# Patient Record
Sex: MALE | Race: OTHER | HISPANIC OR LATINO | Employment: OTHER | URBAN - METROPOLITAN AREA
[De-identification: names, ages, dates, MRNs, and addresses within clinical notes are randomized per-mention and may not be internally consistent; named-entity substitution may affect disease eponyms.]

---

## 2021-05-17 ENCOUNTER — VBI (OUTPATIENT)
Dept: ADMINISTRATIVE | Facility: OTHER | Age: 56
End: 2021-05-17

## 2021-06-23 RX ORDER — HYDROCHLOROTHIAZIDE 25 MG/1
TABLET ORAL EVERY 24 HOURS
COMMUNITY
Start: 2014-12-08 | End: 2021-07-19 | Stop reason: SDUPTHER

## 2021-06-23 RX ORDER — ATORVASTATIN CALCIUM 20 MG/1
TABLET, FILM COATED ORAL EVERY 24 HOURS
COMMUNITY
End: 2021-09-28 | Stop reason: HOSPADM

## 2021-06-24 ENCOUNTER — HOSPITAL ENCOUNTER (EMERGENCY)
Facility: HOSPITAL | Age: 56
Discharge: HOME | End: 2021-06-24
Attending: EMERGENCY MEDICINE | Admitting: EMERGENCY MEDICINE
Payer: COMMERCIAL

## 2021-06-24 ENCOUNTER — OFFICE VISIT (OUTPATIENT)
Dept: FAMILY MEDICINE CLINIC | Facility: CLINIC | Age: 56
End: 2021-06-24

## 2021-06-24 ENCOUNTER — APPOINTMENT (EMERGENCY)
Dept: CT IMAGING | Facility: HOSPITAL | Age: 56
End: 2021-06-24
Payer: COMMERCIAL

## 2021-06-24 VITALS
HEART RATE: 94 BPM | HEIGHT: 62 IN | BODY MASS INDEX: 30.11 KG/M2 | SYSTOLIC BLOOD PRESSURE: 232 MMHG | DIASTOLIC BLOOD PRESSURE: 88 MMHG | WEIGHT: 163.6 LBS | RESPIRATION RATE: 20 BRPM | OXYGEN SATURATION: 98 % | TEMPERATURE: 98.4 F

## 2021-06-24 VITALS
OXYGEN SATURATION: 98 % | SYSTOLIC BLOOD PRESSURE: 179 MMHG | RESPIRATION RATE: 18 BRPM | DIASTOLIC BLOOD PRESSURE: 75 MMHG | TEMPERATURE: 98.6 F | HEART RATE: 82 BPM

## 2021-06-24 DIAGNOSIS — I51.89 GRADE I DIASTOLIC DYSFUNCTION: ICD-10-CM

## 2021-06-24 DIAGNOSIS — R51.9 HEADACHE: ICD-10-CM

## 2021-06-24 DIAGNOSIS — Z12.11 SCREENING FOR COLON CANCER: ICD-10-CM

## 2021-06-24 DIAGNOSIS — I16.1 HYPERTENSIVE EMERGENCY: Primary | ICD-10-CM

## 2021-06-24 DIAGNOSIS — I10 HYPERTENSION, UNSPECIFIED TYPE: ICD-10-CM

## 2021-06-24 DIAGNOSIS — I10 HYPERTENSION, UNSPECIFIED TYPE: Primary | ICD-10-CM

## 2021-06-24 LAB
ALBUMIN SERPL BCP-MCNC: 3.3 G/DL (ref 3.5–5)
ALP SERPL-CCNC: 123 U/L (ref 46–116)
ALT SERPL W P-5'-P-CCNC: 30 U/L (ref 12–78)
ANION GAP SERPL CALCULATED.3IONS-SCNC: 9 MMOL/L (ref 4–13)
AST SERPL W P-5'-P-CCNC: 32 U/L (ref 5–45)
ATRIAL RATE: 86 BPM
BASOPHILS # BLD AUTO: 0.03 THOUSANDS/ΜL (ref 0–0.1)
BASOPHILS NFR BLD AUTO: 1 % (ref 0–1)
BILIRUB SERPL-MCNC: 0.75 MG/DL (ref 0.2–1)
BUN SERPL-MCNC: 6 MG/DL (ref 5–25)
CALCIUM ALBUM COR SERPL-MCNC: 9.4 MG/DL (ref 8.3–10.1)
CALCIUM SERPL-MCNC: 8.8 MG/DL (ref 8.3–10.1)
CHLORIDE SERPL-SCNC: 96 MMOL/L (ref 100–108)
CO2 SERPL-SCNC: 29 MMOL/L (ref 21–32)
CREAT SERPL-MCNC: 0.73 MG/DL (ref 0.6–1.3)
EOSINOPHIL # BLD AUTO: 0.01 THOUSAND/ΜL (ref 0–0.61)
EOSINOPHIL NFR BLD AUTO: 0 % (ref 0–6)
ERYTHROCYTE [DISTWIDTH] IN BLOOD BY AUTOMATED COUNT: 12 % (ref 11.6–15.1)
GFR SERPL CREATININE-BSD FRML MDRD: 104 ML/MIN/1.73SQ M
GLUCOSE SERPL-MCNC: 140 MG/DL (ref 65–140)
HCT VFR BLD AUTO: 46.1 % (ref 36.5–49.3)
HGB BLD-MCNC: 16.1 G/DL (ref 12–17)
IMM GRANULOCYTES # BLD AUTO: 0.02 THOUSAND/UL (ref 0–0.2)
IMM GRANULOCYTES NFR BLD AUTO: 0 % (ref 0–2)
LYMPHOCYTES # BLD AUTO: 0.93 THOUSANDS/ΜL (ref 0.6–4.47)
LYMPHOCYTES NFR BLD AUTO: 14 % (ref 14–44)
MCH RBC QN AUTO: 33.8 PG (ref 26.8–34.3)
MCHC RBC AUTO-ENTMCNC: 34.9 G/DL (ref 31.4–37.4)
MCV RBC AUTO: 97 FL (ref 82–98)
MONOCYTES # BLD AUTO: 0.56 THOUSAND/ΜL (ref 0.17–1.22)
MONOCYTES NFR BLD AUTO: 9 % (ref 4–12)
NEUTROPHILS # BLD AUTO: 4.93 THOUSANDS/ΜL (ref 1.85–7.62)
NEUTS SEG NFR BLD AUTO: 76 % (ref 43–75)
NRBC BLD AUTO-RTO: 0 /100 WBCS
P AXIS: 9 DEGREES
PLATELET # BLD AUTO: 142 THOUSANDS/UL (ref 149–390)
PMV BLD AUTO: 9.7 FL (ref 8.9–12.7)
POTASSIUM SERPL-SCNC: 4.1 MMOL/L (ref 3.5–5.3)
PR INTERVAL: 178 MS
PROT SERPL-MCNC: 7.8 G/DL (ref 6.4–8.2)
QRS AXIS: 14 DEGREES
QRSD INTERVAL: 86 MS
QT INTERVAL: 336 MS
QTC INTERVAL: 402 MS
RBC # BLD AUTO: 4.77 MILLION/UL (ref 3.88–5.62)
SODIUM SERPL-SCNC: 134 MMOL/L (ref 136–145)
T WAVE AXIS: 213 DEGREES
TROPONIN I SERPL-MCNC: 0.04 NG/ML
VENTRICULAR RATE: 86 BPM
WBC # BLD AUTO: 6.48 THOUSAND/UL (ref 4.31–10.16)

## 2021-06-24 PROCEDURE — 99284 EMERGENCY DEPT VISIT MOD MDM: CPT

## 2021-06-24 PROCEDURE — 93010 ELECTROCARDIOGRAM REPORT: CPT | Performed by: INTERNAL MEDICINE

## 2021-06-24 PROCEDURE — 99213 OFFICE O/P EST LOW 20 MIN: CPT | Performed by: FAMILY MEDICINE

## 2021-06-24 PROCEDURE — 84484 ASSAY OF TROPONIN QUANT: CPT | Performed by: EMERGENCY MEDICINE

## 2021-06-24 PROCEDURE — 70450 CT HEAD/BRAIN W/O DYE: CPT

## 2021-06-24 PROCEDURE — 99285 EMERGENCY DEPT VISIT HI MDM: CPT | Performed by: EMERGENCY MEDICINE

## 2021-06-24 PROCEDURE — 3078F DIAST BP <80 MM HG: CPT | Performed by: FAMILY MEDICINE

## 2021-06-24 PROCEDURE — 96375 TX/PRO/DX INJ NEW DRUG ADDON: CPT

## 2021-06-24 PROCEDURE — 36415 COLL VENOUS BLD VENIPUNCTURE: CPT | Performed by: EMERGENCY MEDICINE

## 2021-06-24 PROCEDURE — 96374 THER/PROPH/DIAG INJ IV PUSH: CPT

## 2021-06-24 PROCEDURE — G1004 CDSM NDSC: HCPCS

## 2021-06-24 PROCEDURE — 93005 ELECTROCARDIOGRAM TRACING: CPT

## 2021-06-24 PROCEDURE — 85025 COMPLETE CBC W/AUTO DIFF WBC: CPT | Performed by: EMERGENCY MEDICINE

## 2021-06-24 PROCEDURE — 80053 COMPREHEN METABOLIC PANEL: CPT | Performed by: EMERGENCY MEDICINE

## 2021-06-24 PROCEDURE — 3077F SYST BP >= 140 MM HG: CPT | Performed by: FAMILY MEDICINE

## 2021-06-24 RX ORDER — LABETALOL 20 MG/4 ML (5 MG/ML) INTRAVENOUS SYRINGE
10 ONCE
Status: DISCONTINUED | OUTPATIENT
Start: 2021-06-24 | End: 2021-06-24

## 2021-06-24 RX ORDER — LABETALOL 20 MG/4 ML (5 MG/ML) INTRAVENOUS SYRINGE
10 ONCE
Status: COMPLETED | OUTPATIENT
Start: 2021-06-24 | End: 2021-06-24

## 2021-06-24 RX ORDER — KETOROLAC TROMETHAMINE 30 MG/ML
30 INJECTION, SOLUTION INTRAMUSCULAR; INTRAVENOUS ONCE
Status: COMPLETED | OUTPATIENT
Start: 2021-06-24 | End: 2021-06-24

## 2021-06-24 RX ADMIN — KETOROLAC TROMETHAMINE 30 MG: 30 INJECTION, SOLUTION INTRAMUSCULAR; INTRAVENOUS at 16:25

## 2021-06-24 RX ADMIN — LABETALOL 20 MG/4 ML (5 MG/ML) INTRAVENOUS SYRINGE 10 MG: at 14:53

## 2021-06-24 NOTE — ED NOTES
Patient's mother provided update via phone at this time  She reports she will come pick him up       Chuy Valdez RN  06/24/21 4877

## 2021-06-24 NOTE — PROGRESS NOTES
Assessment/Plan:    Hypertensive emergency  Assessment:   Blood Pressure 1st reading 232/88 mmHg(obtain by nurse);  225/90  MmHg (obtain by me), poorly controlled  Goal BP less than 140/90 mmHg as per JNC-8  Complains significant for end-organ damage  EKG obtained at the office: NSR, possible q-waves and  T-was inversion in V4, V5, and V6  Currently is not taking any medication to controlled his HTN  No EKG on file to compare these findings     DDx: ACS, old MI, or aortic dissection  Plant:  - After discussing with the patient our findings it was recommended transport him to the ED via EMS; patient agreed  -EMS to transfer the patient to [facility] for a Higher level of care were given with copy of EKG obtained at the clinic    -Reassess the patient out our office once he is D/C from hospital for better control of BP  Diagnoses and all orders for this visit:    Hypertensive emergency    Hypertension, unspecified type    Grade I diastolic dysfunction  -     POCT ECG    Screening for colon cancer    Other orders  -     atorvastatin (LIPITOR) 20 mg tablet; every 24 hours  -     hydrochlorothiazide (HYDRODIURIL) 25 mg tablet; every 24 hours  -     Cancel: Ambulatory referral to Colorectal Surgery; Future          Subjective:      Patient ID: Prerna Peterson is a 64 y o  male  Encounter vas performed with the help of the mother  This is a pleasant 64 y o  Male who is deaf 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, with a PMH pertinent for HTN and grade I diastolic dysfunction, presents to our clinic with his mother, who is helping to translate during the encounter  The mother states that the patient has complained of headaches, blurry vision, and chest tightness for a couple of days   A series of questions were written for better understanding, including location, intensity, severity, quality, radiation, and associators: the location frontotemporal headache, 7/10 intensity, throbbing in quality, that started "a couple of days ago", associated with blurry vision and chest tightness  Denies palpitations, LE edema, Cough, SOB  He has not been evaluated by his PCP "in a long time"  His mother states she thought the patient was getting regular checkups with PCP  The mother is also concern regarding the patient's increased in alcohol intake  The following portions of the patient's history were reviewed and updated as appropriate: allergies, current medications, past family history, past medical history, past social history, past surgical history and problem list     Review of Systems   Constitutional: Negative for chills and fever  Eyes: Positive for visual disturbance  Blurry vision   Respiratory: Negative for cough, shortness of breath and wheezing  Cardiovascular: Negative for palpitations and leg swelling  Chest tightness    Gastrointestinal: Negative for nausea and vomiting  Neurological: Positive for headaches  Objective:      BP (!) 232/88 (BP Location: Left arm, Patient Position: Sitting, Cuff Size: Standard)   Pulse 94   Temp 98 4 °F (36 9 °C) (Temporal)   Resp 20   Ht 5' 1 75" (1 568 m)   Wt 74 2 kg (163 lb 9 6 oz)   SpO2 98%   BMI 30 17 kg/m²          Physical Exam  Vitals (Nurse BP: 232/88 mmHg; Second /90 mmHg 10 min after firt reading, last bp was obtained by me  ) and nursing note reviewed  Constitutional:       General: He is awake  He is not in acute distress  Appearance: He is well-developed  He is obese  He is not ill-appearing, toxic-appearing or diaphoretic  HENT:      Head: Normocephalic and atraumatic  Eyes:      Extraocular Movements: Extraocular movements intact  Conjunctiva/sclera: Conjunctivae normal    Cardiovascular:      Rate and Rhythm: Normal rate and regular rhythm  Occasional extrasystoles are present  Pulses:           Radial pulses are 2+ on the right side and 2+ on the left side  Heart sounds: Normal heart sounds  No murmur heard  No friction rub  No gallop  Pulmonary:      Effort: Pulmonary effort is normal  No respiratory distress  Breath sounds: Normal breath sounds and air entry  Chest:      Chest wall: No lacerations or tenderness  There is no dullness to percussion  Breasts: Breasts are symmetrical      Abdominal:      Palpations: Abdomen is soft  Musculoskeletal:      Cervical back: Normal range of motion and neck supple  Feet:      Right foot:      Skin integrity: No ulcer, skin breakdown, erythema, warmth, callus or dry skin  Left foot:      Skin integrity: No ulcer, skin breakdown, erythema, warmth, callus or dry skin  Skin:     General: Skin is warm  Neurological:      Mental Status: He is alert  Cranial Nerves: Cranial nerve deficit (deafness, chronic, since birth) and dysarthria (chornic, since birth) present  Motor: No weakness, tremor or abnormal muscle tone  Psychiatric:         Behavior: Behavior is cooperative

## 2021-06-24 NOTE — ED PROVIDER NOTES
History  Chief Complaint   Patient presents with    Hypertension     Pt presents from [facility] after staff noted BP of 200/100's and EKG showed changes  Pt reports chronic vision changes and new onset dizziness  PT requires ASL interpretation  15-year-old male was sent over from the clinic for his blood pressure being 200's/100's   + headache, no nausea/vomiting, no chest pain, no shortness of breath  Patient feels dizzy at times  He states that he is not on medication for hypertension  He is deaf and mute and his family is translating for him through lip reading and sign language  Prior to Admission Medications   Prescriptions Last Dose Informant Patient Reported? Taking?   atorvastatin (LIPITOR) 20 mg tablet   Yes No   Sig: every 24 hours   hydrochlorothiazide (HYDRODIURIL) 25 mg tablet   Yes No   Sig: every 24 hours      Facility-Administered Medications: None       Past Medical History:   Diagnosis Date    Deaf     Heart murmur        No past surgical history on file  Family History   Problem Relation Age of Onset    Hypertension Mother     Diabetes Mother     Heart disease Mother         pacemaker    Arthritis Mother         chronic     I have reviewed and agree with the history as documented  E-Cigarette/Vaping    E-Cigarette Use Never User      E-Cigarette/Vaping Substances    Nicotine No     THC No     CBD No     Flavoring No     Other No     Unknown No      Social History     Tobacco Use    Smoking status: Never Smoker    Smokeless tobacco: Never Used   Vaping Use    Vaping Use: Never used   Substance Use Topics    Alcohol use: Not Currently     Comment: 'as per mother pt drinks everyday"     Drug use: Never       Review of Systems   Constitutional: Negative for appetite change, fatigue and fever  HENT: Negative for rhinorrhea and sore throat  Respiratory: Negative for cough, shortness of breath and wheezing      Cardiovascular: Negative for chest pain and leg swelling  Gastrointestinal: Negative for abdominal pain, diarrhea, nausea and vomiting  Genitourinary: Negative for dysuria and flank pain  Musculoskeletal: Negative for back pain and neck pain  Skin: Negative for rash  Neurological: Positive for dizziness and headaches  Negative for syncope  Psychiatric/Behavioral:        Mood normal       Physical Exam  Physical Exam  Vitals and nursing note reviewed  Constitutional:       Appearance: He is well-developed  HENT:      Head: Normocephalic and atraumatic  Eyes:      Pupils: Pupils are equal, round, and reactive to light  Cardiovascular:      Rate and Rhythm: Normal rate and regular rhythm  Pulmonary:      Effort: Pulmonary effort is normal  No respiratory distress  Breath sounds: Normal breath sounds  Abdominal:      Palpations: Abdomen is soft  Tenderness: There is no abdominal tenderness  Musculoskeletal:         General: Normal range of motion  Cervical back: Normal range of motion and neck supple  Skin:     General: Skin is warm and dry  Neurological:      General: No focal deficit present  Mental Status: He is alert and oriented to person, place, and time  Cranial Nerves: No cranial nerve deficit           Vital Signs  ED Triage Vitals [06/24/21 1412]   Temperature Pulse Respirations Blood Pressure SpO2   98 6 °F (37 °C) 90 19 (!) 225/118 96 %      Temp Source Heart Rate Source Patient Position - Orthostatic VS BP Location FiO2 (%)   Oral Monitor Lying Left arm --      Pain Score       --           Vitals:    06/24/21 1412 06/24/21 1625 06/24/21 1645   BP: (!) 225/118 (!) 179/86 (!) 179/75   Pulse: 90 84 82   Patient Position - Orthostatic VS: Lying Lying Lying         Visual Acuity      ED Medications  Medications   Labetalol HCl (NORMODYNE) injection 10 mg (10 mg Intravenous Given 6/24/21 1453)   ketorolac (TORADOL) injection 30 mg (30 mg Intravenous Given 6/24/21 1625)       Diagnostic Studies  Results Reviewed     Procedure Component Value Units Date/Time    Troponin I [[id number]]  (Normal) Collected: 06/24/21 1453    Lab Status: Final result Specimen: Blood from Arm, Left Updated: 06/24/21 1533     Troponin I 0 04 ng/mL     Comprehensive metabolic panel [[id number]]  (Abnormal) Collected: 06/24/21 1453    Lab Status: Final result Specimen: Blood from Arm, Left Updated: 06/24/21 1531     Sodium 134 mmol/L      Potassium 4 1 mmol/L      Chloride 96 mmol/L      CO2 29 mmol/L      ANION GAP 9 mmol/L      BUN 6 mg/dL      Creatinine 0 73 mg/dL      Glucose 140 mg/dL      Calcium 8 8 mg/dL      Corrected Calcium 9 4 mg/dL      AST 32 U/L      ALT 30 U/L      Alkaline Phosphatase 123 U/L      Total Protein 7 8 g/dL      Albumin 3 3 g/dL      Total Bilirubin 0 75 mg/dL      eGFR 104 ml/min/1 73sq m     Narrative:      National Kidney Disease Foundation guidelines for Chronic Kidney Disease (CKD):     Stage 1 with normal or high GFR (GFR > 90 mL/min/1 73 square meters)    Stage 2 Mild CKD (GFR = 60-89 mL/min/1 73 square meters)    Stage 3A Moderate CKD (GFR = 45-59 mL/min/1 73 square meters)    Stage 3B Moderate CKD (GFR = 30-44 mL/min/1 73 square meters)    Stage 4 Severe CKD (GFR = 15-29 mL/min/1 73 square meters)    Stage 5 End Stage CKD (GFR <15 mL/min/1 73 square meters)  Note: GFR calculation is accurate only with a steady state creatinine    CBC and differential [[id number]]  (Abnormal) Collected: 06/24/21 1453    Lab Status: Final result Specimen: Blood from Arm, Left Updated: 06/24/21 1510     WBC 6 48 Thousand/uL      RBC 4 77 Million/uL      Hemoglobin 16 1 g/dL      Hematocrit 46 1 %      MCV 97 fL      MCH 33 8 pg      MCHC 34 9 g/dL      RDW 12 0 %      MPV 9 7 fL      Platelets 324 Thousands/uL      nRBC 0 /100 WBCs      Neutrophils Relative 76 %      Immat GRANS % 0 %      Lymphocytes Relative 14 %      Monocytes Relative 9 %      Eosinophils Relative 0 %      Basophils Relative 1 %      Neutrophils Absolute 4 93 Thousands/µL      Immature Grans Absolute 0 02 Thousand/uL      Lymphocytes Absolute 0 93 Thousands/µL      Monocytes Absolute 0 56 Thousand/µL      Eosinophils Absolute 0 01 Thousand/µL      Basophils Absolute 0 03 Thousands/µL                  CT head without contrast   Final Result by Kathleen Houston MD (06/24 0143)      No acute intracranial abnormality  Workstation performed: [id number]                    Procedures  Procedures         ED Course                                           MDM  Number of Diagnoses or Management Options     Amount and/or Complexity of Data Reviewed  Clinical lab tests: ordered and reviewed  Tests in the radiology section of CPT®: ordered and reviewed    Risk of Complications, Morbidity, and/or Mortality  Presenting problems: moderate  General comments: Patient felt better in the ER  His blood pressure lower to 179/75  On his EKG it shows some T-wave inversion in V4 to V6  He has no old EKG to compare  He has no chest pain and his troponin is negative  He is stable to follow up as an outpatient with a cardiologist -phone number was given        Disposition  Final diagnoses:   Hypertension, unspecified type   Headache     Time reflects when diagnosis was documented in both MDM as applicable and the Disposition within this note     Time User Action Codes Description Comment    6/24/2021  4:14 PM Jae Serra Hypertension, unspecified type     6/24/2021  4:14 PM Lopez-Rodriguez, Ted Lesch Add [R51 9] Headache       ED Disposition     ED Disposition Condition Date/Time Comment    Discharge Stable Thu Jun 24, 2021  4:14 PM Ashley Palmira discharge to home/self care              Follow-up Information     Follow up With Specialties Details Why Contact Info Additional [address]   [address], [address] [address]  [address] [facility], [address], [address], [place], [address] [place] [address]          Discharge Medication List as of 6/24/2021  4:14 PM      CONTINUE these medications which have NOT CHANGED    Details   atorvastatin (LIPITOR) 20 mg tablet every 24 hours, Historical Med      hydrochlorothiazide (HYDRODIURIL) 25 mg tablet every 24 hours, Starting Mon 12/8/2014, Historical Med           No discharge procedures on file      PDMP Review     None          ED Provider  Electronically Signed by           Jenny Murrell MD  06/24/21 9289

## 2021-06-25 NOTE — ASSESSMENT & PLAN NOTE
Assessment:   Blood Pressure 1st reading 232/88 mmHg(obtain by nurse);  225/90  MmHg (obtain by me), poorly controlled  Goal BP less than 140/90 mmHg as per JNC-8  Complains significant for end-organ damage  EKG obtained at the office: NSR, possible q-waves and  T-was inversion in V4, V5, and V6  Currently is not taking any medication to controlled his HTN  No EKG on file to compare these findings     DDx: ACS, old MI, or aortic dissection  Plant:  - After discussing with the patient our findings it was recommended transport him to the ED via EMS; patient agreed  -EMS to transfer the patient to [facility] for a Higher level of care were given with copy of EKG obtained at the clinic    -Reassess the patient out our office once he is D/C from hospital for better control of BP

## 2021-07-19 ENCOUNTER — OFFICE VISIT (OUTPATIENT)
Dept: FAMILY MEDICINE CLINIC | Facility: CLINIC | Age: 56
End: 2021-07-19

## 2021-07-19 ENCOUNTER — VBI (OUTPATIENT)
Dept: ADMINISTRATIVE | Facility: OTHER | Age: 56
End: 2021-07-19

## 2021-07-19 VITALS
HEIGHT: 62 IN | HEART RATE: 103 BPM | DIASTOLIC BLOOD PRESSURE: 78 MMHG | RESPIRATION RATE: 20 BRPM | TEMPERATURE: 96.7 F | WEIGHT: 160.7 LBS | OXYGEN SATURATION: 96 % | BODY MASS INDEX: 29.57 KG/M2 | SYSTOLIC BLOOD PRESSURE: 166 MMHG

## 2021-07-19 DIAGNOSIS — I10 HYPERTENSION, UNSPECIFIED TYPE: Primary | ICD-10-CM

## 2021-07-19 DIAGNOSIS — D69.6 THROMBOCYTOPENIA (HCC): ICD-10-CM

## 2021-07-19 PROCEDURE — 99213 OFFICE O/P EST LOW 20 MIN: CPT | Performed by: FAMILY MEDICINE

## 2021-07-19 PROCEDURE — 3078F DIAST BP <80 MM HG: CPT | Performed by: FAMILY MEDICINE

## 2021-07-19 PROCEDURE — 3077F SYST BP >= 140 MM HG: CPT | Performed by: FAMILY MEDICINE

## 2021-07-19 RX ORDER — BLOOD PRESSURE TEST KIT
KIT MISCELLANEOUS 2 TIMES DAILY
Qty: 1 KIT | Refills: 0 | Status: SHIPPED | OUTPATIENT
Start: 2021-07-19 | End: 2021-11-03 | Stop reason: SDUPTHER

## 2021-07-19 RX ORDER — HYDROCHLOROTHIAZIDE 25 MG/1
25 TABLET ORAL DAILY
Qty: 30 TABLET | Refills: 2 | Status: SHIPPED | OUTPATIENT
Start: 2021-07-19 | End: 2021-08-20

## 2021-07-19 RX ORDER — LISINOPRIL 10 MG/1
10 TABLET ORAL DAILY
Qty: 30 TABLET | Refills: 0 | Status: SHIPPED | OUTPATIENT
Start: 2021-07-19 | End: 2021-09-20 | Stop reason: ALTCHOICE

## 2021-07-19 NOTE — ASSESSMENT & PLAN NOTE
Blood Pressure 166/78 mmHg,   suboptimal control  Goal BP less than 140/90 mmHg as per JNC-8   As per chart the patient on hydrochlorothiazide 25 mg daily, patient not compliant  Reviewed BP target goal with patient  EKG  Obtain at the office showed  NSR,  With possible enlargement of the  left atrial, ST and T-wave abnormalities I was recommended to consider inferolateral ischemia  -Continue to maintain healthy balanced diet with focus on low salt intake     -Continue  Will continue hydrochlorothiazide 25 mg daily   -Start the patient on lisinopril 10 mg daily  -Monitor BP at home daily and log the results for next office visit     - Will repeat EKG at next visit

## 2021-07-19 NOTE — ASSESSMENT & PLAN NOTE
Incidental as per CBC performed  On 6/2021   no active bleeding reported         Repeat CBC and discussed results and next encounter

## 2021-07-20 ENCOUNTER — PATIENT OUTREACH (OUTPATIENT)
Dept: FAMILY MEDICINE CLINIC | Facility: CLINIC | Age: 56
End: 2021-07-20

## 2021-07-20 PROBLEM — I16.1 HYPERTENSIVE EMERGENCY: Status: RESOLVED | Noted: 2021-06-24 | Resolved: 2021-07-20

## 2021-07-20 NOTE — PROGRESS NOTES
MIAH HAWKINS received a referral from patient's PCP regarding possible involvement in complex care management program  MIAH HAWKINS did discuss case with RN SHRUTHI, López Eng at this time  Per Zeyad Peters, pt does not meet criteria for complex care management  Referral will be closed  MIAH CM available if any psychosocial concerns present

## 2021-07-21 NOTE — ASSESSMENT & PLAN NOTE
No murmur was appreciated of this visit, previous murmur most likely related to the hypertensive emergency  Will reassess for murmurs her next visit     Follow-up in 4 weeks

## 2021-07-21 NOTE — PROGRESS NOTES
Assessment/Plan:    Hypertension  Blood Pressure 166/78 mmHg,   suboptimal control  Goal BP less than 140/90 mmHg as per JNC-8   As per chart the patient on hydrochlorothiazide 25 mg daily, patient not compliant  Reviewed BP target goal with patient  EKG  Obtain at the office showed  NSR,  With possible enlargement of the  left atrial, ST and T-wave abnormalities I was recommended to consider inferolateral ischemia  -Continue to maintain healthy balanced diet with focus on low salt intake     -Continue  Will continue hydrochlorothiazide 25 mg daily   -Start the patient on lisinopril 10 mg daily  -Monitor BP at home daily and log the results for next office visit  - Will repeat EKG at next visit      Thrombocytopenia ([facility] [address] )   Incidental as per CBC performed  On 6/2021   no active bleeding reported         Repeat CBC and discussed results and next encounter    Grade I diastolic dysfunction   No murmur was appreciated of this visit, previous murmur most likely related to the hypertensive emergency  Will reassess for murmurs her next visit  Follow-up in 4 weeks       Diagnoses and all orders for this visit:    Hypertension, unspecified type  -     hydrochlorothiazide (HYDRODIURIL) 25 mg tablet; Take 1 tablet (25 mg total) by mouth daily  -     lisinopril (ZESTRIL) 10 mg tablet; Take 1 tablet (10 mg total) by mouth daily  -     Blood Pressure KIT; Use 2 (two) times a day  -     Ambulatory referral to social work care management program; Future    Thrombocytopenia ([address] )  -     CBC and differential; Future           DEAFNESS   Patient poor compliant medication, possible due to his communication barrier  Will referred the patient to social work for available resources  Subjective:      Patient ID: Rhina Arellano is a 64 y o  male  Encounter vas performed with the help of the mother  This is a pleasant 64 y o   Male with a PMH pertinent for Deafness,  HTN and grade I diastolic dysfunction, who presents to our clinic with his mother follow-up on his chronic conditions,  At the last encounter the patient was sent to the emergency department  On 06/24/2021 after he was found to be in hypertensive emergency, the patient was complaining of chest pain headaches and abnormal EKG (T-wave inversion in V4 and V6),  troponins were negative  And the patient was discharged home with close follow-up with PCP  At that time his blood pressure was 230/100s mmHg  At today's visit the patient feels much better and denies chest pain, palpitation,  Shortness of breath, cough, lower extremity edema,dizziness, headaches, nausea,  Or vomits  The patient is not compliant with these medications, mother states that she was unaware that the patient was on a prescribed blood pressure medication  Although she was in the when the patient was transfer at his last visit to the emergency department to rule out ACS  The following portions of the patient's history were reviewed and updated as appropriate: allergies, current medications, past family history, past medical history, past social history, past surgical history and problem list     Review of Systems   Constitutional: Negative for chills, diaphoresis and fever  Encounter was performed with the help of his mother   HENT: Negative for trouble swallowing  Bilateral deafness, chronic since the patient was born  Eyes: Negative for visual disturbance  Respiratory: Negative for cough, chest tightness and shortness of breath  Cardiovascular: Negative for chest pain, palpitations and leg swelling  Gastrointestinal: Negative for abdominal pain, nausea and vomiting  Genitourinary: Negative for difficulty urinating and dysuria  Musculoskeletal: Negative for back pain and myalgias  Skin: Negative for pallor  Neurological: Negative for syncope           Objective:      /78 (BP Location: Left arm, Patient Position: Sitting, Cuff Size: Standard)   Pulse 103 Temp (!) 96 7 °F (35 9 °C) (Temporal)   Resp 20   Ht 5' 1 75" (1 568 m)   Wt 72 9 kg (160 lb 11 2 oz)   SpO2 96%   BMI 29 63 kg/m²          Physical Exam  Vitals (Nurse BP: 232/88 mmHg; Second /90 mmHg 10 min after firt reading, last bp was obtained by me  ) and nursing note reviewed  Constitutional:       General: He is awake  He is not in acute distress  Appearance: He is well-developed  He is obese  He is not ill-appearing, toxic-appearing or diaphoretic  HENT:      Head: Normocephalic and atraumatic  Eyes:      General: No scleral icterus  Extraocular Movements: Extraocular movements intact  Conjunctiva/sclera: Conjunctivae normal    Neck:      Vascular: No carotid bruit  Cardiovascular:      Rate and Rhythm: Normal rate and regular rhythm  No extrasystoles are present  Pulses:           Radial pulses are 2+ on the right side and 2+ on the left side  Heart sounds: Normal heart sounds  No murmur heard  No friction rub  No gallop  Pulmonary:      Effort: Pulmonary effort is normal  No respiratory distress  Breath sounds: Normal breath sounds and air entry  Chest:      Chest wall: No lacerations or tenderness  There is no dullness to percussion  Breasts: Breasts are symmetrical      Abdominal:      Palpations: Abdomen is soft  Tenderness: There is no abdominal tenderness  Musculoskeletal:         General: Normal range of motion  Right lower leg: No edema  Left lower leg: No edema  Feet:      Right foot:      Skin integrity: No ulcer, skin breakdown, erythema, warmth, callus or dry skin  Left foot:      Skin integrity: No ulcer, skin breakdown, erythema, warmth, callus or dry skin  Skin:     General: Skin is warm  Neurological:      Mental Status: He is alert  Cranial Nerves: Cranial nerve deficit (deafness, chronic, since birth) and dysarthria (chornic, since birth) present        Motor: No weakness, tremor or abnormal muscle tone    Psychiatric:         Behavior: Behavior is cooperative

## 2021-08-20 ENCOUNTER — OFFICE VISIT (OUTPATIENT)
Dept: FAMILY MEDICINE CLINIC | Facility: CLINIC | Age: 56
End: 2021-08-20

## 2021-08-20 VITALS
WEIGHT: 161 LBS | OXYGEN SATURATION: 95 % | BODY MASS INDEX: 29.69 KG/M2 | SYSTOLIC BLOOD PRESSURE: 176 MMHG | HEART RATE: 106 BPM | DIASTOLIC BLOOD PRESSURE: 84 MMHG | TEMPERATURE: 97.6 F | RESPIRATION RATE: 18 BRPM

## 2021-08-20 DIAGNOSIS — I10 HYPERTENSION, UNSPECIFIED TYPE: Primary | ICD-10-CM

## 2021-08-20 DIAGNOSIS — I51.89 GRADE I DIASTOLIC DYSFUNCTION: ICD-10-CM

## 2021-08-20 PROBLEM — Z12.11 SCREENING FOR COLON CANCER: Status: ACTIVE | Noted: 2021-08-20

## 2021-08-20 PROCEDURE — 99213 OFFICE O/P EST LOW 20 MIN: CPT | Performed by: FAMILY MEDICINE

## 2021-08-20 PROCEDURE — 3077F SYST BP >= 140 MM HG: CPT | Performed by: FAMILY MEDICINE

## 2021-08-20 PROCEDURE — 3079F DIAST BP 80-89 MM HG: CPT | Performed by: FAMILY MEDICINE

## 2021-08-20 RX ORDER — AMLODIPINE BESYLATE 5 MG/1
5 TABLET ORAL DAILY
Qty: 30 TABLET | Refills: 0 | Status: SHIPPED | OUTPATIENT
Start: 2021-08-20 | End: 2021-09-20 | Stop reason: SDUPTHER

## 2021-08-20 NOTE — PROGRESS NOTES
Assessment/Plan:    Hypertension  Blood Pressure 176/84 mmHg,   suboptimal control  Goal BP less than 140/90 mmHg as per JNC-8   On hydrochlorothiazide 25 mg and lisinopril 10 mg daily, patient not compliant  Reviewed BP target goal with patient  EKG  Obtain at the office showed  NSR,  With possible enlargement of the  left atrial, ST and T-wave abnormalities I was recommended to consider inferolateral ischemia  -Continue to maintain healthy balanced diet with focus on low salt intake  - will discontinue the  Hydrochlorothiazide, will continue lisinopril 10 mg daily and will start the patient on amlodipine 5 mg daily  - Ambulatory referral to Cardiology  - Encourage patient for daily blood pressure check up some home, if not possible because of the lack of obtaining the device then was recommended to find a pharmacy where he can not check his blood pressure  Grade I diastolic dysfunction   No murmur was appreciated of this visit  -Will reassess for murmurs her next visit   -Amb  Echocardiogram ordered      Screening for colon cancer  · Pt is 64years old, no history of colonoscopy perform   · USPSTF recommends colonoscopy screening starting at age 48   · Denies rectal bleeding, melena, or changes in bw habits/   · Will  Refer him to ambulatory gastroenterology for colonoscopy  Diagnoses and all orders for this visit:    Hypertension, unspecified type  -     Ambulatory referral to Cardiology; Future  -     amLODIPine (NORVASC) 5 mg tablet; Take 1 tablet (5 mg total) by mouth daily  -     Ambulatory referral to Gastroenterology; Future    Grade I diastolic dysfunction  -     Echo complete with contrast if indicated; Future          Subjective:      Patient ID: Ligia Valdez is a 64 y o  male  Encounter performed with the help of his mother for translation since the patient has a hypoacusia  Silvia Armijo  Encarnacion is a 64 y o  M with a PMHx that includes HTN, grade I diastolic dysfunction, Hyperlipidemia, cardiomegaly  and thrombocytopenia, who presents for a f/u of his HTN     He is currently on Lisinopril 10 mg and hydrochlorothiazide 25 mg, not compliant with his medication his hydrochlorothiazide, patient reports that makes him feel dizzy  Patient does not monitor his blood pressure home, tried to get a blood pressure kit at the pharmacy but was  told that he had to pay for the device  At his last visit his blood pressure was 166/78 mmHg  Overall  Patient reports is feeling well  Denies fatigue, headaches, dizziness, blurred vision, nausea, palpitation, chest pain, SOB, urinary changes, weakness, bowel changes, sleep problems  Patient's medical conditions are stable unless noted otherwise above   Patient has not had any recent hospitalizations, or medical emergencies since last visit  Patient has no further complaints other than what is mentioned in the ROS  The following portions of the patient's history were reviewed and updated as appropriate: allergies, current medications, past family history, past medical history, past social history, past surgical history and problem list     Review of Systems   Constitutional: Negative for activity change, chills, diaphoresis, fatigue, fever and unexpected weight change  Eyes: Negative for visual disturbance  Respiratory: Negative for cough, chest tightness, shortness of breath and wheezing  Cardiovascular: Negative for chest pain, palpitations and leg swelling  Gastrointestinal: Negative for abdominal distention, abdominal pain, blood in stool, constipation, diarrhea, nausea, rectal pain and vomiting  Genitourinary: Negative for difficulty urinating, dysuria and flank pain  Skin: Negative for color change and pallor  Neurological: Negative for dizziness, speech difficulty and weakness  Psychiatric/Behavioral: Negative for sleep disturbance and suicidal ideas           Objective:      BP (!) 176/84 (BP Location: Left arm, Patient Position: Sitting, Cuff Size: Adult)   Pulse (!) 106   Temp 97 6 °F (36 4 °C) (Temporal)   Resp 18   Wt 73 kg (161 lb)   SpO2 95%   BMI 29 69 kg/m²          Physical Exam  Vitals and nursing note reviewed  Constitutional:       General: He is not in acute distress  Appearance: He is well-developed  He is not ill-appearing, toxic-appearing or diaphoretic  HENT:      Head: Normocephalic and atraumatic  Right Ear: Decreased hearing noted  Left Ear: Decreased hearing noted  Ears:      Comments: Chronic hypoacusia since birth  Eyes:      General: No scleral icterus  Extraocular Movements: Extraocular movements intact  Cardiovascular:      Rate and Rhythm: Normal rate and regular rhythm  No extrasystoles are present  Pulses:           Radial pulses are 2+ on the right side and 2+ on the left side  Heart sounds: Normal heart sounds, S1 normal and S2 normal  No murmur heard  No friction rub  No gallop  Pulmonary:      Effort: Pulmonary effort is normal  No respiratory distress  Breath sounds: Normal breath sounds and air entry  Abdominal:      General: Bowel sounds are normal       Palpations: Abdomen is soft  There is no mass  Tenderness: There is no abdominal tenderness  There is no right CVA tenderness, left CVA tenderness, guarding or rebound  Musculoskeletal:         General: No swelling, tenderness, deformity or signs of injury  Normal range of motion  Cervical back: Normal range of motion  Right lower leg: No edema  Left lower leg: No edema  Feet:      Right foot:      Skin integrity: No ulcer, skin breakdown, erythema, warmth, callus or dry skin  Left foot:      Skin integrity: No ulcer, skin breakdown, erythema, warmth, callus or dry skin  Skin:     General: Skin is warm  Findings: No rash  Neurological:      General: No focal deficit present  Mental Status: He is alert

## 2021-08-20 NOTE — ASSESSMENT & PLAN NOTE
Blood Pressure 176/84 mmHg,   suboptimal control  Goal BP less than 140/90 mmHg as per JNC-8   On hydrochlorothiazide 25 mg and lisinopril 10 mg daily, patient not compliant  Reviewed BP target goal with patient  EKG  Obtain at the office showed  NSR,  With possible enlargement of the  left atrial, ST and T-wave abnormalities I was recommended to consider inferolateral ischemia  -Continue to maintain healthy balanced diet with focus on low salt intake  - will discontinue the  Hydrochlorothiazide, will continue lisinopril 10 mg daily and will start the patient on amlodipine 5 mg daily  - Ambulatory referral to Cardiology  - Encourage patient for daily blood pressure check up some home, if not possible because of the lack of obtaining the device then was recommended to find a pharmacy where he can not check his blood pressure

## 2021-08-20 NOTE — PATIENT INSTRUCTIONS
Heart Healthy Diet   WHAT YOU NEED TO KNOW:   A heart healthy diet is an eating plan low in unhealthy fats and sodium (salt)  The plan is high in healthy fats and fiber  A heart healthy diet helps improve your cholesterol levels and lowers your risk for heart disease and stroke  A dietitian will teach you how to read and understand food labels  DISCHARGE INSTRUCTIONS:   Heart healthy diet guidelines to follow:   · Choose foods that contain healthy fats  ? Unsaturated fats  include monounsaturated and polyunsaturated fats  Unsaturated fat is found in foods such as soybean, canola, olive, corn, and safflower oils  It is also found in soft tub margarine that is made with liquid vegetable oil  ? Omega-3 fat  is found in certain fish, such as salmon, tuna, and trout, and in walnuts and flaxseed  Eat fish high in omega-3 fats at least 2 times a week  · Get 20 to 30 grams of fiber each day  Fruits, vegetables, whole-grain foods, and legumes (cooked beans) are good sources of fiber  · Limit or do not have unhealthy fats  ? Cholesterol  is found in animal foods, such as eggs and lobster, and in dairy products made from whole milk  Limit cholesterol to less than 200 mg each day  ? Saturated fat  is found in meats, such as pete and hamburger  It is also found in chicken or turkey skin, whole milk, and butter  Limit saturated fat to less than 7% of your total daily calories  ? Trans fat  is found in packaged foods, such as potato chips and cookies  It is also in hard margarine, some fried foods, and shortening  Do not eat foods that contain trans fats  · Limit sodium as directed  You may be told to limit sodium to 2,000 to 2,300 mg each day  Choose low-sodium or no-salt-added foods  Add little or no salt to food you prepare  Use herbs and spices in place of salt         Include the following in your heart healthy plan:  Ask your dietitian or healthcare provider how many servings to have from each of the following food groups:  · Grains:      ? Whole-wheat breads, cereals, and pastas, and brown rice    ? Low-fat, low-sodium crackers and chips    · Vegetables:      ? Broccoli, green beans, green peas, and spinach    ? Collards, kale, and lima beans    ? Carrots, sweet potatoes, tomatoes, and peppers    ? Canned vegetables with no salt added    · Fruits:      ? Bananas, peaches, pears, and pineapple    ? Grapes, raisins, and dates    ? Oranges, tangerines, grapefruit, orange juice, and grapefruit juice    ? Apricots, mangoes, melons, and papaya    ? Raspberries and strawberries    ? Canned fruit with no added sugar    · Low-fat dairy:      ? Nonfat (skim) milk, 1% milk, and low-fat almond, cashew, or soy milks fortified with calcium    ? Low-fat cheese, regular or frozen yogurt, and cottage cheese    · Meats and proteins:      ? Lean cuts of beef and pork (loin, leg, round), skinless chicken and turkey    ? Legumes, soy products, egg whites, or nuts    Limit or do not include the following in your heart healthy plan:   · Unhealthy fats and oils:      ? Whole or 2% milk, cream cheese, sour cream, or cheese    ? High-fat cuts of beef (T-bone steaks, ribs), chicken or turkey with skin, and organ meats such as liver    ? Butter, stick margarine, shortening, and cooking oils such as coconut or palm oil    · Foods and liquids high in sodium:      ? Packaged foods, such as frozen dinners, cookies, macaroni and cheese, and cereals with more than 300 mg of sodium per serving    ? Vegetables with added sodium, such as instant potatoes, vegetables with added sauces, or regular canned vegetables    ? Cured or smoked meats, such as hot dogs, pete, and sausage    ? High-sodium ketchup, barbecue sauce, salad dressing, pickles, olives, soy sauce, or miso    · Foods and liquids high in sugar:      ? Candy, cake, cookies, pies, or doughnuts    ? Soft drinks (soda), sports drinks, or sweetened tea    ?  Canned or dry mixes for cakes, soups, sauces, or gravies    Other healthy heart guidelines:   · Do not smoke  Nicotine and other chemicals in cigarettes and cigars can cause lung and heart damage  Ask your healthcare provider for information if you currently smoke and need help to quit  E-cigarettes or smokeless tobacco still contain nicotine  Talk to your healthcare provider before you use these products  · Limit or do not drink alcohol as directed  Alcohol can damage your heart and raise your blood pressure  Your healthcare provider may give you specific daily and weekly limits  The general recommended limit is 1 drink a day for women 21 or older and for men 72 or older  Do not have more than 3 drinks in a day or 7 in a week  The recommended limit is 2 drinks a day for men 24to 59years of age  Do not have more than 4 drinks in a day or 14 in a week  A drink of alcohol is 12 ounces of beer, 5 ounces of wine, or 1½ ounces of liquor  · Exercise regularly  Exercise can help you maintain a healthy weight and improve your blood pressure and cholesterol levels  Regular exercise can also decrease your risk for heart problems  Ask your healthcare provider about the best exercise plan for you  Do not start an exercise program without asking your healthcare provider  Follow up with your doctor or cardiologist as directed:  Write down your questions so you remember to ask them during your visits  © Copyright [employer] 2021 Information is for End User's use only and may not be sold, redistributed or otherwise used for commercial purposes  All illustrations and images included in CareNotes® are the copyrighted property of A D A M , Inc  or [facility] Rebekah Barton   The above information is an  only  It is not intended as medical advice for individual conditions or treatments  Talk to your doctor, nurse or pharmacist before following any medical regimen to see if it is safe and effective for you

## 2021-08-22 NOTE — ASSESSMENT & PLAN NOTE
No murmur was appreciated of this visit  -Will reassess for murmurs her next visit   -Amb   Echocardiogram ordered

## 2021-08-22 NOTE — ASSESSMENT & PLAN NOTE
· Pt is 64years old, no history of colonoscopy perform   · USPSTF recommends colonoscopy screening starting at age 48   · Denies rectal bleeding, melena, or changes in bw habits/   · Will  Refer him to ambulatory gastroenterology for colonoscopy

## 2021-09-20 ENCOUNTER — OFFICE VISIT (OUTPATIENT)
Dept: FAMILY MEDICINE CLINIC | Facility: CLINIC | Age: 56
End: 2021-09-20

## 2021-09-20 VITALS
TEMPERATURE: 98 F | HEIGHT: 68 IN | SYSTOLIC BLOOD PRESSURE: 190 MMHG | BODY MASS INDEX: 24.55 KG/M2 | OXYGEN SATURATION: 96 % | DIASTOLIC BLOOD PRESSURE: 93 MMHG | WEIGHT: 162 LBS | HEART RATE: 102 BPM | RESPIRATION RATE: 16 BRPM

## 2021-09-20 DIAGNOSIS — I10 HYPERTENSION, UNSPECIFIED TYPE: Primary | ICD-10-CM

## 2021-09-20 PROCEDURE — 3080F DIAST BP >= 90 MM HG: CPT | Performed by: INTERNAL MEDICINE

## 2021-09-20 PROCEDURE — 3077F SYST BP >= 140 MM HG: CPT | Performed by: INTERNAL MEDICINE

## 2021-09-20 PROCEDURE — 99213 OFFICE O/P EST LOW 20 MIN: CPT | Performed by: INTERNAL MEDICINE

## 2021-09-20 RX ORDER — LOSARTAN POTASSIUM AND HYDROCHLOROTHIAZIDE 12.5; 5 MG/1; MG/1
1 TABLET ORAL DAILY
Qty: 30 TABLET | Refills: 5 | Status: SHIPPED | OUTPATIENT
Start: 2021-09-20 | End: 2021-09-28 | Stop reason: HOSPADM

## 2021-09-20 RX ORDER — AMLODIPINE BESYLATE 5 MG/1
5 TABLET ORAL DAILY
Qty: 30 TABLET | Refills: 5 | Status: SHIPPED | OUTPATIENT
Start: 2021-09-20 | End: 2021-09-28 | Stop reason: HOSPADM

## 2021-09-20 NOTE — PROGRESS NOTES
pressure was 176/84 mmHg  Overall  Patient reports is feeling well  Denies fatigue, headaches, dizziness, blurred vision, nausea, palpitation, chest pain, SOB, urinary changes, weakness, bowel changes, sleep problems  The patient's medical conditions are stable unless noted otherwise above  Patient has not had any recent hospitalizations, or medical emergencies since last visit  Patient has no further complaints other than what is mentioned in the ROS  The following portions of the patient's history were reviewed and updated as appropriate: allergies, current medications, past family history, past medical history, past social history, past surgical history and problem list     Review of Systems   Constitutional: Negative for activity change, chills, diaphoresis, fatigue, fever and unexpected weight change  Eyes: Negative for visual disturbance  Respiratory: Negative for cough, chest tightness, shortness of breath and wheezing  Cardiovascular: Negative for chest pain, palpitations and leg swelling  Gastrointestinal: Negative for abdominal distention, abdominal pain, anal bleeding, blood in stool, constipation, diarrhea, nausea, rectal pain and vomiting  Genitourinary: Negative for difficulty urinating, dysuria and flank pain  Skin: Negative for color change and pallor  Neurological: Negative for dizziness, speech difficulty and weakness  Psychiatric/Behavioral: Negative for sleep disturbance and suicidal ideas  Objective:      BP (!) 190/93 (BP Location: Left arm, Patient Position: Sitting, Cuff Size: Standard)   Pulse 102   Temp 98 °F (36 7 °C) (Temporal)   Resp 16   Ht 5' 7 75" (1 721 m)   Wt 73 5 kg (162 lb)   SpO2 96%   BMI 24 81 kg/m²          Physical Exam  Vitals and nursing note reviewed  Constitutional:       General: He is not in acute distress  Appearance: He is well-developed  He is not ill-appearing, toxic-appearing or diaphoretic     HENT:      Head: Normocephalic and atraumatic  Ears:      Comments:    Eyes:      General: No scleral icterus  Extraocular Movements: Extraocular movements intact  Cardiovascular:      Rate and Rhythm: Normal rate and regular rhythm  No extrasystoles are present  Pulses:           Radial pulses are 2+ on the right side and 2+ on the left side  Heart sounds: Normal heart sounds, S1 normal and S2 normal  No murmur heard  No friction rub  No gallop  Pulmonary:      Effort: Pulmonary effort is normal  No respiratory distress  Breath sounds: Normal breath sounds and air entry  Abdominal:      General: Bowel sounds are normal       Palpations: Abdomen is soft  There is no mass  Tenderness: There is no abdominal tenderness  There is no right CVA tenderness, left CVA tenderness, guarding or rebound  Musculoskeletal:         General: No swelling, tenderness, deformity or signs of injury  Normal range of motion  Cervical back: Normal range of motion  Right lower leg: No edema  Left lower leg: No edema  Feet:      Right foot:      Skin integrity: No ulcer, skin breakdown, erythema, warmth, callus or dry skin  Left foot:      Skin integrity: No ulcer, skin breakdown, erythema, warmth, callus or dry skin  Skin:     General: Skin is warm  Findings: No rash  Neurological:      General: No focal deficit present  Mental Status: He is alert

## 2021-09-21 NOTE — ASSESSMENT & PLAN NOTE
Assessment   Blood Pressure at todays office visit 190/93 mmHg,   suboptimally control  Goal BP less than 140/90 mmHg as per JNC-8   On hydrochlorothiazide 25 mg and lisinopril 10 mg daily and amlodipine 5 mg dialy, reports been compliant  Reviewed BP target goal with patient  EKG form 6/2021 Obtain at the office showed  NSR,  With possible enlargement of the  left atrial, ST and T-wave abnormalities I was recommended to consider inferolateral ischemia, troponin wnl x1     Plan   -Continue to maintain healthy balanced diet with focus on low salt intake  - will discontinue the  Hydrochlorothiazide, will continue lisinopril 10 mg daily and will start him on losartan-hydrochlorothiazide 50-12 5 mg   - Ambulatory referral to Cardiology at last visit, pending scheduling    - Encourage patient for daily blood pressure check up some home, if not possible because of the lack of obtaining the device then was recommended to find a pharmacy where he can not check his blood pressure  Reassess at next visit

## 2021-09-27 ENCOUNTER — CLINICAL SUPPORT (OUTPATIENT)
Dept: FAMILY MEDICINE CLINIC | Facility: CLINIC | Age: 56
End: 2021-09-27

## 2021-09-27 ENCOUNTER — APPOINTMENT (EMERGENCY)
Dept: RADIOLOGY | Facility: HOSPITAL | Age: 56
End: 2021-09-27
Payer: COMMERCIAL

## 2021-09-27 ENCOUNTER — HOSPITAL ENCOUNTER (OUTPATIENT)
Facility: HOSPITAL | Age: 56
Setting detail: OBSERVATION
Discharge: HOME | End: 2021-09-28
Attending: EMERGENCY MEDICINE | Admitting: FAMILY MEDICINE
Payer: COMMERCIAL

## 2021-09-27 VITALS — SYSTOLIC BLOOD PRESSURE: 226 MMHG | DIASTOLIC BLOOD PRESSURE: 100 MMHG

## 2021-09-27 DIAGNOSIS — I10 HYPERTENSION, UNSPECIFIED TYPE: Primary | ICD-10-CM

## 2021-09-27 DIAGNOSIS — E11.9 TYPE 2 DIABETES MELLITUS WITHOUT COMPLICATION, WITHOUT LONG-TERM CURRENT USE OF INSULIN (HCC): ICD-10-CM

## 2021-09-27 DIAGNOSIS — E78.49 OTHER HYPERLIPIDEMIA: ICD-10-CM

## 2021-09-27 DIAGNOSIS — I16.0 HYPERTENSIVE URGENCY: ICD-10-CM

## 2021-09-27 DIAGNOSIS — I51.89 GRADE I DIASTOLIC DYSFUNCTION: ICD-10-CM

## 2021-09-27 PROBLEM — R77.8 ELEVATED TROPONIN: Status: ACTIVE | Noted: 2021-09-27

## 2021-09-27 PROBLEM — R79.89 ELEVATED TROPONIN: Status: ACTIVE | Noted: 2021-09-27

## 2021-09-27 LAB
ALBUMIN SERPL BCP-MCNC: 4.6 G/DL (ref 3–5.2)
ALP SERPL-CCNC: 113 U/L (ref 43–122)
ALT SERPL W P-5'-P-CCNC: 46 U/L
ANION GAP SERPL CALCULATED.3IONS-SCNC: 12 MMOL/L (ref 5–14)
AST SERPL W P-5'-P-CCNC: 61 U/L (ref 17–59)
ATRIAL RATE: 94 BPM
ATRIAL RATE: 99 BPM
BACTERIA UR QL AUTO: ABNORMAL /HPF
BASOPHILS # BLD AUTO: 0 THOUSANDS/ΜL (ref 0–0.1)
BASOPHILS NFR BLD AUTO: 1 % (ref 0–1)
BILIRUB SERPL-MCNC: 0.72 MG/DL
BILIRUB UR QL STRIP: NEGATIVE
BUN SERPL-MCNC: 6 MG/DL (ref 5–25)
CALCIUM SERPL-MCNC: 9.4 MG/DL (ref 8.4–10.2)
CHLORIDE SERPL-SCNC: 97 MMOL/L (ref 97–108)
CHOLEST SERPL-MCNC: 230 MG/DL
CLARITY UR: CLEAR
CO2 SERPL-SCNC: 26 MMOL/L (ref 22–30)
COLOR UR: YELLOW
CREAT SERPL-MCNC: 0.57 MG/DL (ref 0.7–1.5)
EOSINOPHIL # BLD AUTO: 0 THOUSAND/ΜL (ref 0–0.4)
EOSINOPHIL NFR BLD AUTO: 0 % (ref 0–6)
ERYTHROCYTE [DISTWIDTH] IN BLOOD BY AUTOMATED COUNT: 13.2 %
FINE GRAN CASTS URNS QL MICRO: ABNORMAL /LPF
GFR SERPL CREATININE-BSD FRML MDRD: 115 ML/MIN/1.73SQ M
GLUCOSE SERPL-MCNC: 122 MG/DL (ref 70–99)
GLUCOSE UR STRIP-MCNC: NEGATIVE MG/DL
HCT VFR BLD AUTO: 48.1 % (ref 41–53)
HDLC SERPL-MCNC: 59 MG/DL
HGB BLD-MCNC: 16.5 G/DL (ref 13.5–17.5)
HGB UR QL STRIP.AUTO: 25
HYALINE CASTS #/AREA URNS LPF: ABNORMAL /LPF
KETONES UR STRIP-MCNC: ABNORMAL MG/DL
LDLC SERPL CALC-MCNC: 153 MG/DL
LEUKOCYTE ESTERASE UR QL STRIP: NEGATIVE
LYMPHOCYTES # BLD AUTO: 0.9 THOUSANDS/ΜL (ref 0.5–4)
LYMPHOCYTES NFR BLD AUTO: 12 % (ref 25–45)
MCH RBC QN AUTO: 34.3 PG (ref 26–34)
MCHC RBC AUTO-ENTMCNC: 34.4 G/DL (ref 31–36)
MCV RBC AUTO: 100 FL (ref 80–100)
MONOCYTES # BLD AUTO: 0.5 THOUSAND/ΜL (ref 0.2–0.9)
MONOCYTES NFR BLD AUTO: 6 % (ref 1–10)
MUCOUS THREADS UR QL AUTO: ABNORMAL
NEUTROPHILS # BLD AUTO: 5.9 THOUSANDS/ΜL (ref 1.8–7.8)
NEUTS SEG NFR BLD AUTO: 81 % (ref 45–65)
NITRITE UR QL STRIP: NEGATIVE
NON-SQ EPI CELLS URNS QL MICRO: ABNORMAL /HPF
P AXIS: 58 DEGREES
P AXIS: 62 DEGREES
PH UR STRIP.AUTO: 6 [PH]
PLATELET # BLD AUTO: 151 THOUSANDS/UL (ref 150–450)
PMV BLD AUTO: 8.5 FL (ref 8.9–12.7)
POTASSIUM SERPL-SCNC: 4.2 MMOL/L (ref 3.6–5)
PR INTERVAL: 200 MS
PR INTERVAL: 202 MS
PROT SERPL-MCNC: 8.6 G/DL (ref 5.9–8.4)
PROT UR STRIP-MCNC: ABNORMAL MG/DL
QRS AXIS: 51 DEGREES
QRS AXIS: 52 DEGREES
QRSD INTERVAL: 78 MS
QRSD INTERVAL: 82 MS
QT INTERVAL: 346 MS
QT INTERVAL: 350 MS
QTC INTERVAL: 437 MS
QTC INTERVAL: 444 MS
RBC # BLD AUTO: 4.82 MILLION/UL (ref 4.5–5.9)
RBC #/AREA URNS AUTO: ABNORMAL /HPF
SODIUM SERPL-SCNC: 135 MMOL/L (ref 137–147)
SP GR UR STRIP.AUTO: 1.01 (ref 1–1.04)
T WAVE AXIS: 194 DEGREES
T WAVE AXIS: 197 DEGREES
TRIGL SERPL-MCNC: 90 MG/DL
TROPONIN I SERPL-MCNC: 0.04 NG/ML (ref 0–0.03)
TROPONIN I SERPL-MCNC: 0.05 NG/ML (ref 0–0.03)
TROPONIN I SERPL-MCNC: 0.05 NG/ML (ref 0–0.03)
TSH SERPL DL<=0.05 MIU/L-ACNC: 1.22 UIU/ML (ref 0.47–4.68)
UROBILINOGEN UA: 1 MG/DL
VENTRICULAR RATE: 94 BPM
VENTRICULAR RATE: 99 BPM
WBC # BLD AUTO: 7.3 THOUSAND/UL (ref 4.5–11)
WBC #/AREA URNS AUTO: ABNORMAL /HPF

## 2021-09-27 PROCEDURE — 93005 ELECTROCARDIOGRAM TRACING: CPT

## 2021-09-27 PROCEDURE — 96361 HYDRATE IV INFUSION ADD-ON: CPT

## 2021-09-27 PROCEDURE — 99284 EMERGENCY DEPT VISIT MOD MDM: CPT | Performed by: EMERGENCY MEDICINE

## 2021-09-27 PROCEDURE — 93010 ELECTROCARDIOGRAM REPORT: CPT | Performed by: INTERNAL MEDICINE

## 2021-09-27 PROCEDURE — 81001 URINALYSIS AUTO W/SCOPE: CPT | Performed by: EMERGENCY MEDICINE

## 2021-09-27 PROCEDURE — 80061 LIPID PANEL: CPT | Performed by: STUDENT IN AN ORGANIZED HEALTH CARE EDUCATION/TRAINING PROGRAM

## 2021-09-27 PROCEDURE — 80053 COMPREHEN METABOLIC PANEL: CPT | Performed by: EMERGENCY MEDICINE

## 2021-09-27 PROCEDURE — 96374 THER/PROPH/DIAG INJ IV PUSH: CPT

## 2021-09-27 PROCEDURE — 71045 X-RAY EXAM CHEST 1 VIEW: CPT

## 2021-09-27 PROCEDURE — 83036 HEMOGLOBIN GLYCOSYLATED A1C: CPT | Performed by: STUDENT IN AN ORGANIZED HEALTH CARE EDUCATION/TRAINING PROGRAM

## 2021-09-27 PROCEDURE — 84484 ASSAY OF TROPONIN QUANT: CPT | Performed by: FAMILY MEDICINE

## 2021-09-27 PROCEDURE — NC001 PR NO CHARGE: Performed by: FAMILY MEDICINE

## 2021-09-27 PROCEDURE — 84443 ASSAY THYROID STIM HORMONE: CPT | Performed by: STUDENT IN AN ORGANIZED HEALTH CARE EDUCATION/TRAINING PROGRAM

## 2021-09-27 PROCEDURE — 36415 COLL VENOUS BLD VENIPUNCTURE: CPT | Performed by: EMERGENCY MEDICINE

## 2021-09-27 PROCEDURE — 84484 ASSAY OF TROPONIN QUANT: CPT | Performed by: EMERGENCY MEDICINE

## 2021-09-27 PROCEDURE — 99285 EMERGENCY DEPT VISIT HI MDM: CPT

## 2021-09-27 PROCEDURE — 85025 COMPLETE CBC W/AUTO DIFF WBC: CPT | Performed by: EMERGENCY MEDICINE

## 2021-09-27 RX ORDER — AMLODIPINE BESYLATE 10 MG/1
10 TABLET ORAL DAILY
Status: DISCONTINUED | OUTPATIENT
Start: 2021-09-28 | End: 2021-09-28 | Stop reason: HOSPADM

## 2021-09-27 RX ORDER — HYDRALAZINE HYDROCHLORIDE 20 MG/ML
10 INJECTION INTRAMUSCULAR; INTRAVENOUS ONCE
Status: COMPLETED | OUTPATIENT
Start: 2021-09-27 | End: 2021-09-27

## 2021-09-27 RX ORDER — CHLORTHALIDONE 25 MG/1
25 TABLET ORAL DAILY
Status: DISCONTINUED | OUTPATIENT
Start: 2021-09-28 | End: 2021-09-28

## 2021-09-27 RX ORDER — LOSARTAN POTASSIUM 50 MG/1
50 TABLET ORAL EVERY EVENING
Status: DISCONTINUED | OUTPATIENT
Start: 2021-09-27 | End: 2021-09-28

## 2021-09-27 RX ORDER — LOSARTAN POTASSIUM 50 MG/1
50 TABLET ORAL EVERY EVENING
Status: DISCONTINUED | OUTPATIENT
Start: 2021-09-28 | End: 2021-09-27

## 2021-09-27 RX ORDER — HYDRALAZINE HYDROCHLORIDE 20 MG/ML
10 INJECTION INTRAMUSCULAR; INTRAVENOUS EVERY 6 HOURS PRN
Status: DISCONTINUED | OUTPATIENT
Start: 2021-09-27 | End: 2021-09-28 | Stop reason: HOSPADM

## 2021-09-27 RX ORDER — AMLODIPINE BESYLATE 5 MG/1
5 TABLET ORAL DAILY
Status: DISCONTINUED | OUTPATIENT
Start: 2021-09-28 | End: 2021-09-27

## 2021-09-27 RX ADMIN — HYDRALAZINE HYDROCHLORIDE 10 MG: 20 INJECTION INTRAMUSCULAR; INTRAVENOUS at 19:11

## 2021-09-27 RX ADMIN — HYDRALAZINE HYDROCHLORIDE 10 MG: 20 INJECTION INTRAMUSCULAR; INTRAVENOUS at 21:23

## 2021-09-27 RX ADMIN — LOSARTAN POTASSIUM 50 MG: 50 TABLET, FILM COATED ORAL at 21:23

## 2021-09-27 RX ADMIN — HYDRALAZINE HYDROCHLORIDE 10 MG: 20 INJECTION INTRAMUSCULAR; INTRAVENOUS at 13:25

## 2021-09-27 RX ADMIN — SODIUM CHLORIDE 1000 ML: 0.9 INJECTION, SOLUTION INTRAVENOUS at 13:06

## 2021-09-28 ENCOUNTER — APPOINTMENT (OUTPATIENT)
Dept: NON INVASIVE DIAGNOSTICS | Facility: HOSPITAL | Age: 56
End: 2021-09-28
Payer: COMMERCIAL

## 2021-09-28 VITALS
DIASTOLIC BLOOD PRESSURE: 86 MMHG | BODY MASS INDEX: 29.89 KG/M2 | HEART RATE: 89 BPM | SYSTOLIC BLOOD PRESSURE: 162 MMHG | RESPIRATION RATE: 18 BRPM | HEIGHT: 61 IN | OXYGEN SATURATION: 95 % | TEMPERATURE: 97.4 F | WEIGHT: 158.29 LBS

## 2021-09-28 PROBLEM — R79.89 ELEVATED TROPONIN: Status: RESOLVED | Noted: 2021-09-27 | Resolved: 2021-09-28

## 2021-09-28 PROBLEM — R77.8 ELEVATED TROPONIN: Status: RESOLVED | Noted: 2021-09-27 | Resolved: 2021-09-28

## 2021-09-28 PROBLEM — E11.9 TYPE 2 DIABETES MELLITUS WITHOUT COMPLICATION, WITHOUT LONG-TERM CURRENT USE OF INSULIN (HCC): Status: ACTIVE | Noted: 2021-09-28

## 2021-09-28 PROBLEM — E78.49 OTHER HYPERLIPIDEMIA: Status: ACTIVE | Noted: 2021-09-28

## 2021-09-28 PROBLEM — I16.0 HYPERTENSIVE URGENCY: Status: RESOLVED | Noted: 2021-09-27 | Resolved: 2021-09-28

## 2021-09-28 LAB
ALBUMIN SERPL BCP-MCNC: 4 G/DL (ref 3–5.2)
ALBUMIN SERPL BCP-MCNC: 4.3 G/DL (ref 3–5.2)
ALP SERPL-CCNC: 95 U/L (ref 43–122)
ALP SERPL-CCNC: 96 U/L (ref 43–122)
ALT SERPL W P-5'-P-CCNC: 34 U/L
ALT SERPL W P-5'-P-CCNC: 35 U/L
ANION GAP SERPL CALCULATED.3IONS-SCNC: 10 MMOL/L (ref 5–14)
ANION GAP SERPL CALCULATED.3IONS-SCNC: 13 MMOL/L (ref 5–14)
AST SERPL W P-5'-P-CCNC: 37 U/L (ref 17–59)
AST SERPL W P-5'-P-CCNC: 40 U/L (ref 17–59)
ATRIAL RATE: 78 BPM
ATRIAL RATE: 95 BPM
ATRIAL RATE: 96 BPM
BILIRUB SERPL-MCNC: 0.98 MG/DL
BILIRUB SERPL-MCNC: 1.02 MG/DL
BUN SERPL-MCNC: 8 MG/DL (ref 5–25)
BUN SERPL-MCNC: 8 MG/DL (ref 5–25)
CALCIUM SERPL-MCNC: 8.9 MG/DL (ref 8.4–10.2)
CALCIUM SERPL-MCNC: 9.3 MG/DL (ref 8.4–10.2)
CHLORIDE SERPL-SCNC: 96 MMOL/L (ref 97–108)
CHLORIDE SERPL-SCNC: 97 MMOL/L (ref 97–108)
CO2 SERPL-SCNC: 24 MMOL/L (ref 22–30)
CO2 SERPL-SCNC: 26 MMOL/L (ref 22–30)
CREAT SERPL-MCNC: 0.61 MG/DL (ref 0.7–1.5)
CREAT SERPL-MCNC: 0.63 MG/DL (ref 0.7–1.5)
CREAT UR-MCNC: 260 MG/DL
EST. AVERAGE GLUCOSE BLD GHB EST-MCNC: 143 MG/DL
GFR SERPL CREATININE-BSD FRML MDRD: 110 ML/MIN/1.73SQ M
GFR SERPL CREATININE-BSD FRML MDRD: 112 ML/MIN/1.73SQ M
GLUCOSE SERPL-MCNC: 115 MG/DL (ref 70–99)
GLUCOSE SERPL-MCNC: 123 MG/DL (ref 70–99)
HBA1C MFR BLD: 6.6 %
MICROALBUMIN UR-MCNC: 929 MG/L (ref 0–20)
MICROALBUMIN/CREAT 24H UR: 357 MG/G CREATININE (ref 0–30)
P AXIS: 42 DEGREES
P AXIS: 53 DEGREES
P AXIS: 58 DEGREES
POTASSIUM SERPL-SCNC: 4 MMOL/L (ref 3.6–5)
POTASSIUM SERPL-SCNC: 4.2 MMOL/L (ref 3.6–5)
PR INTERVAL: 170 MS
PR INTERVAL: 182 MS
PR INTERVAL: 184 MS
PROT SERPL-MCNC: 7.7 G/DL (ref 5.9–8.4)
PROT SERPL-MCNC: 8 G/DL (ref 5.9–8.4)
QRS AXIS: 39 DEGREES
QRS AXIS: 43 DEGREES
QRS AXIS: 50 DEGREES
QRSD INTERVAL: 88 MS
QRSD INTERVAL: 92 MS
QRSD INTERVAL: 96 MS
QT INTERVAL: 346 MS
QT INTERVAL: 346 MS
QT INTERVAL: 390 MS
QTC INTERVAL: 434 MS
QTC INTERVAL: 437 MS
QTC INTERVAL: 444 MS
SODIUM SERPL-SCNC: 133 MMOL/L (ref 137–147)
SODIUM SERPL-SCNC: 133 MMOL/L (ref 137–147)
T WAVE AXIS: 139 DEGREES
T WAVE AXIS: 147 DEGREES
T WAVE AXIS: 193 DEGREES
VENTRICULAR RATE: 78 BPM
VENTRICULAR RATE: 95 BPM
VENTRICULAR RATE: 96 BPM

## 2021-09-28 PROCEDURE — NC001 PR NO CHARGE: Performed by: FAMILY MEDICINE

## 2021-09-28 PROCEDURE — 93306 TTE W/DOPPLER COMPLETE: CPT

## 2021-09-28 PROCEDURE — 93005 ELECTROCARDIOGRAM TRACING: CPT

## 2021-09-28 PROCEDURE — 93306 TTE W/DOPPLER COMPLETE: CPT | Performed by: INTERNAL MEDICINE

## 2021-09-28 PROCEDURE — 93010 ELECTROCARDIOGRAM REPORT: CPT | Performed by: INTERNAL MEDICINE

## 2021-09-28 PROCEDURE — 82570 ASSAY OF URINE CREATININE: CPT | Performed by: STUDENT IN AN ORGANIZED HEALTH CARE EDUCATION/TRAINING PROGRAM

## 2021-09-28 PROCEDURE — 99205 OFFICE O/P NEW HI 60 MIN: CPT | Performed by: INTERNAL MEDICINE

## 2021-09-28 PROCEDURE — 80053 COMPREHEN METABOLIC PANEL: CPT | Performed by: STUDENT IN AN ORGANIZED HEALTH CARE EDUCATION/TRAINING PROGRAM

## 2021-09-28 PROCEDURE — 82043 UR ALBUMIN QUANTITATIVE: CPT | Performed by: STUDENT IN AN ORGANIZED HEALTH CARE EDUCATION/TRAINING PROGRAM

## 2021-09-28 RX ORDER — CARVEDILOL 3.12 MG/1
3.12 TABLET ORAL 2 TIMES DAILY WITH MEALS
Qty: 60 TABLET | Refills: 0 | Status: SHIPPED | OUTPATIENT
Start: 2021-09-28 | End: 2021-11-03 | Stop reason: SDUPTHER

## 2021-09-28 RX ORDER — CHLORTHALIDONE 25 MG/1
12.5 TABLET ORAL DAILY
Status: DISCONTINUED | OUTPATIENT
Start: 2021-09-28 | End: 2021-09-28 | Stop reason: HOSPADM

## 2021-09-28 RX ORDER — ATORVASTATIN CALCIUM 40 MG/1
40 TABLET, FILM COATED ORAL
Qty: 30 TABLET | Refills: 0 | Status: SHIPPED | OUTPATIENT
Start: 2021-09-28 | End: 2021-11-03 | Stop reason: SDUPTHER

## 2021-09-28 RX ORDER — CARVEDILOL 3.12 MG/1
3.12 TABLET ORAL 2 TIMES DAILY WITH MEALS
Status: DISCONTINUED | OUTPATIENT
Start: 2021-09-28 | End: 2021-09-28 | Stop reason: HOSPADM

## 2021-09-28 RX ORDER — LOSARTAN POTASSIUM 50 MG/1
50 TABLET ORAL
Status: DISCONTINUED | OUTPATIENT
Start: 2021-09-28 | End: 2021-09-28 | Stop reason: HOSPADM

## 2021-09-28 RX ORDER — CHLORTHALIDONE 25 MG/1
12.5 TABLET ORAL DAILY
Qty: 30 TABLET | Refills: 0 | Status: SHIPPED | OUTPATIENT
Start: 2021-09-29 | End: 2021-11-03 | Stop reason: SDUPTHER

## 2021-09-28 RX ORDER — AMLODIPINE BESYLATE 10 MG/1
10 TABLET ORAL DAILY
Qty: 30 TABLET | Refills: 0 | Status: SHIPPED | OUTPATIENT
Start: 2021-09-29 | End: 2021-11-03 | Stop reason: SDUPTHER

## 2021-09-28 RX ORDER — ATORVASTATIN CALCIUM 40 MG/1
40 TABLET, FILM COATED ORAL
Status: DISCONTINUED | OUTPATIENT
Start: 2021-09-28 | End: 2021-09-28 | Stop reason: HOSPADM

## 2021-09-28 RX ORDER — LOSARTAN POTASSIUM 50 MG/1
50 TABLET ORAL
Qty: 30 TABLET | Refills: 0 | Status: SHIPPED | OUTPATIENT
Start: 2021-09-29 | End: 2021-11-03 | Stop reason: SDUPTHER

## 2021-09-28 RX ADMIN — CARVEDILOL 3.12 MG: 3.12 TABLET, FILM COATED ORAL at 10:04

## 2021-09-28 RX ADMIN — CHLORTHALIDONE 12.5 MG: 25 TABLET ORAL at 10:15

## 2021-09-28 RX ADMIN — AMLODIPINE BESYLATE 10 MG: 10 TABLET ORAL at 10:16

## 2021-09-28 RX ADMIN — LOSARTAN POTASSIUM 50 MG: 50 TABLET, FILM COATED ORAL at 13:19

## 2021-10-20 ENCOUNTER — TELEPHONE (OUTPATIENT)
Dept: GASTROENTEROLOGY | Facility: MEDICAL CENTER | Age: 56
End: 2021-10-20

## 2021-11-03 ENCOUNTER — OFFICE VISIT (OUTPATIENT)
Dept: FAMILY MEDICINE CLINIC | Facility: CLINIC | Age: 56
End: 2021-11-03

## 2021-11-03 VITALS
RESPIRATION RATE: 18 BRPM | WEIGHT: 152 LBS | BODY MASS INDEX: 28.72 KG/M2 | HEART RATE: 95 BPM | OXYGEN SATURATION: 96 % | TEMPERATURE: 97.6 F | DIASTOLIC BLOOD PRESSURE: 80 MMHG | SYSTOLIC BLOOD PRESSURE: 160 MMHG

## 2021-11-03 DIAGNOSIS — E78.49 OTHER HYPERLIPIDEMIA: ICD-10-CM

## 2021-11-03 DIAGNOSIS — E11.9 TYPE 2 DIABETES MELLITUS WITHOUT COMPLICATION, WITHOUT LONG-TERM CURRENT USE OF INSULIN (HCC): ICD-10-CM

## 2021-11-03 DIAGNOSIS — I10 HYPERTENSION, UNSPECIFIED TYPE: Primary | ICD-10-CM

## 2021-11-03 DIAGNOSIS — Z23 NEED FOR INFLUENZA VACCINATION: ICD-10-CM

## 2021-11-03 PROCEDURE — 99213 OFFICE O/P EST LOW 20 MIN: CPT | Performed by: FAMILY MEDICINE

## 2021-11-03 PROCEDURE — 3077F SYST BP >= 140 MM HG: CPT | Performed by: FAMILY MEDICINE

## 2021-11-03 PROCEDURE — 3079F DIAST BP 80-89 MM HG: CPT | Performed by: FAMILY MEDICINE

## 2021-11-03 PROCEDURE — 4010F ACE/ARB THERAPY RXD/TAKEN: CPT | Performed by: FAMILY MEDICINE

## 2021-11-03 PROCEDURE — G0008 ADMIN INFLUENZA VIRUS VAC: HCPCS | Performed by: FAMILY MEDICINE

## 2021-11-03 PROCEDURE — 90682 RIV4 VACC RECOMBINANT DNA IM: CPT | Performed by: FAMILY MEDICINE

## 2021-11-03 RX ORDER — CARVEDILOL 3.12 MG/1
3.12 TABLET ORAL 2 TIMES DAILY WITH MEALS
Qty: 60 TABLET | Refills: 1 | Status: SHIPPED | OUTPATIENT
Start: 2021-11-03 | End: 2021-11-30

## 2021-11-03 RX ORDER — LOSARTAN POTASSIUM 50 MG/1
50 TABLET ORAL
Qty: 30 TABLET | Refills: 1 | Status: SHIPPED | OUTPATIENT
Start: 2021-11-03 | End: 2021-11-30

## 2021-11-03 RX ORDER — BLOOD PRESSURE TEST KIT
KIT MISCELLANEOUS 2 TIMES DAILY
Qty: 1 KIT | Refills: 0 | Status: SHIPPED | OUTPATIENT
Start: 2021-11-03

## 2021-11-03 RX ORDER — CHLORTHALIDONE 25 MG/1
12.5 TABLET ORAL DAILY
Qty: 30 TABLET | Refills: 0 | Status: SHIPPED | OUTPATIENT
Start: 2021-11-03 | End: 2021-11-30

## 2021-11-03 RX ORDER — AMLODIPINE BESYLATE 10 MG/1
10 TABLET ORAL DAILY
Qty: 30 TABLET | Refills: 1 | Status: SHIPPED | OUTPATIENT
Start: 2021-11-03

## 2021-11-03 RX ORDER — ATORVASTATIN CALCIUM 40 MG/1
40 TABLET, FILM COATED ORAL
Qty: 30 TABLET | Refills: 1 | Status: SHIPPED | OUTPATIENT
Start: 2021-11-03

## 2021-11-30 ENCOUNTER — CONSULT (OUTPATIENT)
Dept: CARDIOLOGY CLINIC | Facility: CLINIC | Age: 56
End: 2021-11-30
Payer: COMMERCIAL

## 2021-11-30 VITALS
HEART RATE: 75 BPM | DIASTOLIC BLOOD PRESSURE: 80 MMHG | BODY MASS INDEX: 27.9 KG/M2 | WEIGHT: 147.8 LBS | HEIGHT: 61 IN | SYSTOLIC BLOOD PRESSURE: 150 MMHG

## 2021-11-30 DIAGNOSIS — I10 UNCONTROLLED HYPERTENSION: Primary | ICD-10-CM

## 2021-11-30 DIAGNOSIS — I51.89 DIASTOLIC DYSFUNCTION WITHOUT HEART FAILURE: ICD-10-CM

## 2021-11-30 DIAGNOSIS — E78.49 OTHER HYPERLIPIDEMIA: ICD-10-CM

## 2021-11-30 DIAGNOSIS — I10 HYPERTENSION, UNSPECIFIED TYPE: ICD-10-CM

## 2021-11-30 DIAGNOSIS — E11.9 TYPE 2 DIABETES MELLITUS WITHOUT COMPLICATION, WITHOUT LONG-TERM CURRENT USE OF INSULIN (HCC): ICD-10-CM

## 2021-11-30 PROCEDURE — 3008F BODY MASS INDEX DOCD: CPT | Performed by: FAMILY MEDICINE

## 2021-11-30 PROCEDURE — 99215 OFFICE O/P EST HI 40 MIN: CPT | Performed by: INTERNAL MEDICINE

## 2021-11-30 PROCEDURE — 93000 ELECTROCARDIOGRAM COMPLETE: CPT | Performed by: INTERNAL MEDICINE

## 2021-11-30 RX ORDER — LOSARTAN POTASSIUM AND HYDROCHLOROTHIAZIDE 12.5; 1 MG/1; MG/1
1 TABLET ORAL DAILY
Qty: 90 TABLET | Refills: 3 | Status: SHIPPED | OUTPATIENT
Start: 2021-11-30

## 2021-11-30 RX ORDER — METOPROLOL SUCCINATE 25 MG/1
25 TABLET, EXTENDED RELEASE ORAL EVERY EVENING
Qty: 90 TABLET | Refills: 3 | Status: SHIPPED | OUTPATIENT
Start: 2021-11-30

## 2022-04-14 ENCOUNTER — VBI (OUTPATIENT)
Dept: ADMINISTRATIVE | Facility: OTHER | Age: 57
End: 2022-04-14

## 2022-10-12 PROBLEM — Z12.11 SCREENING FOR COLON CANCER: Status: RESOLVED | Noted: 2021-08-20 | Resolved: 2022-10-12

## 2023-03-01 ENCOUNTER — APPOINTMENT (EMERGENCY)
Dept: RADIOLOGY | Facility: HOSPITAL | Age: 58
End: 2023-03-01

## 2023-03-01 ENCOUNTER — HOSPITAL ENCOUNTER (OUTPATIENT)
Facility: HOSPITAL | Age: 58
Setting detail: OBSERVATION
Discharge: HOME | End: 2023-03-02
Attending: EMERGENCY MEDICINE | Admitting: FAMILY MEDICINE

## 2023-03-01 ENCOUNTER — APPOINTMENT (EMERGENCY)
Dept: CT IMAGING | Facility: HOSPITAL | Age: 58
End: 2023-03-01

## 2023-03-01 DIAGNOSIS — R77.8 ELEVATED TROPONIN: Primary | ICD-10-CM

## 2023-03-01 DIAGNOSIS — I16.1 HYPERTENSIVE EMERGENCY WITHOUT CONGESTIVE HEART FAILURE: ICD-10-CM

## 2023-03-01 DIAGNOSIS — I51.89 DIASTOLIC DYSFUNCTION WITHOUT HEART FAILURE: ICD-10-CM

## 2023-03-01 DIAGNOSIS — I10 HYPERTENSION: ICD-10-CM

## 2023-03-01 DIAGNOSIS — E78.49 OTHER HYPERLIPIDEMIA: ICD-10-CM

## 2023-03-01 DIAGNOSIS — E11.9 TYPE 2 DIABETES MELLITUS WITHOUT COMPLICATION, WITHOUT LONG-TERM CURRENT USE OF INSULIN (HCC): ICD-10-CM

## 2023-03-01 LAB
2HR DELTA HS TROPONIN: 8 NG/L
4HR DELTA HS TROPONIN: 13 NG/L
ALBUMIN SERPL BCP-MCNC: 4.7 G/DL (ref 3.5–5)
ALP SERPL-CCNC: 112 U/L (ref 43–122)
ALT SERPL W P-5'-P-CCNC: 43 U/L
ANION GAP SERPL CALCULATED.3IONS-SCNC: 11 MMOL/L (ref 5–14)
AST SERPL W P-5'-P-CCNC: 49 U/L (ref 17–59)
ATRIAL RATE: 75 BPM
ATRIAL RATE: 93 BPM
BACTERIA UR QL AUTO: NORMAL /HPF
BASOPHILS # BLD MANUAL: 0 THOUSAND/UL (ref 0–0.1)
BASOPHILS NFR MAR MANUAL: 0 % (ref 0–1)
BILIRUB SERPL-MCNC: 0.89 MG/DL (ref 0.2–1)
BILIRUB UR QL STRIP: NEGATIVE
BUN SERPL-MCNC: 6 MG/DL (ref 5–25)
CALCIUM SERPL-MCNC: 9.2 MG/DL (ref 8.4–10.2)
CARDIAC TROPONIN I PNL SERPL HS: 27 NG/L
CARDIAC TROPONIN I PNL SERPL HS: 35 NG/L
CARDIAC TROPONIN I PNL SERPL HS: 40 NG/L
CARDIAC TROPONIN I PNL SERPL HS: 42 NG/L (ref 8–18)
CHLORIDE SERPL-SCNC: 97 MMOL/L (ref 96–108)
CLARITY UR: CLEAR
CO2 SERPL-SCNC: 25 MMOL/L (ref 21–32)
COLOR UR: YELLOW
CREAT SERPL-MCNC: 0.59 MG/DL (ref 0.7–1.5)
EOSINOPHIL # BLD MANUAL: 0 THOUSAND/UL (ref 0–0.4)
EOSINOPHIL NFR BLD MANUAL: 0 % (ref 0–6)
ERYTHROCYTE [DISTWIDTH] IN BLOOD BY AUTOMATED COUNT: 12.6 % (ref 11.6–15.1)
FLUAV RNA RESP QL NAA+PROBE: NEGATIVE
FLUBV RNA RESP QL NAA+PROBE: NEGATIVE
GFR SERPL CREATININE-BSD FRML MDRD: 111 ML/MIN/1.73SQ M
GLUCOSE SERPL-MCNC: 126 MG/DL (ref 70–99)
GLUCOSE SERPL-MCNC: 83 MG/DL (ref 65–140)
GLUCOSE SERPL-MCNC: 91 MG/DL (ref 65–140)
GLUCOSE UR STRIP-MCNC: NEGATIVE MG/DL
HCT VFR BLD AUTO: 49.3 % (ref 36.5–49.3)
HGB BLD-MCNC: 16.8 G/DL (ref 12–17)
HGB UR QL STRIP.AUTO: 25
KETONES UR STRIP-MCNC: NEGATIVE MG/DL
LEUKOCYTE ESTERASE UR QL STRIP: NEGATIVE
LIPASE SERPL-CCNC: 77 U/L (ref 23–300)
LYMPHOCYTES # BLD AUTO: 0.51 THOUSAND/UL (ref 0.6–4.47)
LYMPHOCYTES # BLD AUTO: 8 % (ref 14–44)
MCH RBC QN AUTO: 32.6 PG (ref 26.8–34.3)
MCHC RBC AUTO-ENTMCNC: 34.1 G/DL (ref 31.4–37.4)
MCV RBC AUTO: 96 FL (ref 82–98)
MONOCYTES # BLD AUTO: 0.26 THOUSAND/UL (ref 0–1.22)
MONOCYTES NFR BLD: 4 % (ref 4–12)
NEUTROPHILS # BLD MANUAL: 5.58 THOUSAND/UL (ref 1.85–7.62)
NEUTS BAND NFR BLD MANUAL: 2 % (ref 0–8)
NEUTS SEG NFR BLD AUTO: 85 % (ref 43–75)
NITRITE UR QL STRIP: NEGATIVE
NON-SQ EPI CELLS URNS QL MICRO: NORMAL /HPF
NT-PROBNP SERPL-MCNC: 275 PG/ML (ref 0–299)
P AXIS: 33 DEGREES
P AXIS: 52 DEGREES
PH UR STRIP.AUTO: 6.5 [PH]
PLATELET # BLD AUTO: 123 THOUSANDS/UL (ref 149–390)
PLATELET BLD QL SMEAR: ABNORMAL
PMV BLD AUTO: 8.9 FL (ref 8.9–12.7)
POTASSIUM SERPL-SCNC: 3.8 MMOL/L (ref 3.5–5.3)
PR INTERVAL: 176 MS
PR INTERVAL: 182 MS
PROT SERPL-MCNC: 8.7 G/DL (ref 6.4–8.4)
PROT UR STRIP-MCNC: ABNORMAL MG/DL
QRS AXIS: 23 DEGREES
QRS AXIS: 37 DEGREES
QRSD INTERVAL: 98 MS
QRSD INTERVAL: 98 MS
QT INTERVAL: 356 MS
QT INTERVAL: 386 MS
QTC INTERVAL: 431 MS
QTC INTERVAL: 442 MS
RBC # BLD AUTO: 5.16 MILLION/UL (ref 3.88–5.62)
RBC #/AREA URNS AUTO: NORMAL /HPF
RBC MORPH BLD: NORMAL
RSV RNA RESP QL NAA+PROBE: NEGATIVE
S PYO DNA THROAT QL NAA+PROBE: NOT DETECTED
SARS-COV-2 RNA RESP QL NAA+PROBE: NEGATIVE
SODIUM SERPL-SCNC: 133 MMOL/L (ref 135–147)
SP GR UR STRIP.AUTO: 1.01 (ref 1–1.04)
T WAVE AXIS: 153 DEGREES
T WAVE AXIS: 80 DEGREES
UROBILINOGEN UA: NEGATIVE MG/DL
VARIANT LYMPHS # BLD AUTO: 1 %
VENTRICULAR RATE: 75 BPM
VENTRICULAR RATE: 93 BPM
WBC # BLD AUTO: 6.41 THOUSAND/UL (ref 4.31–10.16)
WBC #/AREA URNS AUTO: NORMAL /HPF

## 2023-03-01 RX ORDER — HYDROCHLOROTHIAZIDE 25 MG/1
12.5 TABLET ORAL DAILY
Status: CANCELLED | OUTPATIENT
Start: 2023-03-01

## 2023-03-01 RX ORDER — AMLODIPINE BESYLATE 5 MG/1
10 TABLET ORAL DAILY
Status: CANCELLED | OUTPATIENT
Start: 2023-03-01

## 2023-03-01 RX ORDER — AMLODIPINE BESYLATE 10 MG/1
10 TABLET ORAL DAILY
Status: DISCONTINUED | OUTPATIENT
Start: 2023-03-01 | End: 2023-03-02 | Stop reason: HOSPADM

## 2023-03-01 RX ORDER — INSULIN LISPRO 100 [IU]/ML
1-5 INJECTION, SOLUTION INTRAVENOUS; SUBCUTANEOUS
Status: DISCONTINUED | OUTPATIENT
Start: 2023-03-01 | End: 2023-03-02 | Stop reason: HOSPADM

## 2023-03-01 RX ORDER — HYDROCHLOROTHIAZIDE 12.5 MG/1
12.5 TABLET ORAL DAILY
Status: DISCONTINUED | OUTPATIENT
Start: 2023-03-01 | End: 2023-03-02 | Stop reason: HOSPADM

## 2023-03-01 RX ORDER — METOPROLOL SUCCINATE 25 MG/1
25 TABLET, EXTENDED RELEASE ORAL EVERY EVENING
Status: DISCONTINUED | OUTPATIENT
Start: 2023-03-01 | End: 2023-03-02 | Stop reason: HOSPADM

## 2023-03-01 RX ORDER — ONDANSETRON 2 MG/ML
4 INJECTION INTRAMUSCULAR; INTRAVENOUS ONCE
Status: COMPLETED | OUTPATIENT
Start: 2023-03-01 | End: 2023-03-01

## 2023-03-01 RX ORDER — LOSARTAN POTASSIUM 50 MG/1
100 TABLET ORAL DAILY
Status: DISCONTINUED | OUTPATIENT
Start: 2023-03-01 | End: 2023-03-02 | Stop reason: HOSPADM

## 2023-03-01 RX ORDER — KETOROLAC TROMETHAMINE 30 MG/ML
15 INJECTION, SOLUTION INTRAMUSCULAR; INTRAVENOUS ONCE
Status: COMPLETED | OUTPATIENT
Start: 2023-03-01 | End: 2023-03-01

## 2023-03-01 RX ORDER — AMLODIPINE BESYLATE 10 MG/1
10 TABLET ORAL DAILY
Status: DISCONTINUED | OUTPATIENT
Start: 2023-03-02 | End: 2023-03-01

## 2023-03-01 RX ORDER — METOPROLOL SUCCINATE 25 MG/1
25 TABLET, EXTENDED RELEASE ORAL EVERY EVENING
Status: CANCELLED | OUTPATIENT
Start: 2023-03-01

## 2023-03-01 RX ORDER — ATORVASTATIN CALCIUM 20 MG/1
40 TABLET, FILM COATED ORAL
Status: CANCELLED | OUTPATIENT
Start: 2023-03-01

## 2023-03-01 RX ORDER — ATORVASTATIN CALCIUM 40 MG/1
40 TABLET, FILM COATED ORAL
Status: DISCONTINUED | OUTPATIENT
Start: 2023-03-01 | End: 2023-03-02 | Stop reason: HOSPADM

## 2023-03-01 RX ORDER — LOSARTAN POTASSIUM 50 MG/1
100 TABLET ORAL DAILY
Status: CANCELLED | OUTPATIENT
Start: 2023-03-01

## 2023-03-01 RX ADMIN — AMLODIPINE BESYLATE 10 MG: 10 TABLET ORAL at 20:53

## 2023-03-01 RX ADMIN — KETOROLAC TROMETHAMINE 15 MG: 30 INJECTION, SOLUTION INTRAMUSCULAR; INTRAVENOUS at 11:57

## 2023-03-01 RX ADMIN — IOHEXOL 85 ML: 350 INJECTION, SOLUTION INTRAVENOUS at 11:18

## 2023-03-01 RX ADMIN — SODIUM CHLORIDE 500 ML: 0.9 INJECTION, SOLUTION INTRAVENOUS at 11:58

## 2023-03-01 RX ADMIN — METOPROLOL SUCCINATE 25 MG: 25 TABLET, FILM COATED, EXTENDED RELEASE ORAL at 22:01

## 2023-03-01 RX ADMIN — LOSARTAN POTASSIUM 100 MG: 50 TABLET, FILM COATED ORAL at 15:51

## 2023-03-01 RX ADMIN — ONDANSETRON 4 MG: 2 INJECTION INTRAMUSCULAR; INTRAVENOUS at 10:37

## 2023-03-01 RX ADMIN — HYDROCHLOROTHIAZIDE 12.5 MG: 12.5 TABLET ORAL at 15:51

## 2023-03-01 RX ADMIN — ATORVASTATIN CALCIUM 40 MG: 40 TABLET, FILM COATED ORAL at 15:51

## 2023-03-01 NOTE — PLAN OF CARE
Problem: Potential for Falls  Goal: Patient will remain free of falls  Description: INTERVENTIONS:  - Educate patient/family on patient safety including physical limitations  - Instruct patient to call for assistance with activity   - Consult OT/PT to assist with strengthening/mobility   - Keep Call bell within reach  - Keep bed low and locked with side rails adjusted as appropriate  - Keep care items and personal belongings within reach  - Initiate and maintain comfort rounds  - Make Fall Risk Sign visible to staff  Outcome: Progressing     Problem: PAIN - ADULT  Goal: Verbalizes/displays adequate comfort level or baseline comfort level  Description: Interventions:  - Encourage patient to monitor pain and request assistance  - Assess pain using appropriate pain scale  - Administer analgesics based on type and severity of pain and evaluate response  - Implement non-pharmacological measures as appropriate and evaluate response  - Consider cultural and social influences on pain and pain management  - Notify physician/advanced practitioner if interventions unsuccessful or patient reports new pain  Outcome: Progressing     Problem: Nutrition/Hydration-ADULT  Goal: Nutrient/Hydration intake appropriate for improving, restoring or maintaining nutritional needs  Description: Monitor and assess patient's nutrition/hydration status for malnutrition  Collaborate with interdisciplinary team and initiate plan and interventions as ordered  Monitor patient's weight and dietary intake as ordered or per policy  Utilize nutrition screening tool and intervene as necessary  Determine patient's food preferences and provide high-protein, high-caloric foods as appropriate       INTERVENTIONS:  - Monitor oral intake, urinary output, labs, and treatment plans  - Assess nutrition and hydration status and recommend course of action  - Evaluate amount of meals eaten  - Assist patient with eating if necessary   - Allow adequate time for meals  - Recommend/ encourage appropriate diets, oral nutritional supplements, and vitamin/mineral supplements  - Order, calculate, and assess calorie counts as needed  - Recommend, monitor, and adjust tube feedings and TPN/PPN based on assessed needs  - Assess need for intravenous fluids  - Provide specific nutrition/hydration education as appropriate  - Include patient/family/caregiver in decisions related to nutrition  Outcome: Progressing

## 2023-03-01 NOTE — ED PROVIDER NOTES
History  Chief Complaint   Patient presents with   • Cold Like Symptoms     Sore throat and vomiting and dizzy feeling, sweaty, body aches since today  54-year-old male past medical history significant for prior heart murmur, prior hypertensive urgency history of hypertension, prior elevated troponin, upper lipidemia and left ventricular hypertrophy presents today for evaluation of dizziness, fatigue, nausea, vomiting, sore throat, body aches and chills ongoing since yesterday  Patient reports his dizziness is more of a fatigue feeling, patient's mother who is interpreting for the patient as he is deaf reports he has not lost consciousness  Patient specifically denies any difficulty breathing or chest pain reports no palpitations  Patient denies abdominal pain however reports he has had nausea and vomiting  Patient endorses sore throat  History provided by:  Patient   used: No        Prior to Admission Medications   Prescriptions Last Dose Informant Patient Reported? Taking? Blood Pressure KIT   No No   Sig: Use 2 (two) times a day   amLODIPine (NORVASC) 10 mg tablet   No No   Sig: Take 1 tablet (10 mg total) by mouth daily In the morning   atorvastatin (LIPITOR) 40 mg tablet   No No   Sig: Take 1 tablet (40 mg total) by mouth daily with dinner   losartan-hydrochlorothiazide (HYZAAR) 100-12 5 MG per tablet   No No   Sig: Take 1 tablet by mouth daily   metFORMIN (GLUCOPHAGE) 500 mg tablet   No No   Sig: take 1 tablet by mouth twice a day with meals   metoprolol succinate (TOPROL-XL) 25 mg 24 hr tablet   No No   Sig: Take 1 tablet (25 mg total) by mouth every evening      Facility-Administered Medications: None       Past Medical History:   Diagnosis Date   • Deaf    • Heart murmur        History reviewed  No pertinent surgical history      Family History   Problem Relation Age of Onset   • Hypertension Mother    • Diabetes Mother    • Heart disease Mother         pacemaker   • Arthritis Mother         chronic     I have reviewed and agree with the history as documented  E-Cigarette/Vaping   • E-Cigarette Use Never User      E-Cigarette/Vaping Substances   • Nicotine No    • THC No    • CBD No    • Flavoring No    • Other No    • Unknown No      Social History     Tobacco Use   • Smoking status: Never   • Smokeless tobacco: Never   Vaping Use   • Vaping Use: Never used   Substance Use Topics   • Alcohol use: Not Currently     Comment: 'as per mother pt drinks everyday"    • Drug use: Never       Review of Systems   Constitutional: Positive for chills and fatigue  Negative for fever  HENT: Positive for sore throat  Negative for congestion, ear pain and rhinorrhea  Eyes: Negative for redness  Respiratory: Negative for chest tightness and shortness of breath  Cardiovascular: Negative for chest pain and palpitations  Gastrointestinal: Positive for nausea and vomiting  Negative for abdominal pain  Genitourinary: Negative for dysuria and hematuria  Musculoskeletal: Positive for arthralgias and myalgias  Skin: Negative for rash  Neurological: Negative for dizziness, syncope, light-headedness and numbness  Physical Exam  Physical Exam  Vitals and nursing note reviewed  Constitutional:       Appearance: Normal appearance  He is well-developed  HENT:      Head: Normocephalic and atraumatic  Eyes:      General: No scleral icterus  Pupils: Pupils are equal, round, and reactive to light  Cardiovascular:      Rate and Rhythm: Normal rate and regular rhythm  Pulses: Normal pulses  Heart sounds: Murmur heard  Pulmonary:      Effort: Pulmonary effort is normal  No respiratory distress  Breath sounds: No stridor  Abdominal:      General: There is no distension  Palpations: There is no mass  Musculoskeletal:      Cervical back: Normal range of motion  Right lower leg: No edema  Left lower leg: No edema     Skin:     General: Skin is warm and dry  Capillary Refill: Capillary refill takes less than 2 seconds  Coloration: Skin is not jaundiced  Neurological:      Mental Status: He is alert and oriented to person, place, and time  Gait: Gait normal    Psychiatric:         Mood and Affect: Mood normal          Vital Signs  ED Triage Vitals [03/01/23 1018]   Temperature Pulse Respirations Blood Pressure SpO2   98 9 °F (37 2 °C) 95 20 (!) 218/101 95 %      Temp Source Heart Rate Source Patient Position - Orthostatic VS BP Location FiO2 (%)   Oral Monitor Sitting Left arm --      Pain Score       10 - Worst Possible Pain           Vitals:    03/01/23 1245 03/01/23 1246 03/01/23 1306 03/01/23 1400   BP: (!) 204/111 (!) 201/115 160/74 (!) 208/103   Pulse: 89 85 86 80   Patient Position - Orthostatic VS: Lying Lying Lying Lying         Visual Acuity      ED Medications  Medications   ondansetron (ZOFRAN) injection 4 mg (4 mg Intravenous Given 3/1/23 1037)   iohexol (OMNIPAQUE) 350 MG/ML injection (SINGLE-DOSE) 85 mL (85 mL Intravenous Given 3/1/23 1118)   sodium chloride 0 9 % bolus 500 mL (0 mL Intravenous Stopped 3/1/23 1300)   ketorolac (TORADOL) injection 15 mg (15 mg Intravenous Given 3/1/23 1157)       Diagnostic Studies  Results Reviewed     Procedure Component Value Units Date/Time    HS Troponin I 4hr [[id number]] Collected: 03/01/23 1407    Lab Status:  In process Specimen: Blood from Arm, Right Updated: 03/01/23 1415    HS Troponin I 2hr [[id number]]  (Normal) Collected: 03/01/23 1239    Lab Status: Final result Specimen: Blood from Arm, Right Updated: 03/01/23 1321     hs TnI 2hr 35 ng/L      Delta 2hr hsTnI 8 ng/L     Manual Differential(PHLEBS Do Not Order) [[id number]]  (Abnormal) Collected: 03/01/23 1037    Lab Status: Final result Specimen: Blood from Arm, Right Updated: 03/01/23 1124     Segmented % 85 %      Bands % 2 %      Lymphocytes % 8 %      Monocytes % 4 %      Eosinophils, % 0 %      Basophils % 0 %      Atypical Lymphocytes % 1 %      Absolute Neutrophils 5 58 Thousand/uL      Lymphocytes Absolute 0 51 Thousand/uL      Monocytes Absolute 0 26 Thousand/uL      Eosinophils Absolute 0 00 Thousand/uL      Basophils Absolute 0 00 Thousand/uL      Total Counted --     RBC Morphology Normal     Platelet Estimate Borderline    FLU/RSV/COVID - if FLU/RSV clinically relevant [[id number]]  (Normal) Collected: 03/01/23 1037    Lab Status: Final result Specimen: Nares from Nose Updated: 03/01/23 1121     SARS-CoV-2 Negative     INFLUENZA A PCR Negative     INFLUENZA B PCR Negative     RSV PCR Negative    Narrative:      FOR PEDIATRIC PATIENTS - copy/paste COVID Guidelines URL to browser: [email or web address]    SARS-CoV-2 assay is a Nucleic Acid Amplification assay intended for the  qualitative detection of nucleic acid from SARS-CoV-2 in nasopharyngeal  swabs  Results are for the presumptive identification of SARS-CoV-2 RNA  Positive results are indicative of infection with SARS-CoV-2, the virus  causing COVID-19, but do not rule out bacterial infection or co-infection  with other viruses  Laboratories within the [place] and its  territories are required to report all positive results to the appropriate  public health authorities  Negative results do not preclude SARS-CoV-2  infection and should not be used as the sole basis for treatment or other  patient management decisions  Negative results must be combined with  clinical observations, patient history, and epidemiological information  This test has not been FDA cleared or approved  This test has been authorized by FDA under an Emergency Use Authorization  (EUA)   This test is only authorized for the duration of time the  declaration that circumstances exist justifying the authorization of the  emergency use of an in vitro diagnostic tests for detection of SARS-CoV-2  virus and/or diagnosis of COVID-19 infection under section 564(b)(1) of  the Act, 21 U  S C  449LJG-5(D)(4), unless the authorization is terminated  or revoked sooner  The test has been validated but independent review by FDA  and CLIA is pending  Test performed using [employer] GeneXpert: This RT-PCR assay targets N2,  a region unique to SARS-CoV-2  A conserved region in the E-gene was chosen  for pan-Sarbecovirus detection which includes SARS-CoV-2  According to CMS-2020-01-R, this platform meets the definition of high-throughput technology      Urine Microscopic [[id number]]  (Normal) Collected: 03/01/23 1047    Lab Status: Final result Specimen: Urine, Other Updated: 03/01/23 1113     RBC, UA 1-2 /hpf      WBC, UA None Seen /hpf      Epithelial Cells Occasional /hpf      Bacteria, UA None Seen /hpf     UA (URINE) with reflex to Scope [[id number]]  (Abnormal) Collected: 03/01/23 1047    Lab Status: Final result Specimen: Urine, Other Updated: 03/01/23 1112     Color, UA Yellow     Clarity, UA Clear     Specific Gravity, UA 1 010     pH, UA 6 5     Leukocytes, UA Negative     Nitrite, UA Negative     Protein, UA 30 (1+) mg/dl      Glucose, UA Negative mg/dl      Ketones, UA Negative mg/dl      Bilirubin, UA Negative     Occult Blood, UA 25 0     UROBILINOGEN UA Negative mg/dL     Strep A PCR [[id number]]  (Normal) Collected: 03/01/23 1037    Lab Status: Final result Specimen: Throat Updated: 03/01/23 1108     STREP A PCR Not Detected    HS Troponin 0hr (reflex protocol) [[id number]]  (Normal) Collected: 03/01/23 1037    Lab Status: Final result Specimen: Blood from Arm, Right Updated: 03/01/23 1107     hs TnI 0hr 27 ng/L     NT-BNP PRO-BE,[facility] only           [[id number]]  (Normal) Collected: 03/01/23 1037    Lab Status: Final result Specimen: Blood from Arm, Right Updated: 03/01/23 1105     NT-proBNP 275 pg/mL     Lipase [[id number]]  (Normal) Collected: 03/01/23 1037    Lab Status: Final result Specimen: Blood from Arm, Right Updated: 03/01/23 1058     Lipase 77 u/L     Comprehensive metabolic panel [[id number]]  (Abnormal) Collected: 03/01/23 1037    Lab Status: Final result Specimen: Blood from Arm, Right Updated: 03/01/23 1058     Sodium 133 mmol/L      Potassium 3 8 mmol/L      Chloride 97 mmol/L      CO2 25 mmol/L      ANION GAP 11 mmol/L      BUN 6 mg/dL      Creatinine 0 59 mg/dL      Glucose 126 mg/dL      Calcium 9 2 mg/dL      AST 49 U/L      ALT 43 U/L      Alkaline Phosphatase 112 U/L      Total Protein 8 7 g/dL      Albumin 4 7 g/dL      Total Bilirubin 0 89 mg/dL      eGFR 111 ml/min/1 73sq m     Narrative:      [place] guidelines for Chronic Kidney Disease (CKD):   •  Stage 1 with normal or high GFR (GFR > 90 mL/min/1 73 square meters)  •  Stage 2 Mild CKD (GFR = 60-89 mL/min/1 73 square meters)  •  Stage 3A Moderate CKD (GFR = 45-59 mL/min/1 73 square meters)  •  Stage 3B Moderate CKD (GFR = 30-44 mL/min/1 73 square meters)  •  Stage 4 Severe CKD (GFR = 15-29 mL/min/1 73 square meters)  •  Stage 5 End Stage CKD (GFR <15 mL/min/1 73 square meters)  Note: GFR calculation is accurate only with a steady state creatinine    CBC and differential [[id number]]  (Abnormal) Collected: 03/01/23 1037    Lab Status: Final result Specimen: Blood from Arm, Right Updated: 03/01/23 1053     WBC 6 41 Thousand/uL      RBC 5 16 Million/uL      Hemoglobin 16 8 g/dL      Hematocrit 49 3 %      MCV 96 fL      MCH 32 6 pg      MCHC 34 1 g/dL      RDW 12 6 %      MPV 8 9 fL      Platelets 277 Thousands/uL     Narrative: This is an appended report  These results have been appended to a previously verified report  CT chest with contrast   Final Result by Kelsey Kathleen MD (03/01 1142)      No abnormalities on chest radiograph  The opacity in question is a benign pericardial fat pad  Diffuse hepatic steatosis            Workstation performed: [id number]         XR chest pa & lateral   ED Interpretation by Sydnee Saucedo, AARON (03/01 4496)   Abnormality noted in the left lower lobe concerning for potential pleural effusion versus other, CT scan ordered for further evaluation  Final Result by Mandi Valiente MD (03/01 4668)      No acute cardiopulmonary disease        Findings are stable            Workstation performed: [id number]                    Procedures  ECG 12 Lead Documentation Only    Date/Time: 3/1/2023 10:34 AM  Performed by: Jen Molina PA-C  Authorized by: Jen Molina PA-C     Indications / Diagnosis:  Fatigue / n/v  ECG reviewed by me, the ED Provider: yes    Patient location:  ED  Previous ECG:     Comparison to cardiac monitor: Yes    Interpretation:     Interpretation: normal    Rate:     ECG rate:  93    ECG rate assessment: normal    Rhythm:     Rhythm: sinus rhythm    Ectopy:     Ectopy: none    QRS:     QRS axis:  Normal    QRS intervals:  Normal  T waves:     T waves: peaked and inverted      Peaked:  V2 and V3    Inverted:  V5 and V6  Q waves:     Q waves:  V1, V2 and V3  Other findings:     Other findings: LVH    Comments:        QRS 98        ECG 12 Lead Documentation Only    Date/Time: 3/1/2023 2:22 PM  Performed by: Jen Molina PA-C  Authorized by: Jen Molina PA-C     Indications / Diagnosis:  Repeat EKG with elevated troponin  ECG reviewed by me, the ED Provider: yes    Patient location:  ED  Previous ECG:     Previous ECG:  Compared to current    Similarity:  No change    Comparison to cardiac monitor: Yes    Interpretation:     Interpretation: normal    Rate:     ECG rate:  75    ECG rate assessment: normal    Rhythm:     Rhythm: sinus rhythm    Ectopy:     Ectopy: none    QRS:     QRS axis:  Normal    QRS intervals:  Normal  Conduction:     Conduction: normal    T waves:     T waves: non-specific and inverted      Inverted:  V5 and V6  Comments:        QRS 98                 ED Course  ED Course as of 03/01/23 1426 Wed Mar 01, 2023   1124 As per radiology the abnormality on CXR is noted to be pericardial fat pad  No other abnls noted on CT    1248    No abnormalities on chest radiograph  The opacity in question is a benign pericardial fat pad      Diffuse hepatic steatosis  [address] staff noticed an abnormal 4-lead EKG, ordered repeat twelve-lead EKG at 1415 hrs  noted to be without significant change  0 Session was had with family medicine residents who accepted the patient for admission on the basis of elevated blood pressure and troponin                               SBIRT 20yo+    Flowsheet Row Most Recent Value   SBIRT (25 yo +)    In order to provide better care to our patients, we are screening all of our patients for alcohol and drug use  Would it be okay to ask you these screening questions? No Filed at: 03/01/2023 1411                    Medical Decision Making  51-year-old male multiple cardiac conditions noted on past medical history including hypertensive urgency/emergency, patient is noncompliant with his antihypertensives as per his mother presents for multiple complaints including a sore throat, dizziness which is reported to be a fatigue sensation not associate with loss of consciousness or rotational dizziness or focal neurodeficits, fatigue, body aches, chills, sweats, nausea and vomiting  Due to patient's significant comorbid cardiac conditions will obtain EKG, troponin, BNP, chest x-ray to evaluate for potential cardiac abnormalities or dysrhythmias, sequela of hypertensive state  Physical exam is reassuring with a known heart murmur auscultated again and rhonchi in bilateral lung fields for which a chest x-ray will be obtained to evaluate for pneumonia  In the setting of URI symptoms, flu COVID and RSV as well as strep throat testing obtained  EKG on my interpretation shows no significant deviations from prior EKG noted in September 2021      Work-up reveals chest x-ray abnormality which prompted a CT scan however a benign pericardial fat pad was noted, additionally blood work is without significant abnormalities with the exception of troponin  Patient noted to have initial troponin over 20 and repeat over 30, a delta troponin of 8 in the setting of hypertension and untreated hypertension recommend observation admission with family medicine who can manage his outpatient regimen  All imaging and/or lab testing discussed with patient  Patient and/or family members verbalizes understanding and agrees with plan for admission  Patient is stable for admission  Portions of the record may have been created with voice recognition software  Occasional wrong word or "sound a like" substitutions may have occurred due to the inherent limitations of voice recognition software  Read the chart carefully and recognize, using context, where substitutions have occurred  Elevated troponin: acute illness or injury  Hypertensive emergency without congestive heart failure: chronic illness or injury with exacerbation, progression, or side effects of treatment that poses a threat to life or bodily functions  Amount and/or Complexity of Data Reviewed  Independent Historian: caregiver  Labs: ordered  Radiology: ordered and independent interpretation performed  Risk  Prescription drug management            Disposition  Final diagnoses:   Elevated troponin   Hypertensive emergency without congestive heart failure     Time reflects when diagnosis was documented in both MDM as applicable and the Disposition within this note     Time User Action Codes Description Comment    3/1/2023  2:12 PM Haven Fraga Add [R77 8] Elevated troponin     3/1/2023  2:12 PM Haven Fraga Add [I16 1] Hypertensive emergency without congestive heart failure       ED Disposition     ED Disposition   Admit    Condition   Stable    Date/Time   Wed Mar 1, 2023  2:12 PM    Comment   Case was discussed with family medicine residents and the patient's admission status was agreed to be Admission Status: observation status to the service of Dr Carter Flores   Follow-up Information    None         Patient's Medications   Discharge Prescriptions    No medications on file       No discharge procedures on file      PDMP Review     None          ED Provider  Electronically Signed by           Dallas Saavedra PA-C  03/01/23 1429

## 2023-03-01 NOTE — ASSESSMENT & PLAN NOTE
ASCVD 32 1%  Home medication Lipitor 40 mg daily  Noncompliant medication for the past 4 months    - Continue home medication atorvastatin 40 mg daily

## 2023-03-01 NOTE — ASSESSMENT & PLAN NOTE
Lab Results   Component Value Date    HGBA1C 6 6 (H) 09/27/2021   Medication noncompliance for the past 4 months    Recent Labs     03/01/23  1621 03/01/23  2149   POCGLU 83 91       Blood Sugar Average: Last 72 hrs:    - Continue home medication Metformin

## 2023-03-01 NOTE — ED NOTES
Pt attached to continuous cardiac and pulse oximetry monitoring, intermittent NiBP monitoring q 30 min     Jaxon Person, [facility]  03/01/23 5284

## 2023-03-01 NOTE — ASSESSMENT & PLAN NOTE
Presents with one day of bilateral headache, generalized body aches, dizziness    /101mmHg in the ED  Troponins 27>35>40  NTproBNP 275  Last saw Cardio on 11/30/21; EF - 56%  Home medication: Amlodipine 10 mg daily, Hyzaar 100-12 0 5 mg daily and Toprol XL 25 mg daily  Noncompliance to home medication for the past 4 months and multiple no-shows to PCP visits and cardiology visits outpatient  Currently asymptomatic on examination     -Amlodipine 10 mg daily, hydrochlorothiazide 12 5 mg daily, losartan 100 mg daily and metoprolol succinate 25 mg daily starting now  -Monitor blood pressure closely  - AM CBC, CMP and EKG

## 2023-03-01 NOTE — H&P
History and Physical - Nestor 6    Patient Information: Jessenia Dial 62 y o  male MRN: [id number]  Unit/Bed#: 7T [facility] 708-01 Encounter: [id number]  Admitting Physician: Cliff Duncan MD  PCP: Mario Blake MD  Date of Admission:  03/01/23    Assessment and Plan    * Uncontrolled hypertension  Assessment & Plan  Presents with one day of bilateral headache, generalized body aches, dizziness  /101mmHg in the ED  Troponins 27>35>40  NTproBNP 275  Last saw Cardio on 11/30/21; EF - 56%  Home medication: Amlodipine 10 mg daily, Hyzaar 100-12 0 5 mg daily and Toprol XL 25 mg daily  Noncompliance to home medication for the past 4 months and multiple no-shows to PCP visits and cardiology visits outpatient  Currently asymptomatic on examination     -Amlodipine 10 mg daily, hydrochlorothiazide 12 5 mg daily, losartan 100 mg daily and metoprolol succinate 25 mg daily starting now  -Monitor blood pressure closely  - AM CBC, CMP and EKG     Deaf  Assessment & Plan  Congenital deafness & mute  Uses amended sign language     - Use Ipad with video for conversing   - Family able to translate (Mother, Hemanth Su)    Type 2 diabetes mellitus without complication, without long-term current use of insulin ([facility] [address] )  Assessment & Plan  Lab Results   Component Value Date    HGBA1C 6 6 (H) 09/27/2021   Medication noncompliance for the past 4 months    No results for input(s): POCGLU in the last 72 hours  Blood Sugar Average: Last 72 hrs:  - Hold home metformin and restart prior to discharge  -SSI and fingerstick glucose checks ACHS    Other hyperlipidemia  Assessment & Plan  ASCVD 32 1%  Home medication Lipitor 40 mg daily  Noncompliant medication for the past 4 months    - Continue home medication of atorvastatin 40 mg daily      VTE Prophylaxis: VTE Score: 3 Moderate Risk (Score 3-4) -Sequential Compression Devices Ordered    Code Status: Level 1 - Full Code  Anticipated Length of Stay:  Patient will be admitted on an Observation basis with an anticipated length of stay of  less than 2 midnights  Justification for Hospital Stay: Hypertensive urgency   Total Time for Visit, including Counseling / Coordination of Care: 45 mins  Greater than 50% of this total time spent on direct patient counseling and coordination of care  Patient Information Sharing: With the consent of Alice Larios , their loved ones Adela Franco, mother ) were notified today by inpatient team of the patient’s condition and current plan  All questions answered  Chief Complaint:     Chief Complaint   Patient presents with   • Cold Like Symptoms     Sore throat and vomiting and dizzy feeling, sweaty, body aches since today  History of Present Illness:    Alice Larios is a 62 y o  male with a PMH of DM type II, HLD and HTN presented to the ED escorted by his mother (as the patient speaks sign language) due to bilateral headache, dizziness, upper and lower extremity pain that had started today  The patient had not eaten anything as well, however had one episode of nbnb emesis as well  Patient currently denies headache, blurry vision, chest pain, SOB, cough, abdominal pain,n/v/c/d or urinary symptoms  Of note, patient has multiple no-shows with Cardiology and PCP at the Clinic since patient does not like going to the doctor  Patient reports he had not taken any of his medication for the past 4 months  ED course:   -VS on arrival: Temp 98 9 F, pulse 95, SPO2 95% on RA, rest 20, /101mmHg  -Labs: Flu/RSV/COVID negative, strep a PCR not detected, UA unremarkable  NT , troponins 27> 35> 40  -CT chest: No abnormalities on chest radiograph   The opacity in question is a benign pericardial fat pad  Diffuse hepatic steatosis  Chest X ray: No acute cardiopulmonary disease   -Meds: Ondansetron 4 mg IV, Ketorolac 15 mg IV, NS bolus 500 ml     Patient will be admitted to [facility] for hypertensive urgency           Review of Systems:  Review of Systems   Constitutional: Negative for fatigue and fever  HENT: Negative for congestion, rhinorrhea, sinus pressure, sinus pain, sore throat and tinnitus  Eyes: Negative for visual disturbance  Respiratory: Negative for apnea, cough, choking, chest tightness, shortness of breath and stridor  Cardiovascular: Negative for chest pain, palpitations and leg swelling  Gastrointestinal: Negative for abdominal pain, constipation, diarrhea, nausea and vomiting  Genitourinary: Negative for difficulty urinating, frequency and urgency  Musculoskeletal: Negative for arthralgias  Skin: Negative for pallor and rash  Neurological: Negative for dizziness, seizures, syncope, facial asymmetry, light-headedness, numbness and headaches  Psychiatric/Behavioral: Negative for confusion  All other systems reviewed and are negative  Past Medical and Surgical History:   Past Medical History:   Diagnosis Date   • Deaf    • Heart murmur      History reviewed  No pertinent surgical history  Meds/Allergies: Allergies: No Known Allergies  Prior to Admission Medications   Prescriptions Last Dose Informant Patient Reported? Taking?    Blood Pressure KIT Unknown  No No   Sig: Use 2 (two) times a day   Patient not taking: Reported on 3/1/2023   amLODIPine (NORVASC) 10 mg tablet Unknown  No No   Sig: Take 1 tablet (10 mg total) by mouth daily In the morning   Patient not taking: Reported on 3/1/2023   atorvastatin (LIPITOR) 40 mg tablet Unknown  No No   Sig: Take 1 tablet (40 mg total) by mouth daily with dinner   Patient not taking: Reported on 3/1/2023   losartan-hydrochlorothiazide (HYZAAR) 100-12 5 MG per tablet Unknown  No No   Sig: Take 1 tablet by mouth daily   Patient not taking: Reported on 3/1/2023   metFORMIN (GLUCOPHAGE) 500 mg tablet Unknown  No No   Sig: take 1 tablet by mouth twice a day with meals   Patient not taking: Reported on 3/1/2023   metoprolol succinate (TOPROL-XL) 25 mg 24 hr tablet Unknown  No No   Sig: Take 1 tablet (25 mg total) by mouth every evening   Patient not taking: Reported on 3/1/2023      Facility-Administered Medications: None     Social History:     Social History     Socioeconomic History   • Marital status: Single     Spouse name: Not on file   • Number of children: Not on file   • Years of education: Not on file   • Highest education level: Not on file   Occupational History   • Not on file   Tobacco Use   • Smoking status: Never   • Smokeless tobacco: Never   Vaping Use   • Vaping Use: Never used   Substance and Sexual Activity   • Alcohol use: Not Currently     Comment: 'as per mother pt drinks everyday"    • Drug use: Never   • Sexual activity: Not on file   Other Topics Concern   • Not on file   Social History Narrative   • Not on file     Social Determinants of Health     Financial Resource Strain: Not on file   Food Insecurity: Not on file   Transportation Needs: Not on file   Physical Activity: Not on file   Stress: Not on file   Social Connections: Not on file   Intimate Partner Violence: Not on file   Housing Stability: Not on file     Patient Pre-hospital Living Situation: Home   Patient Pre-hospital Level of Mobility: Self ambulating   Patient Pre-hospital Diet Restrictions: None     Family History:  Family History   Problem Relation Age of Onset   • Hypertension Mother    • Diabetes Mother    • Heart disease Mother         pacemaker   • Arthritis Mother         chronic       Physical Exam:   Vitals:   Blood Pressure: (!) 188/86 (03/01/23 1706)  Pulse: 80 (03/01/23 1541)  Temperature: 98 9 °F (37 2 °C) (03/01/23 1018)  Temp Source: Temporal (03/01/23 1541)  Respirations: 16 (03/01/23 1541)  Height: 5' 2" (157 5 cm) (03/01/23 1645)  Weight - Scale: 64 kg (141 lb 1 5 oz) (03/01/23 1645)  SpO2: 96 % (03/01/23 1541)    Physical Exam  Constitutional:       General: He is not in acute distress  Appearance: He is normal weight   He is not ill-appearing or toxic-appearing  HENT:      Head: Normocephalic and atraumatic  Right Ear: External ear normal  There is no impacted cerumen  Left Ear: External ear normal  There is no impacted cerumen  Nose: Nose normal  No congestion or rhinorrhea  Mouth/Throat:      Mouth: Mucous membranes are moist       Pharynx: Oropharynx is clear  No posterior oropharyngeal erythema  Eyes:      General: No scleral icterus  Extraocular Movements: Extraocular movements intact  Neck:      Vascular: No carotid bruit  Cardiovascular:      Rate and Rhythm: Normal rate and regular rhythm  Pulses: Normal pulses  Heart sounds: Normal heart sounds  No murmur heard  No friction rub  Pulmonary:      Effort: Pulmonary effort is normal  No respiratory distress  Breath sounds: Normal breath sounds  No wheezing  Abdominal:      General: Abdomen is flat  Bowel sounds are normal  There is no distension  Palpations: Abdomen is soft  There is no mass  Tenderness: There is no abdominal tenderness  Hernia: No hernia is present  Musculoskeletal:         General: No swelling  Normal range of motion  Cervical back: No rigidity  Right lower leg: No edema  Left lower leg: No edema  Lymphadenopathy:      Cervical: No cervical adenopathy  Skin:     General: Skin is warm  Coloration: Skin is not jaundiced  Findings: No erythema or rash  Neurological:      General: No focal deficit present  Mental Status: He is alert  Psychiatric:         Mood and Affect: Mood normal          Lab Results: I have personally reviewed pertinent reports      Results from last 7 days   Lab Units 03/01/23  1037   WBC Thousand/uL 6 41   HEMOGLOBIN g/dL 16 8   HEMATOCRIT % 49 3   PLATELETS Thousands/uL 123*   LYMPHO PCT % 8*   MONO PCT % 4   EOS PCT % 0   BANDS PCT % 2     Results from last 7 days   Lab Units 03/01/23  1037   POTASSIUM mmol/L 3 8   CHLORIDE mmol/L 97   CO2 mmol/L 25   BUN mg/dL 6   CREATININE mg/dL 0 59*   CALCIUM mg/dL 9 2   ALK PHOS U/L 112   ALT U/L 43   AST U/L 49   EGFR ml/min/1 73sq m 111                     Results from last 7 days   Lab Units 03/01/23  1037   NT-PRO BNP pg/mL 275      Results from last 7 days   Lab Units 03/01/23  1047   COLOR UA  Yellow   CLARITY UA  Clear   SPEC GRAV UA  1 010   PH UA  6 5   LEUKOCYTES UA  Negative   NITRITE UA  Negative   GLUCOSE UA mg/dl Negative   KETONES UA mg/dl Negative   BILIRUBIN UA  Negative   BLOOD UA  25 0*      Results from last 7 days   Lab Units 03/01/23  1047   RBC UA /hpf 1-2   WBC UA /hpf None Seen   EPITHELIAL CELLS WET PREP /hpf Occasional   BACTERIA UA /hpf None Seen        Imaging: I have personally reviewed pertinent reports  XR chest pa & lateral  Result Date: 3/1/2023  Narrative: CHEST INDICATION:   dizziness  COMPARISON:  9/27/2021 EXAM PERFORMED/VIEWS:  XR CHEST PA & LATERAL Images: 2 FINDINGS: Cardiomediastinal silhouette appears unremarkable  The lungs are clear  No pneumothorax or pleural effusion  Osseous structures appear within normal limits for patient age  Impression: No acute cardiopulmonary disease  CT chest with contrast  Result Date: 3/1/2023  Impression: No abnormalities on chest radiograph  The opacity in question is a benign pericardial fat pad  Diffuse hepatic steatosis         EKG, Pathology, and Other Studies Reviewed on Admission:   EKG  Result Date: 03/01/23  Impression:  NSR, no acute ST/T wave abnormalities, QTc interval 431    Entire H&P was discussed with Dr Alice Mancilla who agreed to what is noted above    Ariel Larose MD  03/01/23  5:28 PM

## 2023-03-02 VITALS
OXYGEN SATURATION: 94 % | WEIGHT: 141.09 LBS | HEIGHT: 62 IN | HEART RATE: 65 BPM | TEMPERATURE: 97.2 F | SYSTOLIC BLOOD PRESSURE: 138 MMHG | DIASTOLIC BLOOD PRESSURE: 86 MMHG | BODY MASS INDEX: 25.96 KG/M2 | RESPIRATION RATE: 17 BRPM

## 2023-03-02 PROBLEM — E87.1 HYPONATREMIA: Status: ACTIVE | Noted: 2023-03-02

## 2023-03-02 PROBLEM — I10 UNCONTROLLED HYPERTENSION: Status: RESOLVED | Noted: 2021-06-24 | Resolved: 2023-03-02

## 2023-03-02 LAB
ALBUMIN SERPL BCP-MCNC: 4.1 G/DL (ref 3.5–5)
ALP SERPL-CCNC: 91 U/L (ref 43–122)
ALT SERPL W P-5'-P-CCNC: 33 U/L
ANION GAP SERPL CALCULATED.3IONS-SCNC: 7 MMOL/L (ref 5–14)
AST SERPL W P-5'-P-CCNC: 39 U/L (ref 17–59)
ATRIAL RATE: 68 BPM
ATRIAL RATE: 72 BPM
BASOPHILS # BLD AUTO: 0.04 THOUSANDS/ÂΜL (ref 0–0.1)
BASOPHILS NFR BLD AUTO: 1 % (ref 0–1)
BILIRUB SERPL-MCNC: 1.66 MG/DL (ref 0.2–1)
BUN SERPL-MCNC: 8 MG/DL (ref 5–25)
CALCIUM SERPL-MCNC: 9 MG/DL (ref 8.4–10.2)
CHLORIDE SERPL-SCNC: 99 MMOL/L (ref 96–108)
CO2 SERPL-SCNC: 26 MMOL/L (ref 21–32)
CREAT SERPL-MCNC: 0.6 MG/DL (ref 0.7–1.5)
EOSINOPHIL # BLD AUTO: 0.02 THOUSAND/ÂΜL (ref 0–0.61)
EOSINOPHIL NFR BLD AUTO: 0 % (ref 0–6)
ERYTHROCYTE [DISTWIDTH] IN BLOOD BY AUTOMATED COUNT: 13 % (ref 11.6–15.1)
GFR SERPL CREATININE-BSD FRML MDRD: 110 ML/MIN/1.73SQ M
GLUCOSE P FAST SERPL-MCNC: 92 MG/DL (ref 70–99)
GLUCOSE SERPL-MCNC: 118 MG/DL (ref 65–140)
GLUCOSE SERPL-MCNC: 92 MG/DL (ref 70–99)
HCT VFR BLD AUTO: 49.8 % (ref 36.5–49.3)
HGB BLD-MCNC: 16.4 G/DL (ref 12–17)
IMM GRANULOCYTES # BLD AUTO: 0.01 THOUSAND/UL (ref 0–0.2)
IMM GRANULOCYTES NFR BLD AUTO: 0 % (ref 0–2)
LYMPHOCYTES # BLD AUTO: 0.98 THOUSANDS/ÂΜL (ref 0.6–4.47)
LYMPHOCYTES NFR BLD AUTO: 16 % (ref 14–44)
MCH RBC QN AUTO: 32.2 PG (ref 26.8–34.3)
MCHC RBC AUTO-ENTMCNC: 32.9 G/DL (ref 31.4–37.4)
MCV RBC AUTO: 98 FL (ref 82–98)
MONOCYTES # BLD AUTO: 0.7 THOUSAND/ÂΜL (ref 0.17–1.22)
MONOCYTES NFR BLD AUTO: 12 % (ref 4–12)
NEUTROPHILS # BLD AUTO: 4.26 THOUSANDS/ÂΜL (ref 1.85–7.62)
NEUTS SEG NFR BLD AUTO: 71 % (ref 43–75)
NRBC BLD AUTO-RTO: 0 /100 WBCS
P AXIS: 25 DEGREES
P AXIS: 33 DEGREES
PLATELET # BLD AUTO: 124 THOUSANDS/UL (ref 149–390)
PMV BLD AUTO: 10.1 FL (ref 8.9–12.7)
POTASSIUM SERPL-SCNC: 3.8 MMOL/L (ref 3.5–5.3)
PR INTERVAL: 170 MS
PR INTERVAL: 174 MS
PROT SERPL-MCNC: 7.8 G/DL (ref 6.4–8.4)
QRS AXIS: 33 DEGREES
QRS AXIS: 35 DEGREES
QRSD INTERVAL: 96 MS
QRSD INTERVAL: 98 MS
QT INTERVAL: 394 MS
QT INTERVAL: 394 MS
QTC INTERVAL: 418 MS
QTC INTERVAL: 431 MS
RBC # BLD AUTO: 5.1 MILLION/UL (ref 3.88–5.62)
SODIUM SERPL-SCNC: 132 MMOL/L (ref 135–147)
T WAVE AXIS: 180 DEGREES
T WAVE AXIS: 184 DEGREES
VENTRICULAR RATE: 68 BPM
VENTRICULAR RATE: 72 BPM
WBC # BLD AUTO: 6.01 THOUSAND/UL (ref 4.31–10.16)

## 2023-03-02 RX ORDER — AMLODIPINE BESYLATE 10 MG/1
10 TABLET ORAL DAILY
Qty: 30 TABLET | Refills: 0 | Status: SHIPPED | OUTPATIENT
Start: 2023-03-02 | End: 2023-03-16

## 2023-03-02 RX ORDER — LOSARTAN POTASSIUM AND HYDROCHLOROTHIAZIDE 12.5; 1 MG/1; MG/1
1 TABLET ORAL DAILY
Qty: 30 TABLET | Refills: 0 | Status: SHIPPED | OUTPATIENT
Start: 2023-03-02 | End: 2023-03-16

## 2023-03-02 RX ORDER — METOPROLOL SUCCINATE 25 MG/1
25 TABLET, EXTENDED RELEASE ORAL DAILY
Qty: 30 TABLET | Refills: 0 | Status: SHIPPED | OUTPATIENT
Start: 2023-03-02 | End: 2023-03-16 | Stop reason: SDUPTHER

## 2023-03-02 RX ORDER — ATORVASTATIN CALCIUM 40 MG/1
40 TABLET, FILM COATED ORAL DAILY
Qty: 30 TABLET | Refills: 0 | Status: SHIPPED | OUTPATIENT
Start: 2023-03-02 | End: 2023-03-16 | Stop reason: SDUPTHER

## 2023-03-02 RX ADMIN — LOSARTAN POTASSIUM 100 MG: 50 TABLET, FILM COATED ORAL at 08:37

## 2023-03-02 RX ADMIN — AMLODIPINE BESYLATE 10 MG: 10 TABLET ORAL at 08:37

## 2023-03-02 RX ADMIN — HYDROCHLOROTHIAZIDE 12.5 MG: 12.5 TABLET ORAL at 08:38

## 2023-03-02 NOTE — PLAN OF CARE
Problem: PAIN - ADULT  Goal: Verbalizes/displays adequate comfort level or baseline comfort level  Description: Interventions:  - Encourage patient to monitor pain and request assistance  - Assess pain using appropriate pain scale  - Administer analgesics based on type and severity of pain and evaluate response  - Implement non-pharmacological measures as appropriate and evaluate response  - Consider cultural and social influences on pain and pain management  - Notify physician/advanced practitioner if interventions unsuccessful or patient reports new pain  Outcome: Progressing     Problem: Nutrition/Hydration-ADULT  Goal: Nutrient/Hydration intake appropriate for improving, restoring or maintaining nutritional needs  Description: Monitor and assess patient's nutrition/hydration status for malnutrition  Collaborate with interdisciplinary team and initiate plan and interventions as ordered  Monitor patient's weight and dietary intake as ordered or per policy  Utilize nutrition screening tool and intervene as necessary  Determine patient's food preferences and provide high-protein, high-caloric foods as appropriate       INTERVENTIONS:  - Monitor oral intake, urinary output, labs, and treatment plans  - Assess nutrition and hydration status and recommend course of action  - Evaluate amount of meals eaten  - Assist patient with eating if necessary   - Allow adequate time for meals  - Recommend/ encourage appropriate diets, oral nutritional supplements, and vitamin/mineral supplements  - Order, calculate, and assess calorie counts as needed  - Recommend, monitor, and adjust tube feedings and TPN/PPN based on assessed needs  - Assess need for intravenous fluids  - Provide specific nutrition/hydration education as appropriate  - Include patient/family/caregiver in decisions related to nutrition  Outcome: Progressing     Problem: Knowledge Deficit  Goal: Patient/family/caregiver demonstrates understanding of disease process, treatment plan, medications, and discharge instructions  Description: Complete learning assessment and assess knowledge base    Interventions:  - Provide teaching at level of understanding  - Provide teaching via preferred learning methods  Outcome: Progressing

## 2023-03-02 NOTE — DISCHARGE SUMMARY
Discharge Summary - Nestor Malcolm    Patient Information: Nubia Valencia 62 y o  male MRN: [id number]  Unit/Bed#: 7T [facility] 708-01 Encounter: [id number]    Discharging Physician / Practitioner: Peewee Gallego   PCP: Ish Reyna MD  Admission Date:   Admission Orders (From admission, onward)     Ordered        03/01/23 1412  Place in Observation  Once                      Discharge Date: 3/2/2023    Reason for Admission: Hypertensive Urgency     Discharge Diagnoses:     Principal Problem:    Uncontrolled hypertension  Active Problems:    Other hyperlipidemia    Type 2 diabetes mellitus without complication, without long-term current use of insulin ([facility] [address] )    Deaf    Hyponatremia  Resolved Problems:    * No resolved hospital problems  *        * Uncontrolled hypertension  Assessment & Plan  Presents with one day of bilateral headache, generalized body aches, dizziness  /101mmHg in the ED  Troponins 27>35>40  NTproBNP 275  Last saw Cardio on 11/30/21; EF - 56%  Home medication: Amlodipine 10 mg daily, Hyzaar 100-12 0 5 mg daily and Toprol XL 25 mg daily  Noncompliance to home medication for the past 4 months and multiple no-shows to PCP visits and cardiology visits outpatient  Currently asymptomatic on examination     -Amlodipine 10 mg daily, hydrochlorothiazide 12 5 mg daily, losartan 100 mg daily and metoprolol succinate 25 mg daily starting now  -Monitor blood pressure closely  - AM CBC, CMP and EKG     Hyponatremia  Assessment & Plan  Chronic   Levels have not gone above 135 in the past    Serum osmolality 272  Possible due to diuretic therapy, however patient noncompliant to medication for the past 4 months     - Urine Sodium random   - Outpatient follow up     Deaf  Assessment & Plan  Congenital deafness & mute  Uses amended sign language       Type 2 diabetes mellitus without complication, without long-term current use of insulin [facility])  Assessment & Plan  Lab Results   Component Value Date    HGBA1C 6 6 (H) 09/27/2021   Medication noncompliance for the past 4 months    Recent Labs     03/01/23  1621 03/01/23  2149   POCGLU 83 91       Blood Sugar Average: Last 72 hrs:    - Continue home medication Metformin     Other hyperlipidemia  Assessment & Plan  ASCVD 32 1%  Home medication Lipitor 40 mg daily  Noncompliant medication for the past 4 months    - Continue home medication atorvastatin 40 mg daily       Consultations During Hospital Stay:  · None     Procedures Performed:   · None     Significant Findings / Test Results:   · CBC unremarkable  · Sodium 133 (chronic)     Incidental Findings:   · None      Test Results Pending at Discharge (will require follow up): · None      Outpatient Tests Requested:  ·     Outpatient follow-up Requested:  • PCP and Cardiology     Complications:  None     Hospital Course:     Madan Sandoval is a 62 y o  male with a PMH of DM type II, HLD and HTN presented on 3/1/23 to the ED escorted by his mother (as the patient speaks sign language) due to bilateral headache, dizziness, upper and lower extremity pain that had started today  The patient had not eaten anything as well, however had one episode of nbnb emesis as well  Patient currently denies headache, blurry vision, chest pain, SOB, cough, abdominal pain,n/v/c/d or urinary symptoms  Of note, patient has multiple no-shows with Cardiology and PCP at the Clinic since patient does not like going to the doctor  Patient reports he had not taken any of his medication for the past 4 months       ED course:   -VS on arrival: Temp 98 9 F, pulse 95, SPO2 95% on RA, rest 20, /101mmHg  -Labs: Flu/RSV/COVID negative, strep a PCR not detected, UA unremarkable  NT , troponins 27> 35> 40  -CT chest: No abnormalities on chest radiograph   The opacity in question is a benign pericardial fat pad  Diffuse hepatic steatosis     Chest X ray: No acute cardiopulmonary disease   -Meds: Ondansetron 4 mg IV, Ketorolac 15 mg IV, NS bolus 500 ml      Patient was admitted to [facility] for hypertensive urgency  During the hospital stay, the patients BP was managed with continuing his home medication: Amlodipine, Losartan and Hydrochlorothiazide and Metorpolol  The following day, the patient patient's blood pressure was 138/86 and patient denied chest pain, shortness of breath, dizziness  Other vital signs stable  Patient was instructed to follow up outpatient with PCP and Cardiology  Condition at Discharge: stable     Discharge Day Visit / Exam:     Vitals: Blood Pressure: 138/86 (03/02/23 0724)  Pulse: 65 (03/02/23 0724)  Temperature: (!) 97 2 °F (36 2 °C) (03/02/23 0724)  Temp Source: Temporal (03/02/23 0724)  Respirations: 17 (03/02/23 0724)  Height: 5' 2" (157 5 cm) (03/01/23 1645)  Weight - Scale: 64 kg (141 lb 1 5 oz) (03/01/23 1645)  SpO2: 94 % (03/02/23 0724)  Exam:   Physical Exam  Constitutional:       General: He is not in acute distress  Appearance: He is normal weight  He is not ill-appearing or toxic-appearing  HENT:      Head: Normocephalic and atraumatic  Right Ear: External ear normal  There is no impacted cerumen  Left Ear: External ear normal  There is no impacted cerumen  Nose: Nose normal  No congestion or rhinorrhea  Mouth/Throat:      Mouth: Mucous membranes are moist       Pharynx: Oropharynx is clear  No posterior oropharyngeal erythema  Eyes:      General: No scleral icterus  Extraocular Movements: Extraocular movements intact  Neck:      Vascular: No carotid bruit  Cardiovascular:      Rate and Rhythm: Normal rate and regular rhythm  Pulses: Normal pulses  Heart sounds: Normal heart sounds  No murmur heard  No friction rub  Pulmonary:      Effort: Pulmonary effort is normal  No respiratory distress  Breath sounds: Normal breath sounds  No wheezing  Abdominal:      General: Abdomen is flat  Bowel sounds are normal  There is no distension  Palpations: Abdomen is soft  Tenderness: There is no abdominal tenderness  Musculoskeletal:         General: Normal range of motion  Cervical back: No rigidity  Right lower leg: No edema  Left lower leg: No edema  Lymphadenopathy:      Cervical: No cervical adenopathy  Skin:     General: Skin is warm  Coloration: Skin is not jaundiced  Findings: No erythema or rash  Neurological:      General: No focal deficit present  Mental Status: He is alert  Sensory: No sensory deficit  Motor: No weakness  Psychiatric:         Mood and Affect: Mood normal          Discussion with Family: Mother, Chancey Landau    Discharge instructions/Information to patient and family:   See after visit summary for information provided to patient and family  Discharge Medications:   amLODIPine Besylate  10 mg Oral Daily, In the morning  Atorvastatin Calcium  40 mg Oral Daily with dinner  Losartan Potassium-HCTZ  100-12 5 MG Tabs, 1 tablet Oral Daily  metFORMIN HCl  500 mg Tabs, take 1 tablet by mouth twice a day with meals  Metoprolol Succinate  25 mg Oral Every evening      Provisions for Follow-Up Care:  See after visit summary for information related to follow-up care and any pertinent home health orders  Disposition:     Home    For Discharges to [facility]:   · Not Applicable to this Patient - Not Applicable to this Patient    Planned Readmission: no     Discharge Statement:  I spent 60 minutes discharging the patient  This time was spent on the day of discharge  I had direct contact with the patient on the day of discharge  Greater than 50% of the total time was spent examining patient, answering all patient questions, arranging and discussing plan of care with patient as well as directly providing post-discharge instructions  Additional time then spent on discharge activities      ** Please Note: This note has been constructed using a voice recognition system **    Peter Jacinto MD  03/02/23  2:56 PM

## 2023-03-02 NOTE — PLAN OF CARE
Problem: Potential for Falls  Goal: Patient will remain free of falls  Description: INTERVENTIONS:  - Educate patient/family on patient safety including physical limitations  - Instruct patient to call for assistance with activity   - Consult OT/PT to assist with strengthening/mobility   - Keep Call bell within reach  - Keep bed low and locked with side rails adjusted as appropriate  - Keep care items and personal belongings within reach  - Initiate and maintain comfort rounds  - Make Fall Risk Sign visible to staff  Outcome: Progressing     Problem: PAIN - ADULT  Goal: Verbalizes/displays adequate comfort level or baseline comfort level  Description: Interventions:  - Encourage patient to monitor pain and request assistance  - Assess pain using appropriate pain scale  - Administer analgesics based on type and severity of pain and evaluate response  - Implement non-pharmacological measures as appropriate and evaluate response  - Consider cultural and social influences on pain and pain management  - Notify physician/advanced practitioner if interventions unsuccessful or patient reports new pain  Outcome: Progressing     Problem: INFECTION - ADULT  Goal: Absence or prevention of progression during hospitalization  Description: INTERVENTIONS:  - Assess and monitor for signs and symptoms of infection  - Monitor lab/diagnostic results  - Monitor all insertion sites, i e  indwelling lines, tubes, and drains  - Monitor endotracheal if appropriate and nasal secretions for changes in amount and color  - [place] appropriate cooling/warming therapies per order  - Administer medications as ordered  - Instruct and encourage patient and family to use good hand hygiene technique  - Identify and instruct in appropriate isolation precautions for identified infection/condition  Outcome: Progressing     Problem: SAFETY ADULT  Goal: Patient will remain free of falls  Description: INTERVENTIONS:  - Educate patient/family on patient safety including physical limitations  - Instruct patient to call for assistance with activity   - Consult OT/PT to assist with strengthening/mobility   - Keep Call bell within reach  - Keep bed low and locked with side rails adjusted as appropriate  - Keep care items and personal belongings within reach  - Initiate and maintain comfort rounds  - Make Fall Risk Sign visible to staff  Outcome: Progressing     Problem: DISCHARGE PLANNING  Goal: Discharge to home or other facility with appropriate resources  Description: INTERVENTIONS:  - Identify barriers to discharge w/patient and caregiver  - Arrange for needed discharge resources and transportation as appropriate  - Identify discharge learning needs (meds, wound care, etc )  - Arrange for interpretive services to assist at discharge as needed  - Refer to Case Management Department for coordinating discharge planning if the patient needs post-hospital services based on physician/advanced practitioner order or complex needs related to functional status, cognitive ability, or social support system  Outcome: Progressing     Problem: Knowledge Deficit  Goal: Patient/family/caregiver demonstrates understanding of disease process, treatment plan, medications, and discharge instructions  Description: Complete learning assessment and assess knowledge base    Interventions:  - Provide teaching at level of understanding  - Provide teaching via preferred learning methods  Outcome: Progressing      Problem: CARDIOVASCULAR - ADULT  Goal: Maintains optimal cardiac output and hemodynamic stability  Description: INTERVENTIONS:  - Monitor I/O, vital signs and rhythm  - Monitor for S/S and trends of decreased cardiac output  - Administer and titrate ordered vasoactive medications to optimize hemodynamic stability  - Assess quality of pulses, skin color and temperature  - Assess for signs of decreased coronary artery perfusion  - Instruct patient to report change in severity of symptoms  Outcome: Progressing

## 2023-03-02 NOTE — ASSESSMENT & PLAN NOTE
Chronic   Levels have not gone above 135 in the past    Serum osmolality 272  Possible due to diuretic therapy, however patient noncompliant to medication for the past 4 months     - Urine Sodium random   - Outpatient follow up

## 2023-03-08 ENCOUNTER — TELEPHONE (OUTPATIENT)
Dept: FAMILY MEDICINE CLINIC | Facility: CLINIC | Age: 58
End: 2023-03-08

## 2023-03-08 NOTE — TELEPHONE ENCOUNTER
03/08/23    first & FINAL attempt to contact patient  Spoke to Pt mom  Pt mom wanted to schedule an ED / Adm f/u appt  Pt mom requested appt to be schedule with Pt PCP  Appt scheduled 03/16/23

## 2023-03-16 ENCOUNTER — OFFICE VISIT (OUTPATIENT)
Dept: FAMILY MEDICINE CLINIC | Facility: CLINIC | Age: 58
End: 2023-03-16

## 2023-03-16 VITALS
TEMPERATURE: 98.7 F | SYSTOLIC BLOOD PRESSURE: 142 MMHG | HEART RATE: 85 BPM | BODY MASS INDEX: 25.61 KG/M2 | OXYGEN SATURATION: 95 % | RESPIRATION RATE: 18 BRPM | WEIGHT: 140 LBS | DIASTOLIC BLOOD PRESSURE: 74 MMHG

## 2023-03-16 DIAGNOSIS — I10 HYPERTENSION, UNSPECIFIED TYPE: ICD-10-CM

## 2023-03-16 DIAGNOSIS — E78.49 OTHER HYPERLIPIDEMIA: ICD-10-CM

## 2023-03-16 DIAGNOSIS — Z23 NEED FOR VACCINATION FOR PNEUMOCOCCUS: ICD-10-CM

## 2023-03-16 DIAGNOSIS — Z11.4 SCREENING FOR HIV (HUMAN IMMUNODEFICIENCY VIRUS): ICD-10-CM

## 2023-03-16 DIAGNOSIS — I51.89 DIASTOLIC DYSFUNCTION WITHOUT HEART FAILURE: ICD-10-CM

## 2023-03-16 DIAGNOSIS — E11.9 TYPE 2 DIABETES MELLITUS WITHOUT COMPLICATION, WITHOUT LONG-TERM CURRENT USE OF INSULIN (HCC): ICD-10-CM

## 2023-03-16 DIAGNOSIS — I10 PRIMARY HYPERTENSION: Primary | ICD-10-CM

## 2023-03-16 DIAGNOSIS — Z11.59 NEED FOR HEPATITIS C SCREENING TEST: ICD-10-CM

## 2023-03-16 DIAGNOSIS — Z12.11 SCREEN FOR COLON CANCER: ICD-10-CM

## 2023-03-16 LAB — SL AMB POCT HEMOGLOBIN AIC: 5.6 (ref ?–6.5)

## 2023-03-16 RX ORDER — METOPROLOL SUCCINATE 25 MG/1
25 TABLET, EXTENDED RELEASE ORAL DAILY
Qty: 90 TABLET | Refills: 0 | Status: SHIPPED | OUTPATIENT
Start: 2023-03-16 | End: 2023-04-15

## 2023-03-16 RX ORDER — AMLODIPINE BESYLATE 10 MG/1
10 TABLET ORAL DAILY
Qty: 90 TABLET | Refills: 0 | Status: SHIPPED | OUTPATIENT
Start: 2023-03-16

## 2023-03-16 RX ORDER — ATORVASTATIN CALCIUM 40 MG/1
40 TABLET, FILM COATED ORAL DAILY
Qty: 90 TABLET | Refills: 0 | Status: SHIPPED | OUTPATIENT
Start: 2023-03-16 | End: 2023-04-15

## 2023-03-16 RX ORDER — LOSARTAN POTASSIUM AND HYDROCHLOROTHIAZIDE 25; 100 MG/1; MG/1
1 TABLET ORAL DAILY
Qty: 90 TABLET | Refills: 0 | Status: SHIPPED | OUTPATIENT
Start: 2023-03-16

## 2023-03-16 RX ORDER — LOSARTAN POTASSIUM AND HYDROCHLOROTHIAZIDE 25; 100 MG/1; MG/1
1 TABLET ORAL DAILY
Qty: 90 TABLET | Refills: 3 | Status: SHIPPED | OUTPATIENT
Start: 2023-03-16 | End: 2023-03-16

## 2023-03-16 NOTE — PROGRESS NOTES
Name: Sincere Alvarez      : 1965      MRN: [id number]  Encounter Provider: Angel Garcia MD  Encounter Date: 3/16/2023   Encounter department: [address] [place] [address]  Primary hypertension  Assessment & Plan:  Assessment:   · Blood pressure at today’s office visit 142/74 mmHg, suboptimally controlled   · Blood Pressure goal as per JNC-8 less than 140/90 mmHg,   Losartan-HCTZ 100-12 5 mg qd  Metroprolon 25 mg ER qd, amlodipine 10 mg qd, reports compliance with medication    Plan:   · Will increase HCTZ from 12 5 to 25 mg QDPOSC  · BP goals and possible side effects reviewed with patient, recommended to call the office/schedule appointment  if need prior to his next visit if needed   · The patient was educated on the importance of medication compliance and to monitor her blood pressure at home on a  daily basis  · Ideally in quiet room; after 5 minutes of rest, sited, legs uncrossed and in a relaxed environment  · Recommended to  bring BP diary in visit  · Will recommend performing aerobic exercise for at least more than 3 times per week or more that 150 min x week of moderate physical activity  · Will recommend sodium and fat reduction and to increase fruit consumption  · Reassess BP in 2 weeks  Orders:  -     losartan-hydrochlorothiazide (HYZAAR) 100-25 MG per tablet; Take 1 tablet by mouth daily  -     Microalbumin / creatinine urine ratio    2  Hypertension, unspecified type  -     amLODIPine (NORVASC) 10 mg tablet; Take 1 tablet (10 mg total) by mouth daily In the morning    3  Diastolic dysfunction without heart failure  -     metoprolol succinate (TOPROL-XL) 25 mg 24 hr tablet; Take 1 tablet (25 mg total) by mouth daily    4  Other hyperlipidemia  -     atorvastatin (LIPITOR) 40 mg tablet; Take 1 tablet (40 mg total) by mouth daily    5   Type 2 diabetes mellitus without complication, without long-term current use of insulin (HCC)  - metFORMIN (GLUCOPHAGE) 500 mg tablet; Take 1 tablet (500 mg total) by mouth 2 (two) times a day with meals  -     Microalbumin / creatinine urine ratio  -     POCT hemoglobin A1c    6  Screen for colon cancer  -     Ambulatory Referral to General Surgery; Future    7  Need for vaccination for pneumococcus  -     Pneumococcal Conjugate Vaccine 20-valent (Pcv20)    8  Need for hepatitis C screening test  -     Hepatitis C antibody; Future    9  Screening for HIV (human immunodeficiency virus)  -     HIV 1/2 AG/AB w Reflex [facility] for 2 yr old and above; Future         Subjective      Encounter performed with the help of his mother for translation since the patient has hypoacusia  Jamal Schwartz is a 64 y o  M with a PMHx that includes HTN, grade I diastolic dysfunction, Hyperlipidemia, cardiomegaly  and thrombocytopenia, who presents for a f/u of his HTN  He is currently on Losartan-HCTZ 100-12 5 mg qd  Metroprolon 25 mg ER qd, amlodipine 10 mg qd, reports compliance with medication  Patient does not monitor his blood pressure home, tried to get a blood pressure kit at the pharmacy but was  told that he had to pay for the device  Patient was recently hospitalized [facility] to [facility] emergency  Overall  Patient reports is feeling well  Denies fatigue, headaches, dizziness, blurred vision, nausea, palpitation, chest pain, SOB, urinary changes, weakness, bowel changes, sleep problems  The patient's medical conditions are stable unless noted otherwise above  Review of Systems   Constitutional: Negative for activity change, appetite change, chills, fatigue, fever and unexpected weight change  HENT: Negative for ear pain and sore throat  Eyes: Negative for pain and visual disturbance  Respiratory: Negative for apnea, cough, choking, chest tightness, shortness of breath, wheezing and stridor  Cardiovascular: Negative for chest pain, palpitations and leg swelling     Gastrointestinal: Negative for abdominal distention, abdominal pain, anal bleeding, blood in stool, constipation, diarrhea, nausea and vomiting  Endocrine: Negative for cold intolerance, heat intolerance, polydipsia and polyphagia  Genitourinary: Negative for decreased urine volume, difficulty urinating, dysuria, enuresis, flank pain, frequency, hematuria and urgency  Musculoskeletal: Negative for arthralgias and back pain  Skin: Negative for color change and rash  Neurological: Negative for seizures, syncope and light-headedness  All other systems reviewed and are negative  Current Outpatient Medications on File Prior to Visit   Medication Sig   • Blood Pressure KIT Use 2 (two) times a day (Patient not taking: Reported on 3/1/2023)   • [DISCONTINUED] carvedilol (COREG) 3 125 mg tablet Take 1 tablet (3 125 mg total) by mouth 2 (two) times a day with meals   • [DISCONTINUED] chlorthalidone 25 mg tablet Take 0 5 tablets (12 5 mg total) by mouth daily In the morning   • [DISCONTINUED] losartan (COZAAR) 50 mg tablet Take 1 tablet (50 mg total) by mouth daily with lunch       Objective     /74 (BP Location: Left arm, Patient Position: Sitting, Cuff Size: Standard)   Pulse 85   Temp 98 7 °F (37 1 °C) (Temporal)   Resp 18   Wt 63 5 kg (140 lb)   SpO2 95%   BMI 25 61 kg/m²     Physical Exam  Vitals and nursing note reviewed  Constitutional:       General: He is not in acute distress  Appearance: He is well-developed and overweight  He is not ill-appearing, toxic-appearing or diaphoretic  HENT:      Head: Normocephalic and atraumatic  Eyes:      General: No scleral icterus  Extraocular Movements: Extraocular movements intact  Cardiovascular:      Rate and Rhythm: Normal rate and regular rhythm  No extrasystoles are present  Pulses:           Radial pulses are 2+ on the right side and 2+ on the left side  Heart sounds: Normal heart sounds, S1 normal and S2 normal  No murmur heard  No friction rub  No gallop  Pulmonary:      Effort: Pulmonary effort is normal  No respiratory distress  Breath sounds: Normal breath sounds and air entry  Abdominal:      General: Bowel sounds are normal       Palpations: Abdomen is soft  There is no mass  Tenderness: There is no abdominal tenderness  There is no right CVA tenderness, left CVA tenderness, guarding or rebound  Musculoskeletal:         General: No swelling, tenderness, deformity or signs of injury  Normal range of motion  Cervical back: Normal range of motion  Right lower leg: No edema  Left lower leg: No edema  Feet:      Right foot:      Skin integrity: No ulcer, skin breakdown, erythema, warmth, callus or dry skin  Left foot:      Skin integrity: No ulcer, skin breakdown, erythema, warmth, callus or dry skin  Skin:     General: Skin is warm  Findings: No rash  Neurological:      General: No focal deficit present  Mental Status: He is alert         Classie Carrel, MD

## 2023-03-20 NOTE — ASSESSMENT & PLAN NOTE
Chronic, has reminded stable      no active bleeding reported            Repeat CBC and discussed results and next encounter

## 2023-03-20 NOTE — ASSESSMENT & PLAN NOTE
Assessment:   · Blood pressure at today’s office visit 142/74 mmHg, suboptimally controlled   · Blood Pressure goal as per JNC-8 less than 140/90 mmHg,   Losartan-HCTZ 100-12 5 mg qd  Metroprolon 25 mg ER qd, amlodipine 10 mg qd, reports compliance with medication    Plan:   · Will increase HCTZ from 12 5 to 25 mg QDPOSC  · BP goals and possible side effects reviewed with patient, recommended to call the office/schedule appointment  if need prior to his next visit if needed   · The patient was educated on the importance of medication compliance and to monitor her blood pressure at home on a  daily basis  · Ideally in quiet room; after 5 minutes of rest, sited, legs uncrossed and in a relaxed environment  · Recommended to  bring BP diary in visit  · Will recommend performing aerobic exercise for at least more than 3 times per week or more that 150 min x week of moderate physical activity  · Will recommend sodium and fat reduction and to increase fruit consumption  · Reassess BP in 2 weeks

## 2023-04-05 LAB
CREAT UR-MCNC: 29.8 MG/DL
MICROALBUMIN UR-MCNC: 25.6 MG/L (ref 0–20)
MICROALBUMIN/CREAT 24H UR: 86 MG/G CREATININE (ref 0–30)

## 2023-04-24 ENCOUNTER — TELEPHONE (OUTPATIENT)
Dept: FAMILY MEDICINE CLINIC | Facility: CLINIC | Age: 58
End: 2023-04-24

## 2023-04-24 NOTE — TELEPHONE ENCOUNTER
----- Message from Junie Ochoa MD sent at 4/18/2023 10:41 AM EDT -----  Sudhir Harris, results showed improvement  Please inform patient that results will be further discussed at next office visit  If there is no visit schedule, please schedule appointment  Thank you, nice day

## 2023-05-04 ENCOUNTER — OFFICE VISIT (OUTPATIENT)
Dept: FAMILY MEDICINE CLINIC | Facility: CLINIC | Age: 58
End: 2023-05-04

## 2023-05-04 VITALS
SYSTOLIC BLOOD PRESSURE: 166 MMHG | HEART RATE: 86 BPM | TEMPERATURE: 97.8 F | BODY MASS INDEX: 25.24 KG/M2 | RESPIRATION RATE: 18 BRPM | DIASTOLIC BLOOD PRESSURE: 66 MMHG | WEIGHT: 138 LBS | OXYGEN SATURATION: 95 %

## 2023-05-04 DIAGNOSIS — I10 HYPERTENSION, UNSPECIFIED TYPE: ICD-10-CM

## 2023-05-04 DIAGNOSIS — I51.89 DIASTOLIC DYSFUNCTION WITHOUT HEART FAILURE: ICD-10-CM

## 2023-05-04 DIAGNOSIS — I10 PRIMARY HYPERTENSION: ICD-10-CM

## 2023-05-04 DIAGNOSIS — E78.49 OTHER HYPERLIPIDEMIA: ICD-10-CM

## 2023-05-04 DIAGNOSIS — E87.1 HYPONATREMIA: Primary | ICD-10-CM

## 2023-05-04 NOTE — PROGRESS NOTES
Name: Rama Tolliver      : 1965      MRN: [id number]  Encounter Provider: Jayda Upton MD  Encounter Date: 2023   Encounter department: [place] [address]    Assessment & Plan     1  Hyponatremia  Assessment & Plan:  Assessment   Has remained stable around low 130s, last 132  Patient asymptomatic  Plan   Fluid restriction   Follow up with future BMP       2  Other hyperlipidemia    3  Diastolic dysfunction without heart failure  Assessment & Plan:  Assessment   Patient was last seen on 2021  Echo showed EF 56%, stable from cardiovascular standpoint, no sign of retention  Currently on amlodipine 10 mg daily, Hyzaar 100-25 mg qd and metoprolol 25 mg XR  Previously on Coreg, was D/C and switch to metoprolol due to side effects(dizziness) per EMR  Challenges to his treatment due to poor compliance with treatment and multiple no-shows to PCP visits and cardiology visits outpatient    Plan   -Amlodipine 10 mg daily, Hyzaar 100-25 mg qd, and metoprolol succinate 25 mg qd          4  Hypertension, unspecified type  -     amLODIPine (NORVASC) 10 mg tablet; Take 1 tablet (10 mg total) by mouth daily In the morning    5  Primary hypertension  Assessment & Plan:  Assessment:   • Blood pressure at today’s office visit 166/66 mmHg, suboptimally controlled  • Despite his HCTZ was increased to 25 mg at his last encounter, his BP has been worsening    • Blood Pressure goal as per JNC-8 less than 140/90 mmHg,   • Losartan-HCTZ 100-25 mg qd, Metoprolol 25 mg ER qd, amlodipine 10 mg qd,  • Questionable compliance, worsening BP readings despite increasing his medications and medications bottles are practically unused       Plan:   • Continue current treatment  • BP goals reviewed with patient  • The patient was educated on the importance of medication compliance and to monitor her blood pressure at home on a  daily basis    • This was also discussed with mother with patient's permission in order to obtain family support with the end goal of better results  • Recommended to  bring BP diary in visit  • Will recommend performing aerobic exercise for at least more than 3 times per week or more that 150 min x week of moderate physical activity  • Will recommend sodium and fat reduction and to increase fruit consumption  • Reassess BP in 1 month  Orders:  -     losartan-hydrochlorothiazide (HYZAAR) 100-25 MG per tablet; Take 1 tablet by mouth daily         Subjective      Mother is helping with the interpretation during our communication  It was a pleasure to [id number] [address], a 62 y o  Male with PMH that includes HTN, grade I diastolic dysfunction, Hyperlipidemia, cardiomegaly who presents for Hypertension follow up and management of his chronic medical condition(s)       -Admitted on 3/2023 for Hypertensive emergency with readings of 218/101mmHg at presentation    -Patient has been difficult with compliance, brought his medications for review and bottles are filled dating back as far as 3/2023   -He is currently on amlodipine 10 mg and Hyzaar 100-25 mg and Toprol XL 25 mg     -Blood pressure at his last visit @ 142/74 mmHg  Overall  Patient reports is feeling well  Denies fatigue, headaches, dizziness, blurred vision, nausea, palpitation, chest pain, SOB, urinary changes, weakness, bowel changes, sleep problems  The patient's medical conditions are stable unless noted otherwise above  Patient has no further complaints other than what is mentioned in the ROS  Review of Systems   Constitutional: Negative for activity change, appetite change, chills, fatigue and fever  HENT: Negative for ear pain, facial swelling, mouth sores, postnasal drip, rhinorrhea, sinus pressure, sneezing, sore throat, trouble swallowing and voice change  Eyes: Negative for pain, itching and visual disturbance     Respiratory: Negative for apnea, cough, choking, chest tightness and shortness of breath  Cardiovascular: Negative for chest pain, palpitations and leg swelling  Gastrointestinal: Negative for abdominal pain, constipation, diarrhea, nausea and vomiting  Genitourinary: Negative for difficulty urinating, dysuria and hematuria  Musculoskeletal: Negative for arthralgias and back pain  Skin: Negative for color change, pallor and rash  Allergic/Immunologic: Negative for environmental allergies and food allergies  Neurological: Negative for dizziness, seizures, syncope, weakness, light-headedness and headaches  Psychiatric/Behavioral: Negative for confusion  All other systems reviewed and are negative  Current Outpatient Medications on File Prior to Visit   Medication Sig   • atorvastatin (LIPITOR) 40 mg tablet Take 1 tablet (40 mg total) by mouth daily   • Blood Pressure KIT Use 2 (two) times a day (Patient not taking: Reported on 3/1/2023)   • metoprolol succinate (TOPROL-XL) 25 mg 24 hr tablet Take 1 tablet (25 mg total) by mouth daily   • [DISCONTINUED] carvedilol (COREG) 3 125 mg tablet Take 1 tablet (3 125 mg total) by mouth 2 (two) times a day with meals   • [DISCONTINUED] chlorthalidone 25 mg tablet Take 0 5 tablets (12 5 mg total) by mouth daily In the morning   • [DISCONTINUED] losartan (COZAAR) 50 mg tablet Take 1 tablet (50 mg total) by mouth daily with lunch       Objective     /66 (BP Location: Left arm, Patient Position: Sitting, Cuff Size: Standard)   Pulse 86   Temp 97 8 °F (36 6 °C) (Temporal)   Resp 18   Wt 62 6 kg (138 lb)   SpO2 95%   BMI 25 24 kg/m²     Physical Exam  Vitals and nursing note reviewed  Constitutional:       General: He is not in acute distress  Appearance: Normal appearance  He is well-developed and overweight  He is not ill-appearing, toxic-appearing or diaphoretic  HENT:      Head: Normocephalic and atraumatic  Right Ear: Decreased hearing noted  Left Ear: Decreased hearing noted        Ears: Comments: Chronic hypoacusia since birth  Eyes:      General: No scleral icterus  Extraocular Movements: Extraocular movements intact  Cardiovascular:      Rate and Rhythm: Normal rate and regular rhythm  No extrasystoles are present  Pulses:           Radial pulses are 2+ on the right side and 2+ on the left side  Heart sounds: Normal heart sounds, S1 normal and S2 normal  No murmur heard  No friction rub  No gallop  Pulmonary:      Effort: Pulmonary effort is normal  No respiratory distress  Breath sounds: Normal breath sounds and air entry  Abdominal:      General: Bowel sounds are normal       Palpations: Abdomen is soft  There is no mass  Tenderness: There is no abdominal tenderness  There is no right CVA tenderness, left CVA tenderness, guarding or rebound  Musculoskeletal:         General: No swelling, tenderness, deformity or signs of injury  Normal range of motion  Cervical back: Normal range of motion  Right lower leg: No edema  Left lower leg: No edema  Feet:      Right foot:      Skin integrity: No ulcer, skin breakdown, erythema, warmth, callus or dry skin  Left foot:      Skin integrity: No ulcer, skin breakdown, erythema, warmth, callus or dry skin  Skin:     General: Skin is warm  Findings: No rash  Neurological:      General: No focal deficit present  Mental Status: He is alert         Kyree Martinez MD

## 2023-05-08 RX ORDER — AMLODIPINE BESYLATE 10 MG/1
10 TABLET ORAL DAILY
Qty: 90 TABLET | Refills: 0 | Status: SHIPPED | OUTPATIENT
Start: 2023-05-08

## 2023-05-08 RX ORDER — LOSARTAN POTASSIUM AND HYDROCHLOROTHIAZIDE 25; 100 MG/1; MG/1
1 TABLET ORAL DAILY
Qty: 90 TABLET | Refills: 0 | Status: SHIPPED | OUTPATIENT
Start: 2023-05-08

## 2023-05-11 NOTE — ASSESSMENT & PLAN NOTE
Assessment:   • Blood pressure at today’s office visit 166/66 mmHg, suboptimally controlled  • Despite his HCTZ was increased to 25 mg at his last encounter, his BP has been worsening    • Blood Pressure goal as per JNC-8 less than 140/90 mmHg,   • Losartan-HCTZ 100-25 mg qd, Metoprolol 25 mg ER qd, amlodipine 10 mg qd,  • Questionable compliance, worsening BP readings despite increasing his medications and medications bottles are practically unused       Plan:   • Continue current treatment  • BP goals reviewed with patient  • The patient was educated on the importance of medication compliance and to monitor her blood pressure at home on a  daily basis  • This was also discussed with mother with patient's permission in order to obtain family support with the end goal of better results  • Recommended to  bring BP diary in visit  • Will recommend performing aerobic exercise for at least more than 3 times per week or more that 150 min x week of moderate physical activity  • Will recommend sodium and fat reduction and to increase fruit consumption  • Reassess BP in 1 month

## 2023-05-11 NOTE — ASSESSMENT & PLAN NOTE
Assessment   Patient was last seen on 11/2021  Echo showed EF 56%, stable from cardiovascular standpoint, no sign of retention  Currently on amlodipine 10 mg daily, Hyzaar 100-25 mg qd and metoprolol 25 mg XR  Previously on Coreg, was D/C and switch to metoprolol due to side effects(dizziness) per EMR     Challenges to his treatment due to poor compliance with treatment and multiple no-shows to PCP visits and cardiology visits outpatient    Plan   -Amlodipine 10 mg daily, Hyzaar 100-25 mg qd, and metoprolol succinate 25 mg qd

## 2023-05-11 NOTE — ASSESSMENT & PLAN NOTE
Assessment   Has remained stable around low 130s, last 132  Patient asymptomatic       Plan   Fluid restriction   Follow up with future BMP

## 2023-05-11 NOTE — ASSESSMENT & PLAN NOTE
Congenital deafness & mute  Uses amended sign language     - Family able to translate (Mother, Surendra Abdi)

## 2023-07-20 ENCOUNTER — OFFICE VISIT (OUTPATIENT)
Dept: FAMILY MEDICINE CLINIC | Facility: CLINIC | Age: 58
End: 2023-07-20

## 2023-07-20 VITALS
HEART RATE: 98 BPM | TEMPERATURE: 98.9 F | OXYGEN SATURATION: 98 % | HEIGHT: 62 IN | WEIGHT: 146 LBS | BODY MASS INDEX: 26.87 KG/M2 | RESPIRATION RATE: 16 BRPM | DIASTOLIC BLOOD PRESSURE: 90 MMHG | SYSTOLIC BLOOD PRESSURE: 162 MMHG

## 2023-07-20 DIAGNOSIS — I10 PRIMARY HYPERTENSION: ICD-10-CM

## 2023-07-20 DIAGNOSIS — H57.89 REDNESS OF EYE, LEFT: Primary | ICD-10-CM

## 2023-07-20 RX ORDER — POLYMYXIN B SULFATE AND TRIMETHOPRIM 1; 10000 MG/ML; [USP'U]/ML
1 SOLUTION OPHTHALMIC EVERY 6 HOURS
Qty: 10 ML | Refills: 0 | Status: SHIPPED | OUTPATIENT
Start: 2023-07-20 | End: 2023-07-27

## 2023-07-20 NOTE — PROGRESS NOTES
Name: Kaiden Gudino      : 1965      MRN: [id number]  Encounter Provider: Heide Rice MD  Encounter Date: 2023   Encounter department: [facility] [facility]     1. Redness of eye, left  Assessment & Plan:  Patient is complaining of left eye pain and redness that began 1 week ago. He also has a small stye on his eyelid. He denies any sick contacts. Patient described sticky eye discharge. Plan:  Prescribe POLYTRIM opthalmic drops 1 drop every 6 hours for 1 week. Orders:  -     polymyxin b-trimethoprim (POLYTRIM) ophthalmic solution; Administer 1 drop into the left eye every 6 (six) hours for 7 days    2. Primary hypertension  Assessment & Plan:  Patient's blood is not well controlled. He stopped his amlodipine that was prescribed in 2023 due to dizziness. Plan:   Discontinue amlodipine   Change TOPROL 25 QD  to LOPRESSOR  25 BID  Follow up in 2 weeks for nurse visit   Follow up with me in 2 months     Orders:  -     metoprolol tartrate (LOPRESSOR) 25 mg tablet; Take 1 tablet (25 mg total) by mouth every 12 (twelve) hours         Subjective      Kaiden Gudino is a 62year old male with PMHX of Diabetes and Hypertension presents to the clinic for painful red Right eye. Needed video interpretor for sign language. [address] [place] [address]     Patient reports that both eyes sensitive to sunlight. Very poor vision in left eye, but right has normal vision. Issues from the sun a few weeks ago. Sticky discharge coming from both eyes. It's his first time having this problem. No other people around, no sick contacts. Only other deaf people. Does not see an eye doctor but says that he needs glasses. HPI  Review of Systems   Constitutional: Negative for fever. HENT: Negative for hearing loss, rhinorrhea and sneezing. Eyes: Positive for photophobia, discharge, itching and visual disturbance. Negative for pain.    Respiratory: Negative for cough and shortness of breath. Cardiovascular: Negative for chest pain and palpitations. Gastrointestinal: Negative for abdominal pain, constipation and diarrhea. Genitourinary: Negative for dysuria and hematuria. Skin: Negative for rash. Neurological: Positive for dizziness. Hematological: Does not bruise/bleed easily. Current Outpatient Medications on File Prior to Visit   Medication Sig   • atorvastatin (LIPITOR) 40 mg tablet Take 1 tablet (40 mg total) by mouth daily   • Blood Pressure KIT Use 2 (two) times a day (Patient not taking: Reported on 3/1/2023)   • losartan-hydrochlorothiazide (HYZAAR) 100-25 MG per tablet Take 1 tablet by mouth daily   • [DISCONTINUED] amLODIPine (NORVASC) 10 mg tablet Take 1 tablet (10 mg total) by mouth daily In the morning   • [DISCONTINUED] carvedilol (COREG) 3.125 mg tablet Take 1 tablet (3.125 mg total) by mouth 2 (two) times a day with meals   • [DISCONTINUED] chlorthalidone 25 mg tablet Take 0.5 tablets (12.5 mg total) by mouth daily In the morning   • [DISCONTINUED] losartan (COZAAR) 50 mg tablet Take 1 tablet (50 mg total) by mouth daily with lunch   • [DISCONTINUED] metoprolol succinate (TOPROL-XL) 25 mg 24 hr tablet Take 1 tablet (25 mg total) by mouth daily       Objective     /90 (BP Location: Right arm, Patient Position: Sitting, Cuff Size: Standard)   Pulse 98   Temp 98.9 °F (37.2 °C) (Temporal)   Resp 16   Ht 5' 2" (1.575 m)   Wt 66.2 kg (146 lb)   SpO2 98%   BMI 26.70 kg/m²     Physical Exam  HENT:      Head: Normocephalic. Eyes:      General: No scleral icterus. Left eye: No hordeolum. Extraocular Movements:      Right eye: Normal extraocular motion. Conjunctiva/sclera:      Left eye: Left conjunctiva is injected. Cardiovascular:      Pulses: Normal pulses. Heart sounds: Normal heart sounds.    Pulmonary:      Effort: Pulmonary effort is normal.       Micheal Padilla MD

## 2023-07-20 NOTE — ASSESSMENT & PLAN NOTE
Patient's blood is not well controlled. He stopped his amlodipine that was prescribed in march 2023 due to dizziness.      Plan:   Discontinue amlodipine   Change TOPROL 25 QD  to LOPRESSOR  25 BID  Follow up in 2 weeks for nurse visit   Follow up with me in 2 months

## 2023-07-20 NOTE — ASSESSMENT & PLAN NOTE
Patient is complaining of left eye pain and redness that began 1 week ago. He also has a small stye on his eyelid. He denies any sick contacts. Patient described sticky eye discharge. Plan:  Prescribe POLYTRIM opthalmic drops 1 drop every 6 hours for 1 week.

## 2023-08-03 ENCOUNTER — CLINICAL SUPPORT (OUTPATIENT)
Dept: FAMILY MEDICINE CLINIC | Facility: CLINIC | Age: 58
End: 2023-08-03

## 2023-08-03 ENCOUNTER — APPOINTMENT (EMERGENCY)
Dept: RADIOLOGY | Facility: HOSPITAL | Age: 58
DRG: 305 | End: 2023-08-03
Payer: MEDICARE

## 2023-08-03 ENCOUNTER — APPOINTMENT (EMERGENCY)
Dept: CT IMAGING | Facility: HOSPITAL | Age: 58
DRG: 305 | End: 2023-08-03
Payer: MEDICARE

## 2023-08-03 ENCOUNTER — HOSPITAL ENCOUNTER (EMERGENCY)
Facility: HOSPITAL | Age: 58
Discharge: HOME | DRG: 305 | End: 2023-08-03
Attending: EMERGENCY MEDICINE
Payer: MEDICARE

## 2023-08-03 ENCOUNTER — OFFICE VISIT (OUTPATIENT)
Dept: FAMILY MEDICINE CLINIC | Facility: CLINIC | Age: 58
End: 2023-08-03

## 2023-08-03 VITALS
HEART RATE: 82 BPM | TEMPERATURE: 98.7 F | SYSTOLIC BLOOD PRESSURE: 180 MMHG | BODY MASS INDEX: 26.68 KG/M2 | RESPIRATION RATE: 16 BRPM | HEIGHT: 62 IN | WEIGHT: 145 LBS | OXYGEN SATURATION: 95 % | DIASTOLIC BLOOD PRESSURE: 100 MMHG

## 2023-08-03 VITALS
DIASTOLIC BLOOD PRESSURE: 85 MMHG | RESPIRATION RATE: 15 BRPM | TEMPERATURE: 98 F | BODY MASS INDEX: 27.55 KG/M2 | OXYGEN SATURATION: 96 % | SYSTOLIC BLOOD PRESSURE: 160 MMHG | WEIGHT: 150.6 LBS | HEART RATE: 65 BPM

## 2023-08-03 VITALS — DIASTOLIC BLOOD PRESSURE: 100 MMHG | SYSTOLIC BLOOD PRESSURE: 180 MMHG

## 2023-08-03 DIAGNOSIS — I10 PRIMARY HYPERTENSION: Primary | ICD-10-CM

## 2023-08-03 DIAGNOSIS — R51.9 HEADACHE: ICD-10-CM

## 2023-08-03 DIAGNOSIS — I10 HYPERTENSION: Primary | ICD-10-CM

## 2023-08-03 DIAGNOSIS — I16.1 HYPERTENSIVE EMERGENCY: Primary | ICD-10-CM

## 2023-08-03 LAB
2HR DELTA HS TROPONIN: 0 NG/L
ANION GAP SERPL CALCULATED.3IONS-SCNC: 7 MMOL/L
ATRIAL RATE: 68 BPM
BASOPHILS # BLD AUTO: 0.04 THOUSANDS/ÂΜL (ref 0–0.1)
BASOPHILS NFR BLD AUTO: 1 % (ref 0–1)
BILIRUB UR QL STRIP: NEGATIVE
BUN SERPL-MCNC: 7 MG/DL (ref 5–25)
CALCIUM SERPL-MCNC: 9.3 MG/DL (ref 8.4–10.2)
CARDIAC TROPONIN I PNL SERPL HS: 18 NG/L
CARDIAC TROPONIN I PNL SERPL HS: 18 NG/L
CHLORIDE SERPL-SCNC: 98 MMOL/L (ref 96–108)
CLARITY UR: CLEAR
CO2 SERPL-SCNC: 28 MMOL/L (ref 21–32)
COLOR UR: NORMAL
CREAT SERPL-MCNC: 0.71 MG/DL (ref 0.6–1.3)
EOSINOPHIL # BLD AUTO: 0.07 THOUSAND/ÂΜL (ref 0–0.61)
EOSINOPHIL NFR BLD AUTO: 1 % (ref 0–6)
ERYTHROCYTE [DISTWIDTH] IN BLOOD BY AUTOMATED COUNT: 12.5 % (ref 11.6–15.1)
GFR SERPL CREATININE-BSD FRML MDRD: 103 ML/MIN/1.73SQ M
GLUCOSE SERPL-MCNC: 94 MG/DL (ref 65–140)
GLUCOSE UR STRIP-MCNC: NEGATIVE MG/DL
HCT VFR BLD AUTO: 53.5 % (ref 36.5–49.3)
HGB BLD-MCNC: 18.4 G/DL (ref 12–17)
HGB UR QL STRIP.AUTO: NEGATIVE
IMM GRANULOCYTES # BLD AUTO: 0.03 THOUSAND/UL (ref 0–0.2)
IMM GRANULOCYTES NFR BLD AUTO: 0 % (ref 0–2)
KETONES UR STRIP-MCNC: NEGATIVE MG/DL
LEUKOCYTE ESTERASE UR QL STRIP: NEGATIVE
LYMPHOCYTES # BLD AUTO: 1.11 THOUSANDS/ÂΜL (ref 0.6–4.47)
LYMPHOCYTES NFR BLD AUTO: 15 % (ref 14–44)
MCH RBC QN AUTO: 32.4 PG (ref 26.8–34.3)
MCHC RBC AUTO-ENTMCNC: 34.4 G/DL (ref 31.4–37.4)
MCV RBC AUTO: 94 FL (ref 82–98)
MONOCYTES # BLD AUTO: 0.49 THOUSAND/ÂΜL (ref 0.17–1.22)
MONOCYTES NFR BLD AUTO: 7 % (ref 4–12)
NEUTROPHILS # BLD AUTO: 5.76 THOUSANDS/ÂΜL (ref 1.85–7.62)
NEUTS SEG NFR BLD AUTO: 76 % (ref 43–75)
NITRITE UR QL STRIP: NEGATIVE
NRBC BLD AUTO-RTO: 0 /100 WBCS
P AXIS: 27 DEGREES
PH UR STRIP.AUTO: 6 [PH]
PLATELET # BLD AUTO: 144 THOUSANDS/UL (ref 149–390)
PMV BLD AUTO: 10 FL (ref 8.9–12.7)
POTASSIUM SERPL-SCNC: 3.9 MMOL/L (ref 3.5–5.3)
PR INTERVAL: 176 MS
PROT UR STRIP-MCNC: NEGATIVE MG/DL
QRS AXIS: 23 DEGREES
QRSD INTERVAL: 92 MS
QT INTERVAL: 402 MS
QTC INTERVAL: 427 MS
RBC # BLD AUTO: 5.68 MILLION/UL (ref 3.88–5.62)
SODIUM SERPL-SCNC: 133 MMOL/L (ref 135–147)
SP GR UR STRIP.AUTO: 1.01 (ref 1–1.04)
T WAVE AXIS: 172 DEGREES
UROBILINOGEN UA: NEGATIVE MG/DL
VENTRICULAR RATE: 68 BPM
WBC # BLD AUTO: 7.5 THOUSAND/UL (ref 4.31–10.16)

## 2023-08-03 PROCEDURE — 84484 ASSAY OF TROPONIN QUANT: CPT | Performed by: EMERGENCY MEDICINE

## 2023-08-03 PROCEDURE — 99285 EMERGENCY DEPT VISIT HI MDM: CPT | Performed by: EMERGENCY MEDICINE

## 2023-08-03 PROCEDURE — 99285 EMERGENCY DEPT VISIT HI MDM: CPT

## 2023-08-03 PROCEDURE — 93005 ELECTROCARDIOGRAM TRACING: CPT

## 2023-08-03 PROCEDURE — G1004 CDSM NDSC: HCPCS

## 2023-08-03 PROCEDURE — 36415 COLL VENOUS BLD VENIPUNCTURE: CPT | Performed by: EMERGENCY MEDICINE

## 2023-08-03 PROCEDURE — 85025 COMPLETE CBC W/AUTO DIFF WBC: CPT | Performed by: EMERGENCY MEDICINE

## 2023-08-03 PROCEDURE — 93010 ELECTROCARDIOGRAM REPORT: CPT | Performed by: INTERNAL MEDICINE

## 2023-08-03 PROCEDURE — 3080F DIAST BP >= 90 MM HG: CPT | Performed by: FAMILY MEDICINE

## 2023-08-03 PROCEDURE — 80048 BASIC METABOLIC PNL TOTAL CA: CPT | Performed by: EMERGENCY MEDICINE

## 2023-08-03 PROCEDURE — 71045 X-RAY EXAM CHEST 1 VIEW: CPT

## 2023-08-03 PROCEDURE — 70450 CT HEAD/BRAIN W/O DYE: CPT

## 2023-08-03 PROCEDURE — 96374 THER/PROPH/DIAG INJ IV PUSH: CPT

## 2023-08-03 PROCEDURE — 3077F SYST BP >= 140 MM HG: CPT | Performed by: FAMILY MEDICINE

## 2023-08-03 PROCEDURE — 96376 TX/PRO/DX INJ SAME DRUG ADON: CPT

## 2023-08-03 PROCEDURE — 99215 OFFICE O/P EST HI 40 MIN: CPT | Performed by: FAMILY MEDICINE

## 2023-08-03 RX ORDER — LABETALOL HYDROCHLORIDE 5 MG/ML
5 INJECTION, SOLUTION INTRAVENOUS ONCE
Status: COMPLETED | OUTPATIENT
Start: 2023-08-03 | End: 2023-08-03

## 2023-08-03 RX ADMIN — LABETALOL HYDROCHLORIDE 5 MG: 5 INJECTION, SOLUTION INTRAVENOUS at 16:15

## 2023-08-03 RX ADMIN — LABETALOL HYDROCHLORIDE 5 MG: 5 INJECTION, SOLUTION INTRAVENOUS at 15:30

## 2023-08-03 NOTE — ASSESSMENT & PLAN NOTE
-patient self discontinued amlodipine due to dizziness.  Per office visit note from 7/20 was started on lopressor 25mg BID and per patient has been using consistently  -today bp 180/100, manual 185/95    PLAN  -send to ER

## 2023-08-03 NOTE — ASSESSMENT & PLAN NOTE
-initial bp of 180/100; repeat manual 185/95  -new onset symptoms of blurry vision and throbbing headache for past two days reports not able to see out of left eye    PLAN  -send to ED Stella Suarez here at bedside.  STAT CXR and ABG to be done.  She is on BIPAP mask 50% FIO2.

## 2023-08-03 NOTE — DISCHARGE INSTRUCTIONS
Please follow up with your appointment tomorrow at [facility]   If symptom worsens please return to the ED

## 2023-08-03 NOTE — ED PROVIDER NOTES
History  Chief Complaint   Patient presents with   • Hypertension     Sent from PCP for HTN and sx of blurred vision for 1 month. And headache that started a few days ago. HPI  Patient is a 61-year-old male who is deaf and has a history of hypertension and hyperlipidemia presenting with concerns of hypertensive emergency. Patient was seen at his primary care provider's office where he complained of 2 days of headaches as well as blurry vision. States that the blurry vision is only present in the left eye. Denies any diplopia. Never had headaches similar to this in the past.  Headache is diffuse. Denies any extremity weakness or numbness. Recently had a change in blood pressure medication due to complaints of lightheadedness. Although initially reported dizziness but denies any vertiginous symptoms. Patient on exam has no focal neurologic deficits. Denies taking any analgesics. Also reports no chest pain, shortness of breath, changes in urination, swelling of extremities or in the abdomen. Prior to Admission Medications   Prescriptions Last Dose Informant Patient Reported? Taking? Blood Pressure KIT Not Taking Self No No   Sig: Use 2 (two) times a day   atorvastatin (LIPITOR) 40 mg tablet   No No   Sig: Take 1 tablet (40 mg total) by mouth daily   losartan-hydrochlorothiazide (HYZAAR) 100-25 MG per tablet Not Taking  No No   Sig: Take 1 tablet by mouth daily   metoprolol tartrate (LOPRESSOR) 25 mg tablet 8/3/2023  No Yes   Sig: Take 1 tablet (25 mg total) by mouth every 12 (twelve) hours      Facility-Administered Medications: None       Past Medical History:   Diagnosis Date   • Deaf    • Heart murmur        History reviewed. No pertinent surgical history.     Family History   Problem Relation Age of Onset   • Hypertension Mother    • Diabetes Mother    • Heart disease Mother         pacemaker   • Arthritis Mother         chronic     I have reviewed and agree with the history as documented. E-Cigarette/Vaping   • E-Cigarette Use Never User      E-Cigarette/Vaping Substances   • Nicotine No    • THC No    • CBD No    • Flavoring No    • Other No    • Unknown No      Social History     Tobacco Use   • Smoking status: Never   • Smokeless tobacco: Never   Vaping Use   • Vaping Use: Never used   Substance Use Topics   • Alcohol use: Not Currently     Comment: 'as per mother pt drinks everyday"    • Drug use: Never       Review of Systems   Constitutional: Negative for chills, diaphoresis, fever and unexpected weight change. HENT: Negative for ear pain and sore throat. Eyes: Positive for visual disturbance. Respiratory: Negative for cough, chest tightness and shortness of breath. Cardiovascular: Negative for chest pain and leg swelling. Gastrointestinal: Negative for abdominal distention, abdominal pain, constipation, diarrhea, nausea and vomiting. Endocrine: Negative. Genitourinary: Negative for difficulty urinating and dysuria. Musculoskeletal: Negative. Skin: Negative. Allergic/Immunologic: Negative. Neurological: Positive for light-headedness and headaches. Negative for weakness. Hematological: Negative. Psychiatric/Behavioral: Negative. All other systems reviewed and are negative. Physical Exam  Physical Exam  Vitals and nursing note reviewed. Constitutional:       General: He is not in acute distress. Appearance: Normal appearance. He is not ill-appearing. HENT:      Head: Normocephalic and atraumatic. Right Ear: External ear normal.      Left Ear: External ear normal.      Nose: Nose normal.      Mouth/Throat:      Mouth: Mucous membranes are moist.      Pharynx: Oropharynx is clear. Eyes:      General: No scleral icterus. Right eye: No discharge. Left eye: No discharge. Extraocular Movements: Extraocular movements intact.       Conjunctiva/sclera: Conjunctivae normal.      Pupils: Pupils are equal, round, and reactive to light. Cardiovascular:      Rate and Rhythm: Normal rate and regular rhythm. Pulses: Normal pulses. Heart sounds: Normal heart sounds. Pulmonary:      Effort: Pulmonary effort is normal.      Breath sounds: Normal breath sounds. Abdominal:      General: Abdomen is flat. Bowel sounds are normal. There is no distension. Palpations: Abdomen is soft. Tenderness: There is no abdominal tenderness. There is no guarding or rebound. Musculoskeletal:         General: Normal range of motion. Cervical back: Normal range of motion and neck supple. Skin:     General: Skin is warm and dry. Capillary Refill: Capillary refill takes less than 2 seconds. Neurological:      General: No focal deficit present. Mental Status: He is alert and oriented to person, place, and time. Mental status is at baseline. Sensory: No sensory deficit. Motor: No weakness. Coordination: Coordination normal.      Gait: Gait normal.   Psychiatric:         Mood and Affect: Mood normal.         Behavior: Behavior normal.         Thought Content:  Thought content normal.         Judgment: Judgment normal.         Vital Signs  ED Triage Vitals   Temperature Pulse Respirations Blood Pressure SpO2   08/03/23 1451 08/03/23 1451 08/03/23 1451 08/03/23 1451 08/03/23 1451   98 °F (36.7 °C) 70 20 (!) 217/101 98 %      Temp Source Heart Rate Source Patient Position - Orthostatic VS BP Location FiO2 (%)   08/03/23 1451 08/03/23 1451 08/03/23 1451 08/03/23 1451 --   Oral Monitor Lying Left arm       Pain Score       08/03/23 1520       10 - Worst Possible Pain           Vitals:    08/03/23 1557 08/03/23 1627 08/03/23 1656 08/03/23 1752   BP: (!) 186/106 (!) 175/90 (!) 182/98 160/85   Pulse: 68 71 68 65   Patient Position - Orthostatic VS: Sitting Sitting Lying Lying         Visual Acuity  Visual Acuity    Flowsheet Row Most Recent Value   L Pupil Size (mm) 3   R Pupil Size (mm) 3          ED Medications  Medications   labetalol (NORMODYNE) injection 5 mg (5 mg Intravenous Given 8/3/23 1530)   labetalol (NORMODYNE) injection 5 mg (5 mg Intravenous Given 8/3/23 1615)       Diagnostic Studies  Results Reviewed     Procedure Component Value Units Date/Time    HS Troponin I 2hr [[id number]]  (Normal) Collected: 08/03/23 1759    Lab Status: Final result Specimen: Blood from Arm, Left Updated: 08/03/23 1830     hs TnI 2hr 18 ng/L      Delta 2hr hsTnI 0 ng/L     Basic metabolic panel [[id number]]  (Abnormal) Collected: 08/03/23 1603    Lab Status: Final result Specimen: Blood from Arm, Left Updated: 08/03/23 1638     Sodium 133 mmol/L      Potassium 3.9 mmol/L      Chloride 98 mmol/L      CO2 28 mmol/L      ANION GAP 7 mmol/L      BUN 7 mg/dL      Creatinine 0.71 mg/dL      Glucose 94 mg/dL      Calcium 9.3 mg/dL      eGFR 103 ml/min/1.73sq m     Narrative:      [place] guidelines for Chronic Kidney Disease (CKD):   •  Stage 1 with normal or high GFR (GFR > 90 mL/min/1.73 square meters)  •  Stage 2 Mild CKD (GFR = 60-89 mL/min/1.73 square meters)  •  Stage 3A Moderate CKD (GFR = 45-59 mL/min/1.73 square meters)  •  Stage 3B Moderate CKD (GFR = 30-44 mL/min/1.73 square meters)  •  Stage 4 Severe CKD (GFR = 15-29 mL/min/1.73 square meters)  •  Stage 5 End Stage CKD (GFR <15 mL/min/1.73 square meters)  Note: GFR calculation is accurate only with a steady state creatinine    HS Troponin 0hr (reflex protocol) [[id number]]  (Normal) Collected: 08/03/23 1603    Lab Status: Final result Specimen: Blood from Arm, Left Updated: 08/03/23 1637     hs TnI 0hr 18 ng/L     UA (URINE) with reflex to Scope [[id number]]  (Normal) Collected: 08/03/23 1529    Lab Status: Final result Specimen: Urine, Clean Catch Updated: 08/03/23 1558     Color, UA Straw     Clarity, UA Clear     Specific Gravity, UA 1.010     pH, UA 6.0     Leukocytes, UA Negative     Nitrite, UA Negative     Protein, UA Negative mg/dl Glucose, UA Negative mg/dl      Ketones, UA Negative mg/dl      Bilirubin, UA Negative     Occult Blood, UA Negative     UROBILINOGEN UA Negative mg/dL     CBC and differential [[id number]]  (Abnormal) Collected: 08/03/23 1529    Lab Status: Final result Specimen: Blood from Arm, Left Updated: 08/03/23 1545     WBC 7.50 Thousand/uL      RBC 5.68 Million/uL      Hemoglobin 18.4 g/dL      Hematocrit 53.5 %      MCV 94 fL      MCH 32.4 pg      MCHC 34.4 g/dL      RDW 12.5 %      MPV 10.0 fL      Platelets 990 Thousands/uL      nRBC 0 /100 WBCs      Neutrophils Relative 76 %      Immat GRANS % 0 %      Lymphocytes Relative 15 %      Monocytes Relative 7 %      Eosinophils Relative 1 %      Basophils Relative 1 %      Neutrophils Absolute 5.76 Thousands/µL      Immature Grans Absolute 0.03 Thousand/uL      Lymphocytes Absolute 1.11 Thousands/µL      Monocytes Absolute 0.49 Thousand/µL      Eosinophils Absolute 0.07 Thousand/µL      Basophils Absolute 0.04 Thousands/µL                  CT head without contrast   Final Result by Yessenia Escobedo MD (08/03 1613)      No acute intracranial abnormality. Workstation performed: [id number]         XR chest 1 view portable   Final Result by Abdirahman Baldwin MD (08/03 1644)      No acute cardiopulmonary disease. Workstation performed: [id number]                    Procedures  Procedures         ED Course                               SBIRT 20yo+    Flowsheet Row Most Recent Value   Initial Alcohol Screen: US AUDIT-C     1. How often do you have a drink containing alcohol? 0 Filed at: 08/03/2023 1520   2. How many drinks containing alcohol do you have on a typical day you are drinking? 0 Filed at: 08/03/2023 1520   3a. Male UNDER 65: How often do you have five or more drinks on one occasion? 0 Filed at: 08/03/2023 1520   Audit-C Score 0 Filed at: 08/03/2023 1520   TJ: How many times in the past year have you. ..     Used an illegal drug or used a prescription medication for non-medical reasons? Never Filed at: 08/03/2023 1520                    Medical Decision Making  70-year-old male presenting with headache, blurry vision of the left eye, lightheadedness in the setting of hypertension  Patient was initially seen in the clinic and was referred to the ED due to concerns of hypertensive emergency  Labs were obtained to rule out any endorgan damage  Labs are unremarkable  Patient however persistently hypertensive and was given 2 doses of labetalol with reduction of blood pressure  Aim to not reduce it more than 25%  Family medicine was consulted and evaluated the patient. Due to resolution of symptoms as well as reduction in the blood pressure they plan for close follow-up tomorrow morning for possible blood pressure medication adjustments  Patient agreed with plan and was discharged with strict return precautions. Headache: acute illness or injury  Hypertension: acute illness or injury  Amount and/or Complexity of Data Reviewed  Labs: ordered. Radiology: ordered. Risk  Prescription drug management. Disposition  Final diagnoses:   Hypertension   Headache     Time reflects when diagnosis was documented in both MDM as applicable and the Disposition within this note     Time User Action Codes Description Comment    8/3/2023  6:46 PM Kevin Wong  Hypertension     8/3/2023  6:46 PM Asad Cunha Add [R51.9] Headache       ED Disposition     ED Disposition   Discharge    Condition   Stable    Date/Time   Thu Aug 3, 2023  6:46 PM    [address] discharge to home/self care.                Follow-up Information     Follow up With Specialties Details Why [address] Family Medicine Schedule an appointment as soon as possible for a visit   [address] [address]  [phone]            Discharge Medication List as of 8/3/2023  6:46 PM      CONTINUE these medications which have NOT CHANGED    Details   atorvastatin (LIPITOR) 40 mg tablet Take 1 tablet (40 mg total) by mouth daily, Starting Thu 3/16/2023, Until Sat 4/15/2023, Normal      Blood Pressure KIT Use 2 (two) times a day, Starting Wed 11/3/2021, Normal      losartan-hydrochlorothiazide (HYZAAR) 100-25 MG per tablet Take 1 tablet by mouth daily, Starting Mon 5/8/2023, Normal      metoprolol tartrate (LOPRESSOR) 25 mg tablet Take 1 tablet (25 mg total) by mouth every 12 (twelve) hours, Starting Thu 7/20/2023, Normal             No discharge procedures on file.     PDMP Review     None          ED Provider  Electronically Signed by           Elijah Her MD  08/05/23 6133

## 2023-08-03 NOTE — QUICK NOTE
Ghassan Yeung is a very pleasant patient who is deaf and has a PMH of HTN and hyperlipemia presented to the ED from the [facility] due to concern of HTN emergency. VS in ED on presentation significant for /101 mmg. Trops at 0 and 4 hours negative, UA wnl, CMP wnl. CT head with no acute intracranial abnormality. Patient responded to 2 rounds of labetolol 5 mg IV given 1 hour apart with decrease in BP to 160/85 mmhg. We used  via Ipad to communicate with patient. He reported feeling better. [facility] and possible changes in his BP medication regimen. Patient acknowledged and agreed. I personally sent clinical and clerical team message to schedule patient for appointment tomorrow morning.     Alexandra Juarez MD

## 2023-08-03 NOTE — PROGRESS NOTES
Name: Vicente Bui      : 1965      MRN: [id number]  Encounter Provider: Stacy Marie MD  Encounter Date: 8/3/2023   Encounter department: [address] [place] [address]    Assessment & Plan     1. Hypertensive emergency  Assessment & Plan:  -initial bp of 180/100; repeat manual 185/95  -new onset symptoms of blurry vision and throbbing headache for past two days reports not able to see out of left eye    PLAN  -send to ED    Orders:  -     Transfer to other facility         Subjective      Miguel Marrero ID# [id number]   63 yo male patient presents today for an elevated blood pressure with concomitant blurry vision and throbbing headaches for the past two days that is new. Patient self discontinued his amlodipine due to dizziness and on  was started on 25mg lopressor BID to which patient reports he uses consistently. Review of Systems   Constitutional: Negative for chills and fever. HENT: Negative for ear pain and sore throat. Eyes: Positive for visual disturbance. Negative for pain. Respiratory: Negative for cough and shortness of breath. Cardiovascular: Negative for chest pain and palpitations. Gastrointestinal: Negative for abdominal pain and vomiting. Genitourinary: Negative for dysuria and hematuria. Musculoskeletal: Negative for arthralgias and back pain. Skin: Negative for color change and rash. Neurological: Negative for seizures, syncope and headaches. All other systems reviewed and are negative.       Current Outpatient Medications on File Prior to Visit   Medication Sig   • atorvastatin (LIPITOR) 40 mg tablet Take 1 tablet (40 mg total) by mouth daily   • Blood Pressure KIT Use 2 (two) times a day (Patient not taking: Reported on 3/1/2023)   • losartan-hydrochlorothiazide (HYZAAR) 100-25 MG per tablet Take 1 tablet by mouth daily   • metoprolol tartrate (LOPRESSOR) 25 mg tablet Take 1 tablet (25 mg total) by mouth every 12 (twelve) hours   • [DISCONTINUED] carvedilol (COREG) 3.125 mg tablet Take 1 tablet (3.125 mg total) by mouth 2 (two) times a day with meals   • [DISCONTINUED] chlorthalidone 25 mg tablet Take 0.5 tablets (12.5 mg total) by mouth daily In the morning   • [DISCONTINUED] losartan (COZAAR) 50 mg tablet Take 1 tablet (50 mg total) by mouth daily with lunch       Objective     BP (!) 180/100 (BP Location: Right arm, Patient Position: Sitting, Cuff Size: Standard)   Pulse 82   Temp 98.7 °F (37.1 °C) (Temporal)   Resp 16   Ht 5' 2" (1.575 m)   Wt 65.8 kg (145 lb)   SpO2 95%   BMI 26.52 kg/m²     Physical Exam  Constitutional:       Appearance: Normal appearance. He is normal weight. HENT:      Head: Normocephalic and atraumatic. Right Ear: External ear normal.      Left Ear: External ear normal.      Nose: Nose normal.      Mouth/Throat:      Mouth: Mucous membranes are moist.      Pharynx: Oropharynx is clear. Eyes:      General: No scleral icterus. Right eye: No discharge. Left eye: No discharge. Extraocular Movements: Extraocular movements intact. Conjunctiva/sclera: Conjunctivae normal.      Pupils: Pupils are equal, round, and reactive to light. Cardiovascular:      Rate and Rhythm: Normal rate and regular rhythm. Pulses: Normal pulses. Heart sounds: Normal heart sounds. No murmur heard. No friction rub. No gallop. Pulmonary:      Effort: Pulmonary effort is normal.      Breath sounds: Normal breath sounds. No wheezing, rhonchi or rales. Abdominal:      General: Abdomen is flat. Bowel sounds are normal. There is distension. Palpations: Abdomen is soft. Tenderness: There is no abdominal tenderness. Comments: No abdominal bruit appreciated   Musculoskeletal:         General: Normal range of motion. Cervical back: Normal range of motion. Skin:     General: Skin is warm. Capillary Refill: Capillary refill takes less than 2 seconds. Findings: No rash. Neurological:      Mental Status: He is alert and oriented to person, place, and time.    Psychiatric:         Mood and Affect: Mood normal.         Judgment: Judgment normal.       Derrick Felipe MD

## 2023-08-04 ENCOUNTER — OFFICE VISIT (OUTPATIENT)
Dept: FAMILY MEDICINE CLINIC | Facility: CLINIC | Age: 58
End: 2023-08-04

## 2023-08-04 ENCOUNTER — HOSPITAL ENCOUNTER (INPATIENT)
Facility: HOSPITAL | Age: 58
LOS: 1 days | Discharge: HOME | DRG: 305 | End: 2023-08-05
Attending: EMERGENCY MEDICINE | Admitting: FAMILY MEDICINE
Payer: MEDICARE

## 2023-08-04 VITALS
RESPIRATION RATE: 18 BRPM | DIASTOLIC BLOOD PRESSURE: 80 MMHG | HEIGHT: 62 IN | BODY MASS INDEX: 26.61 KG/M2 | OXYGEN SATURATION: 96 % | WEIGHT: 144.6 LBS | SYSTOLIC BLOOD PRESSURE: 170 MMHG | HEART RATE: 80 BPM | TEMPERATURE: 97.6 F

## 2023-08-04 DIAGNOSIS — I16.1 HYPERTENSIVE EMERGENCY: ICD-10-CM

## 2023-08-04 DIAGNOSIS — Z60.2 LIVES ALONE WITHOUT HELP AVAILABLE: ICD-10-CM

## 2023-08-04 DIAGNOSIS — Z75.1 NEED FOR TRANSFER TO ANOTHER FACILITY: Primary | ICD-10-CM

## 2023-08-04 DIAGNOSIS — I10 PRIMARY HYPERTENSION: ICD-10-CM

## 2023-08-04 DIAGNOSIS — I16.0 HYPERTENSIVE URGENCY: Primary | ICD-10-CM

## 2023-08-04 DIAGNOSIS — H91.90 DEAFNESS, UNSPECIFIED LATERALITY: ICD-10-CM

## 2023-08-04 LAB
2HR DELTA HS TROPONIN: 0 NG/L
4HR DELTA HS TROPONIN: 1 NG/L
ANION GAP SERPL CALCULATED.3IONS-SCNC: 8 MMOL/L
ATRIAL RATE: 67 BPM
ATRIAL RATE: 72 BPM
ATRIAL RATE: 77 BPM
BASOPHILS # BLD AUTO: 0.02 THOUSANDS/ÂΜL (ref 0–0.1)
BASOPHILS NFR BLD AUTO: 0 % (ref 0–1)
BILIRUB UR QL STRIP: NEGATIVE
BUN SERPL-MCNC: 6 MG/DL (ref 5–25)
CALCIUM SERPL-MCNC: 9.7 MG/DL (ref 8.4–10.2)
CARDIAC TROPONIN I PNL SERPL HS: 16 NG/L
CARDIAC TROPONIN I PNL SERPL HS: 16 NG/L
CARDIAC TROPONIN I PNL SERPL HS: 17 NG/L
CHLORIDE SERPL-SCNC: 96 MMOL/L (ref 96–108)
CLARITY UR: CLEAR
CO2 SERPL-SCNC: 30 MMOL/L (ref 21–32)
COLOR UR: NORMAL
CREAT SERPL-MCNC: 0.73 MG/DL (ref 0.6–1.3)
EOSINOPHIL # BLD AUTO: 0.04 THOUSAND/ÂΜL (ref 0–0.61)
EOSINOPHIL NFR BLD AUTO: 1 % (ref 0–6)
ERYTHROCYTE [DISTWIDTH] IN BLOOD BY AUTOMATED COUNT: 12.4 % (ref 11.6–15.1)
GFR SERPL CREATININE-BSD FRML MDRD: 102 ML/MIN/1.73SQ M
GLUCOSE SERPL-MCNC: 97 MG/DL (ref 65–140)
GLUCOSE UR STRIP-MCNC: NEGATIVE MG/DL
HCT VFR BLD AUTO: 51.1 % (ref 36.5–49.3)
HGB BLD-MCNC: 17.6 G/DL (ref 12–17)
HGB UR QL STRIP.AUTO: NEGATIVE
IMM GRANULOCYTES # BLD AUTO: 0.02 THOUSAND/UL (ref 0–0.2)
IMM GRANULOCYTES NFR BLD AUTO: 0 % (ref 0–2)
KETONES UR STRIP-MCNC: NEGATIVE MG/DL
LEUKOCYTE ESTERASE UR QL STRIP: NEGATIVE
LYMPHOCYTES # BLD AUTO: 0.88 THOUSANDS/ÂΜL (ref 0.6–4.47)
LYMPHOCYTES NFR BLD AUTO: 15 % (ref 14–44)
MAGNESIUM SERPL-MCNC: 2.1 MG/DL (ref 1.9–2.7)
MCH RBC QN AUTO: 32.7 PG (ref 26.8–34.3)
MCHC RBC AUTO-ENTMCNC: 34.4 G/DL (ref 31.4–37.4)
MCV RBC AUTO: 95 FL (ref 82–98)
MONOCYTES # BLD AUTO: 0.46 THOUSAND/ÂΜL (ref 0.17–1.22)
MONOCYTES NFR BLD AUTO: 8 % (ref 4–12)
NEUTROPHILS # BLD AUTO: 4.56 THOUSANDS/ÂΜL (ref 1.85–7.62)
NEUTS SEG NFR BLD AUTO: 76 % (ref 43–75)
NITRITE UR QL STRIP: NEGATIVE
NRBC BLD AUTO-RTO: 0 /100 WBCS
P AXIS: 32 DEGREES
P AXIS: 40 DEGREES
P AXIS: 47 DEGREES
PH UR STRIP.AUTO: 8 [PH]
PLATELET # BLD AUTO: 138 THOUSANDS/UL (ref 149–390)
PMV BLD AUTO: 9.8 FL (ref 8.9–12.7)
POTASSIUM SERPL-SCNC: 4.4 MMOL/L (ref 3.5–5.3)
PR INTERVAL: 184 MS
PR INTERVAL: 188 MS
PR INTERVAL: 188 MS
PROT UR STRIP-MCNC: NEGATIVE MG/DL
QRS AXIS: 25 DEGREES
QRS AXIS: 28 DEGREES
QRS AXIS: 39 DEGREES
QRSD INTERVAL: 100 MS
QRSD INTERVAL: 94 MS
QRSD INTERVAL: 94 MS
QT INTERVAL: 390 MS
QT INTERVAL: 396 MS
QT INTERVAL: 402 MS
QTC INTERVAL: 424 MS
QTC INTERVAL: 433 MS
QTC INTERVAL: 441 MS
RBC # BLD AUTO: 5.38 MILLION/UL (ref 3.88–5.62)
SODIUM SERPL-SCNC: 134 MMOL/L (ref 135–147)
SP GR UR STRIP.AUTO: 1.02 (ref 1–1.04)
T WAVE AXIS: 141 DEGREES
T WAVE AXIS: 142 DEGREES
T WAVE AXIS: 153 DEGREES
TSH SERPL DL<=0.05 MIU/L-ACNC: 0.83 UIU/ML (ref 0.45–4.5)
UROBILINOGEN UA: NEGATIVE MG/DL
VENTRICULAR RATE: 67 BPM
VENTRICULAR RATE: 72 BPM
VENTRICULAR RATE: 77 BPM
WBC # BLD AUTO: 5.98 THOUSAND/UL (ref 4.31–10.16)

## 2023-08-04 PROCEDURE — 99291 CRITICAL CARE FIRST HOUR: CPT | Performed by: EMERGENCY MEDICINE

## 2023-08-04 PROCEDURE — 3077F SYST BP >= 140 MM HG: CPT

## 2023-08-04 PROCEDURE — 36415 COLL VENOUS BLD VENIPUNCTURE: CPT | Performed by: EMERGENCY MEDICINE

## 2023-08-04 PROCEDURE — 84443 ASSAY THYROID STIM HORMONE: CPT | Performed by: EMERGENCY MEDICINE

## 2023-08-04 PROCEDURE — 3079F DIAST BP 80-89 MM HG: CPT

## 2023-08-04 PROCEDURE — 84484 ASSAY OF TROPONIN QUANT: CPT

## 2023-08-04 PROCEDURE — 93005 ELECTROCARDIOGRAM TRACING: CPT

## 2023-08-04 PROCEDURE — 99215 OFFICE O/P EST HI 40 MIN: CPT

## 2023-08-04 PROCEDURE — 93010 ELECTROCARDIOGRAM REPORT: CPT | Performed by: INTERNAL MEDICINE

## 2023-08-04 PROCEDURE — 83735 ASSAY OF MAGNESIUM: CPT | Performed by: EMERGENCY MEDICINE

## 2023-08-04 PROCEDURE — 85025 COMPLETE CBC W/AUTO DIFF WBC: CPT | Performed by: EMERGENCY MEDICINE

## 2023-08-04 PROCEDURE — 80048 BASIC METABOLIC PNL TOTAL CA: CPT | Performed by: EMERGENCY MEDICINE

## 2023-08-04 PROCEDURE — 96374 THER/PROPH/DIAG INJ IV PUSH: CPT

## 2023-08-04 PROCEDURE — 99285 EMERGENCY DEPT VISIT HI MDM: CPT

## 2023-08-04 PROCEDURE — 84484 ASSAY OF TROPONIN QUANT: CPT | Performed by: EMERGENCY MEDICINE

## 2023-08-04 RX ORDER — LOSARTAN POTASSIUM 50 MG/1
100 TABLET ORAL DAILY
Status: DISCONTINUED | OUTPATIENT
Start: 2023-08-05 | End: 2023-08-05 | Stop reason: HOSPADM

## 2023-08-04 RX ORDER — ATORVASTATIN CALCIUM 40 MG/1
40 TABLET, FILM COATED ORAL DAILY
Status: DISCONTINUED | OUTPATIENT
Start: 2023-08-04 | End: 2023-08-05 | Stop reason: HOSPADM

## 2023-08-04 RX ORDER — HYDROCHLOROTHIAZIDE 25 MG/1
25 TABLET ORAL DAILY
Status: DISCONTINUED | OUTPATIENT
Start: 2023-08-05 | End: 2023-08-05 | Stop reason: HOSPADM

## 2023-08-04 RX ORDER — HYDRALAZINE HYDROCHLORIDE 20 MG/ML
5 INJECTION INTRAMUSCULAR; INTRAVENOUS ONCE
Status: COMPLETED | OUTPATIENT
Start: 2023-08-04 | End: 2023-08-04

## 2023-08-04 RX ORDER — AMLODIPINE BESYLATE 10 MG/1
10 TABLET ORAL DAILY
Status: DISCONTINUED | OUTPATIENT
Start: 2023-08-04 | End: 2023-08-05 | Stop reason: HOSPADM

## 2023-08-04 RX ORDER — LOSARTAN POTASSIUM AND HYDROCHLOROTHIAZIDE 25; 100 MG/1; MG/1
1 TABLET ORAL DAILY
Status: DISCONTINUED | OUTPATIENT
Start: 2023-08-05 | End: 2023-08-04 | Stop reason: SDUPTHER

## 2023-08-04 RX ORDER — ENOXAPARIN SODIUM 100 MG/ML
40 INJECTION SUBCUTANEOUS DAILY
Status: DISCONTINUED | OUTPATIENT
Start: 2023-08-05 | End: 2023-08-05 | Stop reason: HOSPADM

## 2023-08-04 RX ADMIN — HYDRALAZINE HYDROCHLORIDE 5 MG: 20 INJECTION INTRAMUSCULAR; INTRAVENOUS at 16:06

## 2023-08-04 RX ADMIN — ATORVASTATIN CALCIUM 40 MG: 40 TABLET, FILM COATED ORAL at 16:06

## 2023-08-04 RX ADMIN — HYDRALAZINE HYDROCHLORIDE 5 MG: 20 INJECTION, SOLUTION INTRAMUSCULAR; INTRAVENOUS at 12:16

## 2023-08-04 RX ADMIN — METOPROLOL TARTRATE 25 MG: 25 TABLET, FILM COATED ORAL at 20:34

## 2023-08-04 RX ADMIN — AMLODIPINE BESYLATE 10 MG: 10 TABLET ORAL at 20:04

## 2023-08-04 SDOH — SOCIAL STABILITY - SOCIAL INSECURITY: PROBLEMS RELATED TO LIVING ALONE: Z60.2

## 2023-08-04 NOTE — ASSESSMENT & PLAN NOTE
bp 170/80 with associated blurry vision and headache, neurological assessment unremarkable; however due to setting should go to ED for further eval

## 2023-08-04 NOTE — PROGRESS NOTES
Name: Berenice Howe      : 1965      MRN: [id number]  Encounter Provider: PRISCILLA Matias  Encounter Date: 2023   Encounter department: [address]     1. Need for transfer to another facility  Assessment & Plan:  bp 170/80 with associated blurry vision and headache, neurological assessment unremarkable; however due to setting should go to ED for further eval    Orders:  -     Transfer to other facility    2. Hypertensive emergency  Assessment & Plan:  bp improved from yesterday now 170/80; however patient experiencing frontal loab headache, with blurry vision worse on the left. Patient sent to ED for similar complaint yesterday was given labetalol x2 and discharge per pt report shortly after arriving home symptoms restarted   Pt reports compliance with medication of metoprolol 25 mg bid and Hazaar daily. Discussed with patient should go to ED for further eval/management   Pt refused ambulance will wall over to ED across the street            [address] 62 y.o. male  has a past medical history of Deaf and Heart murmur. Presenting today for follow up post ED visit for hypertension with associated blurry vision. Patient reports via  shortly after discharge started the headache and blurry vision returned with elevations in bp. Pt report taking bp medications this am. Due to limitations of setting and pt symptoms recommended he go to ED, pt agreeable with plan. Due to language barrier, a  was present via telemedicine during the history-taking and subsequent discussion (and for part of the physical exam) with this patient.  name Toniekyler Mary ID# [id number]. Hypertension  This is a chronic problem. The current episode started more than 1 year ago. The problem has been waxing and waning since onset. The problem is uncontrolled.  Associated symptoms include blurred vision, headaches and malaise/fatigue. Pertinent negatives include no chest pain, palpitations, peripheral edema, shortness of breath or sweats. Risk factors for coronary artery disease include dyslipidemia, male gender, obesity, stress and sedentary lifestyle. Past treatments include angiotensin blockers and beta blockers. The current treatment provides no improvement. There are no compliance problems. Review of Systems   Constitutional: Positive for malaise/fatigue. Negative for chills and fever. HENT: Negative for ear pain and sore throat. Eyes: Positive for blurred vision. Negative for pain and visual disturbance. Respiratory: Negative for cough and shortness of breath. Cardiovascular: Negative for chest pain and palpitations. Gastrointestinal: Negative for abdominal pain and vomiting. Genitourinary: Negative for dysuria and hematuria. Musculoskeletal: Negative for arthralgias and back pain. Skin: Negative for color change and rash. Neurological: Positive for headaches. Negative for seizures and syncope. All other systems reviewed and are negative.       Current Outpatient Medications on File Prior to Visit   Medication Sig   • atorvastatin (LIPITOR) 40 mg tablet Take 1 tablet (40 mg total) by mouth daily   • Blood Pressure KIT Use 2 (two) times a day (Patient not taking: Reported on 3/1/2023)   • losartan-hydrochlorothiazide (HYZAAR) 100-25 MG per tablet Take 1 tablet by mouth daily (Patient not taking: Reported on 8/3/2023)   • metoprolol tartrate (LOPRESSOR) 25 mg tablet Take 1 tablet (25 mg total) by mouth every 12 (twelve) hours   • [DISCONTINUED] carvedilol (COREG) 3.125 mg tablet Take 1 tablet (3.125 mg total) by mouth 2 (two) times a day with meals   • [DISCONTINUED] chlorthalidone 25 mg tablet Take 0.5 tablets (12.5 mg total) by mouth daily In the morning   • [DISCONTINUED] losartan (COZAAR) 50 mg tablet Take 1 tablet (50 mg total) by mouth daily with lunch       Objective     /80 (BP Location: Left arm, Patient Position: Sitting, Cuff Size: Adult)   Pulse 80   Temp 97.6 °F (36.4 °C) (Temporal)   Resp 18   Ht 5' 2" (1.575 m)   Wt 65.6 kg (144 lb 9.6 oz)   SpO2 96%   BMI 26.45 kg/m²     Physical Exam  Vitals and nursing note reviewed. Constitutional:       General: He is not in acute distress. Appearance: Normal appearance. He is not ill-appearing. HENT:      Head: Normocephalic and atraumatic. Right Ear: External ear normal.      Left Ear: External ear normal.      Nose: Nose normal.      Mouth/Throat:      Mouth: Mucous membranes are moist.   Eyes:      General: Visual field deficit present. Right eye: No discharge. Left eye: No discharge. Pupils: Pupils are equal, round, and reactive to light. Cardiovascular:      Rate and Rhythm: Normal rate and regular rhythm. Pulses: Normal pulses. Heart sounds: Normal heart sounds. Pulmonary:      Effort: Pulmonary effort is normal. No respiratory distress. Breath sounds: Normal breath sounds. No wheezing. Abdominal:      General: Bowel sounds are normal.      Palpations: Abdomen is soft. Tenderness: There is no abdominal tenderness. There is no right CVA tenderness or left CVA tenderness. Musculoskeletal:         General: Normal range of motion. Cervical back: Normal range of motion. Skin:     General: Skin is warm and dry. Neurological:      General: No focal deficit present. Mental Status: He is alert and oriented to person, place, and time. GCS: GCS eye subscore is 4. GCS verbal subscore is 5. GCS motor subscore is 6. Cranial Nerves: No dysarthria or facial asymmetry. Motor: No weakness, tremor or abnormal muscle tone.       Coordination: Coordination normal. Heel to Shin Test normal.      Gait: Gait normal.       Elgie Manner, CRNP

## 2023-08-04 NOTE — ASSESSMENT & PLAN NOTE
Presents w/ one day of B/L throbbing headache and eye pain and B/L blurry vision, worse on the left. Discharged from ED yesterday and sxs worsended. Can feel blood pressure rising. Denies chest pain, tightness, SOB. No alcohol, drugs, smoking. BP: 179/104 (initial), > 171/83 > 154/82  Hydralazine 5 mg given in ED.   Home medications: Losartan hydrochlorothiazide 100-25 mg daily, Metoprolol tartrate 25 mg every 12 hours; compliant    Last saw Cardio on 11/30/21; EF - 56%    Plan:  - S/p 5 mg hydralazine   - Restarted on Amlodipine 10 mg   - Continue other blood pressure lowering agents  - Troponins trended without concerns  - No end-organ damage  - DVT PPx  - Neuro checks q6  - 2g salt restricted diet  - Monitor BP

## 2023-08-04 NOTE — ASSESSMENT & PLAN NOTE
bp improved from yesterday now 170/80; however patient experiencing frontal loab headache, with blurry vision worse on the left. Patient sent to ED for similar complaint yesterday was given labetalol x2 and discharge per pt report shortly after arriving home symptoms restarted   Pt reports compliance with medication of metoprolol 25 mg bid and Hazaar daily.    Discussed with patient should go to ED for further eval/management   Pt refused ambulance will wall over to ED across the street

## 2023-08-04 NOTE — ASSESSMENT & PLAN NOTE
- No complaints of adverse effects, including visual changes, dizziness, headaches or syncope. - Will continue current metoprolol 25 mg daily, Hyzaar 100-25 mg daily. Will add Norvasc 10 mg daily   - Encouraged continued use of home blood pressure logs. - Encouraged diet and exercise regimen as tolerated.  -pt to come next week for nurse visit for bp check   Follow up in one month     Head CT on 8/3:  FINDINGS:     PARENCHYMA: Decreased attenuation is noted in periventricular and subcortical white matter demonstrating an appearance that is statistically most likely to represent mild microangiopathic change.     No CT signs of acute infarction. No intracranial mass, mass effect or midline shift.   No acute parenchymal hemorrhage.     VENTRICLES AND EXTRA-AXIAL SPACES:  Normal for the patient's age.     VISUALIZED ORBITS: Normal visualized orbits.     PARANASAL SINUSES: Normal visualized paranasal sinuses.     CALVARIUM AND EXTRACRANIAL SOFT TISSUES:  Normal.     IMPRESSION:     No acute intracranial abnormality.

## 2023-08-04 NOTE — H&P
History and Physical - [address] [place] [address]    Patient Information: Lesly Krabbe 62 y.o. male MRN: [id number]  Unit/Bed#: 7T [facility] 706-01 Encounter: [id number]  Admitting Physician: Andreia Sanders MD  PCP: Barbi Staples MD  Date of Admission:  23    Assessment and Plan    * Hypertensive emergency  Assessment & Plan  Presents w/ one day of B/L throbbing headache and eye pain and B/L blurry vision, worse on the left. Discharged from ED yesterday and sxs worsended. Can feel blood pressure rising. Denies chest pain, tightness, SOB. No alcohol, drugs, smoking. BP: 179/104 (initial), > 171/83 > 154/82  Hydralazine 5 mg given in ED. Home medications: Losartan hydrochlorothiazide 100-25 mg daily, Metoprolol tartrate 25 mg every 12 hours; compliant    Last saw Cardio on 21; EF - 56%    Plan:  - 5 mg hydralazine now  - Retart Amlodipine 10 mg tonight  - Consider increasing metoprolol to 50 mg BID from tomorrow  - Trend troponins  - AM renal function  - DVT PPx  - Neuro checks q6  - 2g salt restricted diet  - Monitor BP  - AM CBC, CMP  - Contingency plan: if BP continues to remain elevated and symptomatic, admin. IV metoprolol 5 mg    Congenital deafness  Assessment & Plan  Congenital deadness and mute. Uses Amended sign language.  was used for encounter, ID # [id number]. Type 2 diabetes mellitus without complication, without long-term current use of insulin [facility])  Assessment & Plan  Lab Results   Component Value Date    HGBA1C 5.6 2023     FS  No current medications. Plan:  - Accu checkACHS and ISS if hyperglycemic    Other hyperlipidemia  Assessment & Plan  ASCVD risk 32.1%  No current medications. Plan:  - Start atorvastatin 40 mg daily      VTE Prophylaxis: VTE Score: 3 Moderate Risk (Score 3-4) - Pharmacological DVT Prophylaxis Ordered: enoxaparin (Lovenox).   Code Status: Level 1 - Full Code  Anticipated Length of Stay:  Patient will be admitted on an Inpatient basis with an anticipated length of stay of  less than 2 midnights. Justification for Hospital Stay: hypertensive emergency  Total Time for Visit, including Counseling / Coordination of Care: 60 mins. Greater than 50% of this total time spent on direct patient counseling and coordination of care. Chief Complaint:     Chief Complaint   Patient presents with   • Follow-Up Hypertension     Reports that he is still having high blood pressure and has same symptoms as yesterday when seen here. Reports he has been taking his meds. History of Present Illness:    Miguel Zamora is a 62 y.o. male past medical history of type 2 diabetes mellitus, HTN, deafness, and thrombocytopenia who presents with worsening bilateral throbbing headaches and bilateral eye pain and blurry vision (worse on the left eye) of 1 day duration. On 8/3/23, patient was admitted to ED for HTN emergency and discharged same day. Patient was instructed to follow-up with PCP at [facility]. BP recorded in clinic: 170/80, patient advised to be evaluated in ED, patient walked to ED. In ED: /104. Hydralazine 5 mg was administered. Patient compliant on Hyzaar 100-25 mg and Lopressor 25 mg. Denies use of illicit or recreational drugs, alcohol, or history of smoking. Patient denies dizziness, n/v, chills, chest pain, SOB, dysuria, abdominal pain. Review of Systems:  Review of Systems   Constitutional: Negative for chills and fever. HENT: Negative for ear pain and sore throat. Eyes: Positive for pain and visual disturbance. Respiratory: Negative for chest tightness and shortness of breath. Cardiovascular: Negative for chest pain. Gastrointestinal: Negative for abdominal pain, nausea and vomiting. Genitourinary: Negative for dysuria and hematuria. Neurological: Positive for headaches.        Past Medical and Surgical History:   Past Medical History:   Diagnosis Date   • Deaf    • Heart murmur      History reviewed. No pertinent surgical history. Meds/Allergies: Allergies: No Known Allergies  Prior to Admission Medications   Prescriptions Last Dose Informant Patient Reported? Taking?    Blood Pressure KIT 8/4/2023 Self No Yes   Sig: Use 2 (two) times a day   atorvastatin (LIPITOR) 40 mg tablet   No No   Sig: Take 1 tablet (40 mg total) by mouth daily   losartan-hydrochlorothiazide (HYZAAR) 100-25 MG per tablet 8/4/2023  No Yes   Sig: Take 1 tablet by mouth daily   metoprolol tartrate (LOPRESSOR) 25 mg tablet 8/4/2023  No Yes   Sig: Take 1 tablet (25 mg total) by mouth every 12 (twelve) hours      Facility-Administered Medications: None     Social History:     Social History     Socioeconomic History   • Marital status: Single     Spouse name: Not on file   • Number of children: Not on file   • Years of education: Not on file   • Highest education level: Not on file   Occupational History   • Not on file   Tobacco Use   • Smoking status: Never   • Smokeless tobacco: Never   Vaping Use   • Vaping Use: Never used   Substance and Sexual Activity   • Alcohol use: Not Currently     Comment: 'as per mother pt drinks everyday"    • Drug use: Never   • Sexual activity: Not on file   Other Topics Concern   • Not on file   Social History Narrative   • Not on file     Social Determinants of Health     Financial Resource Strain: Not on file   Food Insecurity: Not on file   Transportation Needs: Not on file   Physical Activity: Not on file   Stress: Not on file   Social Connections: Not on file   Intimate Partner Violence: Not on file   Housing Stability: Not on file     Patient Pre-hospital Living Situation: Alone  Patient Pre-hospital Level of Mobility: None  Patient Pre-hospital Diet Restrictions: None    Family History:  Family History   Problem Relation Age of Onset   • Hypertension Mother    • Diabetes Mother    • Heart disease Mother         pacemaker   • Arthritis Mother         chronic       Physical Exam:   Vitals: Blood Pressure: (!) 182/86 (08/04/23 1708)  Pulse: 90 (08/04/23 1708)  Temperature: 97.8 °F (36.6 °C) (08/04/23 1500)  Temp Source: Temporal (08/04/23 1500)  Respirations: 18 (08/04/23 1630)  Height: 5' 2" (157.5 cm) (08/04/23 1500)  Weight - Scale: 65 kg (143 lb 4.8 oz) (08/04/23 1500)  SpO2: 97 % (08/04/23 1630)    Physical Exam  Vitals reviewed. HENT:      Mouth/Throat:      Mouth: Mucous membranes are moist.   Eyes:      General: No scleral icterus. Right eye: No discharge. Left eye: No discharge. Comments: Visual field deficit. Cardiovascular:      Rate and Rhythm: Normal rate and regular rhythm. Pulses: Normal pulses. Heart sounds: Normal heart sounds. Pulmonary:      Effort: Pulmonary effort is normal. No respiratory distress. Breath sounds: Normal breath sounds. Chest:      Chest wall: No tenderness. Abdominal:      Tenderness: There is no abdominal tenderness. Musculoskeletal:      Right lower leg: No edema. Left lower leg: No edema. Skin:     General: Skin is warm and dry. Neurological:      Mental Status: He is alert. Lab Results: I have personally reviewed pertinent reports.     Results from last 7 days   Lab Units 08/04/23  1143   WBC Thousand/uL 5.98   HEMOGLOBIN g/dL 17.6*   HEMATOCRIT % 51.1*   PLATELETS Thousands/uL 138*   NEUTROS PCT % 76*   LYMPHS PCT % 15   MONOS PCT % 8   EOS PCT % 1     Results from last 7 days   Lab Units 08/04/23  1143 08/03/23  1603   POTASSIUM mmol/L 4.4 3.9   CHLORIDE mmol/L 96 98   CO2 mmol/L 30 28   BUN mg/dL 6 7   CREATININE mg/dL 0.73 0.71   CALCIUM mg/dL 9.7 9.3   EGFR ml/min/1.73sq m 102 103   MAGNESIUM mg/dL 2.1  --                           Results from last 7 days   Lab Units 08/04/23  1239   COLOR UA  Straw   CLARITY UA  Clear   SPEC GRAV UA  1.020   PH UA  8.0   LEUKOCYTES UA  Negative   NITRITE UA  Negative   GLUCOSE UA mg/dl Negative   KETONES UA mg/dl Negative   BILIRUBIN UA  Negative   BLOOD UA Negative             Imaging: I have personally reviewed pertinent reports. XR chest 1 view portable  Result Date: 8/3/2023  Impression: No acute cardiopulmonary disease. CT head without contrast  Result Date: 8/3/2023  Impression: No acute intracranial abnormality. EKG, Pathology, and Other Studies Reviewed on Admission:   EKG  Result Date: 08/04/23  Impression:  Inverted T waves; no change compared to 8/3/23 EKG. Entire H&P was discussed with Dr. Derik Sykes who agreed to what is noted above.     Mariya Oliva MD  08/04/23  6:33 PM

## 2023-08-04 NOTE — ASSESSMENT & PLAN NOTE
Lab Results   Component Value Date    HGBA1C 5.6 2023     FS  No current medications.     Plan:  - Accu checkACHS and ISS if hyperglycemic

## 2023-08-04 NOTE — PLAN OF CARE
Problem: PAIN - ADULT  Goal: Verbalizes/displays adequate comfort level or baseline comfort level  Description: Interventions:  - Encourage patient to monitor pain and request assistance  - Assess pain using appropriate pain scale  - Administer analgesics based on type and severity of pain and evaluate response  - Implement non-pharmacological measures as appropriate and evaluate response  - Consider cultural and social influences on pain and pain management  - Notify physician/advanced practitioner if interventions unsuccessful or patient reports new pain  8/4/2023 1547 by Juliette Geronimo RN  Outcome: Progressing  8/4/2023 1545 by Juliette Geronimo RN  Outcome: Progressing     Problem: SAFETY ADULT  Goal: Patient will remain free of falls  Description: INTERVENTIONS:  - Educate patient/family on patient safety including physical limitations  - Instruct patient to call for assistance with activity    - Keep Call bell within reach  - Keep bed low and locked with side rails adjusted as appropriate  - Keep care items and personal belongings within reach  - Initiate and maintain comfort rounds  - Consider moving patient to room near nurses station  8/4/2023 1547 by Juliette Geronimo RN  Outcome: Progressing  8/4/2023 1545 by Juliette Geronimo RN  Outcome: Progressing     Problem: Knowledge Deficit  Goal: Patient/family/caregiver demonstrates understanding of disease process, treatment plan, medications, and discharge instructions  Description: Complete learning assessment and assess knowledge base.   Interventions:  - Provide teaching at level of understanding  - Provide teaching via preferred learning methods  8/4/2023 1547 by Juliette Geronimo RN  Outcome: Progressing  8/4/2023 1545 by Juliette Geronimo RN  Outcome: Progressing

## 2023-08-04 NOTE — ED PROVIDER NOTES
History  Chief Complaint   Patient presents with   • Follow-Up Hypertension     Reports that he is still having high blood pressure and has same symptoms as yesterday when seen here. Reports he has been taking his meds. This is a 27-year-old male who presents emergency department with complaint of headache, bilateral blurred vision and feeling lightheaded today. No room spinning sensation. Patient was seen here yesterday for elevated blood pressure. Blood pressure improved and was seen by family practice and referred to clinic today. Today he has persistent symptoms. No weakness or numbness. No chest pain. No abdominal pain. Symptoms have been persistent since yesterday during his visit to the ED. Patient was treated in ED with medications, BP improved, was seen by family practice and advise f/u today in [facility] office. Today in office was noted to again have elevated BP and persistent symptoms. Patient states he took his medications as prescribed today. DDx: hypertensive urgency, will check EKG and troponin to eval for ACS, will check labs to evaluate for renal impact of HTN. Prior to Admission Medications   Prescriptions Last Dose Informant Patient Reported? Taking? Blood Pressure KIT 8/4/2023 Self No Yes   Sig: Use 2 (two) times a day   atorvastatin (LIPITOR) 40 mg tablet   No No   Sig: Take 1 tablet (40 mg total) by mouth daily   losartan-hydrochlorothiazide (HYZAAR) 100-25 MG per tablet 8/4/2023  No Yes   Sig: Take 1 tablet by mouth daily   metoprolol tartrate (LOPRESSOR) 25 mg tablet 8/4/2023  No Yes   Sig: Take 1 tablet (25 mg total) by mouth every 12 (twelve) hours      Facility-Administered Medications: None       Past Medical History:   Diagnosis Date   • Deaf    • Heart murmur        History reviewed. No pertinent surgical history.     Family History   Problem Relation Age of Onset   • Hypertension Mother    • Diabetes Mother    • Heart disease Mother         pacemaker   • Arthritis Mother chronic     I have reviewed and agree with the history as documented. E-Cigarette/Vaping   • E-Cigarette Use Never User      E-Cigarette/Vaping Substances   • Nicotine No    • THC No    • CBD No    • Flavoring No    • Other No    • Unknown No      Social History     Tobacco Use   • Smoking status: Never   • Smokeless tobacco: Never   Vaping Use   • Vaping Use: Never used   Substance Use Topics   • Alcohol use: Not Currently     Comment: 'as per mother pt drinks everyday"    • Drug use: Never       Review of Systems   Constitutional: Negative for activity change, appetite change and fever. HENT: Negative for congestion, ear pain, rhinorrhea and sore throat. Eyes: Positive for visual disturbance (blurred). Negative for pain, discharge and redness. Respiratory: Negative for cough, shortness of breath and wheezing. Cardiovascular: Negative for chest pain and palpitations. Gastrointestinal: Negative for abdominal pain, diarrhea, nausea and vomiting. Endocrine: Negative for polyuria. Genitourinary: Negative for difficulty urinating, dysuria, frequency and urgency. Musculoskeletal: Negative for arthralgias and myalgias. Skin: Negative for color change and rash. Allergic/Immunologic: Negative for immunocompromised state. Neurological: Positive for headaches. Negative for dizziness, syncope and light-headedness. Hematological: Does not bruise/bleed easily. Psychiatric/Behavioral: Negative for confusion. All other systems reviewed and are negative. Physical Exam  Physical Exam  Vitals and nursing note reviewed. Constitutional:       General: He is not in acute distress. Appearance: He is well-developed. HENT:      Head: Normocephalic and atraumatic. Nose: Nose normal.   Eyes:      General: No scleral icterus. Conjunctiva/sclera: Conjunctivae normal.   Cardiovascular:      Rate and Rhythm: Normal rate and regular rhythm. Heart sounds: Normal heart sounds. Pulmonary:      Effort: Pulmonary effort is normal. No respiratory distress. Breath sounds: Normal breath sounds. No stridor. No wheezing. Abdominal:      General: There is no distension. Palpations: Abdomen is soft. Tenderness: There is no abdominal tenderness. There is no guarding or rebound. Musculoskeletal:         General: No deformity. Cervical back: Normal range of motion and neck supple. Skin:     General: Skin is warm and dry. Findings: No rash. Neurological:      General: No focal deficit present. Mental Status: He is alert and oriented to person, place, and time. Cranial Nerves: Cranial nerve deficit (deaf at baseline. no other CN deficit noted.) present. Sensory: No sensory deficit. Motor: No weakness (5/5 b/l upper and lower extremities. no ataxia with finger to nose or heel to shin.). Comments: Deaf at baseline   Psychiatric:         Thought Content:  Thought content normal.         Vital Signs  ED Triage Vitals [08/04/23 1053]   Temperature Pulse Respirations Blood Pressure SpO2   97.9 °F (36.6 °C) 82 18 (!) 197/104 94 %      Temp Source Heart Rate Source Patient Position - Orthostatic VS BP Location FiO2 (%)   Tympanic Monitor Sitting Left arm --      Pain Score       --           Vitals:    08/04/23 1200 08/04/23 1216 08/04/23 1230 08/04/23 1300   BP: 160/91 (!) 171/83 (!) 176/97 154/82   Pulse: 64  76 70   Patient Position - Orthostatic VS: Lying  Lying Lying         Visual Acuity      ED Medications  Medications   hydrALAZINE (APRESOLINE) injection 5 mg (5 mg Intravenous Given 8/4/23 1216)       Diagnostic Studies  Results Reviewed     Procedure Component Value Units Date/Time    HS Troponin I 2hr [[id number]]  (Normal) Collected: 08/04/23 1338    Lab Status: Final result Specimen: Blood from Arm, Right Updated: 08/04/23 1413     hs TnI 2hr 16 ng/L      Delta 2hr hsTnI 0 ng/L     UA (URINE) with reflex to Scope [[id number]]  (Normal) Collected: 08/04/23 1239    Lab Status: Final result Specimen: Urine, Clean Catch Updated: 08/04/23 1257     Color, UA Straw     Clarity, UA Clear     Specific Gravity, UA 1.020     pH, UA 8.0     Leukocytes, UA Negative     Nitrite, UA Negative     Protein, UA Negative mg/dl      Glucose, UA Negative mg/dl      Ketones, UA Negative mg/dl      Bilirubin, UA Negative     Occult Blood, UA Negative     UROBILINOGEN UA Negative mg/dL     TSH, 3rd generation with Free T4 reflex [[id number]]  (Normal) Collected: 08/04/23 1143    Lab Status: Final result Specimen: Blood from Arm, Right Updated: 08/04/23 1226     TSH 3RD GENERATON 0.831 uIU/mL     HS Troponin 0hr (reflex protocol) [[id number]]  (Normal) Collected: 08/04/23 1143    Lab Status: Final result Specimen: Blood from Arm, Right Updated: 08/04/23 1217     hs TnI 0hr 16 ng/L     HS Troponin I 4hr [[id number]]     Lab Status: No result Specimen: Blood     Basic metabolic panel [[id number]]  (Abnormal) Collected: 08/04/23 1143    Lab Status: Final result Specimen: Blood from Arm, Right Updated: 08/04/23 1213     Sodium 134 mmol/L      Potassium 4.4 mmol/L      Chloride 96 mmol/L      CO2 30 mmol/L      ANION GAP 8 mmol/L      BUN 6 mg/dL      Creatinine 0.73 mg/dL      Glucose 97 mg/dL      Calcium 9.7 mg/dL      eGFR 102 ml/min/1.73sq m     Narrative:      [facility] guidelines for Chronic Kidney Disease (CKD):   •  Stage 1 with normal or high GFR (GFR > 90 mL/min/1.73 square meters)  •  Stage 2 Mild CKD (GFR = 60-89 mL/min/1.73 square meters)  •  Stage 3A Moderate CKD (GFR = 45-59 mL/min/1.73 square meters)  •  Stage 3B Moderate CKD (GFR = 30-44 mL/min/1.73 square meters)  •  Stage 4 Severe CKD (GFR = 15-29 mL/min/1.73 square meters)  •  Stage 5 End Stage CKD (GFR <15 mL/min/1.73 square meters)  Note: GFR calculation is accurate only with a steady state creatinine    Magnesium [[id number]]  (Normal) Collected: 08/04/23 1143    Lab Status: Final result Specimen: Blood from Arm, Right Updated: 08/04/23 1213     Magnesium 2.1 mg/dL     CBC and differential [[id number]]  (Abnormal) Collected: 08/04/23 1143    Lab Status: Final result Specimen: Blood from Arm, Right Updated: 08/04/23 1159     WBC 5.98 Thousand/uL      RBC 5.38 Million/uL      Hemoglobin 17.6 g/dL      Hematocrit 51.1 %      MCV 95 fL      MCH 32.7 pg      MCHC 34.4 g/dL      RDW 12.4 %      MPV 9.8 fL      Platelets 846 Thousands/uL      nRBC 0 /100 WBCs      Neutrophils Relative 76 %      Immat GRANS % 0 %      Lymphocytes Relative 15 %      Monocytes Relative 8 %      Eosinophils Relative 1 %      Basophils Relative 0 %      Neutrophils Absolute 4.56 Thousands/µL      Immature Grans Absolute 0.02 Thousand/uL      Lymphocytes Absolute 0.88 Thousands/µL      Monocytes Absolute 0.46 Thousand/µL      Eosinophils Absolute 0.04 Thousand/µL      Basophils Absolute 0.02 Thousands/µL                  No orders to display              Procedures  ECG 12 Lead Documentation Only    Date/Time: 8/4/2023 11:40 AM    Performed by: Dior Pierre MD  Authorized by: Dior Pierre MD    Indications / Diagnosis:  Hypertension  ECG reviewed by me, the ED Provider: yes    Patient location:  ED  Previous ECG:     Previous ECG:  Compared to current    Comparison ECG info:  8/3/23    Similarity:  No change  Rate:     ECG rate assessment: normal    Rhythm:     Rhythm: sinus rhythm    Ectopy:     Ectopy: none    QRS:     QRS axis:  Normal    QRS intervals:  Normal  Conduction:     Conduction: normal    ST segments:     ST segments:  Normal  T waves:     T waves: inverted      Inverted:  V4, V5, V6, I and aVL    ECG 12 Lead Documentation Only    Date/Time: 8/4/2023 2:01 PM    Performed by: Dior Pierre MD  Authorized by: Dior Pierre MD    Indications / Diagnosis:  Hypertension  ECG reviewed by me, the ED Provider: yes    Patient location:  ED  Rate:     ECG rate assessment: normal Rhythm:     Rhythm: sinus rhythm    Ectopy:     Ectopy: none    QRS:     QRS axis:  Normal    QRS intervals:  Normal  Conduction:     Conduction: normal    ST segments:     ST segments:  Normal  T waves:     T waves: inverted      Inverted:  I, aVL, V4, V5 and V6  CriticalCare Time    Date/Time: 8/4/2023 2:35 PM    Performed by: Maurice Dietz MD  Authorized by: Maurice Dietz MD    Critical care provider statement:     Critical care time (minutes):  35    Critical care time was exclusive of:  Separately billable procedures and treating other patients and teaching time    Critical care was necessary to treat or prevent imminent or life-threatening deterioration of the following conditions:  Circulatory failure    Critical care was time spent personally by me on the following activities:  Obtaining history from patient or surrogate, development of treatment plan with patient or surrogate, discussions with consultants, examination of patient, evaluation of patient's response to treatment, interpretation of cardiac output measurements, ordering and performing treatments and interventions, ordering and review of laboratory studies, ordering and review of radiographic studies and re-evaluation of patient's condition             ED Course  ED Course as of 08/04/23 1436   Fri Aug 04, 2023   1123 Interaction was interpreted by  Codi Hansen [id number].   1310 Review of records shows patient had a CAT scan performed yesterday was within normal limits. Symptoms of the same as yesterday therefore repeat CAT scan was not ordered. He has no new symptoms since yesterday. These are persistent symptoms. 1311 I reached out to family practice admitting resident for admission evaluation. Discussed with him. He will be down to evaluate patient. SBIRT 20yo+    Flowsheet Row Most Recent Value   Initial Alcohol Screen: US AUDIT-C     1.  How often do you have a drink containing alcohol? 0 Filed at: 08/04/2023 1055   2. How many drinks containing alcohol do you have on a typical day you are drinking? 0 Filed at: 08/04/2023 1055   3a. Male UNDER 65: How often do you have five or more drinks on one occasion? 0 Filed at: 08/04/2023 1055   Audit-C Score 0 Filed at: 08/04/2023 1055   TJ: How many times in the past year have you. .. Used an illegal drug or used a prescription medication for non-medical reasons? Never Filed at: 08/04/2023 1055                    Medical Decision Making  Patient presents the emergency department with symptomatic hypertension requiring IV medications for blood pressure control. Patient does require admission. Blood pressure improved after IV hydralazine. Amount and/or Complexity of Data Reviewed  External Data Reviewed: notes. Details: Primary care office note was reviewed from today. Patient was persistently hypertensive and having similar complaints yesterday so was referred to the emergency department for further evaluation. Labs: ordered. ECG/medicine tests: ordered and independent interpretation performed. Risk  Prescription drug management. Decision regarding hospitalization. Disposition  Final diagnoses:   Hypertensive urgency     Time reflects when diagnosis was documented in both MDM as applicable and the Disposition within this note     Time User Action Codes Description Comment    8/4/2023  1:44 PM Kian Hernandez Add [I16.0] Hypertensive urgency       ED Disposition     ED Disposition   Admit    Condition   Stable    Date/Time   Fri Aug 4, 2023  1:44 PM    Comment   Case was discussed with Family Practice resident and the patient's admission status was agreed to be Admission Status: inpatient status to the service of Dr. Derik Sykes . Follow-up Information    None         Patient's Medications   Discharge Prescriptions    No medications on file       No discharge procedures on file.     PDMP Review None          ED Provider  Electronically Signed by           Nayla Monae MD  08/04/23 2331

## 2023-08-05 VITALS
TEMPERATURE: 97.5 F | BODY MASS INDEX: 26.37 KG/M2 | WEIGHT: 143.3 LBS | OXYGEN SATURATION: 95 % | HEART RATE: 64 BPM | HEIGHT: 62 IN | SYSTOLIC BLOOD PRESSURE: 123 MMHG | RESPIRATION RATE: 20 BRPM | DIASTOLIC BLOOD PRESSURE: 65 MMHG

## 2023-08-05 PROBLEM — I16.1 HYPERTENSIVE EMERGENCY: Status: RESOLVED | Noted: 2021-06-24 | Resolved: 2023-08-05

## 2023-08-05 LAB
ALBUMIN SERPL BCP-MCNC: 3.8 G/DL (ref 3.5–5)
ALP SERPL-CCNC: 57 U/L (ref 34–104)
ALT SERPL W P-5'-P-CCNC: 13 U/L (ref 7–52)
ANION GAP SERPL CALCULATED.3IONS-SCNC: 9 MMOL/L
AST SERPL W P-5'-P-CCNC: 18 U/L (ref 13–39)
BASOPHILS # BLD AUTO: 0.02 THOUSANDS/ÂΜL (ref 0–0.1)
BASOPHILS NFR BLD AUTO: 0 % (ref 0–1)
BILIRUB SERPL-MCNC: 1.22 MG/DL (ref 0.2–1)
BUN SERPL-MCNC: 7 MG/DL (ref 5–25)
CALCIUM SERPL-MCNC: 9.2 MG/DL (ref 8.4–10.2)
CHLORIDE SERPL-SCNC: 99 MMOL/L (ref 96–108)
CO2 SERPL-SCNC: 24 MMOL/L (ref 21–32)
CREAT SERPL-MCNC: 0.64 MG/DL (ref 0.6–1.3)
EOSINOPHIL # BLD AUTO: 0.01 THOUSAND/ÂΜL (ref 0–0.61)
EOSINOPHIL NFR BLD AUTO: 0 % (ref 0–6)
ERYTHROCYTE [DISTWIDTH] IN BLOOD BY AUTOMATED COUNT: 12.5 % (ref 11.6–15.1)
GFR SERPL CREATININE-BSD FRML MDRD: 107 ML/MIN/1.73SQ M
GLUCOSE SERPL-MCNC: 91 MG/DL (ref 65–140)
HCT VFR BLD AUTO: 51.7 % (ref 36.5–49.3)
HGB BLD-MCNC: 17.6 G/DL (ref 12–17)
IMM GRANULOCYTES # BLD AUTO: 0.02 THOUSAND/UL (ref 0–0.2)
IMM GRANULOCYTES NFR BLD AUTO: 0 % (ref 0–2)
LYMPHOCYTES # BLD AUTO: 0.89 THOUSANDS/ÂΜL (ref 0.6–4.47)
LYMPHOCYTES NFR BLD AUTO: 11 % (ref 14–44)
MCH RBC QN AUTO: 32.6 PG (ref 26.8–34.3)
MCHC RBC AUTO-ENTMCNC: 34 G/DL (ref 31.4–37.4)
MCV RBC AUTO: 96 FL (ref 82–98)
MONOCYTES # BLD AUTO: 0.48 THOUSAND/ÂΜL (ref 0.17–1.22)
MONOCYTES NFR BLD AUTO: 6 % (ref 4–12)
NEUTROPHILS # BLD AUTO: 6.48 THOUSANDS/ÂΜL (ref 1.85–7.62)
NEUTS SEG NFR BLD AUTO: 83 % (ref 43–75)
NRBC BLD AUTO-RTO: 0 /100 WBCS
PLATELET # BLD AUTO: 150 THOUSANDS/UL (ref 149–390)
PMV BLD AUTO: 10.7 FL (ref 8.9–12.7)
POTASSIUM SERPL-SCNC: 4.2 MMOL/L (ref 3.5–5.3)
PROT SERPL-MCNC: 6.9 G/DL (ref 6.4–8.4)
RBC # BLD AUTO: 5.4 MILLION/UL (ref 3.88–5.62)
SODIUM SERPL-SCNC: 132 MMOL/L (ref 135–147)
WBC # BLD AUTO: 7.9 THOUSAND/UL (ref 4.31–10.16)

## 2023-08-05 PROCEDURE — NC001 PR NO CHARGE: Performed by: FAMILY MEDICINE

## 2023-08-05 PROCEDURE — 85025 COMPLETE CBC W/AUTO DIFF WBC: CPT

## 2023-08-05 PROCEDURE — 80053 COMPREHEN METABOLIC PANEL: CPT

## 2023-08-05 PROCEDURE — 99235 HOSP IP/OBS SAME DATE MOD 70: CPT | Performed by: FAMILY MEDICINE

## 2023-08-05 RX ORDER — AMLODIPINE BESYLATE 10 MG/1
10 TABLET ORAL DAILY
Qty: 30 TABLET | Refills: 0 | Status: SHIPPED | OUTPATIENT
Start: 2023-08-06 | End: 2023-08-25

## 2023-08-05 RX ADMIN — METOPROLOL TARTRATE 25 MG: 25 TABLET, FILM COATED ORAL at 08:58

## 2023-08-05 RX ADMIN — ATORVASTATIN CALCIUM 40 MG: 40 TABLET, FILM COATED ORAL at 08:58

## 2023-08-05 RX ADMIN — HYDROCHLOROTHIAZIDE 25 MG: 25 TABLET ORAL at 08:58

## 2023-08-05 RX ADMIN — LOSARTAN POTASSIUM 100 MG: 50 TABLET, FILM COATED ORAL at 08:57

## 2023-08-05 RX ADMIN — AMLODIPINE BESYLATE 10 MG: 10 TABLET ORAL at 08:57

## 2023-08-05 RX ADMIN — ENOXAPARIN SODIUM 40 MG: 40 INJECTION SUBCUTANEOUS at 08:58

## 2023-08-05 NOTE — UTILIZATION REVIEW
Initial Clinical Review    Admission: Date/Time/Statement:   Admission Orders (From admission, onward)     Ordered        08/04/23 1347  INPATIENT ADMISSION  Once                      Orders Placed This Encounter   Procedures   • INPATIENT ADMISSION     Standing Status:   Standing     Number of Occurrences:   1     Order Specific Question:   Level of Care     Answer:   Med Surg [16]     Order Specific Question:   Estimated length of stay     Answer:   More than 2 Midnights     Order Specific Question:   Certification     Answer:   I certify that inpatient services are medically necessary for this patient for a duration of greater than two midnights. See H&P and MD Progress Notes for additional information about the patient's course of treatment. ED Arrival Information     Expected   -    Arrival   8/4/2023 10:41    Acuity   Urgent            Means of arrival   Walk-In    Escorted by   [facility]    Admission type   Emergency            Arrival complaint   high blood pressure/headache           Chief Complaint   Patient presents with   • Follow-Up Hypertension     Reports that he is still having high blood pressure and has same symptoms as yesterday when seen here. Reports he has been taking his meds. Initial Presentation: 62 y.o. male presents to ED from clinic  due to elevated /80 and compliants of bilat throbbing headache, eye pain, blurry vision, worse on left. Pt was seen in ED 8/3 for HTN with similar symptoms. CT head was negative. Symptoms now worsening. BP is 179/104 Given hydralazine in ED, /83, 154/82. Patient compliant with home meds Losartan HCTZ 100/25 daily and metoprolol 25 mg q12 h.  PMH: T2DM, elevated lipids, Congenital deafness and mute, ASL  used  Exam notes visual field deficit alert  EKG shows SR, occ PVC  Admitted asinpatient to med surg for hypertensive emergency Plan Hydralazine 5 mg now, restart amlodipine tonight, consider increasing metoprolo to 50 mg bid tomorrow, trend troponin, monitor renal function, DVT PPx, neuro checks q6h, 2gram restricted salt diet, monitor BP, check CBC, CMP in am. Start IV metoprolol if Bp remains elevated, monitor accu check glucose, add SSI coverage, Start atorvastatin 40mg daily      Date: 8/5    Day 2:     Exam alert, no focal neuro deficit. BP returned to normal limits.  Symptoms HA, eye pain, blurry vision resolved         ED Triage Vitals   Temperature Pulse Respirations Blood Pressure SpO2   08/04/23 1053 08/04/23 1053 08/04/23 1053 08/04/23 1053 08/04/23 1053   97.9 °F (36.6 °C) 82 18 (!) 197/104 94 %      Temp Source Heart Rate Source Patient Position - Orthostatic VS BP Location FiO2 (%)   08/04/23 1053 08/04/23 1053 08/04/23 1053 08/04/23 1053 --   Tympanic Monitor Sitting Left arm       Pain Score       08/04/23 1528       No Pain          Wt Readings from Last 1 Encounters:   08/04/23 65 kg (143 lb 4.8 oz)     Additional Vital Signs:      Date/Time Temp Pulse Resp BP MAP (mmHg) SpO2 O2 Device Patient Position - Orthostatic VS   08/05/23 1057 -- 64 -- 123/65 75 -- -- Sitting   08/05/23 0743 97.5 °F (36.4 °C) 66 20 142/75 93 95 % None (Room air) Lying   08/04/23 2219 97.6 °F (36.4 °C) 65 18 156/83 -- 95 % None (Room air) Lying   08/04/23 2004 -- 82 -- 184/94 Abnormal  136 -- -- Lying   08/04/23 1708 -- 90 -- 182/86 Abnormal  -- -- -- Sitting   08/04/23 1630 -- -- 18 183/92 Abnormal  -- 97 % None (Room air) Lying   08/04/23 1603 -- 74 -- 206/106 Abnormal  -- 97 % None (Room air) Lying   08/04/23 1500 97.8 °F (36.6 °C) 70 18 189/85 Abnormal  96 97 % None (Room air) Sitting   08/04/23 1300 -- 70 18 154/82 -- 94 % None (Room air) Lying   08/04/23 1230 -- 76 20 176/97 Abnormal  -- 96 % None (Room air) Lying   08/04/23 1216 -- -- -- 171/83 Abnormal  -- -- -- --   08/04/23 1200 -- 64 18 160/91 -- 95 % None (Room air) Lying   08/04/23 1141 -- 68 18 205/110 Abnormal  -- 98 % None (Room air) Lying       Pertinent Labs/Diagnostic Test Results:     EKG 8/4 1553    Sinus rhythm with occasional Premature ventricular complexes  Possible Left atrial enlargement  Poor R wave progression  T wave abnormality, consider lateral ischemia  Abnormal ECG  When compared with ECG of 04-AUG-2023 13:58,  Premature ventricular complexes are now Present     EKG 8/4 1358    Normal sinus rhythm  Poor R wave progression  T wave abnormality, consider lateral ischemia    EKG 8/4 1139  Normal sinus rhythm  Poor R wave progression  ST & T wave abnormality, consider lateral ischemia  Abnormal ECG        Results from last 7 days   Lab Units 08/05/23  0441 08/04/23  1143 08/03/23  1529   WBC Thousand/uL 7.90 5.98 7.50   HEMOGLOBIN g/dL 17.6* 17.6* 18.4*   HEMATOCRIT % 51.7* 51.1* 53.5*   PLATELETS Thousands/uL 150 138* 144*   NEUTROS ABS Thousands/µL 6.48 4.56 5.76         Results from last 7 days   Lab Units 08/05/23  0441 08/04/23  1143 08/03/23  1603   SODIUM mmol/L 132* 134* 133*   POTASSIUM mmol/L 4.2 4.4 3.9   CHLORIDE mmol/L 99 96 98   CO2 mmol/L 24 30 28   ANION GAP mmol/L 9 8 7   BUN mg/dL 7 6 7   CREATININE mg/dL 0.64 0.73 0.71   EGFR ml/min/1.73sq m 107 102 103   CALCIUM mg/dL 9.2 9.7 9.3   MAGNESIUM mg/dL  --  2.1  --      Results from last 7 days   Lab Units 08/05/23  0441   AST U/L 18   ALT U/L 13   ALK PHOS U/L 57   TOTAL PROTEIN g/dL 6.9   ALBUMIN g/dL 3.8   TOTAL BILIRUBIN mg/dL 1.22*         Results from last 7 days   Lab Units 08/05/23  0441 08/04/23  1143 08/03/23  1603   GLUCOSE RANDOM mg/dL 91 97 94             Results from last 7 days   Lab Units 08/04/23  1600 08/04/23  1338 08/04/23  1143 08/03/23  1759 08/03/23  1603   HS TNI 0HR ng/L  --   --  16  --  18   HS TNI 2HR ng/L  --  16  --  18  --    HSTNI D2 ng/L  --  0  --  0  --    HS TNI 4HR ng/L 17  --   --   --   --    HSTNI D4 ng/L 1  --   --   --   --              Results from last 7 days   Lab Units 08/04/23  1143   TSH 3RD GENERATON uIU/mL 0.831 Results from last 7 days   Lab Units 08/04/23  1239 08/03/23  1529   CLARITY UA  Clear Clear   COLOR UA  Straw Straw   SPEC GRAV UA  1.020 1.010   PH UA  8.0 6.0   GLUCOSE UA mg/dl Negative Negative   KETONES UA mg/dl Negative Negative   BLOOD UA  Negative Negative   PROTEIN UA mg/dl Negative Negative   NITRITE UA  Negative Negative   BILIRUBIN UA  Negative Negative   UROBILINOGEN UA mg/dL Negative Negative   LEUKOCYTES UA  Negative Negative               ED Treatment:   Medication Administration from 08/04/2023 1041 to 08/04/2023 1440       Date/Time Order Dose Route Action     08/04/2023 1216 EDT hydrALAZINE (APRESOLINE) injection 5 mg 5 mg Intravenous Given        Past Medical History:   Diagnosis Date   • Deaf    • Heart murmur      Present on Admission:  • Hypertensive emergency  • Congenital deafness  • Type 2 diabetes mellitus without complication, without long-term current use of insulin (HCC)  • Other hyperlipidemia      Admitting Diagnosis: Hypertensive urgency [I16.0]  Encounter for follow-up for hypertension [I10]  Age/Sex: 62 y.o. male  Admission Orders:  Scheduled Medications:  amLODIPine, 10 mg, Oral, Daily  atorvastatin, 40 mg, Oral, Daily  enoxaparin, 40 mg, Subcutaneous, Daily  losartan, 100 mg, Oral, Daily   And  hydrochlorothiazide, 25 mg, Oral, Daily  metoprolol tartrate, 25 mg, Oral, Q12H [facility]      Continuous IV Infusions:     PRN Meds:      Telemetry   Reg diet, 2 gram NA   Neuro  checks q 6 hrs    SCD's   VS routine     IP CONSULT TO CASE MANAGEMENT    Network Utilization Review Department  ATTENTION: Please call with any questions or concerns to [phone] and carefully listen to the prompts so that you are directed to the right person. All voicemails are confidential.  [facility] all requests for admission clinical reviews, approved or denied determinations and any other requests to dedicated fax number below belonging to the campus where the patient is receiving treatment.  List of dedicated fax numbers for the Facilities:  [place] DENIALS (Administrative/Medical Necessity) [phone] [address] (Maternity/NICU/Pediatrics) [phone]   [address] [phone]   [facility] [address] [facility] [phone] [address] [place] [address] [phone]   [address] [phone]

## 2023-08-05 NOTE — DISCHARGE INSTRUCTIONS
Hypertension   WHAT YOU NEED TO KNOW:   Hypertension is high blood pressure. Your blood pressure is the force of your blood moving against the walls of your arteries. Hypertension causes your blood pressure to get so high that your heart has to work much harder than normal. This can damage your heart. Hypertension that does not respond to medicines and lifestyle changes is called resistant hypertension. Hypertension is considered chronic when it continues for 3 months or longer. DISCHARGE INSTRUCTIONS:   Call your local emergency number (911 in the [address]) or have someone call if:   You have chest pain. You have any of the following signs of a heart attack:      Squeezing, pressure, or pain in your chest    You may  also have any of the following:     Discomfort or pain in your back, neck, jaw, stomach, or arm    Shortness of breath    Nausea or vomiting    Lightheadedness or a sudden cold sweat    You become confused or have trouble speaking. You suddenly feel lightheaded or have trouble breathing. Return to the emergency department if:   You have a severe headache or vision loss. You have weakness in an arm or leg. Call your doctor or cardiologist if:   You feel faint, dizzy, confused, or drowsy. You have been taking your blood pressure medicine but your pressure is higher than your provider says it should be. You have questions or concerns about your condition or care. Medicines: You may  need any of the following:  Antihypertensives  may be used to help lower your blood pressure. Several kinds of medicines are available. Your healthcare provider will choose medicines based on the kind of hypertension you have. You may need more than one type of medicine. Take the medicine exactly as directed. Diuretics  help decrease extra fluid that collects in your body. This will help lower your blood pressure. You may urinate more often while you take this medicine.     Cholesterol medicine  helps lower your cholesterol level. A low cholesterol level helps prevent heart disease and makes it easier to control your blood pressure. Take your medicine as directed. Contact your healthcare provider if you think your medicine is not helping or if you have side effects. Tell your provider if you are allergic to any medicine. Keep a list of the medicines, vitamins, and herbs you take. Include the amounts, and when and why you take them. Bring the list or the pill bottles to follow-up visits. Carry your medicine list with you in case of an emergency. Follow up with your doctor or cardiologist as directed: You will need to return to have your blood pressure checked and to have other lab tests done. Write down your questions so you remember to ask them during your visits. Stages of hypertension:  Your healthcare provider will give you a blood pressure goal based on your age, health, and risk for cardiovascular disease. The following are general guidelines on the stages of hypertension:  Normal blood pressure is 119/79 or lower . Your healthcare provider may only check your blood pressure each year if it stays at a normal level. Elevated blood pressure is 120/79 to 129/79 . This is sometimes called prehypertension. Your healthcare provider may suggest lifestyle changes to help lower your blood pressure to a normal level. He or she may then check it again in 3 to 6 months. Stage 1 hypertension is 130/80  to 139/89 . Your provider may recommend lifestyle changes, medication, and checks every 3 to 6 months until your blood pressure is controlled. Stage 2 hypertension is 140/90 or higher . Your provider will recommend lifestyle changes and have you take 2 kinds of hypertension medicines. You will also need to have your blood pressure checked monthly until it is controlled. Manage hypertension:   Check your blood pressure at home.   Do not smoke, have caffeine, or exercise for at least 30 minutes before you check your blood pressure. Sit and rest for 5 minutes before you check your blood pressure. Extend your arm and support it on a flat surface. Your arm should be at the same level as your heart. Follow the directions that came with your blood pressure monitor. Check your blood pressure 2 times, 1 minute apart, before you take your medicine in the morning. Also check your blood pressure before your evening meal. Keep a record of your readings and bring it to your follow-up visits. Ask your healthcare provider what your blood pressure should be. Manage any other health conditions you have. Health conditions such as diabetes can increase your risk for hypertension. Follow your healthcare provider's instructions and take all your medicines as directed. Ask about all medicines. Certain medicines can increase your blood pressure. Examples include oral birth control pills, decongestants, herbal supplements, and NSAIDs, such as ibuprofen. Your healthcare provider can tell you which medicines are safe for you to take. This includes prescription and over-the-counter medicines. Lifestyle changes you can make to manage hypertension:  Your healthcare provider may recommend you work with a team to manage hypertension. The team may include medical experts such as a dietitian, an exercise or physical therapist, and a behavior therapist. Your family members may be included in helping you create lifestyle changes. Limit sodium (salt) as directed. Too much sodium can affect your fluid balance. Check labels to find low-sodium or no-salt-added foods. Some low-sodium foods use potassium salts for flavor. Too much potassium can also cause health problems. Your healthcare provider will tell you how much sodium and potassium are safe for you to have in a day. He or she may recommend that you limit sodium to 2,300 mg a day. Follow the meal plan recommended by your healthcare provider.   A dietitian or your provider can give you more information on low-sodium plans or the DASH (Dietary Approaches to Stop Hypertension) eating plan. The DASH plan is low in sodium, processed sugar, unhealthy fats, and total fat. It is high in potassium, calcium, and fiber. These can be found in vegetables, fruit, and whole-grain foods. Be physically active throughout the day. Physical activity, such as exercise, can help control your blood pressure and your weight. Be physically active for at least 30 minutes per day, on most days of the week. Include aerobic activity, such as walking or riding a bicycle. Also include strength training at least 2 times each week. Your healthcare providers can help you create a physical activity plan. Decrease stress. This may help lower your blood pressure. Learn ways to relax, such as deep breathing or listening to music. Limit alcohol as directed. Alcohol can increase your blood pressure. A drink of alcohol is 12 ounces of beer, 5 ounces of wine, or 1½ ounces of liquor. Do not smoke. Nicotine and other chemicals in cigarettes and cigars can increase your blood pressure and also cause lung damage. Ask your healthcare provider for information if you currently smoke and need help to quit. E-cigarettes or smokeless tobacco still contain nicotine. Talk to your healthcare provider before you use these products. © Copyright [employer] 2022 Information is for End User's use only and may not be sold, redistributed or otherwise used for commercial purposes. The above information is an  only. It is not intended as medical advice for individual conditions or treatments. Talk to your doctor, nurse or pharmacist before following any medical regimen to see if it is safe and effective for you.

## 2023-08-05 NOTE — PLAN OF CARE
Problem: PAIN - ADULT  Goal: Verbalizes/displays adequate comfort level or baseline comfort level  Description: Interventions:  - Encourage patient to monitor pain and request assistance  - Assess pain using appropriate pain scale  - Administer analgesics based on type and severity of pain and evaluate response  - Implement non-pharmacological measures as appropriate and evaluate response  - Consider cultural and social influences on pain and pain management  - Notify physician/advanced practitioner if interventions unsuccessful or patient reports new pain  Outcome: Progressing     Problem: SAFETY ADULT  Goal: Patient will remain free of falls  Description: INTERVENTIONS:  - Educate patient/family on patient safety including physical limitations  - Instruct patient to call for assistance with activity    - Keep Call bell within reach  - Keep bed low and locked with side rails adjusted as appropriate  - Keep care items and personal belongings within reach  - Initiate and maintain comfort rounds  - Consider moving patient to room near nurses station  Outcome: Progressing     Problem: Knowledge Deficit  Goal: Patient/family/caregiver demonstrates understanding of disease process, treatment plan, medications, and discharge instructions  Description: Complete learning assessment and assess knowledge base.   Interventions:  - Provide teaching at level of understanding  - Provide teaching via preferred learning methods  Outcome: Progressing

## 2023-08-05 NOTE — ASSESSMENT & PLAN NOTE
BP Readings from Last 3 Encounters:   08/05/23 123/65   08/04/23 170/80   08/03/23 160/85     Patient's hypertension improved upon admission    Plan:  Started on amlodipine 10 mg daily and continue upon discharge, in addition to continuing home   Losartan-hydrochlorothiazide 100-25 mg and metoprolol-tartrate 25 mg

## 2023-08-05 NOTE — DISCHARGE SUMMARY
Discharge Summary - [address] [place] [address]    Patient Information: Manual Lobe 62 y.o. male MRN: [id number]  Unit/Bed#: 7T [facility] 706-01 Encounter: [id number]    Discharging Physician / Practitioner: Dr. Jessica Vazquez  PCP: Cesar Mehta MD  Admission Date:   Admission Orders (From admission, onward)     Ordered        23 1347  [address]  Once                      Discharge Date: 2023    Reason for Admission: Hypertensive emergency    Discharge Diagnoses:     Principal Problem (Resolved): Hypertensive emergency  Active Problems:    Primary hypertension    Other hyperlipidemia        Congenital deafness  Assessment & Plan  Congenital deadness and mute. Uses Amended sign language.  was used for encounter, ID # [id number]. Type 2 diabetes mellitus without complication, without long-term current use of insulin [facility])  Assessment & Plan  Lab Results   Component Value Date    HGBA1C 5.6 2023     FS  No current medications. Plan:  - Accu checkACHS and ISS if hyperglycemic    Other hyperlipidemia  Assessment & Plan  ASCVD risk 32.1%      Plan:  - Continue atorvastatin 40 mg daily     Primary hypertension  Assessment & Plan  BP Readings from Last 3 Encounters:   23 123/65   23 170/80   23 160/85     Patient's hypertension improved upon admission    Plan:  Started on amlodipine 10 mg daily and continue upon discharge, in addition to continuing home   Losartan-hydrochlorothiazide 100-25 mg and metoprolol-tartrate 25 mg    * Hypertensive emergency-resolved as of 2023  Assessment & Plan  Presents w/ one day of B/L throbbing headache and eye pain and B/L blurry vision, worse on the left. Discharged from ED yesterday and sxs worsended. Can feel blood pressure rising. Denies chest pain, tightness, SOB. No alcohol, drugs, smoking. BP: 179/104 (initial), > 171/83 > 154/82  Hydralazine 5 mg given in ED.   Home medications: Losartan hydrochlorothiazide 100-25 mg daily, Metoprolol tartrate 25 mg every 12 hours; compliant    Last saw Cardio on 11/30/21; EF - 56%    Plan:  - S/p 5 mg hydralazine   - Restarted on Amlodipine 10 mg   - Continue other blood pressure lowering agents  - Troponins trended without concerns  - No end-organ damage  - DVT PPx  - Neuro checks q6  - 2g salt restricted diet  - Monitor BP         Consultations During Hospital Stay:  · None    Procedures Performed:     · None    Significant Findings / Test Results:     · None    Incidental Findings:   · None    Test Results Pending at Discharge (will require follow up): · None     Outpatient Tests Requested:  · None    Outpatient follow-up Requested:  • Follow up with PCP in 1-2 weeks     Complications:  None    Hospital Course:     Lesly Krabbe is a 62 y.o. male patient who presented to the ED 8/04/23 for hypertensive emergency, transferred from her PCP's office due to bilateral throbbing headaches, bilateral eye pain and blurry vision (left>right) in the setting of elevated blood pressure (170/80). Medical history is significant for hypertension on 3 different medication classes, hyperlipidemia and type 2 diabetes. Of note, patient was seen in the ED one day prior foe elevated blood pressure without symptoms and recommended to follow up with PCP. During this ED course, patient noted to have blood pressure of 197/104 with persistence of symtoms mentioned as above. He was given one dose of IV Hydralazine 5 mg. Cardiac work up negative and EKG remarkable for sinus rhythm with occasional PVCs. Patient was admitted for observation for monitoring of hypertension and symptoms. His home medications, such as Losartan 100mg, HCTZ 25mg and Metroprolol tartrate 25mg, were continued, and Amlodipine 10 mg was added, with a good response. On day of discharge, patient's blood pressure returned to normal limits and symptoms resolved.  He was discharged home on old and new medication, and recommended to follow up with PCP. Condition at Discharge: good     Discharge Day Visit / Exam:     See daily progress note for corresponding physical exam on day of discharge     Discussion with Family: N/A    Discharge instructions/Information to patient and family:   See after visit summary for information provided to patient and family. Discharge Medications:  Amlodipine 10 mg daily  Losartan-Hydrochlorothiazide 100-25mg daily  Metoprolol-Tartrate 25mg q12h  Atorvastatin 40 mg daily    Provisions for Follow-Up Care:  See after visit summary for information related to follow-up care and any pertinent home health orders. Physical Exam  Constitutional:       Appearance: Normal appearance. HENT:      Head: Normocephalic. Eyes:      Conjunctiva/sclera: Conjunctivae normal.   Cardiovascular:      Rate and Rhythm: Normal rate and regular rhythm. Heart sounds: No murmur heard. No gallop. Pulmonary:      Effort: Pulmonary effort is normal. No respiratory distress. Breath sounds: Normal breath sounds. Abdominal:      Palpations: Abdomen is soft. Musculoskeletal:         General: No swelling. Cervical back: Normal range of motion. Skin:     General: Skin is warm. Neurological:      General: No focal deficit present. Mental Status: He is alert. Psychiatric:         Mood and Affect: Mood normal.           Disposition:     Home    For Discharges to [facility] SNF:   · Not Applicable to this Patient - Not Applicable to this Patient    Planned Readmission: No     Discharge Statement:  I spent 30 minutes discharging the patient. This time was spent on the day of discharge. I had direct contact with the patient on the day of discharge. Greater than 50% of the total time was spent examining patient, answering all patient questions, arranging and discussing plan of care with patient as well as directly providing post-discharge.     ** Please Note: This note has been constructed using a voice recognition system **    Yash Calle MD  08/05/23  2:33 PM

## 2023-08-05 NOTE — CASE MANAGEMENT
Case Management Discharge Planning Note    Patient name Filippo Bojorquez  Location 7T U 706/7T [facility] 706-01 MRN [id number]  : 1965 Date 2023       Current Admission Date: 2023  Current Admission Diagnosis:Other hyperlipidemia  Patient Active Problem List    Diagnosis Date Noted   • Need for transfer to another facility 2023   • Redness of eye, left 2023   • Hyponatremia 2023   • Congenital deafness    • Other hyperlipidemia 2021   • Type 2 diabetes mellitus without complication, without long-term current use of insulin ([address]) 2021   • Thrombocytopenia ([address]) 2021   • Primary hypertension    • Diastolic dysfunction without heart failure 2021      LOS (days): 1  Geometric Mean LOS (GMLOS) (days):   Days to GMLOS:     OBJECTIVE:  Risk of Unplanned Readmission Score: 10.28         Current admission status: Inpatient   Preferred Pharmacy:   [address] [place] [address]  Phone: [phone] Fax: [phone]    Primary Care Provider: Diamond Morrell MD    Primary Insurance: Kolby Ly MEDICARE [facility] RE[facility]  Secondary Insurance: [facility]    DISCHARGE DETAILS:      Additional Comments: Patient discharging with medications sent to [facility]. Pt unable to  Amlodipine due to an issue with his Medicare Part D. As per pharmacist, MA will not  the cost. SHRUTHI approved indigent at $8.90 billed to [facility] cost center. SHRUTHI updated RN of same. Indigent form faxed to [facility] [phone].

## 2023-08-07 DIAGNOSIS — Z71.89 COMPLEX CARE COORDINATION: Primary | ICD-10-CM

## 2023-08-07 NOTE — UTILIZATION REVIEW
NOTIFICATION OF INPATIENT ADMISSION   AUTHORIZATION REQUEST   SERVICING FACILITY:   [address] [place] [address]  Tax ID: [id number]  NPI: [id number] ATTENDING PROVIDER:  Attending Name and NPI#: Tanner Diaz Md [[id number]]  Address: [address] [place] [address]  Phone: [phone]     ADMISSION INFORMATION:  Place of Service: Inpatient [address] [place] [address] Code: 21  Inpatient Admission Date/Time: 8/4/23  1:47 PM  Discharge Date/Time: 8/5/2023 11:21 AM  Admitting Diagnosis Code/Description:  Hypertensive urgency [I16.0]  Encounter for follow-up for hypertension [I10]     UTILIZATION REVIEW CONTACT:  Rasheed Robbins Utilization   Network Utilization Review Department  Phone: [phone]  Fax [phone]  Email: [email or web address]  Contact for approvals/pending authorizations, clinical reviews, and discharge. PHYSICIAN ADVISORY SERVICES:  Medical Necessity Denial & Hwtn-ql-Cfzx Review  Phone: [phone]  Fax: [phone]  Email: [email or web address] Jean-Paul Pires RN  Registered Nurse  Specialty:  Perioperative  Utilization Review      Addendum  Date of Service:  8/5/2023  7:27 AM     Expand All Collapse All    Initial Clinical Review     Admission: Date/Time/Statement:       Admission Orders (From admission, onward)       Ordered         08/04/23 1347   INPATIENT ADMISSION  Once                               Orders Placed This Encounter   Procedures   • INPATIENT ADMISSION       Standing Status:   Standing       Number of Occurrences:   1       Order Specific Question:   Level of Care       Answer:   Med Surg [16]       Order Specific Question:   Estimated length of stay       Answer:   More than 2 Midnights       Order Specific Question:   Certification       Answer:   I certify that inpatient services are medically necessary for this patient for a duration of greater than two midnights. See H&P and MD Progress Notes for additional information about the patient's course of treatment.               ED Arrival Information               Expected   -    Arrival   8/4/2023 10:41    Acuity   Urgent              Means of arrival   Walk-In    Escorted by   [facility]    Admission type   Emergency              Arrival complaint   high blood pressure/headache                  Chief Complaint   Patient presents with   • Follow-Up Hypertension       Reports that he is still having high blood pressure and has same symptoms as yesterday when seen here. Reports he has been taking his meds.          Initial Presentation: 62 y.o. male presents to ED from clinic  due to elevated /80 and compliants of bilat throbbing headache, eye pain, blurry vision, worse on left. Pt was seen in ED 8/3 for HTN with similar symptoms. CT head was negative. Symptoms now worsening. BP is 179/104 Given hydralazine in ED, /83, 154/82. Patient compliant with home meds Losartan HCTZ 100/25 daily and metoprolol 25 mg q12 h. PMH: T2DM, elevated lipids, Congenital deafness and mute, ASL  used  Exam notes visual field deficit alert  EKG shows SR, occ PVC  Admitted asinpatient to med surg for hypertensive emergency Plan Hydralazine 5 mg now, restart amlodipine tonight, consider increasing metoprolo to 50 mg bid tomorrow, trend troponin, monitor renal function, DVT PPx, neuro checks q6h, 2gram restricted salt diet, monitor BP, check CBC, CMP in am. Start IV metoprolol if Bp remains elevated, monitor accu check glucose, add SSI coverage, Start atorvastatin 40mg daily       Date: 8/5    Day 2:     Exam alert, no focal neuro deficit. BP returned to normal limits.  Symptoms HA, eye pain, blurry vision resolved                   ED Triage Vitals   Temperature Pulse Respirations Blood Pressure SpO2   08/04/23 1053 08/04/23 1053 08/04/23 1053 08/04/23 1053 08/04/23 1053 97.9 °F (36.6 °C) 82 18 (!) 197/104 94 %       Temp Source Heart Rate Source Patient Position - Orthostatic VS BP Location FiO2 (%)   08/04/23 1053 08/04/23 1053 08/04/23 1053 08/04/23 1053 --   Tympanic Monitor Sitting Left arm         Pain Score           08/04/23 1528           No Pain                  Wt Readings from Last 1 Encounters:   08/04/23 65 kg (143 lb 4.8 oz)      Additional Vital Signs:      Date/Time Temp Pulse Resp BP MAP (mmHg) SpO2 O2 Device Patient Position - Orthostatic VS   08/05/23 1057 -- 64 -- 123/65 75 -- -- Sitting   08/05/23 0743 97.5 °F (36.4 °C) 66 20 142/75 93 95 % None (Room air) Lying   08/04/23 2219 97.6 °F (36.4 °C) 65 18 156/83 -- 95 % None (Room air) Lying   08/04/23 2004 -- 82 -- 184/94 Abnormal  136 -- -- Lying   08/04/23 1708 -- 90 -- 182/86 Abnormal  -- -- -- Sitting   08/04/23 1630 -- -- 18 183/92 Abnormal  -- 97 % None (Room air) Lying   08/04/23 1603 -- 74 -- 206/106 Abnormal  -- 97 % None (Room air) Lying   08/04/23 1500 97.8 °F (36.6 °C) 70 18 189/85 Abnormal  96 97 % None (Room air) Sitting   08/04/23 1300 -- 70 18 154/82 -- 94 % None (Room air) Lying   08/04/23 1230 -- 76 20 176/97 Abnormal  -- 96 % None (Room air) Lying   08/04/23 1216 -- -- -- 171/83 Abnormal  -- -- -- --   08/04/23 1200 -- 64 18 160/91 -- 95 % None (Room air) Lying   08/04/23 1141 -- 68 18 205/110 Abnormal  -- 98 % None (Room air) Lying         Pertinent Labs/Diagnostic Test Results:      EKG 8/4 1553    Sinus rhythm with occasional Premature ventricular complexes  Possible Left atrial enlargement  Poor R wave progression  T wave abnormality, consider lateral ischemia  Abnormal ECG  When compared with ECG of 04-AUG-2023 13:58,  Premature ventricular complexes are now Present      EKG 8/4 1358    Normal sinus rhythm  Poor R wave progression  T wave abnormality, consider lateral ischemia     EKG 8/4 1139  Normal sinus rhythm  Poor R wave progression  ST & T wave abnormality, consider lateral ischemia  Abnormal ECG                Results from last 7 days   Lab Units 08/05/23  0441 08/04/23  1143 08/03/23  1529   WBC Thousand/uL 7.90 5.98 7.50   HEMOGLOBIN g/dL 17.6* 17.6* 18.4*   HEMATOCRIT % 51.7* 51.1* 53.5*   PLATELETS Thousands/uL 150 138* 144*   NEUTROS ABS Thousands/µL 6.48 4.56 5.76                 Results from last 7 days   Lab Units 08/05/23  0441 08/04/23  1143 08/03/23  1603   SODIUM mmol/L 132* 134* 133*   POTASSIUM mmol/L 4.2 4.4 3.9   CHLORIDE mmol/L 99 96 98   CO2 mmol/L 24 30 28   ANION GAP mmol/L 9 8 7   BUN mg/dL 7 6 7   CREATININE mg/dL 0.64 0.73 0.71   EGFR ml/min/1.73sq m 107 102 103   CALCIUM mg/dL 9.2 9.7 9.3   MAGNESIUM mg/dL  --  2.1  --            Results from last 7 days   Lab Units 08/05/23  0441   AST U/L 18   ALT U/L 13   ALK PHOS U/L 57   TOTAL PROTEIN g/dL 6.9   ALBUMIN g/dL 3.8   TOTAL BILIRUBIN mg/dL 1.22*                 Results from last 7 days   Lab Units 08/05/23  0441 08/04/23  1143 08/03/23  1603   GLUCOSE RANDOM mg/dL 91 97 94              Results from last 7 days   Lab Units 08/04/23  1600 08/04/23  1338 08/04/23  1143 08/03/23  1759 08/03/23  1603   HS TNI 0HR ng/L  --   --  16  --  18   HS TNI 2HR ng/L  --  16  --  18  --    HSTNI D2 ng/L  --  0  --  0  --    HS TNI 4HR ng/L 17  --   --   --   --    HSTNI D4 ng/L 1  --   --   --   --                    Results from last 7 days   Lab Units 08/04/23  1143   TSH 3RD GENERATON uIU/mL 0.831                Results from last 7 days   Lab Units 08/04/23  1239 08/03/23  1529   CLARITY UA   Clear Clear   COLOR UA   Straw Straw   SPEC GRAV UA   1.020 1.010   PH UA   8.0 6.0   GLUCOSE UA mg/dl Negative Negative   KETONES UA mg/dl Negative Negative   BLOOD UA   Negative Negative   PROTEIN UA mg/dl Negative Negative   NITRITE UA   Negative Negative   BILIRUBIN UA   Negative Negative   UROBILINOGEN UA mg/dL Negative Negative   LEUKOCYTES UA   Negative Negative                 ED Treatment:             Medication Administration from 08/04/2023 1041 to 08/04/2023 1440        Date/Time Order Dose Route Action       08/04/2023 1216 EDT hydrALAZINE (APRESOLINE) injection 5 mg 5 mg Intravenous Given          Medical History        Past Medical History:   Diagnosis Date   • Deaf     • Heart murmur           Present on Admission:  • Hypertensive emergency  • Congenital deafness  • Type 2 diabetes mellitus without complication, without long-term current use of insulin (HCC)  • Other hyperlipidemia        Admitting Diagnosis: Hypertensive urgency [I16.0]  Encounter for follow-up for hypertension [I10]  Age/Sex: 62 y.o. male  Admission Orders:  Scheduled Medications:  amLODIPine, 10 mg, Oral, Daily  atorvastatin, 40 mg, Oral, Daily  enoxaparin, 40 mg, Subcutaneous, Daily  losartan, 100 mg, Oral, Daily   And  hydrochlorothiazide, 25 mg, Oral, Daily  metoprolol tartrate, 25 mg, Oral, Q12H [address]        Continuous IV Infusions:  PRN Meds:   Telemetry   Reg diet, 2 gram NA   Neuro  checks q 6 hrs    SCD's   VS routine      IP CONSULT TO CASE MANAGEMENT     Network Utilization Review Department  ATTENTION: Please call with any questions or concerns to [phone] and carefully listen to the prompts so that you are directed to the right person. All voicemails are confidential.  Jam Benoit all requests for admission clinical reviews, approved or denied determinations and any other requests to dedicated fax number below belonging to the campus where the patient is receiving treatment.  List of dedicated fax numbers for the Facilities:  [place] DENIALS (Administrative/Medical Necessity) [phone]   [facility] (Maternity/NICU/Pediatrics) [phone]   [address] [facility] [address] [place] [address] [phone] [address] [phone]   [address]  [facility] [phone]                    Revision History

## 2023-08-10 ENCOUNTER — PATIENT OUTREACH (OUTPATIENT)
Dept: FAMILY MEDICINE CLINIC | Facility: CLINIC | Age: 58
End: 2023-08-10

## 2023-08-10 NOTE — PROGRESS NOTES
HRR.    I called Naveed Nielson but received voicemail. Message was left asking for a return call. I will continue further outreach. Chart reviewed.

## 2023-08-15 ENCOUNTER — PATIENT OUTREACH (OUTPATIENT)
Dept: FAMILY MEDICINE CLINIC | Facility: CLINIC | Age: 58
End: 2023-08-15

## 2023-08-15 NOTE — LETTER
Date: 08/15/23    Dear Adam Richard,   My name is Homosassadmitry Fieldley; I am a registered nurse care manager working with [employer] [facility].   I have not been able to reach you and would like to set a time that I can talk with you over the phone. My work is to help patients that have complex medical conditions get the care they need. This includes patients who may have been in the hospital or emergency room. Please call me with any questions you may have.     Sincerely,  Homosassa Liam  [phone]  Outpatient Care Manager

## 2023-08-15 NOTE — PROGRESS NOTES
I called Cecily Gibson but received voicemail. Message was left asking for a return call. I am mailing the 'unable to reach' letter and await response.

## 2023-08-25 ENCOUNTER — OFFICE VISIT (OUTPATIENT)
Dept: FAMILY MEDICINE CLINIC | Facility: CLINIC | Age: 58
End: 2023-08-25

## 2023-08-25 ENCOUNTER — PATIENT OUTREACH (OUTPATIENT)
Dept: FAMILY MEDICINE CLINIC | Facility: CLINIC | Age: 58
End: 2023-08-25

## 2023-08-25 VITALS
BODY MASS INDEX: 25.58 KG/M2 | HEIGHT: 62 IN | HEART RATE: 95 BPM | WEIGHT: 139 LBS | DIASTOLIC BLOOD PRESSURE: 100 MMHG | RESPIRATION RATE: 18 BRPM | TEMPERATURE: 97.5 F | SYSTOLIC BLOOD PRESSURE: 188 MMHG

## 2023-08-25 DIAGNOSIS — R42 DIZZINESS: ICD-10-CM

## 2023-08-25 DIAGNOSIS — I10 PRIMARY HYPERTENSION: Primary | ICD-10-CM

## 2023-08-25 DIAGNOSIS — Z59.41 FOOD INSECURITY: ICD-10-CM

## 2023-08-25 DIAGNOSIS — Z78.9 NEEDS ASSISTANCE WITH COMMUNITY RESOURCES: Primary | ICD-10-CM

## 2023-08-25 PROCEDURE — 99214 OFFICE O/P EST MOD 30 MIN: CPT

## 2023-08-25 PROCEDURE — 3077F SYST BP >= 140 MM HG: CPT

## 2023-08-25 PROCEDURE — 3080F DIAST BP >= 90 MM HG: CPT

## 2023-08-25 RX ORDER — HYDROCHLOROTHIAZIDE 25 MG/1
25 TABLET ORAL DAILY
Qty: 30 TABLET | Refills: 1 | Status: SHIPPED | OUTPATIENT
Start: 2023-08-25

## 2023-08-25 RX ORDER — LOSARTAN POTASSIUM 100 MG/1
100 TABLET ORAL DAILY
Qty: 30 TABLET | Refills: 1 | Status: SHIPPED | OUTPATIENT
Start: 2023-08-25

## 2023-08-25 RX ORDER — METOPROLOL SUCCINATE 25 MG/1
25 TABLET, EXTENDED RELEASE ORAL DAILY
Qty: 30 TABLET | Refills: 1 | Status: SHIPPED | OUTPATIENT
Start: 2023-08-25

## 2023-08-25 SDOH — ECONOMIC STABILITY - FOOD INSECURITY: FOOD INSECURITY: Z59.41

## 2023-08-25 NOTE — PROGRESS NOTES
MIAH HAWKINS did receive a consult from provider Saundra in person. Provider explained that Pt came into the office same day visit, he is deaf and is dealing with some difficult situations. MIAH HAWKINS met with Pt at provider's room. Provider assisted with connecting Pt with a translate using [employer]. MIAH HAWKISN did wait about 15 minutes to receive the support since they did not have staff available at this time. MIAH HAWKINS completed the assessment using [place], (sign language). MIAH HAWKINS asked if Pt needs assistance with transportation, food, housing, also, asked if Pt needs HHA. Pt stated that he does not need transportation, he is stable with housing, he receives $ .00 monthly, Pt showed his access card he stated that something is wrong with his food stamps. Also, Pt stated that he can not see well and would like to have glasses.

## 2023-08-25 NOTE — PROGRESS NOTES
Name: Lilia Genao      : 1965      MRN: [id number]  Encounter Provider: PRISCILLA Griffin  Encounter Date: 2023   Encounter department: [facility] [facility]     1. Primary hypertension  Assessment & Plan:  Hypertensive urgency, denies any current symptoms. Pt stopped taking amlodipine due to perceived side effects and never restarted other home antihypertensives after hospital discharge, states he does not have them. As they were all filled for 90 day supply in July, I changed the orders slightly in hopes that pt's insurance will cover them, as pt has no money to buy them out of pocket. - Stop amlodipine as pt refuses to take. - Start Toprol XL 25 mg daily, losartan, 100 mg daily, and HCTZ 25 mg daily.  - Return in 1 week for BP recheck, go to ED if any symptoms. Pt indicated understanding of plan. - Social work notified of pt's inability to pay for any co-pays, if they should exist.     Orders:  -     metoprolol succinate (Toprol XL) 25 mg 24 hr tablet; Take 1 tablet (25 mg total) by mouth daily  -     losartan (COZAAR) 100 MG tablet; Take 1 tablet (100 mg total) by mouth daily  -     hydrochlorothiazide (HYDRODIURIL) 25 mg tablet; Take 1 tablet (25 mg total) by mouth daily    2. Dizziness    3. Food insecurity  Assessment & Plan:  - Referred pt to Social Work and asked that connection be made before pt leaves office today due to pt requiring sign language interpretation and pt has not had food in two days and no money for co-pays. Pt did meet with  in office. Orders:  -     Ambulatory Referral to Social Work Care Management Program; Future         Subjective     HPI     Sign language interpretation services were utilized for this visit. Jaunita Habermann presents to the office for a SAME DAY visit for c/o dizziness. Dizziness occurs intermittently, often upon getting up in the morning.  There is no chest pain, palpitations, SOB, or other symptoms. Pt believes it may be because he has no food, or due to amlodipine, and he points out several times how the medication bottle states that dizziness is a possible side effect. Pt has therefore stopped taking the amlodipine and states that is the only medication he received after discharge from the hospital. Pt was recently admitted for hypertensive emergency from 8/4 to 8/5/23 and was discharged on 3 antihypertensives but never started/restarted the metoprolol or Hyzaar. Understandably, pt remains focused on lack of food throughout the visit. Review of Systems   Constitutional: Negative for activity change, fatigue and unexpected weight change. Eyes: Negative for pain and visual disturbance. Respiratory: Negative for cough, chest tightness, shortness of breath and wheezing. Cardiovascular: Negative for chest pain, palpitations and leg swelling. Neurological: Positive for light-headedness (intermittent). Negative for dizziness, syncope, weakness, numbness and headaches. All other systems reviewed and are negative. Past Medical History:   Diagnosis Date   • Deaf    • Heart murmur      History reviewed. No pertinent surgical history.   Family History   Problem Relation Age of Onset   • Hypertension Mother    • Diabetes Mother    • Heart disease Mother         pacemaker   • Arthritis Mother         chronic     Social History     Socioeconomic History   • Marital status: Single     Spouse name: None   • Number of children: None   • Years of education: None   • Highest education level: None   Occupational History   • None   Tobacco Use   • Smoking status: Never     Passive exposure: Never   • Smokeless tobacco: Never   Vaping Use   • Vaping Use: Never used   Substance and Sexual Activity   • Alcohol use: Not Currently     Comment: 'as per mother pt drinks everyday"    • Drug use: Never   • Sexual activity: None   Other Topics Concern   • None   Social History Narrative   • None     Social Determinants of Health     Financial Resource Strain: Low Risk  (8/25/2023)    Overall Financial Resource Strain (CARDIA)    • Difficulty of Paying Living Expenses: Not very hard   Food Insecurity: No Food Insecurity (8/25/2023)    Hunger Vital Sign    • Worried About Running Out of Food in the Last Year: Never true    • Ran Out of Food in the Last Year: Never true   Transportation Needs: No Transportation Needs (8/25/2023)    PRAPARE - Transportation    • Lack of Transportation (Medical): No    • Lack of Transportation (Non-Medical): No   Physical Activity: Not on file   Stress: Not on file   Social Connections: Not on file   Intimate Partner Violence: Not on file   Housing Stability: Not on file     Current Outpatient Medications on File Prior to Visit   Medication Sig   • atorvastatin (LIPITOR) 40 mg tablet Take 1 tablet (40 mg total) by mouth daily   • Blood Pressure KIT Use 2 (two) times a day   • [DISCONTINUED] carvedilol (COREG) 3.125 mg tablet Take 1 tablet (3.125 mg total) by mouth 2 (two) times a day with meals   • [DISCONTINUED] chlorthalidone 25 mg tablet Take 0.5 tablets (12.5 mg total) by mouth daily In the morning     No Known Allergies  Immunization History   Administered Date(s) Administered   • COVID-19 PFIZER VACCINE 0.3 ML IM 07/21/2021, 08/12/2021   • Influenza, recombinant, quadrivalent,injectable, preservative free 11/03/2021   • Pneumococcal Conjugate Vaccine 20-valent (Pcv20), Polysace 03/16/2023       Objective     BP (!) 188/100 (BP Location: Left arm, Patient Position: Sitting, Cuff Size: Adult)   Pulse 95   Temp 97.5 °F (36.4 °C) (Temporal)   Resp 18   Ht 5' 2" (1.575 m)   Wt 63 kg (139 lb)   BMI 25.42 kg/m²     Physical Exam  Vitals reviewed. Constitutional:       General: He is not in acute distress. Appearance: He is overweight. He is not ill-appearing or diaphoretic. HENT:      Head: Normocephalic and atraumatic.       Mouth/Throat:      Mouth: Mucous membranes are moist.      Pharynx: Oropharynx is clear. Eyes:      General: Lids are normal.      Conjunctiva/sclera: Conjunctivae normal.      Pupils: Pupils are equal, round, and reactive to light. Cardiovascular:      Rate and Rhythm: Normal rate and regular rhythm. Pulses: Normal pulses. Pulmonary:      Effort: Pulmonary effort is normal. No tachypnea. Breath sounds: Normal breath sounds. No decreased breath sounds, wheezing or rales. Musculoskeletal:      Cervical back: Neck supple. Right lower leg: No edema. Left lower leg: No edema. Lymphadenopathy:      Cervical: No cervical adenopathy. Skin:     General: Skin is warm and dry. Neurological:      Mental Status: He is alert and oriented to person, place, and time. Cranial Nerves: No cranial nerve deficit, dysarthria or facial asymmetry. Motor: Motor function is intact. No weakness. Gait: Gait is intact.    Psychiatric:         Attention and Perception: Attention normal.         Mood and Affect: Mood and affect normal.         Behavior: Behavior normal.       PRISCILLA Blandon

## 2023-08-26 PROBLEM — Z59.41 FOOD INSECURITY: Status: ACTIVE | Noted: 2023-08-26

## 2023-08-26 NOTE — ASSESSMENT & PLAN NOTE
- Referred pt to Social Work and asked that connection be made before pt leaves office today due to pt requiring sign language interpretation and pt has not had food in two days and no money for co-pays. Pt did meet with  in office.

## 2023-08-26 NOTE — ASSESSMENT & PLAN NOTE
Hypertensive urgency, denies any current symptoms. Pt stopped taking amlodipine due to perceived side effects and never restarted other home antihypertensives after hospital discharge, states he does not have them. As they were all filled for 90 day supply in July, I changed the orders slightly in hopes that pt's insurance will cover them, as pt has no money to buy them out of pocket. - Stop amlodipine as pt refuses to take. - Start Toprol XL 25 mg daily, losartan, 100 mg daily, and HCTZ 25 mg daily.  - Return in 1 week for BP recheck, go to ED if any symptoms. Pt indicated understanding of plan.   - Social work notified of pt's inability to pay for any co-pays, if they should exist.

## 2023-08-29 ENCOUNTER — PATIENT OUTREACH (OUTPATIENT)
Dept: FAMILY MEDICINE CLINIC | Facility: CLINIC | Age: 58
End: 2023-08-29

## 2023-08-30 ENCOUNTER — PATIENT OUTREACH (OUTPATIENT)
Dept: FAMILY MEDICINE CLINIC | Facility: CLINIC | Age: 58
End: 2023-08-30

## 2023-08-31 ENCOUNTER — PATIENT OUTREACH (OUTPATIENT)
Dept: FAMILY MEDICINE CLINIC | Facility: CLINIC | Age: 58
End: 2023-08-31

## 2023-08-31 NOTE — PROGRESS NOTES
MIAH HAWKINS did call [address] at ([phone], ext [phone]) and spoke with [facility]. MIAH HAWKINS introduced herself, her role and explained her the purpose of this call. MIAH HAWKINS explained that Pt is deaf and would be beneficial for him to have a device that helps him at home. Pt stated that he does not listen when someone comes to his door, even can use phone, however, Pt can read. [facility] expressed that if she can contact Pt she could provide a demonstration how Pt can use a captioned telephone. MIAH HAWKINS informed [facility] that Pt is scheduled to see his provider at [facility] 9/26/2023. T[facility] expressed that MIAH HAWKINS can call her through video call and she can talk with Pt. MIAH HAWKINS informed [facility] that if Pt shows up for his upcoming appointment MIAH HAWKINS will contact her. Also, MIAH HAWKINS expressed thanked to [facility] for her support and time. MIAH HAWKINS is remain available for further assistance  As needed. MIAH HAWKINS will continue to follow-up.

## 2023-09-19 ENCOUNTER — HOSPITAL ENCOUNTER (EMERGENCY)
Facility: HOSPITAL | Age: 58
Discharge: HOME | End: 2023-09-19
Attending: EMERGENCY MEDICINE
Payer: MEDICARE

## 2023-09-19 ENCOUNTER — APPOINTMENT (EMERGENCY)
Dept: RADIOLOGY | Facility: HOSPITAL | Age: 58
End: 2023-09-19
Payer: MEDICARE

## 2023-09-19 VITALS
TEMPERATURE: 99.1 F | BODY MASS INDEX: 25.08 KG/M2 | OXYGEN SATURATION: 96 % | DIASTOLIC BLOOD PRESSURE: 80 MMHG | RESPIRATION RATE: 18 BRPM | SYSTOLIC BLOOD PRESSURE: 164 MMHG | HEART RATE: 77 BPM | WEIGHT: 137.1 LBS

## 2023-09-19 DIAGNOSIS — R42 VERTIGO: Primary | ICD-10-CM

## 2023-09-19 LAB
2HR DELTA HS TROPONIN: 1 NG/L
ALBUMIN SERPL BCP-MCNC: 4.4 G/DL (ref 3.5–5)
ALP SERPL-CCNC: 78 U/L (ref 34–104)
ALT SERPL W P-5'-P-CCNC: 18 U/L (ref 7–52)
ANION GAP SERPL CALCULATED.3IONS-SCNC: 8 MMOL/L
AST SERPL W P-5'-P-CCNC: 22 U/L (ref 13–39)
ATRIAL RATE: 75 BPM
BASOPHILS # BLD AUTO: 0.03 THOUSANDS/ÂΜL (ref 0–0.1)
BASOPHILS NFR BLD AUTO: 1 % (ref 0–1)
BILIRUB SERPL-MCNC: 0.69 MG/DL (ref 0.2–1)
BUN SERPL-MCNC: 5 MG/DL (ref 5–25)
CALCIUM SERPL-MCNC: 9.5 MG/DL (ref 8.4–10.2)
CARDIAC TROPONIN I PNL SERPL HS: 13 NG/L
CARDIAC TROPONIN I PNL SERPL HS: 14 NG/L
CHLORIDE SERPL-SCNC: 98 MMOL/L (ref 96–108)
CO2 SERPL-SCNC: 28 MMOL/L (ref 21–32)
CREAT SERPL-MCNC: 0.74 MG/DL (ref 0.6–1.3)
EOSINOPHIL # BLD AUTO: 0 THOUSAND/ÂΜL (ref 0–0.61)
EOSINOPHIL NFR BLD AUTO: 0 % (ref 0–6)
ERYTHROCYTE [DISTWIDTH] IN BLOOD BY AUTOMATED COUNT: 13.1 % (ref 11.6–15.1)
GFR SERPL CREATININE-BSD FRML MDRD: 101 ML/MIN/1.73SQ M
GLUCOSE SERPL-MCNC: 112 MG/DL (ref 65–140)
HCT VFR BLD AUTO: 53.4 % (ref 36.5–49.3)
HGB BLD-MCNC: 18.1 G/DL (ref 12–17)
IMM GRANULOCYTES # BLD AUTO: 0.02 THOUSAND/UL (ref 0–0.2)
IMM GRANULOCYTES NFR BLD AUTO: 0 % (ref 0–2)
LYMPHOCYTES # BLD AUTO: 0.92 THOUSANDS/ÂΜL (ref 0.6–4.47)
LYMPHOCYTES NFR BLD AUTO: 17 % (ref 14–44)
MCH RBC QN AUTO: 32 PG (ref 26.8–34.3)
MCHC RBC AUTO-ENTMCNC: 33.9 G/DL (ref 31.4–37.4)
MCV RBC AUTO: 94 FL (ref 82–98)
MONOCYTES # BLD AUTO: 0.4 THOUSAND/ÂΜL (ref 0.17–1.22)
MONOCYTES NFR BLD AUTO: 8 % (ref 4–12)
NEUTROPHILS # BLD AUTO: 3.98 THOUSANDS/ÂΜL (ref 1.85–7.62)
NEUTS SEG NFR BLD AUTO: 74 % (ref 43–75)
NRBC BLD AUTO-RTO: 0 /100 WBCS
P AXIS: 36 DEGREES
PLATELET # BLD AUTO: 157 THOUSANDS/UL (ref 149–390)
PMV BLD AUTO: 10.2 FL (ref 8.9–12.7)
POTASSIUM SERPL-SCNC: 3.7 MMOL/L (ref 3.5–5.3)
PR INTERVAL: 166 MS
PROT SERPL-MCNC: 8.1 G/DL (ref 6.4–8.4)
QRS AXIS: 51 DEGREES
QRSD INTERVAL: 86 MS
QT INTERVAL: 388 MS
QTC INTERVAL: 433 MS
RBC # BLD AUTO: 5.66 MILLION/UL (ref 3.88–5.62)
SODIUM SERPL-SCNC: 134 MMOL/L (ref 135–147)
T WAVE AXIS: 173 DEGREES
VENTRICULAR RATE: 75 BPM
WBC # BLD AUTO: 5.35 THOUSAND/UL (ref 4.31–10.16)

## 2023-09-19 PROCEDURE — 84484 ASSAY OF TROPONIN QUANT: CPT | Performed by: EMERGENCY MEDICINE

## 2023-09-19 PROCEDURE — 71045 X-RAY EXAM CHEST 1 VIEW: CPT

## 2023-09-19 PROCEDURE — 36415 COLL VENOUS BLD VENIPUNCTURE: CPT

## 2023-09-19 PROCEDURE — 85025 COMPLETE CBC W/AUTO DIFF WBC: CPT | Performed by: EMERGENCY MEDICINE

## 2023-09-19 PROCEDURE — 93010 ELECTROCARDIOGRAM REPORT: CPT | Performed by: INTERNAL MEDICINE

## 2023-09-19 PROCEDURE — 99285 EMERGENCY DEPT VISIT HI MDM: CPT | Performed by: EMERGENCY MEDICINE

## 2023-09-19 PROCEDURE — 99284 EMERGENCY DEPT VISIT MOD MDM: CPT

## 2023-09-19 PROCEDURE — 80053 COMPREHEN METABOLIC PANEL: CPT | Performed by: EMERGENCY MEDICINE

## 2023-09-19 PROCEDURE — 93005 ELECTROCARDIOGRAM TRACING: CPT

## 2023-09-19 RX ORDER — MECLIZINE HCL 12.5 MG/1
25 TABLET ORAL ONCE
Status: COMPLETED | OUTPATIENT
Start: 2023-09-19 | End: 2023-09-19

## 2023-09-19 RX ORDER — MECLIZINE HYDROCHLORIDE 25 MG/1
25 TABLET ORAL EVERY 6 HOURS PRN
Qty: 40 TABLET | Refills: 0 | Status: SHIPPED | OUTPATIENT
Start: 2023-09-19

## 2023-09-19 RX ORDER — ONDANSETRON 4 MG/1
4 TABLET, ORALLY DISINTEGRATING ORAL ONCE
Status: COMPLETED | OUTPATIENT
Start: 2023-09-19 | End: 2023-09-19

## 2023-09-19 RX ORDER — ONDANSETRON 4 MG/1
4 TABLET, ORALLY DISINTEGRATING ORAL EVERY 8 HOURS PRN
Qty: 20 TABLET | Refills: 0 | Status: SHIPPED | OUTPATIENT
Start: 2023-09-19

## 2023-09-19 RX ADMIN — ONDANSETRON 4 MG: 4 TABLET, ORALLY DISINTEGRATING ORAL at 13:48

## 2023-09-19 RX ADMIN — MECLIZINE 25 MG: 12.5 TABLET ORAL at 13:48

## 2023-09-19 NOTE — ED PROVIDER NOTES
History  Chief Complaint   Patient presents with   • Earache   • Dizziness     Pt reports, via  that he has an earache, headache, and stomach cramping that started several weeks ago, with dizziness starting today. Has been taking his hydrochlorothiazide, losartan, and metoprolol as prescribed. • Abdominal Pain     Patient is a 57-year-old male. He has a history of deafness. He has a history of hypertension. He has hyperlipidemia and diabetes. Patient presents to the emergency room with a 6-month history of ringing in his right ear with vertigo. Patient reports that it causes him to have difficulty with his balance. He denies any focal motor or sensory complaints. No fever or chills. No headache. He is nauseous and does report some epigastric pain. No chest pain or trouble breathing. Prior to Admission Medications   Prescriptions Last Dose Informant Patient Reported? Taking? Blood Pressure KIT  Self No No   Sig: Use 2 (two) times a day   atorvastatin (LIPITOR) 40 mg tablet   No No   Sig: Take 1 tablet (40 mg total) by mouth daily   hydrochlorothiazide (HYDRODIURIL) 25 mg tablet   No No   Sig: Take 1 tablet (25 mg total) by mouth daily   losartan (COZAAR) 100 MG tablet   No No   Sig: Take 1 tablet (100 mg total) by mouth daily   metoprolol succinate (Toprol XL) 25 mg 24 hr tablet   No No   Sig: Take 1 tablet (25 mg total) by mouth daily      Facility-Administered Medications: None       Past Medical History:   Diagnosis Date   • Deaf    • Heart murmur        History reviewed. No pertinent surgical history. Family History   Problem Relation Age of Onset   • Hypertension Mother    • Diabetes Mother    • Heart disease Mother         pacemaker   • Arthritis Mother         chronic     I have reviewed and agree with the history as documented.     E-Cigarette/Vaping   • E-Cigarette Use Never User      E-Cigarette/Vaping Substances   • Nicotine No    • THC No    • CBD No    • Flavoring No    • Other No    • Unknown No      Social History     Tobacco Use   • Smoking status: Never     Passive exposure: Never   • Smokeless tobacco: Never   Vaping Use   • Vaping Use: Never used   Substance Use Topics   • Alcohol use: Yes     Comment: 1 beer occasionally   • Drug use: Never       Review of Systems   Constitutional: Negative for chills and fever. HENT: Positive for hearing loss and tinnitus. Negative for rhinorrhea and sore throat. Eyes: Negative for pain, redness and visual disturbance. Respiratory: Negative for cough and shortness of breath. Cardiovascular: Negative for chest pain and leg swelling. Gastrointestinal: Positive for abdominal pain and nausea. Negative for diarrhea and vomiting. Endocrine: Negative for polydipsia and polyuria. Genitourinary: Negative for dysuria, frequency and hematuria. Musculoskeletal: Negative for back pain and neck pain. Skin: Negative for rash and wound. Allergic/Immunologic: Negative for immunocompromised state. Neurological: Positive for dizziness. Negative for weakness, numbness and headaches. Psychiatric/Behavioral: Negative for hallucinations and suicidal ideas. All other systems reviewed and are negative. Physical Exam  Physical Exam  Vitals reviewed. Constitutional:       General: He is not in acute distress. Appearance: He is not toxic-appearing. HENT:      Head: Normocephalic and atraumatic. Right Ear: Tympanic membrane normal.      Left Ear: Tympanic membrane normal.      Nose: Nose normal.      Mouth/Throat:      Mouth: Mucous membranes are moist.   Eyes:      General:         Right eye: No discharge. Left eye: No discharge. Extraocular Movements: Extraocular movements intact. Conjunctiva/sclera: Conjunctivae normal.      Pupils: Pupils are equal, round, and reactive to light. Comments: There is no nystagmus. Cardiovascular:      Rate and Rhythm: Normal rate and regular rhythm. Pulses: Normal pulses. Heart sounds: Normal heart sounds. No murmur heard. No friction rub. No gallop. Pulmonary:      Effort: Pulmonary effort is normal. No respiratory distress. Breath sounds: Normal breath sounds. No stridor. No wheezing, rhonchi or rales. Abdominal:      General: Bowel sounds are normal. There is no distension. Palpations: Abdomen is soft. Tenderness: There is no abdominal tenderness. There is no right CVA tenderness, left CVA tenderness, guarding or rebound. Musculoskeletal:         General: No swelling, tenderness, deformity or signs of injury. Normal range of motion. Cervical back: Normal range of motion and neck supple. No rigidity. Right lower leg: No edema. Left lower leg: No edema. Comments: No calf pain or unilateral leg swelling   Skin:     General: Skin is warm and dry. Coloration: Skin is not jaundiced or pale. Findings: No bruising, erythema or rash. Neurological:      General: No focal deficit present. Mental Status: He is alert and oriented to person, place, and time. GCS: GCS eye subscore is 4. GCS verbal subscore is 5. GCS motor subscore is 6. Cranial Nerves: No facial asymmetry. Sensory: No sensory deficit. Motor: Motor function is intact. Coordination: Coordination is intact. Finger-Nose-Finger Test and Heel to [place] Test normal.      Gait: Gait is intact. Comments: No ataxia.    Psychiatric:         Mood and Affect: Mood normal.         Behavior: Behavior normal.         Vital Signs  ED Triage Vitals [09/19/23 1218]   Temperature Pulse Respirations Blood Pressure SpO2   99.1 °F (37.3 °C) 89 18 (!) 215/101 93 %      Temp Source Heart Rate Source Patient Position - Orthostatic VS BP Location FiO2 (%)   Oral Monitor Sitting Left arm --      Pain Score       --           Vitals:    09/19/23 1218 09/19/23 1319   BP: (!) 215/101 164/80   Pulse: 89 77   Patient Position - Orthostatic VS: Sitting          Visual Acuity      ED Medications  Medications   meclizine (ANTIVERT) tablet 25 mg (25 mg Oral Given 9/19/23 1348)   ondansetron (ZOFRAN-ODT) dispersible tablet 4 mg (4 mg Oral Given 9/19/23 1348)       Diagnostic Studies  Results Reviewed     Procedure Component Value Units Date/Time    HS Troponin I 2hr [[id number]]  (Normal) Collected: 09/19/23 1448    Lab Status: Final result Specimen: Blood from Arm, Left Updated: 09/19/23 1543     hs TnI 2hr 14 ng/L      Delta 2hr hsTnI 1 ng/L     HS Troponin I 4hr [[id number]]     Lab Status: No result Specimen: Blood     HS Troponin 0hr (reflex protocol) [[id number]]  (Normal) Collected: 09/19/23 1244    Lab Status: Final result Specimen: Blood from Arm, Right Updated: 09/19/23 1340     hs TnI 0hr 13 ng/L     Comprehensive metabolic panel [[id number]]  (Abnormal) Collected: 09/19/23 1244    Lab Status: Final result Specimen: Blood from Arm, Right Updated: 09/19/23 1334     Sodium 134 mmol/L      Potassium 3.7 mmol/L      Chloride 98 mmol/L      CO2 28 mmol/L      ANION GAP 8 mmol/L      BUN 5 mg/dL      Creatinine 0.74 mg/dL      Glucose 112 mg/dL      Calcium 9.5 mg/dL      AST 22 U/L      ALT 18 U/L      Alkaline Phosphatase 78 U/L      Total Protein 8.1 g/dL      Albumin 4.4 g/dL      Total Bilirubin 0.69 mg/dL      eGFR 101 ml/min/1.73sq m     Narrative:      [place] guidelines for Chronic Kidney Disease (CKD):   •  Stage 1 with normal or high GFR (GFR > 90 mL/min/1.73 square meters)  •  Stage 2 Mild CKD (GFR = 60-89 mL/min/1.73 square meters)  •  Stage 3A Moderate CKD (GFR = 45-59 mL/min/1.73 square meters)  •  Stage 3B Moderate CKD (GFR = 30-44 mL/min/1.73 square meters)  •  Stage 4 Severe CKD (GFR = 15-29 mL/min/1.73 square meters)  •  Stage 5 End Stage CKD (GFR <15 mL/min/1.73 square meters)  Note: GFR calculation is accurate only with a steady state creatinine    CBC and differential [[id number]]  (Abnormal) Collected: 09/19/23 1244    Lab Status: Final result Specimen: Blood from Arm, Right Updated: 09/19/23 1318     WBC 5.35 Thousand/uL      RBC 5.66 Million/uL      Hemoglobin 18.1 g/dL      Hematocrit 53.4 %      MCV 94 fL      MCH 32.0 pg      MCHC 33.9 g/dL      RDW 13.1 %      MPV 10.2 fL      Platelets 261 Thousands/uL      nRBC 0 /100 WBCs      Neutrophils Relative 74 %      Immat GRANS % 0 %      Lymphocytes Relative 17 %      Monocytes Relative 8 %      Eosinophils Relative 0 %      Basophils Relative 1 %      Neutrophils Absolute 3.98 Thousands/µL      Immature Grans Absolute 0.02 Thousand/uL      Lymphocytes Absolute 0.92 Thousands/µL      Monocytes Absolute 0.40 Thousand/µL      Eosinophils Absolute 0.00 Thousand/µL      Basophils Absolute 0.03 Thousands/µL                  XR chest 1 view portable   ED Interpretation by Osvaldo Jeffries MD (09/19 1400)   No infiltrates. No CHF. Final Result by Cori Epperson MD (09/19 1522)      No acute cardiopulmonary disease. Resident: Stephania Sandhu, the attending radiologist, have reviewed the images and agree with the final report above. Workstation performed: [id number]                    Procedures  ECG 12 Lead Documentation Only    Date/Time: 9/19/2023 2:05 PM    Performed by: Osvaldo Jeffries MD  Authorized by: Osvaldo Jeffries MD    ECG reviewed by me, the ED Provider: yes    Patient location:  ED  Comments:      Normal sinus rhythm with frequent PVCs. Septal infarct, age undetermined. T wave abnormality laterally. Abnormal EKG. ED Course                               SBIRT 20yo+    Flowsheet Row Most Recent Value   Initial Alcohol Screen: US AUDIT-C     1. How often do you have a drink containing alcohol? 1 Filed at: 09/19/2023 1221   2. How many drinks containing alcohol do you have on a typical day you are drinking? 0 Filed at: 09/19/2023 1225   3a. Male UNDER 65: How often do you have five or more drinks on one occasion? 0 Filed at: 09/19/2023 1225   3b. FEMALE Any Age, or MALE 65+: How often do you have 4 or more drinks on one occassion? 0 Filed at: 09/19/2023 1225   Audit-C Score 1 Filed at: 09/19/2023 1225   TJ: How many times in the past year have you. .. Used an illegal drug or used a prescription medication for non-medical reasons? Never Filed at: 09/19/2023 1225                    Medical Decision Making  Patient's main complaint is tinnitus and vertigo. There is nothing on physical exam to suggest a cerebellar stroke. EKG was unchanged from prior EKG. Troponin is mildly elevated. I do not believe his symptoms are due to acute coronary syndrome. Delta troponin is pending. If delta is normal, patient can likely be discharged on Antivert with referral to ENT. This clearly seems peripheral.  Symptoms are chronic. Doubt central cause. Amount and/or Complexity of Data Reviewed  Labs: ordered. Decision-making details documented in ED Course. Radiology: ordered and independent interpretation performed. Decision-making details documented in ED Course. ECG/medicine tests: ordered and independent interpretation performed. Decision-making details documented in ED Course. Risk  Prescription drug management. Decision regarding hospitalization. Disposition  Final diagnoses:   Vertigo     Time reflects when diagnosis was documented in both MDM as applicable and the Disposition within this note     Time User Action Codes Description Comment    9/19/2023  2:47 PM Milagro Morris Add [R42] Vertigo       ED Disposition     ED Disposition   Discharge    Condition   Stable    Date/Time   Tue Sep 19, 2023  2:47 PM    [address] discharge to home/self care.                Follow-up Information     Follow up With Specialties Details [address] MD Jose R Otolaryngology In 1 week  [address]  [address]  [address] [facility]  [phone]            Patient's Medications   Discharge Prescriptions    MECLIZINE (ANTIVERT) 25 MG TABLET    Take 1 tablet (25 mg total) by mouth every 6 (six) hours as needed for dizziness       Start Date: 9/19/2023 End Date: --       Order Dose: 25 mg       Quantity: 40 tablet    Refills: 0    ONDANSETRON (ZOFRAN-ODT) 4 MG DISINTEGRATING TABLET    Take 1 tablet (4 mg total) by mouth every 8 (eight) hours as needed for nausea or vomiting       Start Date: 9/19/2023 End Date: --       Order Dose: 4 mg       Quantity: 20 tablet    Refills: 0       No discharge procedures on file.     PDMP Review     None          ED Provider  Electronically Signed by           Carlita Woodall MD  09/19/23 7789

## 2023-09-25 PROBLEM — R42 DIZZINESS: Status: ACTIVE | Noted: 2023-09-25

## 2023-09-25 NOTE — ASSESSMENT & PLAN NOTE
Lab Results   Component Value Date    HGBA1C 5.6 03/16/2023     Last fasting blood glucose:  91  No home medications   Monitor for symptoms of hyper or hypoglycemia

## 2023-09-25 NOTE — ASSESSMENT & PLAN NOTE
Patient referred to social work and spoke with them at last visit. Son is currently reunited with his father after 34 years and is helping him with his food and health situation.

## 2023-09-25 NOTE — ASSESSMENT & PLAN NOTE
Patient was last seen in the ED for dizziness. Was given meclizine and ondansetron     Patient has no changes in gait, and describes the dizziness as room spinning that lasts for a few minutes on sudden movements. He reports blood coming from his ear for the past couple of days, but none seen today. Most likely from scratching and cleaning his ears. Most likely BPPV    Patient has an appointment with private otolaryngologist for evaluation.      PT for BPPV evaluation and treatment/training

## 2023-09-25 NOTE — ASSESSMENT & PLAN NOTE
Home medications:    Losartan 100 mg QD   HCT 25 mg BID   Metoprolol 25 mg QD     Medication compliance and barriers assessed  Patient take his medications for only 5 out of the 7 days. Patient educated on taking medication everyday  His son Stuart Sheppard is now helping him with the medications and paying for them.      BP is controlled today: 148/84  Plan:   Patient encouraged to check blood pressure at least once a day and create log to discuss and future visits  Exercise and dietary counseling

## 2023-09-26 ENCOUNTER — OFFICE VISIT (OUTPATIENT)
Dept: FAMILY MEDICINE CLINIC | Facility: CLINIC | Age: 58
End: 2023-09-26

## 2023-09-26 VITALS
DIASTOLIC BLOOD PRESSURE: 84 MMHG | TEMPERATURE: 97.6 F | HEIGHT: 62 IN | HEART RATE: 86 BPM | OXYGEN SATURATION: 97 % | WEIGHT: 139 LBS | SYSTOLIC BLOOD PRESSURE: 148 MMHG | RESPIRATION RATE: 17 BRPM | BODY MASS INDEX: 25.58 KG/M2

## 2023-09-26 DIAGNOSIS — I10 PRIMARY HYPERTENSION: ICD-10-CM

## 2023-09-26 DIAGNOSIS — E11.9 TYPE 2 DIABETES MELLITUS WITHOUT COMPLICATION, WITHOUT LONG-TERM CURRENT USE OF INSULIN (HCC): ICD-10-CM

## 2023-09-26 DIAGNOSIS — R42 DIZZINESS: Primary | ICD-10-CM

## 2023-09-26 DIAGNOSIS — Z59.41 FOOD INSECURITY: ICD-10-CM

## 2023-09-26 DIAGNOSIS — R10.13 EPIGASTRIC PAIN: ICD-10-CM

## 2023-09-26 DIAGNOSIS — H60.543 DERMATITIS OF BOTH EAR CANALS: ICD-10-CM

## 2023-09-26 PROCEDURE — 3079F DIAST BP 80-89 MM HG: CPT | Performed by: INTERNAL MEDICINE

## 2023-09-26 PROCEDURE — 99214 OFFICE O/P EST MOD 30 MIN: CPT | Performed by: INTERNAL MEDICINE

## 2023-09-26 PROCEDURE — 3077F SYST BP >= 140 MM HG: CPT | Performed by: INTERNAL MEDICINE

## 2023-09-26 RX ORDER — OMEPRAZOLE 20 MG/1
20 CAPSULE, DELAYED RELEASE ORAL
Qty: 90 CAPSULE | Refills: 3 | Status: SHIPPED | OUTPATIENT
Start: 2023-09-26 | End: 2024-09-20

## 2023-09-26 RX ORDER — HYDROCORTISONE AND ACETIC ACID 20.75; 10.375 MG/ML; MG/ML
3 SOLUTION AURICULAR (OTIC) EVERY 6 HOURS SCHEDULED
Qty: 10 ML | Refills: 0 | Status: SHIPPED | OUTPATIENT
Start: 2023-09-26

## 2023-09-26 SDOH — ECONOMIC STABILITY - FOOD INSECURITY: FOOD INSECURITY: Z59.41

## 2023-09-26 NOTE — ASSESSMENT & PLAN NOTE
Epigastric pain was found on physical exam, with deep palpation.       Patient agreed to a trial of PPIs    Follow up in 6 weeks

## 2023-09-26 NOTE — PROGRESS NOTES
Name: Ian Victor      : 1965      MRN: [id number]  Encounter Provider: Mal Alvarez MD  Encounter Date: 2023   Encounter department: [address] [facility]     1. Dizziness  Assessment & Plan:  Patient was last seen in the ED for dizziness. Was given meclizine and ondansetron     Patient has no changes in gait, and describes the dizziness as room spinning that lasts for a few minutes on sudden movements. He reports blood coming from his ear for the past couple of days, but none seen today. Most likely from scratching and cleaning his ears. Most likely BPPV    Patient has an appointment with private otolaryngologist for evaluation. PT for BPPV evaluation and treatment/training     Orders:  -     Ambulatory Referral to Physical Therapy; Future    2. Primary hypertension  Assessment & Plan:  Home medications:    Losartan 100 mg QD   HCT 25 mg BID   Metoprolol 25 mg QD     Medication compliance and barriers assessed  Patient take his medications for only 5 out of the 7 days. Patient educated on taking medication everyday  His son Duane Burkitt is now helping him with the medications and paying for them. BP is controlled today: 148/84  Plan:   Patient encouraged to check blood pressure at least once a day and create log to discuss and future visits  Exercise and dietary counseling       3. Food insecurity  Assessment & Plan:  Patient referred to social work and spoke with them at last visit. Son is currently reunited with his father after 34 years and is helping him with his food and health situation. 4. Type 2 diabetes mellitus without complication, without long-term current use of insulin ([facility])  Assessment & Plan:    Lab Results   Component Value Date    HGBA1C 5.6 2023     Last fasting blood glucose:  91  No home medications   Monitor for symptoms of hyper or hypoglycemia         5.  Dermatitis of both ear canals  - hydrocortisone-acetic acid (VOSOL-HC) otic solution; Administer 3 drops into both ears every 6 (six) hours    6. Epigastric pain  Assessment & Plan:  Epigastric pain was found on physical exam, with deep palpation. Patient agreed to a trial of PPIs    Follow up in 6 weeks    Orders:  -     omeprazole (PriLOSEC) 20 mg delayed release capsule; Take 1 capsule (20 mg total) by mouth daily before breakfast         Subjective     62year old male, with a past medical history of congenital deafness, high blood pressure, diabetes came to the clinic today for a follow up on his blood pressure and his Dizziness during the last ER follow up. Patient went to the ER a few days ago for dizziness and was treated with meclizine and ondansetron. His nausea and vomiting are still persisting despite the medications from the ED. He still feels dizzy a few times a day when getting up. He describes it as a room spinning sensation that does away after a few minutes. He feels nauseaous a few time since leaving the ED, and is related to the feeling of dizziness. The patient also complains of blood coming out of his R ear, that he noticed for the past few days. He admits to cleaning his ears a few times a day. He recently reunited with his long estranged son after 29 years of not speaking to him. His son named Cal Rene will be helping his father more with his health and finances. Review of Systems   Constitutional: Negative for chills and fever. HENT: Positive for ear discharge and hearing loss. Negative for sore throat. Blood reported to be coming from the ear   Eyes: Negative for visual disturbance. Respiratory: Negative for cough and shortness of breath. Cardiovascular: Negative for chest pain and palpitations. Gastrointestinal: Positive for abdominal pain and nausea. Negative for vomiting. Genitourinary: Negative for dysuria and hematuria. Musculoskeletal: Negative for arthralgias. Skin: Negative for color change. Neurological: Negative for seizures, syncope and headaches. All other systems reviewed and are negative. Past Medical History:   Diagnosis Date   • Deaf    • Heart murmur      No past surgical history on file. Family History   Problem Relation Age of Onset   • Hypertension Mother    • Diabetes Mother    • Heart disease Mother         pacemaker   • Arthritis Mother         chronic     Social History     Socioeconomic History   • Marital status: Single     Spouse name: None   • Number of children: None   • Years of education: None   • Highest education level: None   Occupational History   • None   Tobacco Use   • Smoking status: Never     Passive exposure: Never   • Smokeless tobacco: Never   Vaping Use   • Vaping Use: Never used   Substance and Sexual Activity   • Alcohol use: Yes     Comment: 1 beer occasionally   • Drug use: Never   • Sexual activity: None   Other Topics Concern   • None   Social History Narrative   • None     Social Determinants of Health     Financial Resource Strain: Low Risk  (8/25/2023)    Overall Financial Resource Strain (CARDIA)    • Difficulty of Paying Living Expenses: Not very hard   Food Insecurity: No Food Insecurity (8/25/2023)    Hunger Vital Sign    • Worried About Running Out of Food in the Last Year: Never true    • Ran Out of Food in the Last Year: Never true   Transportation Needs: No Transportation Needs (8/25/2023)    PRAPARE - Transportation    • Lack of Transportation (Medical): No    • Lack of Transportation (Non-Medical):  No   Physical Activity: Not on file   Stress: Not on file   Social Connections: Not on file   Intimate Partner Violence: Not on file   Housing Stability: Not on file     Current Outpatient Medications on File Prior to Visit   Medication Sig   • hydrochlorothiazide (HYDRODIURIL) 25 mg tablet Take 1 tablet (25 mg total) by mouth daily   • losartan (COZAAR) 100 MG tablet Take 1 tablet (100 mg total) by mouth daily • meclizine (ANTIVERT) 25 mg tablet Take 1 tablet (25 mg total) by mouth every 6 (six) hours as needed for dizziness   • metoprolol succinate (Toprol XL) 25 mg 24 hr tablet Take 1 tablet (25 mg total) by mouth daily   • ondansetron (ZOFRAN-ODT) 4 mg disintegrating tablet Take 1 tablet (4 mg total) by mouth every 8 (eight) hours as needed for nausea or vomiting   • atorvastatin (LIPITOR) 40 mg tablet Take 1 tablet (40 mg total) by mouth daily   • Blood Pressure KIT Use 2 (two) times a day   • [DISCONTINUED] carvedilol (COREG) 3.125 mg tablet Take 1 tablet (3.125 mg total) by mouth 2 (two) times a day with meals   • [DISCONTINUED] chlorthalidone 25 mg tablet Take 0.5 tablets (12.5 mg total) by mouth daily In the morning     No Known Allergies  Immunization History   Administered Date(s) Administered   • COVID-19 PFIZER VACCINE 0.3 ML IM 07/21/2021, 08/12/2021   • Influenza, recombinant, quadrivalent,injectable, preservative free 11/03/2021   • Pneumococcal Conjugate Vaccine 20-valent (Pcv20), Polysace 03/16/2023       Objective     /84 (BP Location: Left arm, Patient Position: Sitting, Cuff Size: Standard)   Pulse 86   Temp 97.6 °F (36.4 °C) (Temporal)   Resp 17   Ht 5' 2" (1.575 m)   Wt 63 kg (139 lb)   SpO2 97%   BMI 25.42 kg/m²     Physical Exam  Constitutional:       General: He is not in acute distress. Appearance: He is not toxic-appearing. HENT:      Head: Normocephalic. Right Ear: Tympanic membrane normal. Swelling present. Left Ear: Tympanic membrane and ear canal normal. No swelling. Ears:      Comments: Mild welling in the R external canal , and erythema  Cardiovascular:      Rate and Rhythm: Normal rate and regular rhythm. Pulses: Normal pulses. Heart sounds: Normal heart sounds. Pulmonary:      Effort: Pulmonary effort is normal.      Breath sounds: Normal breath sounds. Abdominal:      General: Abdomen is flat.  Bowel sounds are normal. There is no distension. Palpations: Abdomen is soft. Tenderness: There is abdominal tenderness. Comments: Tenderness in epigastric region with deep palpation. Skin:     General: Skin is warm. Coloration: Skin is not jaundiced.        Roxana Hill MD

## 2023-09-30 ENCOUNTER — HOSPITAL ENCOUNTER (EMERGENCY)
Facility: HOSPITAL | Age: 58
Discharge: HOME | End: 2023-09-30
Attending: EMERGENCY MEDICINE
Payer: MEDICARE

## 2023-09-30 VITALS
DIASTOLIC BLOOD PRESSURE: 101 MMHG | WEIGHT: 139.4 LBS | HEART RATE: 65 BPM | BODY MASS INDEX: 25.5 KG/M2 | TEMPERATURE: 97.8 F | RESPIRATION RATE: 18 BRPM | SYSTOLIC BLOOD PRESSURE: 198 MMHG | OXYGEN SATURATION: 95 %

## 2023-09-30 DIAGNOSIS — J30.9 ALLERGIC SINUSITIS: Primary | ICD-10-CM

## 2023-09-30 LAB — SARS-COV-2 RNA RESP QL NAA+PROBE: NEGATIVE

## 2023-09-30 PROCEDURE — 99284 EMERGENCY DEPT VISIT MOD MDM: CPT | Performed by: EMERGENCY MEDICINE

## 2023-09-30 PROCEDURE — 87635 SARS-COV-2 COVID-19 AMP PRB: CPT | Performed by: EMERGENCY MEDICINE

## 2023-09-30 PROCEDURE — 99284 EMERGENCY DEPT VISIT MOD MDM: CPT

## 2023-09-30 RX ORDER — ACETAMINOPHEN 325 MG/1
650 TABLET ORAL ONCE
Status: COMPLETED | OUTPATIENT
Start: 2023-09-30 | End: 2023-09-30

## 2023-09-30 RX ORDER — IBUPROFEN 600 MG/1
600 TABLET ORAL ONCE
Status: COMPLETED | OUTPATIENT
Start: 2023-09-30 | End: 2023-09-30

## 2023-09-30 RX ORDER — IBUPROFEN 400 MG/1
400 TABLET ORAL EVERY 6 HOURS PRN
Qty: 30 TABLET | Refills: 0 | Status: SHIPPED | OUTPATIENT
Start: 2023-09-30 | End: 2023-10-07

## 2023-09-30 RX ORDER — LORATADINE 10 MG/1
10 TABLET ORAL ONCE
Status: COMPLETED | OUTPATIENT
Start: 2023-09-30 | End: 2023-09-30

## 2023-09-30 RX ORDER — LORATADINE 10 MG/1
10 TABLET ORAL DAILY
Qty: 20 TABLET | Refills: 0 | Status: SHIPPED | OUTPATIENT
Start: 2023-09-30

## 2023-09-30 RX ADMIN — IBUPROFEN 600 MG: 600 TABLET ORAL at 15:46

## 2023-09-30 RX ADMIN — ACETAMINOPHEN 650 MG: 325 TABLET ORAL at 15:46

## 2023-09-30 RX ADMIN — LORATADINE 10 MG: 10 TABLET ORAL at 15:46

## 2023-09-30 NOTE — ED PROVIDER NOTES
History  Chief Complaint   Patient presents with   • Headache     Pt to ed with bilateral earache and headache x 1 day, denies nausea, reports diarrhea x2 days and fatigued and sensitivity to lights     66-year-old male presenting the emergency department with bilateral earache, headache. No nausea. Some diarrhea. Some light sensitivity. No vision changes. Gradual onset headache over the past day. Nasal congestion. Prior to Admission Medications   Prescriptions Last Dose Informant Patient Reported? Taking? Blood Pressure KIT  Self No No   Sig: Use 2 (two) times a day   atorvastatin (LIPITOR) 40 mg tablet   No No   Sig: Take 1 tablet (40 mg total) by mouth daily   hydrochlorothiazide (HYDRODIURIL) 25 mg tablet   No No   Sig: Take 1 tablet (25 mg total) by mouth daily   hydrocortisone-acetic acid (VOSOL-HC) otic solution   No No   Sig: Administer 3 drops into both ears every 6 (six) hours   losartan (COZAAR) 100 MG tablet   No No   Sig: Take 1 tablet (100 mg total) by mouth daily   meclizine (ANTIVERT) 25 mg tablet   No No   Sig: Take 1 tablet (25 mg total) by mouth every 6 (six) hours as needed for dizziness   metoprolol succinate (Toprol XL) 25 mg 24 hr tablet   No No   Sig: Take 1 tablet (25 mg total) by mouth daily   omeprazole (PriLOSEC) 20 mg delayed release capsule   No No   Sig: Take 1 capsule (20 mg total) by mouth daily before breakfast   ondansetron (ZOFRAN-ODT) 4 mg disintegrating tablet   No No   Sig: Take 1 tablet (4 mg total) by mouth every 8 (eight) hours as needed for nausea or vomiting      Facility-Administered Medications: None       Past Medical History:   Diagnosis Date   • Deaf    • Heart murmur        History reviewed. No pertinent surgical history.     Family History   Problem Relation Age of Onset   • Hypertension Mother    • Diabetes Mother    • Heart disease Mother         pacemaker   • Arthritis Mother         chronic     I have reviewed and agree with the history as documented. E-Cigarette/Vaping   • E-Cigarette Use Never User      E-Cigarette/Vaping Substances   • Nicotine No    • THC No    • CBD No    • Flavoring No    • Other No    • Unknown No      Social History     Tobacco Use   • Smoking status: Never     Passive exposure: Never   • Smokeless tobacco: Never   Vaping Use   • Vaping Use: Never used   Substance Use Topics   • Alcohol use: Yes     Comment: 1 beer occasionally   • Drug use: Never       Review of Systems   Constitutional: Negative for chills and fever. HENT: Positive for congestion and rhinorrhea. Eyes: Negative for discharge and visual disturbance. Respiratory: Positive for cough. Negative for shortness of breath. Cardiovascular: Negative for chest pain and palpitations. Gastrointestinal: Negative for diarrhea, nausea and vomiting. Endocrine: Negative for polydipsia and polyphagia. Genitourinary: Negative for dysuria, flank pain and hematuria. Musculoskeletal: Positive for myalgias. Skin: Negative for pallor and rash. Neurological: Positive for headaches. Psychiatric/Behavioral: Negative for confusion. Physical Exam  Physical Exam  Vitals and nursing note reviewed. Constitutional:       General: He is not in acute distress. Appearance: He is well-developed. HENT:      Head: Normocephalic and atraumatic. Right Ear: Tympanic membrane and ear canal normal.      Left Ear: Tympanic membrane and ear canal normal.      Nose: Congestion present. Mouth/Throat:      Mouth: Mucous membranes are moist.   Eyes:      Conjunctiva/sclera: Conjunctivae normal.   Cardiovascular:      Rate and Rhythm: Normal rate and regular rhythm. Heart sounds: No murmur heard. Pulmonary:      Effort: Pulmonary effort is normal. No respiratory distress. Breath sounds: Normal breath sounds. Abdominal:      Palpations: Abdomen is soft. Tenderness: There is no abdominal tenderness.    Musculoskeletal:         General: No swelling. Cervical back: Neck supple. Skin:     General: Skin is warm and dry. Capillary Refill: Capillary refill takes less than 2 seconds. Neurological:      Mental Status: He is alert and oriented to person, place, and time. Cranial Nerves: No cranial nerve deficit. Sensory: No sensory deficit. Psychiatric:         Mood and Affect: Mood normal.         Vital Signs  ED Triage Vitals   Temperature Pulse Respirations Blood Pressure SpO2   09/30/23 1513 09/30/23 1513 09/30/23 1513 09/30/23 1520 09/30/23 1513   97.8 °F (36.6 °C) 65 18 (!) 198/101 95 %      Temp Source Heart Rate Source Patient Position - Orthostatic VS BP Location FiO2 (%)   09/30/23 1513 -- 09/30/23 1520 09/30/23 1520 --   Tympanic  Lying Left arm       Pain Score       --                  Vitals:    09/30/23 1513 09/30/23 1520   BP:  (!) 198/101   Pulse: 65    Patient Position - Orthostatic VS:  Lying         Visual Acuity      ED Medications  Medications   acetaminophen (TYLENOL) tablet 650 mg (650 mg Oral Given 9/30/23 1546)   ibuprofen (MOTRIN) tablet 600 mg (600 mg Oral Given 9/30/23 1546)   loratadine (CLARITIN) tablet 10 mg (10 mg Oral Given 9/30/23 1546)       Diagnostic Studies  Results Reviewed     Procedure Component Value Units Date/Time    COVID only [[id number]]  (Normal) Collected: 09/30/23 1545    Lab Status: Final result Specimen: Nares from Nose Updated: 09/30/23 1622     SARS-CoV-2 Negative    Narrative:      FOR PEDIATRIC PATIENTS - copy/paste COVID Guidelines URL to browser: [email or web address]    SARS-CoV-2 assay is a Nucleic Acid Amplification assay intended for the  qualitative detection of nucleic acid from SARS-CoV-2 in nasopharyngeal  swabs. Results are for the presumptive identification of SARS-CoV-2 RNA.     Positive results are indicative of infection with SARS-CoV-2, the virus  causing COVID-19, but do not rule out bacterial infection or co-infection  with other viruses. Laboratories within the [facility] and its  territories are required to report all positive results to the appropriate  public health authorities. Negative results do not preclude SARS-CoV-2  infection and should not be used as the sole basis for treatment or other  patient management decisions. Negative results must be combined with  clinical observations, patient history, and epidemiological information. This test has not been FDA cleared or approved. This test has been authorized by FDA under an Emergency Use Authorization  (EUA). This test is only authorized for the duration of time the  declaration that circumstances exist justifying the authorization of the  emergency use of an in vitro diagnostic tests for detection of SARS-CoV-2  virus and/or diagnosis of COVID-19 infection under section 564(b)(1) of  the Act, 21 U. S.C. 123VSD-9(H)(2), unless the authorization is terminated  or revoked sooner. The test has been validated but independent review by FDA  and CLIA is pending. Test performed using [employer] Gene[employer]: This RT-PCR assay targets N2,  a region unique to SARS-CoV-2. A conserved region in the E-gene was chosen  for pan-Sarbecovirus detection which includes SARS-CoV-2. According to CMS-2020-01-R, this platform meets the definition of high-throughput technology. No orders to display              Procedures  Procedures         ED Course                                             Medical Decision Making  59-year-old male presenting emerged department with headache, nasal congestion, bilateral ear pain. Differential diagnosis includes viral, bacterial, allergic sinusitis. COVID test negative. Likely allergic sinusitis, will treat with Claritin. Amount and/or Complexity of Data Reviewed  Labs: ordered. Risk  OTC drugs. Prescription drug management. Disposition  Final diagnoses:    Allergic sinusitis     Time reflects when diagnosis was documented in both MDM as applicable and the Disposition within this note     Time User Action Codes Description Comment    9/30/2023  4:24 PM Daryn Johnson Add [J30.9] Allergic sinusitis       ED Disposition     ED Disposition   Discharge    Condition   Stable    Date/Time   Sat Sep 30, 2023  4:24 PM    [address] discharge to home/self care. Follow-up Information     Follow up With Specialties Details Why Contact Info Additional [facility]   [address] [facility], [address] [place] [phone]  [address] [place] [address]          Patient's Medications   Discharge Prescriptions    IBUPROFEN (MOTRIN) 400 MG TABLET    Take 1 tablet (400 mg total) by mouth every 6 (six) hours as needed for mild pain (Body aches or fever) for up to 7 days       Start Date: 9/30/2023 End Date: 10/7/2023       Order Dose: 400 mg       Quantity: 30 tablet    Refills: 0    LORATADINE (CLARITIN) 10 MG TABLET    Take 1 tablet (10 mg total) by mouth daily       Start Date: 9/30/2023 End Date: --       Order Dose: 10 mg       Quantity: 20 tablet    Refills: 0       No discharge procedures on file.     PDMP Review     None          ED Provider  Electronically Signed by           Jose Schreiber MD  09/30/23 9163

## 2023-10-02 ENCOUNTER — PATIENT OUTREACH (OUTPATIENT)
Dept: FAMILY MEDICINE CLINIC | Facility: CLINIC | Age: 58
End: 2023-10-02

## 2023-10-02 NOTE — PROGRESS NOTES
Per chart review this seems Pt did show up for his doctor visit 9/26/2023. MIAH HAWKINS could not meet with Pt. Noted that Pt's son is currently reunited with his father after 34 years and is helping him with food and [place] situation. After chart review MIAH HWAKINS did call Pt's mom Denise Nick. MIAH HAWKINS introduced herself, her role, explained Walkercristobal Nick the purpose of this call and assisted in [facility] [place]. MIAH HAWKINS explained Denise Nick that MIAH HAWKINS would like to assist Pt with getting a captioned telephone, however, he should be able to talk with a staff from [address] [place] [address]. Denise Nick stated that she will talk with Pt's son and will explain Pt about it. Denise Nick expressed that she will contact the MIAH  once she contact the Pt since he lives independently. MIAH HAWKINS explained Denise Nick that per chart review this seems Pt is struggling with food insecurity. Denise Nick stated that Pt told her that he is not receiving the same amount of food stamps anymore. MIAH HAWKINS explained Denise Nick that it's happening with many people. Denise Sandoval is aware about food bank. MIAH HAWKINS provided Denise Nick with MIAH HAWKINS phone number and also, confirmed her name. Walkercristobal Nick is aware that she can contact the MIAH HAWKINS for further support. MIAH HAWKINS is available Monday through Friday [facility] office hours. Walker Sandoval seems understanding and expressed thanked to MIAH HAWKINS for her support. MIAH HAWKINS is remain available for further assistance as needed. MIAH HAWKINS will continue to follow-up.

## 2023-11-06 NOTE — ASSESSMENT & PLAN NOTE
Patient found to have epigastric pain on deep palpation at last visit. Patient has been taking omeprazole 20 mg daily each morning for 6 weeks, and he no longer has any stomach pain, or heart burn. On exam no epigastric tenderness is noted.     Plan:   Continue omeprazole

## 2023-11-06 NOTE — ASSESSMENT & PLAN NOTE
Patient has complained of dizziness at last appointment that resembled room spinning with fast movements, and treating with meclizine and zofran. Dizziness has resolved and no longer takes the anti-nausea medications.

## 2023-11-07 ENCOUNTER — OFFICE VISIT (OUTPATIENT)
Dept: FAMILY MEDICINE CLINIC | Facility: CLINIC | Age: 58
End: 2023-11-07

## 2023-11-07 VITALS
RESPIRATION RATE: 16 BRPM | OXYGEN SATURATION: 96 % | WEIGHT: 137 LBS | SYSTOLIC BLOOD PRESSURE: 170 MMHG | TEMPERATURE: 96.9 F | BODY MASS INDEX: 25.21 KG/M2 | DIASTOLIC BLOOD PRESSURE: 70 MMHG | HEIGHT: 62 IN | HEART RATE: 107 BPM

## 2023-11-07 DIAGNOSIS — R10.13 EPIGASTRIC PAIN: ICD-10-CM

## 2023-11-07 DIAGNOSIS — R42 DIZZINESS: ICD-10-CM

## 2023-11-07 DIAGNOSIS — I10 PRIMARY HYPERTENSION: Primary | ICD-10-CM

## 2023-11-07 PROCEDURE — 3077F SYST BP >= 140 MM HG: CPT | Performed by: FAMILY MEDICINE

## 2023-11-07 PROCEDURE — 99214 OFFICE O/P EST MOD 30 MIN: CPT | Performed by: FAMILY MEDICINE

## 2023-11-07 PROCEDURE — 3078F DIAST BP <80 MM HG: CPT | Performed by: FAMILY MEDICINE

## 2023-11-07 RX ORDER — LOSARTAN POTASSIUM 50 MG/1
50 TABLET ORAL DAILY
Qty: 90 TABLET | Refills: 0 | Status: SHIPPED | OUTPATIENT
Start: 2023-11-07 | End: 2024-02-05

## 2023-11-07 NOTE — PROGRESS NOTES
Assessment/Plan:    Dizziness  Patient has complained of dizziness at last appointment that resembled room spinning with fast movements, and treating with meclizine and zofran. Dizziness has resolved and no longer takes the anti-nausea medications. Primary hypertension  Assessment:   Blood pressure at today’s office visit 170/70 mmHg, uncontrolled. Recheck: 165/80  Blood Pressure is above goal as per JNC-8 less than 140/90 mmHg,   Currently on HCT 25 mg BID, Losartan 100 mg QD, metoprolol succinate 25 mg QD and non-compliant. Verbalized that he takes his medications every day, each day, but when questioned had no taken any today. Aerobic Exercise recommendations and diet, compliant walks every day. Does not complain of snoring, being exhausted, sleepy,  headache or irritability during the day. Plan:   D/C current medication regiment. Start losartan on a lower dose of 50 mg QD due to it causing him stomach problems. The patient was educated on the importance of medication compliance and to monitor her blood pressure at home daily in a quiet room; after resting for at least 5 minutes and to bring the logs at next visit. Continue walking daily  recommend sodium and fat reduction   Reassess BP in 2 weeks    Epigastric pain  Patient found to have epigastric pain on deep palpation at last visit. Patient has been taking omeprazole 20 mg daily each morning for 6 weeks, and he no longer has any stomach pain, or heart burn. On exam no epigastric tenderness is noted. Plan:   Continue omeprazole      Diagnoses and all orders for this visit:    Primary hypertension  -     losartan (COZAAR) 50 mg tablet; Take 1 tablet (50 mg total) by mouth daily    Dizziness    Epigastric pain          Subjective:      Patient ID: Adria Sweeney is a 62 y.o. male. Kaylah Xie  [facility] 62year old man with Past medical history of HLD, HTN, T2DM, and congenital deafness presents to the clinic for a follow-up on his dizziness, blood pressure and epigastric pain. Since the last visit 6 months ago, the epigastric pain has resolved since beginning omeprazole 20 mg once daily and his dizziness has also resolved. He had an appointment with his private ENT who manages his ear problem. He also did not get the physical therapy for the dizziness. He says he does not usually check his blood pressure at home but noticed on some weeks it has been high. He said that he does not check his blood pressure because he does not remember. The patient says that he takes all his blood pressure medications, however, when prompted he hasn't taken any blood pressure medications today, and stopped taking his losartan 2 days ago because it hurts his stomach because he said the dose is too high. He also stopped taking the hydrochlorothiazide medication because he said that is hurt his eyes. He said that his metoprolol was for his eyes and hasn't taken it either. He also saw the ophthalmologist and was prescribed new glasses, and can see much better and enjoys walking around much more. He does have people helping his manage his medications which are his son Panchito Kirk and his mother Sridhar Restrepo, but they are sometimes too busy to check up on him. The following portions of the patient's history were reviewed and updated as appropriate: current medications. Review of Systems   Constitutional:  Negative for fever and unexpected weight change. HENT:  Negative for congestion, rhinorrhea and sore throat. Eyes:  Negative for visual disturbance. Respiratory:  Negative for cough, shortness of breath and wheezing. Cardiovascular:  Negative for chest pain, palpitations and leg swelling. Gastrointestinal:  Negative for blood in stool, constipation, diarrhea, nausea and vomiting. Genitourinary:  Negative for dysuria and hematuria. Skin:  Negative for rash. Neurological:  Negative for dizziness, light-headedness and headaches.    Psychiatric/Behavioral: Negative for agitation. Objective:      /70 (BP Location: Left arm, Patient Position: Sitting, Cuff Size: Standard)   Pulse (!) 107   Temp (!) 96.9 °F (36.1 °C) (Temporal)   Resp 16   Ht 5' 2" (1.575 m)   Wt 62.1 kg (137 lb)   SpO2 96%   BMI 25.06 kg/m²          Physical Exam  Constitutional:       General: He is not in acute distress. Appearance: He is normal weight. He is not toxic-appearing. HENT:      Head: Normocephalic. Nose: Nose normal.      Mouth/Throat:      Mouth: Mucous membranes are moist.      Pharynx: Oropharynx is clear. Eyes:      General: No scleral icterus. Cardiovascular:      Rate and Rhythm: Normal rate and regular rhythm. Pulses: Normal pulses. Heart sounds: Normal heart sounds. No murmur heard. Pulmonary:      Effort: Pulmonary effort is normal. No respiratory distress. Breath sounds: Normal breath sounds. No wheezing or rales. Abdominal:      General: Abdomen is flat. Bowel sounds are normal.      Palpations: Abdomen is soft. Tenderness: There is no abdominal tenderness. Musculoskeletal:         General: No swelling. Skin:     Capillary Refill: Capillary refill takes less than 2 seconds. Neurological:      Mental Status: He is alert and oriented to person, place, and time.

## 2023-11-10 ENCOUNTER — PATIENT OUTREACH (OUTPATIENT)
Dept: FAMILY MEDICINE CLINIC | Facility: CLINIC | Age: 58
End: 2023-11-10

## 2023-11-10 NOTE — PROGRESS NOTES
MIAH HAWKINS did call Pt's mom Nasir Dias. MIAH HAWKINS asked Nasir Dias about Pt's current state since it's hard for MIAH HAWKINS contact him. Nasir Dias expressed that she have not see Pt for long time. MIAH HAWKINS explained Nasir Dias that per chart review this seems that Pt came to office 11/7/23 for a doctor visit and did not report any social needs at time. MIAH HAWKINS informed Nasir Dias that MIAH HAWKINS will close this referral today, however, Pt or Nasir Dias can contact MIAH  for further support. Nasir Dias stated that if Pt asked for MIAH  support will reach out to her. Nasir Dias expressed thanked for the MIAH  support. MIAH HAWKINS is closing case due to unable to contact Pt. MIAH HAWKINS is remain available for further assistance as needed.

## 2023-11-16 ENCOUNTER — APPOINTMENT (EMERGENCY)
Dept: CT IMAGING | Facility: HOSPITAL | Age: 58
End: 2023-11-16
Payer: MEDICARE

## 2023-11-16 ENCOUNTER — HOSPITAL ENCOUNTER (EMERGENCY)
Facility: HOSPITAL | Age: 58
Discharge: HOME | End: 2023-11-16
Attending: EMERGENCY MEDICINE
Payer: MEDICARE

## 2023-11-16 VITALS
RESPIRATION RATE: 20 BRPM | OXYGEN SATURATION: 96 % | DIASTOLIC BLOOD PRESSURE: 85 MMHG | BODY MASS INDEX: 24.8 KG/M2 | SYSTOLIC BLOOD PRESSURE: 169 MMHG | HEART RATE: 92 BPM | WEIGHT: 135.6 LBS | TEMPERATURE: 98.9 F

## 2023-11-16 DIAGNOSIS — R51.9 HEADACHE: Primary | ICD-10-CM

## 2023-11-16 LAB
ALBUMIN SERPL BCP-MCNC: 4.3 G/DL (ref 3.5–5)
ALP SERPL-CCNC: 75 U/L (ref 34–104)
ALT SERPL W P-5'-P-CCNC: 14 U/L (ref 7–52)
ANION GAP SERPL CALCULATED.3IONS-SCNC: 8 MMOL/L
AST SERPL W P-5'-P-CCNC: 18 U/L (ref 13–39)
BASOPHILS # BLD AUTO: 0.02 THOUSANDS/ÂΜL (ref 0–0.1)
BASOPHILS NFR BLD AUTO: 0 % (ref 0–1)
BILIRUB SERPL-MCNC: 0.72 MG/DL (ref 0.2–1)
BUN SERPL-MCNC: 9 MG/DL (ref 5–25)
CALCIUM SERPL-MCNC: 9.6 MG/DL (ref 8.4–10.2)
CHLORIDE SERPL-SCNC: 100 MMOL/L (ref 96–108)
CO2 SERPL-SCNC: 27 MMOL/L (ref 21–32)
CREAT SERPL-MCNC: 0.7 MG/DL (ref 0.6–1.3)
EOSINOPHIL # BLD AUTO: 0.05 THOUSAND/ÂΜL (ref 0–0.61)
EOSINOPHIL NFR BLD AUTO: 1 % (ref 0–6)
ERYTHROCYTE [DISTWIDTH] IN BLOOD BY AUTOMATED COUNT: 12.7 % (ref 11.6–15.1)
GFR SERPL CREATININE-BSD FRML MDRD: 104 ML/MIN/1.73SQ M
GLUCOSE SERPL-MCNC: 88 MG/DL (ref 65–140)
HCT VFR BLD AUTO: 49.7 % (ref 36.5–49.3)
HGB BLD-MCNC: 16.9 G/DL (ref 12–17)
IMM GRANULOCYTES # BLD AUTO: 0.02 THOUSAND/UL (ref 0–0.2)
IMM GRANULOCYTES NFR BLD AUTO: 0 % (ref 0–2)
LYMPHOCYTES # BLD AUTO: 1.05 THOUSANDS/ÂΜL (ref 0.6–4.47)
LYMPHOCYTES NFR BLD AUTO: 15 % (ref 14–44)
MCH RBC QN AUTO: 31.8 PG (ref 26.8–34.3)
MCHC RBC AUTO-ENTMCNC: 34 G/DL (ref 31.4–37.4)
MCV RBC AUTO: 94 FL (ref 82–98)
MONOCYTES # BLD AUTO: 0.47 THOUSAND/ÂΜL (ref 0.17–1.22)
MONOCYTES NFR BLD AUTO: 7 % (ref 4–12)
NEUTROPHILS # BLD AUTO: 5.51 THOUSANDS/ÂΜL (ref 1.85–7.62)
NEUTS SEG NFR BLD AUTO: 77 % (ref 43–75)
NRBC BLD AUTO-RTO: 0 /100 WBCS
PLATELET # BLD AUTO: 149 THOUSANDS/UL (ref 149–390)
PMV BLD AUTO: 10.4 FL (ref 8.9–12.7)
POTASSIUM SERPL-SCNC: 4 MMOL/L (ref 3.5–5.3)
PROT SERPL-MCNC: 7.7 G/DL (ref 6.4–8.4)
RBC # BLD AUTO: 5.31 MILLION/UL (ref 3.88–5.62)
SODIUM SERPL-SCNC: 135 MMOL/L (ref 135–147)
WBC # BLD AUTO: 7.12 THOUSAND/UL (ref 4.31–10.16)

## 2023-11-16 PROCEDURE — 36415 COLL VENOUS BLD VENIPUNCTURE: CPT | Performed by: EMERGENCY MEDICINE

## 2023-11-16 PROCEDURE — 96361 HYDRATE IV INFUSION ADD-ON: CPT

## 2023-11-16 PROCEDURE — 99285 EMERGENCY DEPT VISIT HI MDM: CPT

## 2023-11-16 PROCEDURE — 99284 EMERGENCY DEPT VISIT MOD MDM: CPT | Performed by: EMERGENCY MEDICINE

## 2023-11-16 PROCEDURE — 96375 TX/PRO/DX INJ NEW DRUG ADDON: CPT

## 2023-11-16 PROCEDURE — G1004 CDSM NDSC: HCPCS

## 2023-11-16 PROCEDURE — 80053 COMPREHEN METABOLIC PANEL: CPT | Performed by: EMERGENCY MEDICINE

## 2023-11-16 PROCEDURE — 70450 CT HEAD/BRAIN W/O DYE: CPT

## 2023-11-16 PROCEDURE — 85025 COMPLETE CBC W/AUTO DIFF WBC: CPT | Performed by: EMERGENCY MEDICINE

## 2023-11-16 PROCEDURE — 96374 THER/PROPH/DIAG INJ IV PUSH: CPT

## 2023-11-16 RX ORDER — KETOROLAC TROMETHAMINE 30 MG/ML
15 INJECTION, SOLUTION INTRAMUSCULAR; INTRAVENOUS ONCE
Status: COMPLETED | OUTPATIENT
Start: 2023-11-16 | End: 2023-11-16

## 2023-11-16 RX ORDER — METOCLOPRAMIDE HYDROCHLORIDE 5 MG/ML
10 INJECTION INTRAMUSCULAR; INTRAVENOUS ONCE
Status: COMPLETED | OUTPATIENT
Start: 2023-11-16 | End: 2023-11-16

## 2023-11-16 RX ORDER — METOCLOPRAMIDE 10 MG/1
10 TABLET ORAL 2 TIMES DAILY PRN
Qty: 30 TABLET | Refills: 0 | Status: SHIPPED | OUTPATIENT
Start: 2023-11-16 | End: 2023-11-21 | Stop reason: ALTCHOICE

## 2023-11-16 RX ORDER — DIPHENHYDRAMINE HYDROCHLORIDE 50 MG/ML
25 INJECTION INTRAMUSCULAR; INTRAVENOUS ONCE
Status: COMPLETED | OUTPATIENT
Start: 2023-11-16 | End: 2023-11-16

## 2023-11-16 RX ORDER — MECLIZINE HCL 12.5 MG/1
25 TABLET ORAL ONCE
Status: COMPLETED | OUTPATIENT
Start: 2023-11-16 | End: 2023-11-16

## 2023-11-16 RX ADMIN — KETOROLAC TROMETHAMINE 15 MG: 30 INJECTION, SOLUTION INTRAMUSCULAR; INTRAVENOUS at 14:22

## 2023-11-16 RX ADMIN — SODIUM CHLORIDE 1000 ML: 0.9 INJECTION, SOLUTION INTRAVENOUS at 12:17

## 2023-11-16 RX ADMIN — METOCLOPRAMIDE 10 MG: 5 INJECTION, SOLUTION INTRAMUSCULAR; INTRAVENOUS at 12:17

## 2023-11-16 RX ADMIN — MECLIZINE 25 MG: 12.5 TABLET ORAL at 12:19

## 2023-11-16 RX ADMIN — DIPHENHYDRAMINE HYDROCHLORIDE 25 MG: 50 INJECTION, SOLUTION INTRAMUSCULAR; INTRAVENOUS at 12:17

## 2023-11-20 NOTE — ASSESSMENT & PLAN NOTE
Assessment:   Blood pressure at today’s office visit  170/90 uncontrolled. BP log systolic ranging from 384U to 195C and diastolic 21W-35E  Blood Pressure is above goal as per JNC-8 less than 140/90 mmHg,   Previous prescribed losartan 50 QD  Reports he is compliant   Aerobic Exercise recommendations and diet, compliant walks every day. Does not complain of snoring, being exhausted, sleepy,  headache or irritability during the day. Plan:   D/C current medication regiment. Start combination valsartan-hydrochlorothiazide 80-12.5 QD The patient was educated on the importance of medication compliance and to monitor her blood pressure at home daily in a quiet room; after resting for at least 5 minutes and to bring the logs at next visit.    Continue walking daily  recommend sodium and fat reduction   FU in 2 weeks

## 2023-11-20 NOTE — ASSESSMENT & PLAN NOTE
Currently endorses abdominal pain. Has been taking ibuprofen daily.   On exam epigastric tenderness absent     Plan:   D/C ibuprofen

## 2023-11-21 ENCOUNTER — OFFICE VISIT (OUTPATIENT)
Dept: FAMILY MEDICINE CLINIC | Facility: CLINIC | Age: 58
End: 2023-11-21

## 2023-11-21 VITALS
HEIGHT: 62 IN | DIASTOLIC BLOOD PRESSURE: 90 MMHG | BODY MASS INDEX: 24.77 KG/M2 | OXYGEN SATURATION: 97 % | SYSTOLIC BLOOD PRESSURE: 170 MMHG | HEART RATE: 89 BPM | RESPIRATION RATE: 18 BRPM | WEIGHT: 134.6 LBS | TEMPERATURE: 98.7 F

## 2023-11-21 DIAGNOSIS — R10.13 EPIGASTRIC PAIN: ICD-10-CM

## 2023-11-21 DIAGNOSIS — Z23 ENCOUNTER FOR IMMUNIZATION: ICD-10-CM

## 2023-11-21 DIAGNOSIS — I10 PRIMARY HYPERTENSION: Primary | ICD-10-CM

## 2023-11-21 PROCEDURE — G0008 ADMIN INFLUENZA VIRUS VAC: HCPCS | Performed by: FAMILY MEDICINE

## 2023-11-21 PROCEDURE — 3080F DIAST BP >= 90 MM HG: CPT | Performed by: FAMILY MEDICINE

## 2023-11-21 PROCEDURE — 3077F SYST BP >= 140 MM HG: CPT | Performed by: FAMILY MEDICINE

## 2023-11-21 PROCEDURE — 90686 IIV4 VACC NO PRSV 0.5 ML IM: CPT | Performed by: FAMILY MEDICINE

## 2023-11-21 PROCEDURE — 99214 OFFICE O/P EST MOD 30 MIN: CPT | Performed by: FAMILY MEDICINE

## 2023-11-21 RX ORDER — VALSARTAN AND HYDROCHLOROTHIAZIDE 80; 12.5 MG/1; MG/1
1 TABLET, FILM COATED ORAL DAILY
Qty: 30 TABLET | Refills: 5 | Status: SHIPPED | OUTPATIENT
Start: 2023-11-21

## 2023-11-21 NOTE — ASSESSMENT & PLAN NOTE
Patient agreed to receive flu shot. Patient has had the flu shot before and has not had any adverse reactions to any prior immunization.

## 2023-11-21 NOTE — PROGRESS NOTES
Assessment/Plan:    Primary hypertension  Assessment:   Blood pressure at today’s office visit  170/90 uncontrolled. BP log systolic ranging from 873E to 086O and diastolic 38J-96C  Blood Pressure is above goal as per JNC-8 less than 140/90 mmHg,   Previous prescribed losartan 50 QD  Reports he is compliant   Aerobic Exercise recommendations and diet, compliant walks every day. Does not complain of snoring, being exhausted, sleepy,  headache or irritability during the day. Plan:   D/C current medication regiment. Start combination valsartan-hydrochlorothiazide 80-12.5 QD The patient was educated on the importance of medication compliance and to monitor her blood pressure at home daily in a quiet room; after resting for at least 5 minutes and to bring the logs at next visit. Continue walking daily  recommend sodium and fat reduction   FU in 2 weeks      Epigastric pain    Currently endorses abdominal pain. Has been taking ibuprofen daily. On exam epigastric tenderness absent     Plan:   D/C ibuprofen      Diagnoses and all orders for this visit:    Primary hypertension  -     valsartan-hydrochlorothiazide (DIOVAN-HCT) 80-12.5 MG per tablet; Take 1 tablet by mouth daily    Epigastric pain    Encounter for immunization  -     influenza vaccine, quadrivalent, 0.5 mL, preservative-free, for adult and pediatric patients 6 mos+ (AFLURIA, FLUARIX, FLULAVAL, FLUZONE)          Subjective:      Patient ID: Chuck Swift is a 62 y.o. male. [address] [place] [address] 62 y.o. male , with past medical history of congenital deafness, epigastric pain, primary hypertension, type 2 diabetes mellitus comes to the clinic today for a BP recheck. When patient was asked about the medications they take, he had mentioned that he was only taking ibuprofen, metoclopramide and no other medications. When asked about his blood pressure medications he gave me the metoclopramide bottle.   When asked if he takes any other blood pressure medications he said this was his blood pressure medication. When asked if he has any other medications in his bag he took out the losartan. He said that he takes this 1 every day, but it hurts his stomach sometimes. Patient reports that he has been compliant and taking the losartan every day. He also noted that he takes 400 mg of ibuprofen twice a day daily as well as metoclopramide twice a day daily. I educated the patient that if he is not having headaches or dizziness/nausea he is not supposed to be taking these medications regularly. Patient has not been feeling dizziness or nausea, and only has occasional headaches. Patient denies any chest pain, changes in vision, shortness of breath, palpitations, nausea, vomiting, diarrhea, constipation. He says that he has a very mild cough currently but it does not bother him much. It comes and goes. Interpretor [facility] Darin Drummond [phone]    The following portions of the patient's history were reviewed and updated as appropriate: current medications and problem list.    Review of Systems   Constitutional:  Negative for chills and fever. HENT:  Negative for sore throat. Respiratory:  Positive for cough. Gastrointestinal:  Negative for abdominal pain, diarrhea, nausea and vomiting. Genitourinary:  Negative for dysuria and hematuria. Skin:  Negative for color change. Neurological:  Negative for syncope and headaches. Objective:      /90 (BP Location: Right arm, Patient Position: Sitting, Cuff Size: Standard)   Pulse 89   Temp 98.7 °F (37.1 °C) (Temporal)   Resp 18   Ht 5' 2" (1.575 m)   Wt 61.1 kg (134 lb 9.6 oz)   SpO2 97%   BMI 24.62 kg/m²          Physical Exam  Constitutional:       General: He is not in acute distress. Appearance: He is normal weight. He is not ill-appearing, toxic-appearing or diaphoretic. HENT:      Head: Normocephalic.       Nose: Nose normal.      Mouth/Throat:      Mouth: Mucous membranes are moist.   Eyes:      General: No scleral icterus. Cardiovascular:      Rate and Rhythm: Normal rate and regular rhythm. Pulses: Normal pulses. Heart sounds: Normal heart sounds. No murmur heard. No gallop. Pulmonary:      Effort: Pulmonary effort is normal. No respiratory distress. Breath sounds: Normal breath sounds. No wheezing or rales. Abdominal:      General: Abdomen is flat. Bowel sounds are normal. There is no distension. Palpations: Abdomen is soft. Tenderness: There is no abdominal tenderness. There is no guarding. Musculoskeletal:         General: No swelling. Right lower leg: No edema. Left lower leg: No edema. Skin:     General: Skin is warm. Capillary Refill: Capillary refill takes less than 2 seconds. Neurological:      Mental Status: He is oriented to person, place, and time.    Psychiatric:         Mood and Affect: Mood normal.

## 2023-11-27 NOTE — ED PROVIDER NOTES
History  Chief Complaint   Patient presents with    Headache     Pt is deaf Headache, hx vertigo. "Meclizine not working ibuprofen making his belly upset" sore throat     63 yo male presenting to the emergency department with headache. Patient is deaf, history of vertigo. Notes that dizziness despite taking the meclizine. Also no sore throat. Sudden onset headache. Prior to Admission Medications   Prescriptions Last Dose Informant Patient Reported? Taking? Blood Pressure KIT  Self No No   Sig: Use 2 (two) times a day   hydrocortisone-acetic acid (VOSOL-HC) otic solution   No No   Sig: Administer 3 drops into both ears every 6 (six) hours   omeprazole (PriLOSEC) 20 mg delayed release capsule   No No   Sig: Take 1 capsule (20 mg total) by mouth daily before breakfast      Facility-Administered Medications: None       Past Medical History:   Diagnosis Date    Deaf     Heart murmur        History reviewed. No pertinent surgical history. Family History   Problem Relation Age of Onset    Hypertension Mother     Diabetes Mother     Heart disease Mother         pacemaker    Arthritis Mother         chronic     I have reviewed and agree with the history as documented. E-Cigarette/Vaping    E-Cigarette Use Never User      E-Cigarette/Vaping Substances    Nicotine No     THC No     CBD No     Flavoring No     Other No     Unknown No      Social History     Tobacco Use    Smoking status: Never     Passive exposure: Never    Smokeless tobacco: Never   Vaping Use    Vaping Use: Never used   Substance Use Topics    Alcohol use: Yes     Comment: 1 beer occasionally    Drug use: Never       Review of Systems   Constitutional:  Negative for chills and fever. HENT:  Negative for ear pain and sore throat. Eyes:  Negative for pain and visual disturbance. Respiratory:  Negative for cough and shortness of breath. Cardiovascular:  Negative for chest pain and palpitations.    Gastrointestinal:  Negative for abdominal pain and vomiting. Genitourinary:  Negative for dysuria and hematuria. Musculoskeletal:  Negative for arthralgias and back pain. Skin:  Negative for color change and rash. Neurological:  Positive for dizziness and headaches. Negative for seizures and syncope. All other systems reviewed and are negative. Physical Exam  Physical Exam  Vitals and nursing note reviewed. Constitutional:       General: He is not in acute distress. Appearance: He is well-developed. HENT:      Head: Normocephalic and atraumatic. Eyes:      Conjunctiva/sclera: Conjunctivae normal.   Cardiovascular:      Rate and Rhythm: Normal rate and regular rhythm. Heart sounds: No murmur heard. Pulmonary:      Effort: Pulmonary effort is normal. No respiratory distress. Breath sounds: Normal breath sounds. Abdominal:      Palpations: Abdomen is soft. Tenderness: There is no abdominal tenderness. Musculoskeletal:         General: No swelling. Cervical back: Neck supple. Skin:     General: Skin is warm and dry. Capillary Refill: Capillary refill takes less than 2 seconds. Neurological:      Mental Status: He is alert.    Psychiatric:         Mood and Affect: Mood normal.         Vital Signs  ED Triage Vitals [11/16/23 1119]   Temperature Pulse Respirations Blood Pressure SpO2   98.9 °F (37.2 °C) 92 20 169/85 96 %      Temp Source Heart Rate Source Patient Position - Orthostatic VS BP Location FiO2 (%)   Tympanic -- Sitting Left arm --      Pain Score       8           Vitals:    11/16/23 1119   BP: 169/85   Pulse: 92   Patient Position - Orthostatic VS: Sitting         Visual Acuity  Visual Acuity      Flowsheet Row Most Recent Value   L Pupil Size (mm) 3   R Pupil Size (mm) 3            ED Medications  Medications   sodium chloride 0.9 % bolus 1,000 mL (0 mL Intravenous Stopped 11/16/23 1423)   metoclopramide (REGLAN) injection 10 mg (10 mg Intravenous Given 11/16/23 1217) diphenhydrAMINE (BENADRYL) injection 25 mg (25 mg Intravenous Given 11/16/23 1217)   meclizine (ANTIVERT) tablet 25 mg (25 mg Oral Given 11/16/23 1219)   ketorolac (TORADOL) injection 15 mg (15 mg Intravenous Given 11/16/23 1422)       Diagnostic Studies  Results Reviewed       Procedure Component Value Units Date/Time    Comprehensive metabolic panel [[id number]] Collected: 11/16/23 1216    Lab Status: Final result Specimen: Blood from Arm, Left Updated: 11/16/23 1300     Sodium 135 mmol/L      Potassium 4.0 mmol/L      Chloride 100 mmol/L      CO2 27 mmol/L      ANION GAP 8 mmol/L      BUN 9 mg/dL      Creatinine 0.70 mg/dL      Glucose 88 mg/dL      Calcium 9.6 mg/dL      AST 18 U/L      ALT 14 U/L      Alkaline Phosphatase 75 U/L      Total Protein 7.7 g/dL      Albumin 4.3 g/dL      Total Bilirubin 0.72 mg/dL      eGFR 104 ml/min/1.73sq m     Narrative:      [place] guidelines for Chronic Kidney Disease (CKD):     Stage 1 with normal or high GFR (GFR > 90 mL/min/1.73 square meters)    Stage 2 Mild CKD (GFR = 60-89 mL/min/1.73 square meters)    Stage 3A Moderate CKD (GFR = 45-59 mL/min/1.73 square meters)    Stage 3B Moderate CKD (GFR = 30-44 mL/min/1.73 square meters)    Stage 4 Severe CKD (GFR = 15-29 mL/min/1.73 square meters)    Stage 5 End Stage CKD (GFR <15 mL/min/1.73 square meters)  Note: GFR calculation is accurate only with a steady state creatinine    CBC and differential [[id number]]  (Abnormal) Collected: 11/16/23 1216    Lab Status: Final result Specimen: Blood from Arm, Left Updated: 11/16/23 1228     WBC 7.12 Thousand/uL      RBC 5.31 Million/uL      Hemoglobin 16.9 g/dL      Hematocrit 49.7 %      MCV 94 fL      MCH 31.8 pg      MCHC 34.0 g/dL      RDW 12.7 %      MPV 10.4 fL      Platelets 872 Thousands/uL      nRBC 0 /100 WBCs      Neutrophils Relative 77 %      Immat GRANS % 0 %      Lymphocytes Relative 15 %      Monocytes Relative 7 %      Eosinophils Relative 1 % Basophils Relative 0 %      Neutrophils Absolute 5.51 Thousands/µL      Immature Grans Absolute 0.02 Thousand/uL      Lymphocytes Absolute 1.05 Thousands/µL      Monocytes Absolute 0.47 Thousand/µL      Eosinophils Absolute 0.05 Thousand/µL      Basophils Absolute 0.02 Thousands/µL                    CT head without contrast   Final Result by Sean Landon MD (11/16 1333)      No acute intracranial abnormality. Workstation performed: [id number]                    Procedures  Procedures         ED Course                                             Medical Decision Making  49-year-old male presenting to the emergency department with headache and dizziness. Differential diagnosis includes subarachnoid hemorrhage, stroke. CT head shows no intracranial hemorrhage. Neurologic exam normal in the ED except for nystagmus. Dizziness resolved after meclizine, PCP follow-up. Amount and/or Complexity of Data Reviewed  Labs: ordered. Radiology: ordered. Risk  Prescription drug management. Disposition  Final diagnoses:   Headache     Time reflects when diagnosis was documented in both MDM as applicable and the Disposition within this note       Time User Action Codes Description Comment    11/16/2023  1:56 PM Ness Corona [R51.9] Headache           ED Disposition       ED Disposition   Discharge    Condition   Stable    Date/Time   u Nov 16, 2023  1:56 PM    [address] discharge to home/self care.                    Follow-up Information       Follow up With Specialties Details Why Contact Info Additional [facility]   [address], [address] [facility] [address]  [address] [facility], [address] [facility], [address] [facility]            Discharge Medication List as of 11/16/2023  1:58 PM        START taking these medications    Details   metoclopramide (Reglan) 10 mg tablet Take 1 tablet (10 mg total) by mouth 2 (two) times a day as needed (headache), Starting Thu 11/16/2023, Normal           CONTINUE these medications which have NOT CHANGED    Details   Blood Pressure KIT Use 2 (two) times a day, Starting Wed 11/3/2021, Normal      hydrocortisone-acetic acid (VOSOL-HC) otic solution Administer 3 drops into both ears every 6 (six) hours, Starting Tue 9/26/2023, Normal      omeprazole (PriLOSEC) 20 mg delayed release capsule Take 1 capsule (20 mg total) by mouth daily before breakfast, Starting Tue 9/26/2023, Until Fri 9/20/2024, Normal      ibuprofen (MOTRIN) 400 mg tablet Take 1 tablet (400 mg total) by mouth every 6 (six) hours as needed for mild pain (Body aches or fever) for up to 7 days, Starting Sat 9/30/2023, Until Sat 10/7/2023 at 2359, Normal      losartan (COZAAR) 50 mg tablet Take 1 tablet (50 mg total) by mouth daily, Starting Tue 11/7/2023, Until Mon 2/5/2024, Normal             No discharge procedures on file.     PDMP Review       None            ED Provider  Electronically Signed by             Hardeep Byrd MD  11/27/23 6167

## 2023-12-04 ENCOUNTER — APPOINTMENT (EMERGENCY)
Dept: CT IMAGING | Facility: HOSPITAL | Age: 58
End: 2023-12-04
Payer: MEDICARE

## 2023-12-04 ENCOUNTER — HOSPITAL ENCOUNTER (EMERGENCY)
Facility: HOSPITAL | Age: 58
Discharge: HOME | End: 2023-12-04
Attending: EMERGENCY MEDICINE | Admitting: EMERGENCY MEDICINE
Payer: MEDICARE

## 2023-12-04 VITALS
HEART RATE: 86 BPM | OXYGEN SATURATION: 97 % | DIASTOLIC BLOOD PRESSURE: 93 MMHG | SYSTOLIC BLOOD PRESSURE: 165 MMHG | WEIGHT: 135.8 LBS | TEMPERATURE: 99 F | BODY MASS INDEX: 24.84 KG/M2 | RESPIRATION RATE: 18 BRPM

## 2023-12-04 DIAGNOSIS — R10.13 EPIGASTRIC PAIN: ICD-10-CM

## 2023-12-04 DIAGNOSIS — I10 PRIMARY HYPERTENSION: Primary | ICD-10-CM

## 2023-12-04 LAB
ALBUMIN SERPL BCP-MCNC: 3.9 G/DL (ref 3.5–5)
ALP SERPL-CCNC: 71 U/L (ref 34–104)
ALT SERPL W P-5'-P-CCNC: 11 U/L (ref 7–52)
ANION GAP SERPL CALCULATED.3IONS-SCNC: 8 MMOL/L
AST SERPL W P-5'-P-CCNC: 17 U/L (ref 13–39)
ATRIAL RATE: 76 BPM
BASOPHILS # BLD AUTO: 0.02 THOUSANDS/ÂΜL (ref 0–0.1)
BASOPHILS NFR BLD AUTO: 0 % (ref 0–1)
BILIRUB SERPL-MCNC: 0.59 MG/DL (ref 0.2–1)
BUN SERPL-MCNC: 8 MG/DL (ref 5–25)
CALCIUM SERPL-MCNC: 9.2 MG/DL (ref 8.4–10.2)
CHLORIDE SERPL-SCNC: 101 MMOL/L (ref 96–108)
CO2 SERPL-SCNC: 27 MMOL/L (ref 21–32)
CREAT SERPL-MCNC: 0.69 MG/DL (ref 0.6–1.3)
EOSINOPHIL # BLD AUTO: 0.01 THOUSAND/ÂΜL (ref 0–0.61)
EOSINOPHIL NFR BLD AUTO: 0 % (ref 0–6)
ERYTHROCYTE [DISTWIDTH] IN BLOOD BY AUTOMATED COUNT: 12.8 % (ref 11.6–15.1)
GFR SERPL CREATININE-BSD FRML MDRD: 104 ML/MIN/1.73SQ M
GLUCOSE SERPL-MCNC: 86 MG/DL (ref 65–140)
HCT VFR BLD AUTO: 46.2 % (ref 36.5–49.3)
HGB BLD-MCNC: 16.2 G/DL (ref 12–17)
IMM GRANULOCYTES # BLD AUTO: 0.02 THOUSAND/UL (ref 0–0.2)
IMM GRANULOCYTES NFR BLD AUTO: 0 % (ref 0–2)
LIPASE SERPL-CCNC: 20 U/L (ref 11–82)
LYMPHOCYTES # BLD AUTO: 1.12 THOUSANDS/ÂΜL (ref 0.6–4.47)
LYMPHOCYTES NFR BLD AUTO: 15 % (ref 14–44)
MCH RBC QN AUTO: 32.2 PG (ref 26.8–34.3)
MCHC RBC AUTO-ENTMCNC: 35.1 G/DL (ref 31.4–37.4)
MCV RBC AUTO: 92 FL (ref 82–98)
MONOCYTES # BLD AUTO: 0.61 THOUSAND/ÂΜL (ref 0.17–1.22)
MONOCYTES NFR BLD AUTO: 8 % (ref 4–12)
NEUTROPHILS # BLD AUTO: 5.79 THOUSANDS/ÂΜL (ref 1.85–7.62)
NEUTS SEG NFR BLD AUTO: 77 % (ref 43–75)
NRBC BLD AUTO-RTO: 0 /100 WBCS
P AXIS: 55 DEGREES
PLATELET # BLD AUTO: 143 THOUSANDS/UL (ref 149–390)
PMV BLD AUTO: 10.2 FL (ref 8.9–12.7)
POTASSIUM SERPL-SCNC: 4.2 MMOL/L (ref 3.5–5.3)
PR INTERVAL: 182 MS
PROT SERPL-MCNC: 7.2 G/DL (ref 6.4–8.4)
QRS AXIS: 41 DEGREES
QRSD INTERVAL: 98 MS
QT INTERVAL: 372 MS
QTC INTERVAL: 418 MS
RBC # BLD AUTO: 5.03 MILLION/UL (ref 3.88–5.62)
SODIUM SERPL-SCNC: 136 MMOL/L (ref 135–147)
T WAVE AXIS: 83 DEGREES
VENTRICULAR RATE: 76 BPM
WBC # BLD AUTO: 7.57 THOUSAND/UL (ref 4.31–10.16)

## 2023-12-04 PROCEDURE — 85025 COMPLETE CBC W/AUTO DIFF WBC: CPT | Performed by: EMERGENCY MEDICINE

## 2023-12-04 PROCEDURE — 96375 TX/PRO/DX INJ NEW DRUG ADDON: CPT

## 2023-12-04 PROCEDURE — 99285 EMERGENCY DEPT VISIT HI MDM: CPT | Performed by: EMERGENCY MEDICINE

## 2023-12-04 PROCEDURE — 36415 COLL VENOUS BLD VENIPUNCTURE: CPT | Performed by: EMERGENCY MEDICINE

## 2023-12-04 PROCEDURE — G1004 CDSM NDSC: HCPCS

## 2023-12-04 PROCEDURE — 70450 CT HEAD/BRAIN W/O DYE: CPT

## 2023-12-04 PROCEDURE — 83690 ASSAY OF LIPASE: CPT | Performed by: EMERGENCY MEDICINE

## 2023-12-04 PROCEDURE — 96374 THER/PROPH/DIAG INJ IV PUSH: CPT

## 2023-12-04 PROCEDURE — 99285 EMERGENCY DEPT VISIT HI MDM: CPT

## 2023-12-04 PROCEDURE — 80053 COMPREHEN METABOLIC PANEL: CPT | Performed by: EMERGENCY MEDICINE

## 2023-12-04 PROCEDURE — 96361 HYDRATE IV INFUSION ADD-ON: CPT

## 2023-12-04 PROCEDURE — 93005 ELECTROCARDIOGRAM TRACING: CPT

## 2023-12-04 RX ORDER — METOCLOPRAMIDE HYDROCHLORIDE 5 MG/ML
10 INJECTION INTRAMUSCULAR; INTRAVENOUS ONCE
Status: COMPLETED | OUTPATIENT
Start: 2023-12-04 | End: 2023-12-04

## 2023-12-04 RX ORDER — MAGNESIUM HYDROXIDE/ALUMINUM HYDROXICE/SIMETHICONE 120; 1200; 1200 MG/30ML; MG/30ML; MG/30ML
30 SUSPENSION ORAL ONCE
Status: COMPLETED | OUTPATIENT
Start: 2023-12-04 | End: 2023-12-04

## 2023-12-04 RX ORDER — DIPHENHYDRAMINE HYDROCHLORIDE 50 MG/ML
25 INJECTION INTRAMUSCULAR; INTRAVENOUS ONCE
Status: COMPLETED | OUTPATIENT
Start: 2023-12-04 | End: 2023-12-04

## 2023-12-04 RX ORDER — ONDANSETRON 4 MG/1
4 TABLET, ORALLY DISINTEGRATING ORAL EVERY 6 HOURS PRN
Qty: 20 TABLET | Refills: 0 | Status: SHIPPED | OUTPATIENT
Start: 2023-12-04

## 2023-12-04 RX ORDER — AMLODIPINE BESYLATE 5 MG/1
10 TABLET ORAL ONCE
Status: COMPLETED | OUTPATIENT
Start: 2023-12-04 | End: 2023-12-04

## 2023-12-04 RX ORDER — AMLODIPINE BESYLATE 10 MG/1
10 TABLET ORAL DAILY
Qty: 30 TABLET | Refills: 0 | Status: SHIPPED | OUTPATIENT
Start: 2023-12-04 | End: 2024-01-03

## 2023-12-04 RX ORDER — PANTOPRAZOLE SODIUM 20 MG/1
20 TABLET, DELAYED RELEASE ORAL DAILY
Qty: 20 TABLET | Refills: 0 | Status: SHIPPED | OUTPATIENT
Start: 2023-12-04

## 2023-12-04 RX ORDER — KETOROLAC TROMETHAMINE 30 MG/ML
15 INJECTION, SOLUTION INTRAMUSCULAR; INTRAVENOUS ONCE
Status: COMPLETED | OUTPATIENT
Start: 2023-12-04 | End: 2023-12-04

## 2023-12-04 RX ADMIN — DIPHENHYDRAMINE HYDROCHLORIDE 25 MG: 50 INJECTION, SOLUTION INTRAMUSCULAR; INTRAVENOUS at 12:33

## 2023-12-04 RX ADMIN — AMLODIPINE BESYLATE 10 MG: 5 TABLET ORAL at 13:53

## 2023-12-04 RX ADMIN — METOCLOPRAMIDE 10 MG: 5 INJECTION, SOLUTION INTRAMUSCULAR; INTRAVENOUS at 12:37

## 2023-12-04 RX ADMIN — KETOROLAC TROMETHAMINE 15 MG: 30 INJECTION, SOLUTION INTRAMUSCULAR; INTRAVENOUS at 12:41

## 2023-12-04 RX ADMIN — SODIUM CHLORIDE 1000 ML: 0.9 INJECTION, SOLUTION INTRAVENOUS at 12:36

## 2023-12-04 RX ADMIN — ALUMINUM HYDROXIDE, MAGNESIUM HYDROXIDE, AND SIMETHICONE 30 ML: 200; 200; 20 SUSPENSION ORAL at 12:32

## 2023-12-04 NOTE — ED PROVIDER NOTES
History  Chief Complaint   Patient presents with    High Blood Pressure     Pt complains of dizziness, headache, and stomach pain. Blood pressures have been high for several weeks per paper notes. Pt has abdominal pains for four and been struggling to eat. Abdominal Pain     60-year-old male presenting to the emergency department with dizziness headaches, pain. Patient has had throbbing headache. Ongoing for few weeks now. Notes poor appetite over the past 5 to 6 days due to headache.  used for history and physical.        Prior to Admission Medications   Prescriptions Last Dose Informant Patient Reported? Taking? Blood Pressure KIT  Self No No   Sig: Use 2 (two) times a day   hydrocortisone-acetic acid (VOSOL-HC) otic solution   No No   Sig: Administer 3 drops into both ears every 6 (six) hours   omeprazole (PriLOSEC) 20 mg delayed release capsule   No No   Sig: Take 1 capsule (20 mg total) by mouth daily before breakfast   valsartan-hydrochlorothiazide (DIOVAN-HCT) 80-12.5 MG per tablet   No No   Sig: Take 1 tablet by mouth daily      Facility-Administered Medications: None       Past Medical History:   Diagnosis Date    Deaf     Heart murmur        History reviewed. No pertinent surgical history. Family History   Problem Relation Age of Onset    Hypertension Mother     Diabetes Mother     Heart disease Mother         pacemaker    Arthritis Mother         chronic     I have reviewed and agree with the history as documented.     E-Cigarette/Vaping    E-Cigarette Use Never User      E-Cigarette/Vaping Substances    Nicotine No     THC No     CBD No     Flavoring No     Other No     Unknown No      Social History     Tobacco Use    Smoking status: Never     Passive exposure: Never    Smokeless tobacco: Never   Vaping Use    Vaping Use: Never used   Substance Use Topics    Alcohol use: Yes     Comment: 1 beer occasionally    Drug use: Never       Review of Systems   Constitutional:  Positive for appetite change. Negative for chills and fever. HENT:  Negative for ear pain and sore throat. Eyes:  Negative for pain and visual disturbance. Respiratory:  Negative for cough and shortness of breath. Cardiovascular:  Negative for chest pain and palpitations. Gastrointestinal:  Positive for abdominal pain. Negative for vomiting. Genitourinary:  Negative for dysuria and hematuria. Musculoskeletal:  Negative for arthralgias and back pain. Skin:  Negative for color change and rash. Neurological:  Positive for headaches. Negative for seizures and syncope. All other systems reviewed and are negative. Physical Exam  Physical Exam  Vitals and nursing note reviewed. Constitutional:       General: He is not in acute distress. Appearance: He is well-developed. HENT:      Head: Normocephalic and atraumatic. Eyes:      Conjunctiva/sclera: Conjunctivae normal.   Cardiovascular:      Rate and Rhythm: Normal rate and regular rhythm. Heart sounds: No murmur heard. Pulmonary:      Effort: Pulmonary effort is normal. No respiratory distress. Breath sounds: Normal breath sounds. Abdominal:      Palpations: Abdomen is soft. Tenderness: There is no abdominal tenderness. Musculoskeletal:         General: No swelling. Cervical back: Neck supple. Skin:     General: Skin is warm and dry. Capillary Refill: Capillary refill takes less than 2 seconds. Neurological:      Mental Status: He is alert.    Psychiatric:         Mood and Affect: Mood normal.         Vital Signs  ED Triage Vitals   Temperature Pulse Respirations Blood Pressure SpO2   12/04/23 1220 12/04/23 1220 12/04/23 1220 12/04/23 1220 12/04/23 1220   99 °F (37.2 °C) 86 18 (!) 197/105 97 %      Temp Source Heart Rate Source Patient Position - Orthostatic VS BP Location FiO2 (%)   12/04/23 1220 12/04/23 1220 12/04/23 1220 12/04/23 1220 --   Tympanic Monitor Lying Left arm       Pain Score       12/04/23 1241 6           Vitals:    12/04/23 1220 12/04/23 1353   BP: (!) 197/105 165/93   Pulse: 86    Patient Position - Orthostatic VS: Lying          Visual Acuity      ED Medications  Medications   sodium chloride 0.9 % bolus 1,000 mL (0 mL Intravenous Stopped 12/4/23 1355)   aluminum-magnesium hydroxide-simethicone (MAALOX) oral suspension 30 mL (30 mL Oral Given 12/4/23 1232)   ketorolac (TORADOL) injection 15 mg (15 mg Intravenous Given 12/4/23 1241)   metoclopramide (REGLAN) injection 10 mg (10 mg Intravenous Given 12/4/23 1237)   diphenhydrAMINE (BENADRYL) injection 25 mg (25 mg Intravenous Given 12/4/23 1233)   amLODIPine (NORVASC) tablet 10 mg (10 mg Oral Given 12/4/23 1353)       Diagnostic Studies  Results Reviewed       Procedure Component Value Units Date/Time    Comprehensive metabolic panel [[id number]] Collected: 12/04/23 1230    Lab Status: Final result Specimen: Blood from Arm, Right Updated: 12/04/23 1306     Sodium 136 mmol/L      Potassium 4.2 mmol/L      Chloride 101 mmol/L      CO2 27 mmol/L      ANION GAP 8 mmol/L      BUN 8 mg/dL      Creatinine 0.69 mg/dL      Glucose 86 mg/dL      Calcium 9.2 mg/dL      AST 17 U/L      ALT 11 U/L      Alkaline Phosphatase 71 U/L      Total Protein 7.2 g/dL      Albumin 3.9 g/dL      Total Bilirubin 0.59 mg/dL      eGFR 104 ml/min/1.73sq m     Narrative:      [place] guidelines for Chronic Kidney Disease (CKD):     Stage 1 with normal or high GFR (GFR > 90 mL/min/1.73 square meters)    Stage 2 Mild CKD (GFR = 60-89 mL/min/1.73 square meters)    Stage 3A Moderate CKD (GFR = 45-59 mL/min/1.73 square meters)    Stage 3B Moderate CKD (GFR = 30-44 mL/min/1.73 square meters)    Stage 4 Severe CKD (GFR = 15-29 mL/min/1.73 square meters)    Stage 5 End Stage CKD (GFR <15 mL/min/1.73 square meters)  Note: GFR calculation is accurate only with a steady state creatinine    Lipase [[id number]]  (Normal) Collected: 12/04/23 1230    Lab Status: Final result Specimen: Blood from Arm, Right Updated: 12/04/23 1306     Lipase 20 u/L     CBC and differential [[id number]]  (Abnormal) Collected: 12/04/23 1230    Lab Status: Final result Specimen: Blood from Arm, Right Updated: 12/04/23 1243     WBC 7.57 Thousand/uL      RBC 5.03 Million/uL      Hemoglobin 16.2 g/dL      Hematocrit 46.2 %      MCV 92 fL      MCH 32.2 pg      MCHC 35.1 g/dL      RDW 12.8 %      MPV 10.2 fL      Platelets 623 Thousands/uL      nRBC 0 /100 WBCs      Neutrophils Relative 77 %      Immat GRANS % 0 %      Lymphocytes Relative 15 %      Monocytes Relative 8 %      Eosinophils Relative 0 %      Basophils Relative 0 %      Neutrophils Absolute 5.79 Thousands/µL      Immature Grans Absolute 0.02 Thousand/uL      Lymphocytes Absolute 1.12 Thousands/µL      Monocytes Absolute 0.61 Thousand/µL      Eosinophils Absolute 0.01 Thousand/µL      Basophils Absolute 0.02 Thousands/µL                    CT head without contrast   Final Result by Sugar Magallon MD (12/04 1340)      No acute intracranial hemorrhage seen no mass effect or midline shift seen                  Workstation performed: [id number]                    Procedures  Procedures         ED Course                               SBIRT 22yo+      Flowsheet Row Most Recent Value   Initial Alcohol Screen: US AUDIT-C     1. How often do you have a drink containing alcohol? 0 Filed at: 12/04/2023 1222   2. How many drinks containing alcohol do you have on a typical day you are drinking? 0 Filed at: 12/04/2023 1222   3a. Male UNDER 65: How often do you have five or more drinks on one occasion? 0 Filed at: 12/04/2023 1222   Audit-C Score 0 Filed at: 12/04/2023 1222   TJ: How many times in the past year have you. .. Used an illegal drug or used a prescription medication for non-medical reasons? Never Filed at: 12/04/2023 1222                      Medical Decision Making  71-year-old male with throbbing headache, epigastric abdominal pain.   No abdominal tenderness to palpation. Likely migraine headache but CT ordered due to prolonged headache. CT shows no intracranial hemorrhage or signs of stroke. Amount and/or Complexity of Data Reviewed  Labs: ordered. Radiology: ordered. Risk  OTC drugs. Prescription drug management. Disposition  Final diagnoses:   Primary hypertension   Epigastric pain     Time reflects when diagnosis was documented in both MDM as applicable and the Disposition within this note       Time User Action Codes Description Comment    12/4/2023  2:51 PM Abad, [address] [facility] Primary hypertension     12/4/2023  2:52 PM Yessica Young Add [R10.13] Epigastric pain           ED Disposition       ED Disposition   Discharge    Condition   Stable    Date/Time   Mon Dec 4, 2023 [address] [facility] discharge to home/self care.                    Follow-up Information       Follow up With Specialties Details Why Contact Info [facility]   [address] [address]  [address] [facility], [address] [facility], [address] [facility]            Patient's Medications   Discharge Prescriptions    AMLODIPINE (NORVASC) 10 MG TABLET    Take 1 tablet (10 mg total) by mouth daily       Start Date: 12/4/2023 End Date: 1/3/2024       Order Dose: 10 mg       Quantity: 30 tablet    Refills: 0    ONDANSETRON (ZOFRAN-ODT) 4 MG DISINTEGRATING TABLET    Take 1 tablet (4 mg total) by mouth every 6 (six) hours as needed for nausea or vomiting       Start Date: 12/4/2023 End Date: --       Order Dose: 4 mg       Quantity: 20 tablet    Refills: 0    PANTOPRAZOLE (PROTONIX) 20 MG TABLET    Take 1 tablet (20 mg total) by mouth daily       Start Date: 12/4/2023 End Date: --       Order Dose: 20 mg       Quantity: 20 tablet    Refills: 0       No discharge procedures on file.     PDMP Review       None            ED Provider  Electronically Signed by             Nataliia Mckay MD  12/04/23 0145

## 2023-12-08 ENCOUNTER — TELEPHONE (OUTPATIENT)
Dept: FAMILY MEDICINE CLINIC | Facility: CLINIC | Age: 58
End: 2023-12-08

## 2023-12-08 NOTE — TELEPHONE ENCOUNTER
PCP SIGNATURE NEEDED FOR [facility] FORM RECEIVED VIA FAX AND PLACED IN PCP FOLDER TO BE DELIVERED AT ASSIGNED TIMES.       Prescriber response

## 2023-12-22 ENCOUNTER — TELEPHONE (OUTPATIENT)
Dept: FAMILY MEDICINE CLINIC | Facility: CLINIC | Age: 58
End: 2023-12-22

## 2023-12-22 NOTE — TELEPHONE ENCOUNTER
Pt came into office requesting medication refill for     pantoprazole (PROTONIX) 20 mg tablet   amLODIPine (NORVASC) 10 mg tablet     Please send to pharmacy on file

## 2024-01-02 DIAGNOSIS — R10.13 EPIGASTRIC PAIN: ICD-10-CM

## 2024-01-02 DIAGNOSIS — I10 PRIMARY HYPERTENSION: ICD-10-CM

## 2024-01-02 RX ORDER — OMEPRAZOLE 20 MG/1
20 CAPSULE, DELAYED RELEASE ORAL
Qty: 90 CAPSULE | Refills: 3 | Status: SHIPPED | OUTPATIENT
Start: 2024-01-02 | End: 2024-12-27

## 2024-01-03 NOTE — PROGRESS NOTES
Per 11/21 note patient is taking valsartan-hydrochlorothiazide 80-12.5 mg tablet for HTN and was advised to return to the office for f/u.   Patient seen in ED on 12/4 where was prescribed 30 tab of amlodipine.   I believe prior to refilling amlodipine as requested patient should have a f/u visit vit PCP.     Also per chart review patient is on omeprazole so will not be refilling pantoprazole. Will refill omeprazole instead.   
No

## 2024-01-29 NOTE — PROGRESS NOTES
Assessment/Plan:    Primary hypertension  Blood Pressure is above goal as per JNC-8 greater than 140/90 mmHg, but much better controlled than last few visits.   Compliant with 10 mg amlodipine daily.  Blood Pressure: 150/74        Plan:   Continue amlodipine 10 mg. The patient was educated on the importance of medication compliance and to monitor his blood pressure at home daily in a quiet room; after resting for at least 5 minutes and to bring the logs at next visit.   Exercise daily 30 minutes per day   recommend sodium and fat reduction   FU in 3 months       Type 2 diabetes mellitus without complication, without long-term current use of insulin ([facility])    Lab Results   Component Value Date    HGBA1C 5.6 03/16/2023     Currently diet controlled  Asymptomatic     Plan:   Recheck Hba1c   Lipid panel       Other hyperlipidemia  Last lipid panel : total - 230,      Was previously taking atorvostatin but it was discontinued per chart review due to completion of therapy.     Plan:   Hba1c   Lipid panel    Diastolic dysfunction without heart failure  Last echo: grade 1 diastolic dysfunction   Not seen by cardiology in since 2021.    Plan:   echo      Ear pain, bilateral  Was previously given otic steroid drops for ears to use every 6 hours as needed most likely due to chronic dermatitis of the ear canal.   Patient is non-compliant with its use, only using it a few days a week.     Plan:   Use otic drops every 6 hours as needed   Will re-evaluate at follow-up in 3 months.     Epigastric pain  Patient currently does not have epigastric pain, but was given a supply in the ED and has greatly improved epigastric pain     Plan:   Refill pantoprazole      Diagnoses and all orders for this visit:    Type 2 diabetes mellitus without complication, without long-term current use of insulin ([facility])  -     Hemoglobin A1C; Future  -     Albumin / creatinine urine ratio; Future  -     Lipid panel; Future    Primary hypertension  -      Echo complete w/ contrast if indicated; Future  -     amLODIPine (NORVASC) 10 mg tablet; Take 1 tablet (10 mg total) by mouth daily    Epigastric pain  -     pantoprazole (PROTONIX) 20 mg tablet; Take 1 tablet (20 mg total) by mouth daily before breakfast    Diastolic dysfunction without heart failure    Ear pain, bilateral          Subjective:      Patient ID: Mal Encarnacion is a 58 y.o. male. Use of [employer] interpretor [id number]    He is here for a follow up for his blood pressure . He has been taking amlodipine 10 mg everyday. He has not been taking the other medication prescribed to him at the last visit, which was valsartan-hydrchlorothiazide. He said he does not have any other blood pressure medications that he got other than the amlodipine. Patient is a poor historian, and requires several repetitions and even then does not answer questions.     He also complains of some ear pain both ears for many years, and rates the pain out of 2/10, and is constantly painful throughout the day. no yellow discharge or  blood seen. Previously used eardrops, not using it every day.  Has not seen a cardiologist since 2021 for blood pressure control.     HPI    The following portions of the patient's history were reviewed and updated as appropriate: current medications and problem list.    Review of Systems   Constitutional:  Negative for chills and fever.   HENT:  Negative for congestion, ear discharge, sore throat and trouble swallowing.    Respiratory:  Negative for chest tightness and shortness of breath.    Cardiovascular:  Negative for chest pain and palpitations.   Gastrointestinal:  Negative for abdominal pain, blood in stool, constipation and diarrhea.   Endocrine: Negative for polyuria.   Genitourinary:  Negative for dysuria and hematuria.   Skin:  Negative for rash.   Neurological:  Negative for dizziness and light-headedness.         Objective:      /74 (BP Location: Right arm, Patient Position: Sitting, Cuff  "Size: Standard)   Pulse 89   Temp (!) 96.8 °F (36 °C) (Temporal)   Resp 18   Ht 5' 1\" (1.549 m)   Wt 60.9 kg (134 lb 4 oz)   SpO2 96%   BMI 25.37 kg/m²          Physical Exam  Constitutional:       General: He is not in acute distress.     Appearance: Normal appearance. He is normal weight. He is not toxic-appearing.   HENT:      Head: Normocephalic and atraumatic.      Right Ear: Tympanic membrane and external ear normal.      Left Ear: Tympanic membrane and external ear normal.      Ears:      Comments: Bilateral , slight erythema on floor of ear canals     Nose: Nose normal.      Mouth/Throat:      Mouth: Mucous membranes are moist.      Pharynx: No oropharyngeal exudate or posterior oropharyngeal erythema.   Eyes:      General:         Right eye: No discharge.         Left eye: No discharge.      Pupils: Pupils are equal, round, and reactive to light.   Cardiovascular:      Rate and Rhythm: Normal rate and regular rhythm.      Pulses: Normal pulses.      Heart sounds: No murmur heard.     No friction rub. No gallop.   Pulmonary:      Effort: Pulmonary effort is normal. No respiratory distress.      Breath sounds: Normal breath sounds. No wheezing.   Abdominal:      General: Bowel sounds are normal. There is no distension.      Tenderness: There is no abdominal tenderness.   Musculoskeletal:         General: Normal range of motion.      Cervical back: Normal range of motion and neck supple.   Skin:     General: Skin is warm.      Capillary Refill: Capillary refill takes less than 2 seconds.      Findings: No rash.   Neurological:      General: No focal deficit present.      Mental Status: He is alert.   Psychiatric:         Mood and Affect: Mood normal.           "

## 2024-01-29 NOTE — ASSESSMENT & PLAN NOTE
Blood Pressure is above goal as per JNC-8 greater than 140/90 mmHg, but much better controlled than last few visits.   Compliant with 10 mg amlodipine daily.  Blood Pressure: 150/74        Plan:   Continue amlodipine 10 mg. The patient was educated on the importance of medication compliance and to monitor his blood pressure at home daily in a quiet room; after resting for at least 5 minutes and to bring the logs at next visit.   Exercise daily 30 minutes per day   recommend sodium and fat reduction   FU in 3 months

## 2024-01-30 ENCOUNTER — OFFICE VISIT (OUTPATIENT)
Dept: FAMILY MEDICINE CLINIC | Facility: CLINIC | Age: 59
End: 2024-01-30

## 2024-01-30 ENCOUNTER — APPOINTMENT (OUTPATIENT)
Dept: LAB | Facility: HOSPITAL | Age: 59
End: 2024-01-30
Payer: MEDICARE

## 2024-01-30 VITALS
HEIGHT: 61 IN | DIASTOLIC BLOOD PRESSURE: 74 MMHG | SYSTOLIC BLOOD PRESSURE: 150 MMHG | TEMPERATURE: 96.8 F | RESPIRATION RATE: 18 BRPM | HEART RATE: 89 BPM | OXYGEN SATURATION: 96 % | WEIGHT: 134.25 LBS | BODY MASS INDEX: 25.34 KG/M2

## 2024-01-30 DIAGNOSIS — R10.13 EPIGASTRIC PAIN: ICD-10-CM

## 2024-01-30 DIAGNOSIS — I10 PRIMARY HYPERTENSION: ICD-10-CM

## 2024-01-30 DIAGNOSIS — E11.9 TYPE 2 DIABETES MELLITUS WITHOUT COMPLICATION, WITHOUT LONG-TERM CURRENT USE OF INSULIN (HCC): ICD-10-CM

## 2024-01-30 DIAGNOSIS — E11.9 TYPE 2 DIABETES MELLITUS WITHOUT COMPLICATION, WITHOUT LONG-TERM CURRENT USE OF INSULIN (HCC): Primary | ICD-10-CM

## 2024-01-30 DIAGNOSIS — I51.89 DIASTOLIC DYSFUNCTION WITHOUT HEART FAILURE: ICD-10-CM

## 2024-01-30 DIAGNOSIS — H92.03 EAR PAIN, BILATERAL: ICD-10-CM

## 2024-01-30 PROBLEM — H57.89 REDNESS OF EYE, LEFT: Status: RESOLVED | Noted: 2023-07-20 | Resolved: 2024-01-30

## 2024-01-30 PROBLEM — E87.1 HYPONATREMIA: Status: RESOLVED | Noted: 2023-03-02 | Resolved: 2024-01-30

## 2024-01-30 PROBLEM — R42 DIZZINESS: Status: RESOLVED | Noted: 2023-09-25 | Resolved: 2024-01-30

## 2024-01-30 LAB
CHOLEST SERPL-MCNC: 174 MG/DL
CREAT UR-MCNC: 38.4 MG/DL
HDLC SERPL-MCNC: 58 MG/DL
LDLC SERPL CALC-MCNC: 104 MG/DL (ref 0–100)
MICROALBUMIN UR-MCNC: <7 MG/L
MICROALBUMIN/CREAT 24H UR: <18 MG/G CREATININE (ref 0–30)
NONHDLC SERPL-MCNC: 116 MG/DL
TRIGL SERPL-MCNC: 58 MG/DL

## 2024-01-30 PROCEDURE — 3077F SYST BP >= 140 MM HG: CPT | Performed by: FAMILY MEDICINE

## 2024-01-30 PROCEDURE — 99214 OFFICE O/P EST MOD 30 MIN: CPT | Performed by: FAMILY MEDICINE

## 2024-01-30 PROCEDURE — 80061 LIPID PANEL: CPT

## 2024-01-30 PROCEDURE — 36415 COLL VENOUS BLD VENIPUNCTURE: CPT

## 2024-01-30 PROCEDURE — 82570 ASSAY OF URINE CREATININE: CPT

## 2024-01-30 PROCEDURE — 3078F DIAST BP <80 MM HG: CPT | Performed by: FAMILY MEDICINE

## 2024-01-30 PROCEDURE — 83036 HEMOGLOBIN GLYCOSYLATED A1C: CPT

## 2024-01-30 PROCEDURE — 82043 UR ALBUMIN QUANTITATIVE: CPT

## 2024-01-30 RX ORDER — AMLODIPINE BESYLATE 10 MG/1
10 TABLET ORAL DAILY
Qty: 30 TABLET | Refills: 4 | Status: SHIPPED | OUTPATIENT
Start: 2024-01-30 | End: 2024-06-28

## 2024-01-30 RX ORDER — PANTOPRAZOLE SODIUM 20 MG/1
20 TABLET, DELAYED RELEASE ORAL
Qty: 30 TABLET | Refills: 5 | Status: SHIPPED | OUTPATIENT
Start: 2024-01-30 | End: 2024-07-28

## 2024-01-30 NOTE — ASSESSMENT & PLAN NOTE
Lab Results   Component Value Date    HGBA1C 5.6 03/16/2023     Currently diet controlled  Asymptomatic     Plan:   Recheck Hba1c   Lipid panel

## 2024-01-30 NOTE — ASSESSMENT & PLAN NOTE
Patient currently does not have epigastric pain, but was given a supply in the ED and has greatly improved epigastric pain     Plan:   Refill pantoprazole

## 2024-01-30 NOTE — ASSESSMENT & PLAN NOTE
Was previously given otic steroid drops for ears to use every 6 hours as needed most likely due to chronic dermatitis of the ear canal.   Patient is non-compliant with its use, only using it a few days a week.     Plan:   Use otic drops every 6 hours as needed   Will re-evaluate at follow-up in 3 months.

## 2024-01-30 NOTE — ASSESSMENT & PLAN NOTE
Last echo: grade 1 diastolic dysfunction   Not seen by cardiology in since 2021.    Plan:   echo

## 2024-01-30 NOTE — ASSESSMENT & PLAN NOTE
Last lipid panel : total - 230,      Was previously taking atorvostatin but it was discontinued per chart review due to completion of therapy.     Plan:   Hba1c   Lipid panel

## 2024-01-31 LAB
EST. AVERAGE GLUCOSE BLD GHB EST-MCNC: 103 MG/DL
HBA1C MFR BLD: 5.2 %

## 2024-02-08 ENCOUNTER — HOSPITAL ENCOUNTER (EMERGENCY)
Facility: HOSPITAL | Age: 59
Discharge: HOME | End: 2024-02-08
Attending: EMERGENCY MEDICINE
Payer: MEDICARE

## 2024-02-08 VITALS
BODY MASS INDEX: 25.02 KG/M2 | TEMPERATURE: 99.6 F | WEIGHT: 132.4 LBS | RESPIRATION RATE: 16 BRPM | OXYGEN SATURATION: 93 % | SYSTOLIC BLOOD PRESSURE: 202 MMHG | HEART RATE: 109 BPM | DIASTOLIC BLOOD PRESSURE: 106 MMHG

## 2024-02-08 DIAGNOSIS — H65.90 SEROUS OTITIS MEDIA: Primary | ICD-10-CM

## 2024-02-08 PROCEDURE — 99282 EMERGENCY DEPT VISIT SF MDM: CPT

## 2024-02-08 PROCEDURE — 99284 EMERGENCY DEPT VISIT MOD MDM: CPT | Performed by: EMERGENCY MEDICINE

## 2024-02-08 RX ORDER — LORATADINE 10 MG/1
10 TABLET ORAL 2 TIMES DAILY
Qty: 10 TABLET | Refills: 0 | Status: SHIPPED | OUTPATIENT
Start: 2024-02-08 | End: 2024-02-13

## 2024-02-08 RX ORDER — LORATADINE 10 MG/1
10 TABLET ORAL ONCE
Status: COMPLETED | OUTPATIENT
Start: 2024-02-08 | End: 2024-02-08

## 2024-02-08 RX ORDER — PREDNISONE 50 MG/1
50 TABLET ORAL DAILY
Qty: 4 TABLET | Refills: 0 | Status: SHIPPED | OUTPATIENT
Start: 2024-02-08 | End: 2024-02-12

## 2024-02-08 RX ADMIN — PREDNISONE 50 MG: 10 TABLET ORAL at 10:23

## 2024-02-08 RX ADMIN — LORATADINE 10 MG: 10 TABLET ORAL at 10:23

## 2024-02-08 NOTE — ED PROVIDER NOTES
History  Chief Complaint   Patient presents with    Cold Like Symptoms     Ear pain, sore throat, coughing      59 year-old male presenting emerged department with right ear pain sore throat coughing.  Onset over the last 2 days.  Sick contacts at home.        Prior to Admission Medications   Prescriptions Last Dose Informant Patient Reported? Taking?   Blood Pressure KIT  Self No No   Sig: Use 2 (two) times a day   amLODIPine (NORVASC) 10 mg tablet   No No   Sig: Take 1 tablet (10 mg total) by mouth daily   hydrocortisone-acetic acid (VOSOL-HC) otic solution   No No   Sig: Administer 3 drops into both ears every 6 (six) hours   pantoprazole (PROTONIX) 20 mg tablet   No No   Sig: Take 1 tablet (20 mg total) by mouth daily before breakfast      Facility-Administered Medications: None       Past Medical History:   Diagnosis Date    Deaf     Heart murmur        History reviewed. No pertinent surgical history.    Family History   Problem Relation Age of Onset    Hypertension Mother     Diabetes Mother     Heart disease Mother         pacemaker    Arthritis Mother         chronic     I have reviewed and agree with the history as documented.    E-Cigarette/Vaping    E-Cigarette Use Never User      E-Cigarette/Vaping Substances    Nicotine No     THC No     CBD No     Flavoring No     Other No     Unknown No      Social History     Tobacco Use    Smoking status: Never     Passive exposure: Never    Smokeless tobacco: Never   Vaping Use    Vaping status: Never Used   Substance Use Topics    Alcohol use: Yes     Comment: 1 beer occasionally    Drug use: Never       Review of Systems   Constitutional:  Negative for chills and fever.   HENT:  Positive for ear pain and sore throat.    Eyes:  Negative for pain and visual disturbance.   Respiratory:  Positive for cough. Negative for shortness of breath.    Cardiovascular:  Negative for chest pain and palpitations.   Gastrointestinal:  Negative for abdominal pain and vomiting.    Genitourinary:  Negative for dysuria and hematuria.   Musculoskeletal:  Negative for arthralgias and back pain.   Skin:  Negative for color change and rash.   Neurological:  Negative for seizures and syncope.   All other systems reviewed and are negative.      Physical Exam  Physical Exam  Vitals and nursing note reviewed.   Constitutional:       General: He is not in acute distress.     Appearance: He is well-developed.   HENT:      Head: Normocephalic and atraumatic.      Left Ear: Tympanic membrane normal.      Ears:      Comments: Right TM with clear effusion without erythema     Nose: Congestion present.      Mouth/Throat:      Mouth: Mucous membranes are moist.      Pharynx: Posterior oropharyngeal erythema present. No oropharyngeal exudate.   Eyes:      Conjunctiva/sclera: Conjunctivae normal.   Cardiovascular:      Rate and Rhythm: Normal rate and regular rhythm.      Heart sounds: No murmur heard.  Pulmonary:      Effort: Pulmonary effort is normal. No respiratory distress.      Breath sounds: Normal breath sounds.   Abdominal:      Palpations: Abdomen is soft.      Tenderness: There is no abdominal tenderness.   Musculoskeletal:         General: No swelling.      Cervical back: Neck supple.   Skin:     General: Skin is warm and dry.      Capillary Refill: Capillary refill takes less than 2 seconds.   Neurological:      Mental Status: He is alert.   Psychiatric:         Mood and Affect: Mood normal.         Vital Signs  ED Triage Vitals [02/08/24 1005]   Temperature Pulse Respirations Blood Pressure SpO2   99.6 °F (37.6 °C) (!) 109 16 (!) 202/106 93 %      Temp Source Heart Rate Source Patient Position - Orthostatic VS BP Location FiO2 (%)   Tympanic Monitor Sitting Left arm --      Pain Score       --           Vitals:    02/08/24 1005   BP: (!) 202/106   Pulse: (!) 109   Patient Position - Orthostatic VS: Sitting         Visual Acuity      ED Medications  Medications   loratadine (CLARITIN) tablet 10 mg  (has no administration in time range)   predniSONE tablet 50 mg (has no administration in time range)       Diagnostic Studies  Results Reviewed       None                   No orders to display              Procedures  Procedures         ED Course                                             Medical Decision Making  59-year-old male with right ear pain URI symptoms.  Likely viral infection.  Serous otitis, will treat with Claritin and prednisone.  PCP follow-up.    Risk  OTC drugs.  Prescription drug management.             Disposition  Final diagnoses:   Serous otitis media     Time reflects when diagnosis was documented in both MDM as applicable and the Disposition within this note       Time User Action Codes Description Comment    2/8/2024 10:15 AM Wendy Abad Add [H65.90] Serous otitis media           ED Disposition       ED Disposition   Discharge    Condition   Stable    Date/Time   Thu Feb 8, 2024 10:15 AM    Comment   Mal Encarnacion discharge to home/self care.                   Follow-up Information       Follow up With Specialties Details Why Contact Info Additional Information    [facility]   [address] [place] [address], [address] [address] [phone] [address], [address] [phone]            Patient's Medications   Discharge Prescriptions    LORATADINE (CLARITIN) 10 MG TABLET    Take 1 tablet (10 mg total) by mouth 2 (two) times a day for 5 days       Start Date: 2/8/2024  End Date: 2/13/2024       Order Dose: 10 mg       Quantity: 10 tablet    Refills: 0    PREDNISONE 50 MG TABLET    Take 1 tablet (50 mg total) by mouth daily for 4 days       Start Date: 2/8/2024  End Date: 2/12/2024       Order Dose: 50 mg       Quantity: 4 tablet    Refills: 0       No discharge procedures on file.    PDMP Review       None            ED Provider  Electronically  Signed by             Wendy Abad MD  02/08/24 1012

## 2024-02-10 ENCOUNTER — APPOINTMENT (EMERGENCY)
Dept: CT IMAGING | Facility: HOSPITAL | Age: 59
End: 2024-02-10
Payer: MEDICARE

## 2024-02-10 ENCOUNTER — HOSPITAL ENCOUNTER (EMERGENCY)
Facility: HOSPITAL | Age: 59
Discharge: HOME | End: 2024-02-10
Attending: EMERGENCY MEDICINE
Payer: MEDICARE

## 2024-02-10 VITALS
HEART RATE: 78 BPM | TEMPERATURE: 98.4 F | SYSTOLIC BLOOD PRESSURE: 207 MMHG | DIASTOLIC BLOOD PRESSURE: 101 MMHG | RESPIRATION RATE: 18 BRPM | WEIGHT: 130.07 LBS | BODY MASS INDEX: 24.58 KG/M2 | OXYGEN SATURATION: 99 %

## 2024-02-10 DIAGNOSIS — N30.90 CYSTITIS: ICD-10-CM

## 2024-02-10 DIAGNOSIS — K52.9 COLITIS: Primary | ICD-10-CM

## 2024-02-10 LAB
ALBUMIN SERPL BCP-MCNC: 4.1 G/DL (ref 3.5–5)
ALP SERPL-CCNC: 93 U/L (ref 34–104)
ALT SERPL W P-5'-P-CCNC: 56 U/L (ref 7–52)
ANION GAP SERPL CALCULATED.3IONS-SCNC: 13 MMOL/L
AST SERPL W P-5'-P-CCNC: 50 U/L (ref 13–39)
BACTERIA UR QL AUTO: ABNORMAL /HPF
BASOPHILS # BLD AUTO: 0.02 THOUSANDS/ÂΜL (ref 0–0.1)
BASOPHILS NFR BLD AUTO: 0 % (ref 0–1)
BILIRUB SERPL-MCNC: 1.12 MG/DL (ref 0.2–1)
BILIRUB UR QL STRIP: NEGATIVE
BUN SERPL-MCNC: 10 MG/DL (ref 5–25)
CALCIUM SERPL-MCNC: 9.2 MG/DL (ref 8.4–10.2)
CHLORIDE SERPL-SCNC: 97 MMOL/L (ref 96–108)
CLARITY UR: CLEAR
CO2 SERPL-SCNC: 25 MMOL/L (ref 21–32)
COLOR UR: YELLOW
CREAT SERPL-MCNC: 0.72 MG/DL (ref 0.6–1.3)
EOSINOPHIL # BLD AUTO: 0.01 THOUSAND/ÂΜL (ref 0–0.61)
EOSINOPHIL NFR BLD AUTO: 0 % (ref 0–6)
ERYTHROCYTE [DISTWIDTH] IN BLOOD BY AUTOMATED COUNT: 12.8 % (ref 11.6–15.1)
FLUAV RNA RESP QL NAA+PROBE: NEGATIVE
FLUBV RNA RESP QL NAA+PROBE: NEGATIVE
GFR SERPL CREATININE-BSD FRML MDRD: 102 ML/MIN/1.73SQ M
GLUCOSE SERPL-MCNC: 96 MG/DL (ref 65–140)
GLUCOSE UR STRIP-MCNC: NEGATIVE MG/DL
HCT VFR BLD AUTO: 49.2 % (ref 36.5–49.3)
HGB BLD-MCNC: 16.8 G/DL (ref 12–17)
HGB UR QL STRIP.AUTO: 25
HYALINE CASTS #/AREA URNS LPF: ABNORMAL /LPF
IMM GRANULOCYTES # BLD AUTO: 0.03 THOUSAND/UL (ref 0–0.2)
IMM GRANULOCYTES NFR BLD AUTO: 0 % (ref 0–2)
KETONES UR STRIP-MCNC: ABNORMAL MG/DL
LEUKOCYTE ESTERASE UR QL STRIP: NEGATIVE
LIPASE SERPL-CCNC: 17 U/L (ref 11–82)
LYMPHOCYTES # BLD AUTO: 0.72 THOUSANDS/ÂΜL (ref 0.6–4.47)
LYMPHOCYTES NFR BLD AUTO: 9 % (ref 14–44)
MCH RBC QN AUTO: 31.9 PG (ref 26.8–34.3)
MCHC RBC AUTO-ENTMCNC: 34.1 G/DL (ref 31.4–37.4)
MCV RBC AUTO: 93 FL (ref 82–98)
MONOCYTES # BLD AUTO: 0.49 THOUSAND/ÂΜL (ref 0.17–1.22)
MONOCYTES NFR BLD AUTO: 6 % (ref 4–12)
MUCOUS THREADS UR QL AUTO: ABNORMAL
NEUTROPHILS # BLD AUTO: 6.77 THOUSANDS/ÂΜL (ref 1.85–7.62)
NEUTS SEG NFR BLD AUTO: 85 % (ref 43–75)
NITRITE UR QL STRIP: NEGATIVE
NON-SQ EPI CELLS URNS QL MICRO: ABNORMAL /HPF
NRBC BLD AUTO-RTO: 0 /100 WBCS
PH UR STRIP.AUTO: 6 [PH]
PLATELET # BLD AUTO: 117 THOUSANDS/UL (ref 149–390)
PMV BLD AUTO: 10.3 FL (ref 8.9–12.7)
POTASSIUM SERPL-SCNC: 3.8 MMOL/L (ref 3.5–5.3)
PROT SERPL-MCNC: 7.1 G/DL (ref 6.4–8.4)
PROT UR STRIP-MCNC: ABNORMAL MG/DL
RBC # BLD AUTO: 5.27 MILLION/UL (ref 3.88–5.62)
RBC #/AREA URNS AUTO: ABNORMAL /HPF
RSV RNA RESP QL NAA+PROBE: NEGATIVE
SARS-COV-2 RNA RESP QL NAA+PROBE: NEGATIVE
SODIUM SERPL-SCNC: 135 MMOL/L (ref 135–147)
SP GR UR STRIP.AUTO: 1.02 (ref 1–1.04)
UROBILINOGEN UA: 1 MG/DL
WBC # BLD AUTO: 8.04 THOUSAND/UL (ref 4.31–10.16)
WBC #/AREA URNS AUTO: ABNORMAL /HPF

## 2024-02-10 PROCEDURE — 80053 COMPREHEN METABOLIC PANEL: CPT | Performed by: PHYSICIAN ASSISTANT

## 2024-02-10 PROCEDURE — 85025 COMPLETE CBC W/AUTO DIFF WBC: CPT | Performed by: PHYSICIAN ASSISTANT

## 2024-02-10 PROCEDURE — 99285 EMERGENCY DEPT VISIT HI MDM: CPT | Performed by: PHYSICIAN ASSISTANT

## 2024-02-10 PROCEDURE — 81003 URINALYSIS AUTO W/O SCOPE: CPT | Performed by: PHYSICIAN ASSISTANT

## 2024-02-10 PROCEDURE — 83690 ASSAY OF LIPASE: CPT | Performed by: PHYSICIAN ASSISTANT

## 2024-02-10 PROCEDURE — 74177 CT ABD & PELVIS W/CONTRAST: CPT

## 2024-02-10 PROCEDURE — G1004 CDSM NDSC: HCPCS

## 2024-02-10 PROCEDURE — 81001 URINALYSIS AUTO W/SCOPE: CPT | Performed by: PHYSICIAN ASSISTANT

## 2024-02-10 PROCEDURE — 99284 EMERGENCY DEPT VISIT MOD MDM: CPT

## 2024-02-10 PROCEDURE — 0241U HB NFCT DS VIR RESP RNA 4 TRGT: CPT | Performed by: PHYSICIAN ASSISTANT

## 2024-02-10 PROCEDURE — 36415 COLL VENOUS BLD VENIPUNCTURE: CPT | Performed by: PHYSICIAN ASSISTANT

## 2024-02-10 RX ORDER — METRONIDAZOLE 500 MG/1
500 TABLET ORAL EVERY 8 HOURS SCHEDULED
Qty: 30 TABLET | Refills: 0 | Status: SHIPPED | OUTPATIENT
Start: 2024-02-10 | End: 2024-02-20

## 2024-02-10 RX ORDER — CIPROFLOXACIN 500 MG/1
500 TABLET, FILM COATED ORAL 2 TIMES DAILY
Qty: 20 TABLET | Refills: 0 | Status: SHIPPED | OUTPATIENT
Start: 2024-02-10 | End: 2024-02-20

## 2024-02-10 RX ADMIN — IOHEXOL 100 ML: 350 INJECTION, SOLUTION INTRAVENOUS at 11:11

## 2024-02-10 NOTE — DISCHARGE INSTRUCTIONS
1.  Ill-defined inflammation in the pelvis appears centered around the seminal vesicles and enlarged prostate gland. Question prostatitis. Bladder wall also appears diffusely thickened though is under distended, also possibly reflecting cystitis.  2.  Hyperenhancement of the prostate gland that appears to involve the peripheral zone. This relatively nonspecific on CT and may be related to prostatitis, but recommend follow-up with PSA and/or exam to exclude prostate malignancy.  3.  Short segment sigmoid colon wall thickening. While this may be due to focal colitis or under distention, the presence of colonic neoplasm cannot be excluded. Recommend follow-up colonoscopy if not recently performed.  4.  Cirrhotic liver morphology. Correlate for underlying chronic liver disease.

## 2024-02-10 NOTE — ED PROVIDER NOTES
"History  Chief Complaint   Patient presents with    Abdominal Pain     Pt c/o abdominal pain w/ poor PO intake/diarrhea & non room-spinning \"dizziness\" & generalized weakness since 2/8. Seen by MD, states prescriptions are not working.      59-year-old male presents to the emergency department with complaints of not feeling well.  Seen in the department 2 days ago and had been complaining of ear pain.  Started prednisone and loratadine without alleviation of symptoms.  Now states that he is also had a subjective fever and feels generally unwell.  States that he has congestion with a cough and some diffuse right-sided abdominal pain.  Notes nausea with vomiting and diarrhea yesterday.  States that abdominal pain has been ongoing in nature and that it is worse after eating.  Reports weight loss over the past several weeks.      History provided by:  Patient   used: Yes (Deaf Talk)    Abdominal Pain  Pain location: right sided.  Pain severity:  Moderate  Onset quality:  Gradual  Timing:  Constant  Progression:  Waxing and waning  Chronicity:  New  Relieved by:  Nothing  Ineffective treatments:  None tried  Associated symptoms: diarrhea, fever (subjective), nausea and vomiting    Associated symptoms: no anorexia, no belching, no chest pain, no chills, no constipation, no cough, no dysuria, no fatigue, no flatus, no hematemesis, no hematochezia, no hematuria, no melena, no shortness of breath and no sore throat        Prior to Admission Medications   Prescriptions Last Dose Informant Patient Reported? Taking?   Blood Pressure KIT  Self No No   Sig: Use 2 (two) times a day   amLODIPine (NORVASC) 10 mg tablet   No No   Sig: Take 1 tablet (10 mg total) by mouth daily   hydrocortisone-acetic acid (VOSOL-HC) otic solution   No No   Sig: Administer 3 drops into both ears every 6 (six) hours   loratadine (CLARITIN) 10 mg tablet   No No   Sig: Take 1 tablet (10 mg total) by mouth 2 (two) times a day for 5 " days   pantoprazole (PROTONIX) 20 mg tablet   No No   Sig: Take 1 tablet (20 mg total) by mouth daily before breakfast   predniSONE 50 mg tablet   No No   Sig: Take 1 tablet (50 mg total) by mouth daily for 4 days      Facility-Administered Medications: None       Past Medical History:   Diagnosis Date    Deaf     Heart murmur     Hypertension        History reviewed. No pertinent surgical history.    Family History   Problem Relation Age of Onset    Hypertension Mother     Diabetes Mother     Heart disease Mother         pacemaker    Arthritis Mother         chronic     I have reviewed and agree with the history as documented.    E-Cigarette/Vaping    E-Cigarette Use Never User      E-Cigarette/Vaping Substances    Nicotine No     THC No     CBD No     Flavoring No     Other No     Unknown No      Social History     Tobacco Use    Smoking status: Never     Passive exposure: Never    Smokeless tobacco: Never   Vaping Use    Vaping status: Never Used   Substance Use Topics    Alcohol use: Yes     Comment: 1 beer occasionally    Drug use: Never       Review of Systems   Constitutional:  Positive for fever (subjective). Negative for activity change, appetite change, chills and fatigue.   HENT:  Positive for congestion. Negative for dental problem, drooling, ear discharge, mouth sores, nosebleeds, rhinorrhea, sore throat and trouble swallowing.    Eyes:  Negative for pain, discharge and itching.   Respiratory:  Negative for cough, chest tightness, shortness of breath and wheezing.    Cardiovascular:  Negative for chest pain and palpitations.   Gastrointestinal:  Positive for abdominal pain, diarrhea, nausea and vomiting. Negative for anorexia, blood in stool, constipation, flatus, hematemesis, hematochezia and melena.   Endocrine: Negative for cold intolerance and heat intolerance.   Genitourinary:  Negative for difficulty urinating, dysuria, flank pain, frequency, hematuria and urgency.   Skin:  Negative for rash and  wound.   Allergic/Immunologic: Negative for food allergies and immunocompromised state.   Neurological:  Negative for dizziness, seizures, syncope, weakness, numbness and headaches.   Psychiatric/Behavioral:  Negative for agitation, behavioral problems and confusion.        Physical Exam  Physical Exam  Vitals and nursing note reviewed.   Constitutional:       General: He is not in acute distress.     Appearance: He is not diaphoretic.   HENT:      Head: Normocephalic and atraumatic.      Right Ear: External ear normal.      Left Ear: External ear normal.      Mouth/Throat:      Mouth: Mucous membranes are moist.   Eyes:      Conjunctiva/sclera: Conjunctivae normal.   Neck:      Vascular: No JVD.      Trachea: No tracheal deviation.   Cardiovascular:      Rate and Rhythm: Normal rate and regular rhythm.      Heart sounds: Normal heart sounds. No murmur heard.     No friction rub. No gallop.   Pulmonary:      Effort: Pulmonary effort is normal. No respiratory distress.      Breath sounds: Normal breath sounds. No wheezing or rales.   Chest:      Chest wall: No tenderness.   Abdominal:      General: Bowel sounds are normal. There is no distension.      Palpations: Abdomen is soft.      Tenderness: There is abdominal tenderness in the right upper quadrant and right lower quadrant. There is no guarding.   Musculoskeletal:         General: No tenderness or deformity. Normal range of motion.   Lymphadenopathy:      Cervical: No cervical adenopathy.   Skin:     General: Skin is warm and dry.      Findings: No erythema or rash.   Neurological:      Mental Status: He is alert and oriented to person, place, and time.   Psychiatric:         Mood and Affect: Mood normal.         Behavior: Behavior normal.         Vital Signs  ED Triage Vitals [02/10/24 0925]   Temperature Pulse Respirations Blood Pressure SpO2   98.4 °F (36.9 °C) (!) 109 20 (!) 174/98 96 %      Temp Source Heart Rate Source Patient Position - Orthostatic VS BP  Location FiO2 (%)   Oral Monitor Lying Left arm --      Pain Score       6           Vitals:    02/10/24 0925 02/10/24 1100 02/10/24 1255 02/10/24 1334   BP: (!) 174/98 (!) 175/98 (!) 181/88 (!) 207/101   Pulse: (!) 109 72 71 78   Patient Position - Orthostatic VS: Lying Lying Lying          Visual Acuity      ED Medications  Medications   iohexol (OMNIPAQUE) 350 MG/ML injection (MULTI-DOSE) 100 mL (100 mL Intravenous Given 2/10/24 1111)       Diagnostic Studies  Results Reviewed       Procedure Component Value Units Date/Time    Comprehensive metabolic panel [[id number]]  (Abnormal) Collected: 02/10/24 0946    Lab Status: Final result Specimen: Blood from Arm, Left Updated: 02/10/24 1142     Sodium 135 mmol/L      Potassium 3.8 mmol/L      Chloride 97 mmol/L      CO2 25 mmol/L      ANION GAP 13 mmol/L      BUN 10 mg/dL      Creatinine 0.72 mg/dL      Glucose 96 mg/dL      Calcium 9.2 mg/dL      AST 50 U/L      ALT 56 U/L      Alkaline Phosphatase 93 U/L      Total Protein 7.1 g/dL      Albumin 4.1 g/dL      Total Bilirubin 1.12 mg/dL      eGFR 102 ml/min/1.73sq m     Narrative:      National Kidney Disease Foundation guidelines for Chronic Kidney Disease (CKD):     Stage 1 with normal or high GFR (GFR > 90 mL/min/1.73 square meters)    Stage 2 Mild CKD (GFR = 60-89 mL/min/1.73 square meters)    Stage 3A Moderate CKD (GFR = 45-59 mL/min/1.73 square meters)    Stage 3B Moderate CKD (GFR = 30-44 mL/min/1.73 square meters)    Stage 4 Severe CKD (GFR = 15-29 mL/min/1.73 square meters)    Stage 5 End Stage CKD (GFR <15 mL/min/1.73 square meters)  Note: GFR calculation is accurate only with a steady state creatinine    Lipase [[id number]]  (Normal) Collected: 02/10/24 0946    Lab Status: Final result Specimen: Blood from Arm, Left Updated: 02/10/24 1142     Lipase 17 u/L     Urine Microscopic [[id number]]  (Abnormal) Collected: 02/10/24 1012    Lab Status: Final result Specimen: Urine, Clean Catch Updated: 02/10/24 1123      RBC, UA 0-1 /hpf      WBC, UA 0-1 /hpf      Epithelial Cells Occasional /hpf      Bacteria, UA Moderate /hpf      Hyaline Casts, UA 2-4 /lpf      MUCUS THREADS Moderate    UA w Reflex to Microscopic w Reflex to Culture [[id number]]  (Abnormal) Collected: 02/10/24 1012    Lab Status: Final result Specimen: Urine, Clean Catch Updated: 02/10/24 1101     Color, UA Yellow     Clarity, UA Clear     Specific Gravity, UA 1.020     pH, UA 6.0     Leukocytes, UA Negative     Nitrite, UA Negative     Protein, UA 30 (1+) mg/dl      Glucose, UA Negative mg/dl      Ketones, UA 15 (1+) mg/dl      Bilirubin, UA Negative     Occult Blood, UA 25.0     UROBILINOGEN UA 1.0 mg/dL     FLU/RSV/COVID - if FLU/RSV clinically relevant [[id number]]  (Normal) Collected: 02/10/24 0946    Lab Status: Final result Specimen: Nares from Nose Updated: 02/10/24 1051     SARS-CoV-2 Negative     INFLUENZA A PCR Negative     INFLUENZA B PCR Negative     RSV PCR Negative    Narrative:      FOR PEDIATRIC PATIENTS - copy/paste COVID Guidelines URL to browser: [email or web address]    SARS-CoV-2 assay is a Nucleic Acid Amplification assay intended for the  qualitative detection of nucleic acid from SARS-CoV-2 in nasopharyngeal  swabs. Results are for the presumptive identification of SARS-CoV-2 RNA.    Positive results are indicative of infection with SARS-CoV-2, the virus  causing COVID-19, but do not rule out bacterial infection or co-infection  with other viruses. Laboratories within the [place] and its  territories are required to report all positive results to the appropriate  public health authorities. Negative results do not preclude SARS-CoV-2  infection and should not be used as the sole basis for treatment or other  patient management decisions. Negative results must be combined with  clinical observations, patient history, and epidemiological information.  This test has not been FDA cleared  or approved.    This test has been authorized by FDA under an Emergency Use Authorization  (EUA). This test is only authorized for the duration of time the  declaration that circumstances exist justifying the authorization of the  emergency use of an in vitro diagnostic tests for detection of SARS-CoV-2  virus and/or diagnosis of COVID-19 infection under section 564(b)(1) of  the Act, 21 U.S.C. 360bbb-3(b)(1), unless the authorization is terminated  or revoked sooner. The test has been validated but independent review by FDA  and CLIA is pending.    Test performed using [employer] GeneXpert: This RT-PCR assay targets N2,  a region unique to SARS-CoV-2. A conserved region in the E-gene was chosen  for pan-Sarbecovirus detection which includes SARS-CoV-2.    According to CMS-2020-01-R, this platform meets the definition of high-throughput technology.    CBC and differential [[id number]]  (Abnormal) Collected: 02/10/24 0946    Lab Status: Final result Specimen: Blood from Arm, Left Updated: 02/10/24 1019     WBC 8.04 Thousand/uL      RBC 5.27 Million/uL      Hemoglobin 16.8 g/dL      Hematocrit 49.2 %      MCV 93 fL      MCH 31.9 pg      MCHC 34.1 g/dL      RDW 12.8 %      MPV 10.3 fL      Platelets 117 Thousands/uL      nRBC 0 /100 WBCs      Neutrophils Relative 85 %      Immat GRANS % 0 %      Lymphocytes Relative 9 %      Monocytes Relative 6 %      Eosinophils Relative 0 %      Basophils Relative 0 %      Neutrophils Absolute 6.77 Thousands/µL      Immature Grans Absolute 0.03 Thousand/uL      Lymphocytes Absolute 0.72 Thousands/µL      Monocytes Absolute 0.49 Thousand/µL      Eosinophils Absolute 0.01 Thousand/µL      Basophils Absolute 0.02 Thousands/µL                    CT abdomen pelvis with contrast   Final Result by Harry Loya MD (02/10 1304)      1.  Ill-defined inflammation in the pelvis appears centered around the seminal vesicles and enlarged prostate gland. Question prostatitis. Bladder wall also  appears diffusely thickened though is under distended, also possibly reflecting cystitis.   2.  Hyperenhancement of the prostate gland that appears to involve the peripheral zone. This relatively nonspecific on CT and may be related to prostatitis, but recommend follow-up with PSA and/or exam to exclude prostate malignancy.   3.  Short segment sigmoid colon wall thickening. While this may be due to focal colitis or under distention, the presence of colonic neoplasm cannot be excluded. Recommend follow-up colonoscopy if not recently performed.   4.  Cirrhotic liver morphology. Correlate for underlying chronic liver disease.         The study was marked in EPIC for immediate notification.      Workstation performed: [id number]                    Procedures  Procedures         ED Course                               SBIRT 20yo+      Flowsheet Row Most Recent Value   Initial Alcohol Screen: US AUDIT-C     1. How often do you have a drink containing alcohol? 1 Filed at: 02/10/2024 0929   2. How many drinks containing alcohol do you have on a typical day you are drinking?  0 Filed at: 02/10/2024 0929   3a. Male UNDER 65: How often do you have five or more drinks on one occasion? 0 Filed at: 02/10/2024 0929   Audit-C Score 1 Filed at: 02/10/2024 0929   TJ: How many times in the past year have you...    Used an illegal drug or used a prescription medication for non-medical reasons? Never Filed at: 02/10/2024 0929                      Medical Decision Making  Differential diagnosis includes but not limited to: influenza, covid, colitis, cholelithiasis, cholecystitis     Amount and/or Complexity of Data Reviewed  Labs: ordered. Decision-making details documented in ED Course.  Radiology: ordered. Decision-making details documented in ED Course.  Discussion of management or test interpretation with external provider(s): Discussed CT findings and treatment plan with patient at length using deaf talk.  Patient demonstrated good  understanding of treatment plan and need for follow-up.  Return to ER precautions given.    Risk  Prescription drug management.             Disposition  Final diagnoses:   Colitis   Cystitis     Time reflects when diagnosis was documented in both MDM as applicable and the Disposition within this note       Time User Action Codes Description Comment    2/10/2024  1:20 PM Ciera Mg Add [K52.9] Colitis     2/10/2024  1:20 PM Ciera Mg Add [N30.90] Cystitis           ED Disposition       ED Disposition   Discharge    Condition   Stable    Date/Time   Sat Feb 10, 2024 1320    Comment   Mal Encarnacion discharge to home/self care.                   Follow-up Information       Follow up With Specialties Details Why Contact Info Additional Information    [facility] Schedule an appointment as soon as possible for a visit   [address] [place] [address] [address]  [facility] [address]  [phone] [facility] [address], [place], [place], [address]   [phone]    [facility] Schedule an appointment as soon as possible for a visit   [address]  [facility] [address]  [facility] [address]  [phone] [facility] [place], [facility] [address], [facility] [address], [place], [place], [address]   [phone]            Discharge Medication List as of 2/10/2024  1:26 PM        START taking these medications    Details   ciprofloxacin (CIPRO) 500 mg tablet Take 1 tablet (500 mg total) by mouth 2 (two) times a day for 10 days, Starting Sat 2/10/2024, Until Tue 2/20/2024, Normal      metroNIDAZOLE (FLAGYL) 500 mg tablet Take 1 tablet (500 mg total) by mouth every 8 (eight) hours for 10 days, Starting Sat 2/10/2024, Until Tue 2/20/2024, Normal           CONTINUE these medications which have NOT CHANGED    Details   amLODIPine (NORVASC) 10 mg tablet  Take 1 tablet (10 mg total) by mouth daily, Starting Tue 1/30/2024, Until Fri 6/28/2024, Normal      Blood Pressure KIT Use 2 (two) times a day, Starting Wed 11/3/2021, Normal      hydrocortisone-acetic acid (VOSOL-HC) otic solution Administer 3 drops into both ears every 6 (six) hours, Starting Tue 9/26/2023, Normal      loratadine (CLARITIN) 10 mg tablet Take 1 tablet (10 mg total) by mouth 2 (two) times a day for 5 days, Starting Thu 2/8/2024, Until Tue 2/13/2024, Normal      pantoprazole (PROTONIX) 20 mg tablet Take 1 tablet (20 mg total) by mouth daily before breakfast, Starting Tue 1/30/2024, Until Sun 7/28/2024, Normal      predniSONE 50 mg tablet Take 1 tablet (50 mg total) by mouth daily for 4 days, Starting Thu 2/8/2024, Until Mon 2/12/2024, Normal                 PDMP Review       None            ED Provider  Electronically Signed by             Ciera Mg PA-C  02/10/24 5333

## 2024-02-26 ENCOUNTER — TELEPHONE (OUTPATIENT)
Dept: FAMILY MEDICINE CLINIC | Facility: CLINIC | Age: 59
End: 2024-02-26

## 2024-02-26 DIAGNOSIS — Z59.82 TRANSPORTATION INSECURITY: ICD-10-CM

## 2024-02-26 SDOH — ECONOMIC STABILITY - TRANSPORTATION SECURITY: TRANSPORTATION INSECURITY: Z59.82

## 2024-02-26 NOTE — TELEPHONE ENCOUNTER
Pt mother came into office requesting a referral to  regarding transportation for pt due to her not being able to transport pt anymore. No longer has a vehicle. Any questions (Icelandic speaking) [phone]

## 2024-02-29 ENCOUNTER — TELEPHONE (OUTPATIENT)
Age: 59
End: 2024-02-29

## 2024-02-29 NOTE — TELEPHONE ENCOUNTER
Pts mother called in stating that she wanted to change his appt on 3/14 as the appt is too late. Advised that he is not scheduled with us on this day.

## 2024-03-06 ENCOUNTER — TELEPHONE (OUTPATIENT)
Dept: GASTROENTEROLOGY | Facility: MEDICAL CENTER | Age: 59
End: 2024-03-06

## 2024-03-06 NOTE — TELEPHONE ENCOUNTER
Notified patient of location and provider change for 3/27 office visit due to provider schedule change    Call was not able to be completed.

## 2024-03-13 ENCOUNTER — TELEPHONE (OUTPATIENT)
Age: 59
End: 2024-03-13

## 2024-03-13 NOTE — TELEPHONE ENCOUNTER
Pt's mother called to change the appt on 03.27, she comes to his appts with him because he is Deaf. She speaks only Mohawk and Pt dose not have a consent form on file. She was adamant they would not be able to come to that appointment and I was unable to speak with PT as he is non-verble.   I worked with Pt's mother via an interpretor and have rescheduled in order to accommodate the pt being seen but please make sure he signs a consent form when in office listing her so there are no future issues

## 2024-03-14 ENCOUNTER — TELEPHONE (OUTPATIENT)
Dept: FAMILY MEDICINE CLINIC | Facility: CLINIC | Age: 59
End: 2024-03-14

## 2024-03-14 NOTE — TELEPHONE ENCOUNTER
IEB FORM RECEIVED ON 3/14/24  GIVEN TO  TO BE COMPLETED, SIGNED BY MD/DO (INCLUDE LISC. NUMBER), AND FAXED BACK IN 3 DAYS

## 2024-03-16 ENCOUNTER — HOSPITAL ENCOUNTER (EMERGENCY)
Facility: HOSPITAL | Age: 59
DRG: 287 | End: 2024-03-16
Attending: EMERGENCY MEDICINE
Payer: MEDICARE

## 2024-03-16 ENCOUNTER — HOSPITAL ENCOUNTER (INPATIENT)
Facility: HOSPITAL | Age: 59
LOS: 1 days | Discharge: HOME | DRG: 287 | End: 2024-03-17
Attending: FAMILY MEDICINE | Admitting: FAMILY MEDICINE
Payer: MEDICARE

## 2024-03-16 VITALS
SYSTOLIC BLOOD PRESSURE: 185 MMHG | OXYGEN SATURATION: 99 % | TEMPERATURE: 97.9 F | BODY MASS INDEX: 25.12 KG/M2 | RESPIRATION RATE: 18 BRPM | DIASTOLIC BLOOD PRESSURE: 81 MMHG | HEART RATE: 80 BPM | WEIGHT: 132.94 LBS

## 2024-03-16 DIAGNOSIS — I21.3 STEMI (ST ELEVATION MYOCARDIAL INFARCTION) (HCC): ICD-10-CM

## 2024-03-16 DIAGNOSIS — I10 PRIMARY HYPERTENSION: ICD-10-CM

## 2024-03-16 DIAGNOSIS — I21.3 STEMI (ST ELEVATION MYOCARDIAL INFARCTION) (HCC): Primary | ICD-10-CM

## 2024-03-16 DIAGNOSIS — F10.90 ALCOHOL USE DISORDER: ICD-10-CM

## 2024-03-16 DIAGNOSIS — R00.1 JUNCTIONAL BRADYCARDIA: ICD-10-CM

## 2024-03-16 DIAGNOSIS — Z59.41 FOOD INSECURITY: Primary | ICD-10-CM

## 2024-03-16 DIAGNOSIS — I25.10 CAD (CORONARY ARTERY DISEASE): ICD-10-CM

## 2024-03-16 LAB
2HR DELTA HS TROPONIN: 8 NG/L
4HR DELTA HS TROPONIN: 18 NG/L
ALBUMIN SERPL BCP-MCNC: 4.3 G/DL (ref 3.5–5)
ALP SERPL-CCNC: 90 U/L (ref 34–104)
ALT SERPL W P-5'-P-CCNC: 16 U/L (ref 7–52)
ANION GAP SERPL CALCULATED.3IONS-SCNC: 7 MMOL/L (ref 4–13)
AST SERPL W P-5'-P-CCNC: 23 U/L (ref 13–39)
ATRIAL RATE: 42 BPM
ATRIAL RATE: 48 BPM
ATRIAL RATE: 56 BPM
ATRIAL RATE: 78 BPM
BASOPHILS # BLD AUTO: 0.02 THOUSANDS/ÂΜL (ref 0–0.1)
BASOPHILS NFR BLD AUTO: 0 % (ref 0–1)
BILIRUB SERPL-MCNC: 0.98 MG/DL (ref 0.2–1)
BUN SERPL-MCNC: 10 MG/DL (ref 5–25)
CALCIUM SERPL-MCNC: 9.3 MG/DL (ref 8.4–10.2)
CARDIAC TROPONIN I PNL SERPL HS: 23 NG/L
CARDIAC TROPONIN I PNL SERPL HS: 25 NG/L
CARDIAC TROPONIN I PNL SERPL HS: 33 NG/L
CARDIAC TROPONIN I PNL SERPL HS: 43 NG/L
CHLORIDE SERPL-SCNC: 97 MMOL/L (ref 96–108)
CO2 SERPL-SCNC: 28 MMOL/L (ref 21–32)
CREAT SERPL-MCNC: 0.7 MG/DL (ref 0.6–1.3)
EOSINOPHIL # BLD AUTO: 0.04 THOUSAND/ÂΜL (ref 0–0.61)
EOSINOPHIL NFR BLD AUTO: 1 % (ref 0–6)
ERYTHROCYTE [DISTWIDTH] IN BLOOD BY AUTOMATED COUNT: 13.1 % (ref 11.6–15.1)
FLUAV RNA RESP QL NAA+PROBE: NEGATIVE
FLUBV RNA RESP QL NAA+PROBE: NEGATIVE
FOLATE SERPL-MCNC: 14.9 NG/ML
GFR SERPL CREATININE-BSD FRML MDRD: 103 ML/MIN/1.73SQ M
GLUCOSE SERPL-MCNC: 118 MG/DL (ref 65–140)
HCT VFR BLD AUTO: 47.4 % (ref 36.5–49.3)
HGB BLD-MCNC: 16.3 G/DL (ref 12–17)
IMM GRANULOCYTES # BLD AUTO: 0.02 THOUSAND/UL (ref 0–0.2)
IMM GRANULOCYTES NFR BLD AUTO: 0 % (ref 0–2)
KCT BLD-ACNC: 361 SEC (ref 89–137)
LYMPHOCYTES # BLD AUTO: 0.81 THOUSANDS/ÂΜL (ref 0.6–4.47)
LYMPHOCYTES NFR BLD AUTO: 12 % (ref 14–44)
MAGNESIUM SERPL-MCNC: 2 MG/DL (ref 1.9–2.7)
MCH RBC QN AUTO: 32.2 PG (ref 26.8–34.3)
MCHC RBC AUTO-ENTMCNC: 34.4 G/DL (ref 31.4–37.4)
MCV RBC AUTO: 94 FL (ref 82–98)
MONOCYTES # BLD AUTO: 0.53 THOUSAND/ÂΜL (ref 0.17–1.22)
MONOCYTES NFR BLD AUTO: 8 % (ref 4–12)
NEUTROPHILS # BLD AUTO: 5.6 THOUSANDS/ÂΜL (ref 1.85–7.62)
NEUTS SEG NFR BLD AUTO: 79 % (ref 43–75)
NRBC BLD AUTO-RTO: 0 /100 WBCS
P AXIS: 15 DEGREES
P AXIS: 37 DEGREES
PHOSPHATE SERPL-MCNC: 3.4 MG/DL (ref 2.7–4.5)
PLATELET # BLD AUTO: 122 THOUSANDS/UL (ref 149–390)
PMV BLD AUTO: 9.3 FL (ref 8.9–12.7)
POTASSIUM SERPL-SCNC: 3.9 MMOL/L (ref 3.5–5.3)
PR INTERVAL: 172 MS
PR INTERVAL: 186 MS
PREALB SERPL-MCNC: 26 MG/DL (ref 17–34)
PROT SERPL-MCNC: 7.4 G/DL (ref 6.4–8.4)
QRS AXIS: 53 DEGREES
QRS AXIS: 62 DEGREES
QRS AXIS: 65 DEGREES
QRS AXIS: 67 DEGREES
QRSD INTERVAL: 100 MS
QRSD INTERVAL: 102 MS
QRSD INTERVAL: 130 MS
QRSD INTERVAL: 132 MS
QT INTERVAL: 382 MS
QT INTERVAL: 468 MS
QT INTERVAL: 468 MS
QT INTERVAL: 470 MS
QTC INTERVAL: 383 MS
QTC INTERVAL: 386 MS
QTC INTERVAL: 435 MS
QTC INTERVAL: 451 MS
RBC # BLD AUTO: 5.06 MILLION/UL (ref 3.88–5.62)
RSV RNA RESP QL NAA+PROBE: NEGATIVE
SARS-COV-2 RNA RESP QL NAA+PROBE: NEGATIVE
SODIUM SERPL-SCNC: 132 MMOL/L (ref 135–147)
SPECIMEN SOURCE: ABNORMAL
T WAVE AXIS: 147 DEGREES
T WAVE AXIS: 167 DEGREES
T WAVE AXIS: 168 DEGREES
T WAVE AXIS: 234 DEGREES
TSH SERPL DL<=0.05 MIU/L-ACNC: 1.23 UIU/ML (ref 0.45–4.5)
VENTRICULAR RATE: 40 BPM
VENTRICULAR RATE: 41 BPM
VENTRICULAR RATE: 56 BPM
VENTRICULAR RATE: 78 BPM
WBC # BLD AUTO: 7.02 THOUSAND/UL (ref 4.31–10.16)

## 2024-03-16 PROCEDURE — 96361 HYDRATE IV INFUSION ADD-ON: CPT

## 2024-03-16 PROCEDURE — 93010 ELECTROCARDIOGRAM REPORT: CPT | Performed by: INTERNAL MEDICINE

## 2024-03-16 PROCEDURE — 85025 COMPLETE CBC W/AUTO DIFF WBC: CPT

## 2024-03-16 PROCEDURE — B2151ZZ FLUOROSCOPY OF LEFT HEART USING LOW OSMOLAR CONTRAST: ICD-10-PCS | Performed by: INTERNAL MEDICINE

## 2024-03-16 PROCEDURE — 99152 MOD SED SAME PHYS/QHP 5/>YRS: CPT | Performed by: INTERNAL MEDICINE

## 2024-03-16 PROCEDURE — 84484 ASSAY OF TROPONIN QUANT: CPT | Performed by: INTERNAL MEDICINE

## 2024-03-16 PROCEDURE — 96374 THER/PROPH/DIAG INJ IV PUSH: CPT

## 2024-03-16 PROCEDURE — 36415 COLL VENOUS BLD VENIPUNCTURE: CPT

## 2024-03-16 PROCEDURE — 84484 ASSAY OF TROPONIN QUANT: CPT

## 2024-03-16 PROCEDURE — 85347 COAGULATION TIME ACTIVATED: CPT

## 2024-03-16 PROCEDURE — 80053 COMPREHEN METABOLIC PANEL: CPT

## 2024-03-16 PROCEDURE — C1769 GUIDE WIRE: HCPCS | Performed by: INTERNAL MEDICINE

## 2024-03-16 PROCEDURE — 4A023N7 MEASUREMENT OF CARDIAC SAMPLING AND PRESSURE, LEFT HEART, PERCUTANEOUS APPROACH: ICD-10-PCS | Performed by: INTERNAL MEDICINE

## 2024-03-16 PROCEDURE — 84134 ASSAY OF PREALBUMIN: CPT | Performed by: FAMILY MEDICINE

## 2024-03-16 PROCEDURE — 93005 ELECTROCARDIOGRAM TRACING: CPT

## 2024-03-16 PROCEDURE — B2111ZZ FLUOROSCOPY OF MULTIPLE CORONARY ARTERIES USING LOW OSMOLAR CONTRAST: ICD-10-PCS | Performed by: INTERNAL MEDICINE

## 2024-03-16 PROCEDURE — 99291 CRITICAL CARE FIRST HOUR: CPT | Performed by: EMERGENCY MEDICINE

## 2024-03-16 PROCEDURE — 83735 ASSAY OF MAGNESIUM: CPT | Performed by: FAMILY MEDICINE

## 2024-03-16 PROCEDURE — 84443 ASSAY THYROID STIM HORMONE: CPT | Performed by: INTERNAL MEDICINE

## 2024-03-16 PROCEDURE — 93458 L HRT ARTERY/VENTRICLE ANGIO: CPT | Performed by: INTERNAL MEDICINE

## 2024-03-16 PROCEDURE — 99153 MOD SED SAME PHYS/QHP EA: CPT | Performed by: INTERNAL MEDICINE

## 2024-03-16 PROCEDURE — 99285 EMERGENCY DEPT VISIT HI MDM: CPT

## 2024-03-16 PROCEDURE — 0241U HB NFCT DS VIR RESP RNA 4 TRGT: CPT

## 2024-03-16 PROCEDURE — C1887 CATHETER, GUIDING: HCPCS | Performed by: INTERNAL MEDICINE

## 2024-03-16 PROCEDURE — C1894 INTRO/SHEATH, NON-LASER: HCPCS | Performed by: INTERNAL MEDICINE

## 2024-03-16 PROCEDURE — 84425 ASSAY OF VITAMIN B-1: CPT | Performed by: FAMILY MEDICINE

## 2024-03-16 PROCEDURE — 82746 ASSAY OF FOLIC ACID SERUM: CPT | Performed by: FAMILY MEDICINE

## 2024-03-16 PROCEDURE — NC001 PR NO CHARGE: Performed by: INTERNAL MEDICINE

## 2024-03-16 PROCEDURE — 84100 ASSAY OF PHOSPHORUS: CPT | Performed by: FAMILY MEDICINE

## 2024-03-16 PROCEDURE — 99222 1ST HOSP IP/OBS MODERATE 55: CPT | Performed by: FAMILY MEDICINE

## 2024-03-16 RX ORDER — HEPARIN SODIUM 1000 [USP'U]/ML
4000 INJECTION, SOLUTION INTRAVENOUS; SUBCUTANEOUS ONCE
Status: COMPLETED | OUTPATIENT
Start: 2024-03-16 | End: 2024-03-16

## 2024-03-16 RX ORDER — HEPARIN SODIUM 1000 [USP'U]/ML
INJECTION, SOLUTION INTRAVENOUS; SUBCUTANEOUS CODE/TRAUMA/SEDATION MEDICATION
Status: DISCONTINUED | OUTPATIENT
Start: 2024-03-16 | End: 2024-03-16 | Stop reason: HOSPADM

## 2024-03-16 RX ORDER — LIDOCAINE HYDROCHLORIDE 10 MG/ML
INJECTION, SOLUTION EPIDURAL; INFILTRATION; INTRACAUDAL; PERINEURAL CODE/TRAUMA/SEDATION MEDICATION
Status: DISCONTINUED | OUTPATIENT
Start: 2024-03-16 | End: 2024-03-16 | Stop reason: HOSPADM

## 2024-03-16 RX ORDER — NITROGLYCERIN 20 MG/100ML
INJECTION INTRAVENOUS CODE/TRAUMA/SEDATION MEDICATION
Status: DISCONTINUED | OUTPATIENT
Start: 2024-03-16 | End: 2024-03-16 | Stop reason: HOSPADM

## 2024-03-16 RX ORDER — PANTOPRAZOLE SODIUM 20 MG/1
20 TABLET, DELAYED RELEASE ORAL
Status: DISCONTINUED | OUTPATIENT
Start: 2024-03-17 | End: 2024-03-17 | Stop reason: HOSPADM

## 2024-03-16 RX ORDER — AMLODIPINE BESYLATE 10 MG/1
10 TABLET ORAL DAILY
Status: DISCONTINUED | OUTPATIENT
Start: 2024-03-17 | End: 2024-03-17 | Stop reason: HOSPADM

## 2024-03-16 RX ORDER — SODIUM CHLORIDE, SODIUM LACTATE, POTASSIUM CHLORIDE, CALCIUM CHLORIDE 600; 310; 30; 20 MG/100ML; MG/100ML; MG/100ML; MG/100ML
125 INJECTION, SOLUTION INTRAVENOUS CONTINUOUS
Status: DISPENSED | OUTPATIENT
Start: 2024-03-16 | End: 2024-03-16

## 2024-03-16 RX ORDER — HYDRALAZINE HYDROCHLORIDE 20 MG/ML
5 INJECTION INTRAMUSCULAR; INTRAVENOUS EVERY 6 HOURS PRN
Status: DISCONTINUED | OUTPATIENT
Start: 2024-03-16 | End: 2024-03-16

## 2024-03-16 RX ORDER — ASPIRIN 325 MG
325 TABLET ORAL ONCE
Status: COMPLETED | OUTPATIENT
Start: 2024-03-16 | End: 2024-03-16

## 2024-03-16 RX ORDER — FOLIC ACID 1 MG/1
1 TABLET ORAL DAILY
Status: DISCONTINUED | OUTPATIENT
Start: 2024-03-17 | End: 2024-03-17 | Stop reason: HOSPADM

## 2024-03-16 RX ORDER — SODIUM CHLORIDE 9 MG/ML
INJECTION, SOLUTION INTRAVENOUS
Status: COMPLETED | OUTPATIENT
Start: 2024-03-16 | End: 2024-03-16

## 2024-03-16 RX ORDER — MIDAZOLAM HYDROCHLORIDE 2 MG/2ML
INJECTION, SOLUTION INTRAMUSCULAR; INTRAVENOUS CODE/TRAUMA/SEDATION MEDICATION
Status: DISCONTINUED | OUTPATIENT
Start: 2024-03-16 | End: 2024-03-16 | Stop reason: HOSPADM

## 2024-03-16 RX ORDER — VERAPAMIL HYDROCHLORIDE 2.5 MG/ML
INJECTION, SOLUTION INTRAVENOUS CODE/TRAUMA/SEDATION MEDICATION
Status: DISCONTINUED | OUTPATIENT
Start: 2024-03-16 | End: 2024-03-16 | Stop reason: HOSPADM

## 2024-03-16 RX ORDER — LANOLIN ALCOHOL/MO/W.PET/CERES
100 CREAM (GRAM) TOPICAL DAILY
Status: DISCONTINUED | OUTPATIENT
Start: 2024-03-17 | End: 2024-03-17 | Stop reason: HOSPADM

## 2024-03-16 RX ORDER — FENTANYL CITRATE 50 UG/ML
INJECTION, SOLUTION INTRAMUSCULAR; INTRAVENOUS CODE/TRAUMA/SEDATION MEDICATION
Status: DISCONTINUED | OUTPATIENT
Start: 2024-03-16 | End: 2024-03-16 | Stop reason: HOSPADM

## 2024-03-16 RX ORDER — ATORVASTATIN CALCIUM 40 MG/1
40 TABLET, FILM COATED ORAL
Status: DISCONTINUED | OUTPATIENT
Start: 2024-03-16 | End: 2024-03-17 | Stop reason: HOSPADM

## 2024-03-16 RX ADMIN — ASPIRIN 325 MG ORAL TABLET 325 MG: 325 PILL ORAL at 13:29

## 2024-03-16 RX ADMIN — HEPARIN SODIUM 4000 UNITS: 1000 INJECTION INTRAVENOUS; SUBCUTANEOUS at 13:32

## 2024-03-16 RX ADMIN — SODIUM CHLORIDE 1000 ML: 0.9 INJECTION, SOLUTION INTRAVENOUS at 13:04

## 2024-03-16 RX ADMIN — ATORVASTATIN CALCIUM 40 MG: 40 TABLET, FILM COATED ORAL at 18:30

## 2024-03-16 RX ADMIN — SODIUM CHLORIDE, SODIUM LACTATE, POTASSIUM CHLORIDE, AND CALCIUM CHLORIDE 125 ML/HR: .6; .31; .03; .02 INJECTION, SOLUTION INTRAVENOUS at 15:51

## 2024-03-16 RX ADMIN — TICAGRELOR 180 MG: 90 TABLET ORAL at 13:29

## 2024-03-16 SDOH — ECONOMIC STABILITY - FOOD INSECURITY: FOOD INSECURITY: Z59.41

## 2024-03-16 NOTE — CONSULTS
Consultation - Cardiology  Mal Encarnacion 59 y.o. male MRN: [id number]  Unit/Bed#: BE CATH LAB ROOM Encounter: [id number]      Consults    History of Present Illness   Physician Requesting Consult: No att. providers found  Reason for Consult / Principal Problem: Chest pain    Assessment/Plan   Assessment/Plan:  59M with PMH of congenital deafness, HTN, DM2 who presented to the ED with dizziness and chest pain and was transferred for urgent coronary angiography which showed no obstructive CAD- soon after the procedure he developed junctional bradycardia.      #Chest pain  Not ACS based on cath. Initial trop normal. CP may have been from HTN. Will get echo, trend trop.     #non-obstructive CAD  Patient came in as STEMI alert but cath did not show evidence of ACS.  EKG changes were likely due to LVH with repolarization abnormality.  LHC did show 50% LAD and 40% D1 which should be treated medically.   two months ago.   -start atorvastatin 40mg daily    #junctional bradycardia  Patient noted to have junctional bradycardia with HR 40 soon after catheterization. He was feeling dizziness at the time.  This could be SSS vs. Secondary to contrast injection or medications used during the catheterization (midazolam/fentanyl).  Monitor on tele. If any further symptomatic bradycardia should be considered for PPP.   -check echo  -monitor on tele, outpatient monitoring may be warranted as well.     #HTN  /90s on arrival to cath lab.  Resume home amlodipine 10mg. Will likely need additional agent.     #DM2: A1c 6.6--> 5.2. not on meds    #deafness:   Communicated with  via ipad.     HPI: Mal Encarnacion is a 59 y.o. year old male with PMH of congenital deafness, HTN, DM2 who presented to the ED with dizziness and chest pain and was transferred for urgent coronary angiography which showed no obstructive CAD- soon after the procedure he developed junctional bradycardia.      History obtained from the patient  with a .  The patient states that today he was feeling dizziness and some chest pain when he got to the ED.  The main reason why went to the hospital was due to dizziness.  He says he has food insecurity at home and has not had much food to eat recently and is hungry.  EKG showed ST elevations due to hypertrophy versus ischemia so he was taken to the Cath Lab which showed stable 50% LAD disease however no other obstructive coronary disease or cause of myocardial infarction.  Soon after the catheterization the patient was noted to be newly bradycardic with a heart rate of 40.  EKG showed junctional rhythm without visible P waves.    The patient reported that when he was bradycardic he felt dizziness which is what brought him into the hospital.     Historical Information   Past Medical History:   Diagnosis Date    Deaf     Heart murmur     Hypertension      No past surgical history on file.  Social History     Substance and Sexual Activity   Alcohol Use Yes    Comment: 1 beer occasionally     Social History     Substance and Sexual Activity   Drug Use Never     Social History     Tobacco Use   Smoking Status Never    Passive exposure: Never   Smokeless Tobacco Never     Family History: mother HTN    Meds/Allergies   Hospital Medications:   Current Facility-Administered Medications   Medication Dose Route Frequency    fentaNYL injection   Intravenous Code/Trauma/Sedation Med    heparin (porcine) injection   Intravenous Code/Trauma/Sedation Med    iohexol (OMNIPAQUE) 350 MG/ML injection (SINGLE-DOSE)    Code/Trauma/Sedation Med    lidocaine (PF) (XYLOCAINE-MPF) 1 % injection   Infiltration Code/Trauma/Sedation Med    midazolam (VERSED) injection   Intravenous Code/Trauma/Sedation Med    nitroglycerin (NITRO-BID) 2 % TD ointment   Topical Code/Trauma/Sedation Med    nitroGLYcerin 200 mcg/mL IA bolus   Intra-arterial Code/Trauma/Sedation Med    sodium chloride 0.9 % infusion   Intravenous  Code/Trauma/Sedation Continuous Med    verapamil (ISOPTIN) injection   Intravenous Code/Trauma/Sedation Med     Home Medications:   Medications Prior to Admission   Medication    amLODIPine (NORVASC) 10 mg tablet    Blood Pressure KIT    hydrocortisone-acetic acid (VOSOL-HC) otic solution    loratadine (CLARITIN) 10 mg tablet    pantoprazole (PROTONIX) 20 mg tablet       No Known Allergies    Objective   Vitals: SpO2 98%.      No intake or output data in the 24 hours ending 03/16/24 1447    Invasive Devices       Peripheral Intravenous Line  Duration             Peripheral IV 03/16/24 Distal;Left;Upper;Ventral (anterior) Arm <1 day    Peripheral IV 03/16/24 Right Antecubital <1 day                    Review of Systems:  ROS  ROS as noted above, otherwise 12 point review of systems was performed and is negative.     Physical Exam  Physical Exam  Constitutional:       Appearance: Normal appearance.   HENT:      Head: Normocephalic and atraumatic.   Neck:      Vascular: No JVD   Cardiovascular:      Rate and Rhythm: bradycardic. No murmurs  Pulmonary:      Effort: CTA-b  Abdominal:      General: Abdomen is flat. Bowel sounds are normal.      Palpations: Abdomen is soft.   Musculoskeletal:     No edema  Skin:     General: Skin is warm.   Neurological:      Mental Status: He is alert and oriented to person, place, and time.   Psychiatric:         Behavior: Behavior normal.       Lab Results: I have personally reviewed pertinent lab results.    Results from last 7 days   Lab Units 03/16/24  1303   WBC Thousand/uL 7.02   HEMOGLOBIN g/dL 16.3   HEMATOCRIT % 47.4   PLATELETS Thousands/uL 122*     Results from last 7 days   Lab Units 03/16/24  1303   POTASSIUM mmol/L 3.9   CHLORIDE mmol/L 97   CO2 mmol/L 28   BUN mg/dL 10   CREATININE mg/dL 0.70   CALCIUM mg/dL 9.3

## 2024-03-16 NOTE — H&P
H&P - Family Medicine [facility] [place]    Mal Encarnacion 1965, 59 y.o. male.  MRN: [id number]    Unit/Bed#: [facility] 501-01 Encounter: [id number]  Primary Care Provider: Awa Shepherd MD      Admission Date: 3/16/2024 1412  Admitting Provider: Ilir Barney MD  Code Status:  Level 1 - Full Code  Diet: Diet Cardiovascular; Cardiac  Consult: IP CONSULT TO NUTRITION SERVICES  IP CONSULT TO CASE MANAGEMENT      ASSESSMENT & PLAN:  Discussed with [facility] team and finalization is pending attending physician attestation.    * Junctional bradycardia  Assessment & Plan  Patient is 59-year-old male with past medical history significant for hypertension, grade 1 diastolic dysfunction, history of MI presenting with 2 days of lightheadedness and 1 hour of chest pain.  Patient was found to be hypertensive to 180/105 on admission.  Initial troponin was 23, EKG showed ST segment elevations in V2, V3, V4 and T wave inversions in leads II, III and aVF with bradycardia to the 40s.  STEMI alert was called, patient was loaded with heparin, aspirin and Brilinta and transferred to [facility].  Patient underwent cardiac cath which showed mild diffuse disease with no culprit lesion, no STEMI.  Patient appears to have LVH which could be contributing to rhythm abnormalities.      Plan:  Echocardiogram ordered  Continue telemetry  0-hr trop 23  Follow up 2-hr and 4-hr troponin  Per cardiology will consider pacemaker placement if bradycardia continues  Cardio following appreciate recs  Continue to check daily electrolytes and correct any abnormalities  Check prealbumin      Primary hypertension  Assessment & Plan  - Most recent BP Blood Pressure: 137/78  or - 24H SBP Systolic (24hrs), Av , Min:137 , Max:189        Patient has history of hypertension previously on carvedilol and chlorthalidone, most recently transition to amlodipine 10 mg in 2024.  Patient presented with dizziness and blood pressure was elevated  "to 180s systolic on arrival to the ED.    Plan:  Continue close monitoring of blood pressures inpatient  Cardiology following, appreciate recs  Continue amlodipine 10 mg daily  Consider PRN hydralazine     Alcohol use disorder  Assessment & Plan  Patient reports significant weekend binge drinking consuming about 12 beers every weekend from Friday to Saturday.  Patient denies any past history of alcohol withdrawal.  Denies any other drug use    Plan:  CIWA protocol ordered  Thiamine and folate levels ordered  Thiamine and folate supplementation ordered  Nutrition consult  Prealbumin ordered    Food insecurity  Assessment & Plan  Patient reports food insecurity    Plan:  Case management consult for connection with outpatient resources  Nutrition consult placed    Hyponatremia  Assessment & Plan  Patient hyponatremic to 132 on admission CMP    Plan:  Continue to monitor daily BMPs  If hyponatremia continues, consider SIADH workup including urine sodium, serum osm, urine osm    Type 2 diabetes mellitus without complication, without long-term current use of insulin (HCC)  Assessment & Plan  Lab Results   Component Value Date    HGBA1C 5.2 01/30/2024       No results for input(s): \"POCGLU\" in the last 72 hours.    Blood Sugar Average: Last 72 hrs:    Patient has history of type 2 diabetes however, on chart review most recent A1c was in the normal range.    Plan:  Cardiac diet  Glucose checks per routine  Can consider adding sliding scale insulin if glucose above normal hospitalized range 140-180    Thrombocytopenia (HCC)  Assessment & Plan  Platelet        Results from last 7 days   Lab Units 03/16/24  1303   PLATELETS Thousands/uL 122*     Plan:  -Continue to monitor CBCs  -Discontinue DVT prophylaxis for platelets <50         Disposition: Admit to Inpatient under Med-surg with telemetry      VTE Pharm PPX: Reason for no pharmacologic prophylaxis Holding for possible procedure  VTE [facility] PPX: sequential compression device " and/or foot pump applied unless contraindicated otherwise.    Advance Directive and Living Will:      Power of :    POLST:    Emergency contact:    Primary Emergency Contact: Janessa Encarnacion   Extended Emergency Contact Information  Primary Emergency Contact: Janessa Encarnacion  Mobile Phone: [phone]  Relation: Mother      History of Present Illness      CC: Lightheadedness/dizziness    HPI: 59 y.o. male with hearing impairment, who has a past medical history of hypertension, thrombocytopenia and previous MI who presented to [facility] for 2 days lightheadedness.  In the ED patient was also noted to be having chest pain, with blood pressure of 200s over 100s.  Initial troponin done were 23, EKG showed ST segment elevations in V2, V3, V4 and T wave inversions in leads II, III and aVF with bradycardia to the 40s. STEMI alert was called, patient was loaded with heparin, aspirin and Brilinta. Given chest pain and St elevations patient was transferred to [facility] for catheterization.  There was concern for ischemia versus ST elevation due to LVH during catheterization patient noted to have no significant occlusions and patient had resolution of dizziness.  However after procedure patient was found to bradycardic to the 40s and 50s.  At this time patient was reporting dizziness.  On monitoring per cardiology there was concern for junctional bradycardia.  Symptoms have now resolved but cardiology would like to admit patient for observation.  Per cardiology if patient continues to have these episodes of junctional bradycardia, patient will likely need pacemaker early next week if patient is in agreement.       Review of systems (ROS):  Review of Systems   Constitutional:  Negative for chills and fever.   HENT:  Negative for ear pain and sore throat.    Eyes:  Negative for pain and visual disturbance.   Respiratory:  Negative for cough and shortness of breath.    Cardiovascular:  Negative for chest pain and  palpitations.   Gastrointestinal:  Negative for abdominal pain and vomiting.   Genitourinary:  Negative for dysuria and hematuria.   Musculoskeletal:  Negative for arthralgias and back pain.   Skin:  Negative for color change and rash.   Neurological:  Positive for dizziness. Negative for seizures and syncope.   All other systems reviewed and are negative.      A 12-point, complete review of systems was reviewed and negative except as stated in above.    Historical Information   Past Medical History:   Diagnosis Date    Deaf     Heart murmur     Hypertension      No past surgical history on file.    Social History   Social History     Substance and Sexual Activity   Alcohol Use Yes    Comment: 1 beer occasionally     Social History     Substance and Sexual Activity   Drug Use Never     Social History     Tobacco Use   Smoking Status Never    Passive exposure: Never   Smokeless Tobacco Never     Family History   Problem Relation Age of Onset    Hypertension Mother     Diabetes Mother     Heart disease Mother         pacemaker    Arthritis Mother         chronic       Meds/Allergies   All medications and allergies reviewed  No Known Allergies    Current Facility-Administered Medications on File Prior to Encounter   Medication Dose Route Frequency Provider Last Rate Last Admin    [COMPLETED] aspirin tablet 325 mg  325 mg Oral Once Alfred Vitale DO   325 mg at 03/16/24 1329    [COMPLETED] heparin (porcine) injection 4,000 Units  4,000 Units Intravenous Once Alfred Vitale DO   4,000 Units at 03/16/24 1332    [COMPLETED] sodium chloride 0.9 % bolus 1,000 mL  1,000 mL Intravenous Once Alfred Vitlae DO 1,000 mL/hr at 03/16/24 1304 1,000 mL at 03/16/24 1304    [COMPLETED] ticagrelor (BRILINTA) tablet 180 mg  180 mg Oral Once Alfred Vitale DO   180 mg at 03/16/24 1329     Current Outpatient Medications on File Prior to Encounter   Medication Sig Dispense Refill    amLODIPine  "(NORVASC) 10 mg tablet Take 1 tablet (10 mg total) by mouth daily 30 tablet 4    Blood Pressure KIT Use 2 (two) times a day 1 kit 0    hydrocortisone-acetic acid (VOSOL-HC) otic solution Administer 3 drops into both ears every 6 (six) hours 10 mL 0    loratadine (CLARITIN) 10 mg tablet Take 1 tablet (10 mg total) by mouth 2 (two) times a day for 5 days 10 tablet 0    pantoprazole (PROTONIX) 20 mg tablet Take 1 tablet (20 mg total) by mouth daily before breakfast 30 tablet 5    [DISCONTINUED] carvedilol (COREG) 3.125 mg tablet Take 1 tablet (3.125 mg total) by mouth 2 (two) times a day with meals 60 tablet 1    [DISCONTINUED] chlorthalidone 25 mg tablet Take 0.5 tablets (12.5 mg total) by mouth daily In the morning 30 tablet 0       Objective   ED Vitals & Management:  ED Triage Vitals   Temp Pulse Resp Blood Pressure SpO2   -- 03/16/24 1540 -- 03/16/24 1540 03/16/24 1421    67  137/78 98 %      Temp src Heart Rate Source Patient Position - Orthostatic VS BP Location FiO2 (%)   -- -- -- -- --             Pain Score       03/16/24 1441       No Pain         Medications   lactated ringers infusion (125 mL/hr Intravenous New Bag 3/16/24 1551)   thiamine tablet 100 mg (has no administration in time range)   folic acid (FOLVITE) tablet 1 mg (has no administration in time range)   amLODIPine (NORVASC) tablet 10 mg (has no administration in time range)   pantoprazole (PROTONIX) EC tablet 20 mg (has no administration in time range)   atorvastatin (LIPITOR) tablet 40 mg (has no administration in time range)   sodium chloride 0.9 % infusion (0 mL/hr Intravenous Stopped 3/16/24 1531)       Current Vitals:   Patient Vitals for the past 24 hrs:   BP Pulse SpO2 Height   03/16/24 1750 159/79 86 -- --   03/16/24 1720 160/80 84 -- --   03/16/24 1655 153/78 91 -- --   03/16/24 1620 158/91 69 -- --   03/16/24 1556 139/79 69 -- --   03/16/24 1540 137/78 67 95 % 5' 1\" (1.549 m)   03/16/24 1421 -- -- 98 % --         Intake/Output Summary " (Last 24 hours) at 3/16/2024 1813  Last data filed at 3/16/2024 1700  Gross per 24 hour   Intake 145.52 ml   Output 500 ml   Net -354.48 ml       Invasive Devices       Peripheral Intravenous Line  Duration             Peripheral IV 03/16/24 Distal;Left;Upper;Ventral (anterior) Arm <1 day    Peripheral IV 03/16/24 Right Antecubital <1 day                    Labs:   I have personally reviewed pertinent reports.    Recent Results (from the past 24 hour(s))   ECG 12 lead    Collection Time: 03/16/24 12:59 PM   Result Value Ref Range    Ventricular Rate 78 BPM    Atrial Rate 78 BPM    TN Interval 172 ms    QRSD Interval 100 ms    QT Interval 382 ms    QTC Interval 435 ms    P Axis 37 degrees    QRS Axis 62 degrees    T Wave Axis 234 degrees   CBC and differential    Collection Time: 03/16/24  1:03 PM   Result Value Ref Range    WBC 7.02 4.31 - 10.16 Thousand/uL    RBC 5.06 3.88 - 5.62 Million/uL    Hemoglobin 16.3 12.0 - 17.0 g/dL    Hematocrit 47.4 36.5 - 49.3 %    MCV 94 82 - 98 fL    MCH 32.2 26.8 - 34.3 pg    MCHC 34.4 31.4 - 37.4 g/dL    RDW 13.1 11.6 - 15.1 %    MPV 9.3 8.9 - 12.7 fL    Platelets 122 (L) 149 - 390 Thousands/uL    nRBC 0 /100 WBCs    Neutrophils Relative 79 (H) 43 - 75 %    Immature Grans % 0 0 - 2 %    Lymphocytes Relative 12 (L) 14 - 44 %    Monocytes Relative 8 4 - 12 %    Eosinophils Relative 1 0 - 6 %    Basophils Relative 0 0 - 1 %    Neutrophils Absolute 5.60 1.85 - 7.62 Thousands/µL    Absolute Immature Grans 0.02 0.00 - 0.20 Thousand/uL    Absolute Lymphocytes 0.81 0.60 - 4.47 Thousands/µL    Absolute Monocytes 0.53 0.17 - 1.22 Thousand/µL    Eosinophils Absolute 0.04 0.00 - 0.61 Thousand/µL    Basophils Absolute 0.02 0.00 - 0.10 Thousands/µL   Comprehensive metabolic panel    Collection Time: 03/16/24  1:03 PM   Result Value Ref Range    Sodium 132 (L) 135 - 147 mmol/L    Potassium 3.9 3.5 - 5.3 mmol/L    Chloride 97 96 - 108 mmol/L    CO2 28 21 - 32 mmol/L    ANION GAP 7 4 - 13 mmol/L     "BUN 10 5 - 25 mg/dL    Creatinine 0.70 0.60 - 1.30 mg/dL    Glucose 118 65 - 140 mg/dL    Calcium 9.3 8.4 - 10.2 mg/dL    AST 23 13 - 39 U/L    ALT 16 7 - 52 U/L    Alkaline Phosphatase 90 34 - 104 U/L    Total Protein 7.4 6.4 - 8.4 g/dL    Albumin 4.3 3.5 - 5.0 g/dL    Total Bilirubin 0.98 0.20 - 1.00 mg/dL    eGFR 103 ml/min/1.73sq m   FLU/RSV/COVID - if FLU/RSV clinically relevant    Collection Time: 03/16/24  1:03 PM    Specimen: Nose; Nares   Result Value Ref Range    SARS-CoV-2 Negative Negative    INFLUENZA A PCR Negative Negative    INFLUENZA B PCR Negative Negative    RSV PCR Negative Negative   HS Troponin 0hr (reflex protocol)    Collection Time: 03/16/24  1:03 PM   Result Value Ref Range    hs TnI 0hr 23 \"Refer to ACS Flowchart\"- see link ng/L   POCT activated clotting time    Collection Time: 03/16/24  2:31 PM   Result Value Ref Range    Activated Clotting Time, i-STAT 361 (H) 89 - 137 sec    Specimen Type VENOUS    ECG 12 lead    Collection Time: 03/16/24  2:59 PM   Result Value Ref Range    Ventricular Rate 41 BPM    Atrial Rate 48 BPM    LA Interval  ms    QRSD Interval 132 ms    QT Interval 468 ms    QTC Interval 386 ms    P Axis  degrees    QRS Axis 67 degrees    T Wave Axis 147 degrees   ECG 12 lead    Collection Time: 03/16/24  2:59 PM   Result Value Ref Range    Ventricular Rate 40 BPM    Atrial Rate 42 BPM    LA Interval  ms    QRSD Interval 130 ms    QT Interval 470 ms    QTC Interval 383 ms    P Axis  degrees    QRS Axis 65 degrees    T Wave Axis 168 degrees   ECG 12 lead    Collection Time: 03/16/24  3:05 PM   Result Value Ref Range    Ventricular Rate 56 BPM    Atrial Rate 56 BPM    LA Interval 186 ms    QRSD Interval 102 ms    QT Interval 468 ms    QTC Interval 451 ms    P Axis 15 degrees    QRS Axis 53 degrees    T Wave [place] 167 degrees   HS Troponin 0hr (reflex protocol)    Collection Time: 03/16/24  4:37 PM   Result Value Ref Range    hs TnI 0hr 25 \"Refer to ACS Flowchart\"- see link " ng/L   TSH, 3rd generation with Free T4 reflex    Collection Time: 03/16/24  4:37 PM   Result Value Ref Range    TSH 3RD GENERATON 1.229 0.450 - 4.500 uIU/mL       Imaging:  I have personally reviewed pertinent reports.    No results found.    EKG, Pathology, and Other Studies:   I have personally reviewed pertinent reports.      Physical Exam    Physical Exam  Constitutional:       Appearance: Normal appearance. He is underweight.   HENT:      Head: Normocephalic and atraumatic.   Eyes:      Pupils: Pupils are equal, round, and reactive to light.   Cardiovascular:      Rate and Rhythm: Normal rate and regular rhythm.      Pulses: Normal pulses.      Heart sounds: Normal heart sounds.   Pulmonary:      Effort: Pulmonary effort is normal.      Breath sounds: Normal breath sounds.   Abdominal:      General: Abdomen is flat. Bowel sounds are normal.      Palpations: Abdomen is soft.   Skin:     General: Skin is warm and dry.   Neurological:      Mental Status: He is alert.              Counseling / Coordination of Care  Total floor / unit time spent today 30 minutes.  Greater than 50% of total time was spent with the patient and / or family counseling and / or coordination of care.    Yareli Magana MD  PGY-2, [facility]  03/16/24  6:13 PM

## 2024-03-16 NOTE — ASSESSMENT & PLAN NOTE
- Most recent BP Blood Pressure: 166/93  or - 24H SBP Systolic (24hrs), Av , Min:137 , Max:189         Patient has history of hypertension previously on carvedilol and chlorthalidone, most recently transition to amlodipine 10 mg in 2024.  Patient presented with dizziness and blood pressure was elevated to 180s systolic on arrival to the ED.    Plan:  Continue close monitoring of blood pressures inpatient  Cardiology following, appreciate recs; started on losartan 50 mg 3/17.  Continue amlodipine 10 mg daily

## 2024-03-16 NOTE — ASSESSMENT & PLAN NOTE
Patient reports significant weekend binge drinking consuming about 12 beers every weekend from Friday to Saturday.  Patient denies any past history of alcohol withdrawal.  Denies any other drug use    Plan:  CIWA protocol ordered  Thiamine and folate levels ordered  Thiamine and folate supplementation ordered  Nutrition consult  Prealbumin ordered

## 2024-03-16 NOTE — ASSESSMENT & PLAN NOTE
Patient hyponatremic to 132 on admission CMP  Sodium       Results from last 7 days   Lab Units 03/17/24  0519 03/16/24  1303   SODIUM mmol/L 135 132*      Plan:  Continue to monitor daily BMPs

## 2024-03-16 NOTE — ED PROVIDER NOTES
History  Chief Complaint   Patient presents with    Dizziness     Pt was cleaning his house this morning with bleach. Pt started feeling dizzy and weak.      59-year-old hearing impaired male with history of hypertension, previous MI, presents with 2 days of lightheadedness, 1 hour of chest pain.  Patient well-appearing on exam and had no acute complaints aside from minor headache and neck pain.  Patient states this feels very similar to his previous MI.  Denies nausea/vomiting, fever/chills, shortness of breath, abdominal pain.        Prior to Admission Medications   Prescriptions Last Dose Informant Patient Reported? Taking?   Blood Pressure KIT  Self No No   Sig: Use 2 (two) times a day   amLODIPine (NORVASC) 10 mg tablet   No No   Sig: Take 1 tablet (10 mg total) by mouth daily   hydrocortisone-acetic acid (VOSOL-HC) otic solution   No No   Sig: Administer 3 drops into both ears every 6 (six) hours   loratadine (CLARITIN) 10 mg tablet   No No   Sig: Take 1 tablet (10 mg total) by mouth 2 (two) times a day for 5 days   pantoprazole (PROTONIX) 20 mg tablet   No No   Sig: Take 1 tablet (20 mg total) by mouth daily before breakfast      Facility-Administered Medications: None       Past Medical History:   Diagnosis Date    Deaf     Heart murmur     Hypertension        History reviewed. No pertinent surgical history.    Family History   Problem Relation Age of Onset    Hypertension Mother     Diabetes Mother     Heart disease Mother         pacemaker    Arthritis Mother         chronic     I have reviewed and agree with the history as documented.    E-Cigarette/Vaping    E-Cigarette Use Never User      E-Cigarette/Vaping Substances    Nicotine No     THC No     CBD No     Flavoring No     Other No     Unknown No      Social History     Tobacco Use    Smoking status: Never     Passive exposure: Never    Smokeless tobacco: Never   Vaping Use    Vaping status: Never Used   Substance Use Topics    Alcohol use: Yes      Comment: 1 beer occasionally    Drug use: Never        Review of Systems   Constitutional:  Negative for chills and fever.   Respiratory:  Negative for shortness of breath.    Cardiovascular:  Positive for chest pain.   Neurological:  Positive for light-headedness and headaches.   All other systems reviewed and are negative.      Physical Exam  ED Triage Vitals [03/16/24 1217]   Temperature Pulse Respirations Blood Pressure SpO2   97.9 °F (36.6 °C) 96 19 (!) 189/105 98 %      Temp Source Heart Rate Source Patient Position - Orthostatic VS BP Location FiO2 (%)   Oral Monitor Sitting Left arm --      Pain Score       --             Orthostatic Vital Signs  Vitals:    03/16/24 1217 03/16/24 1325 03/16/24 1336   BP: (!) 189/105 (!) 176/80 (!) 185/81   Pulse: 96 76 80   Patient Position - Orthostatic VS: Sitting Lying Lying       Physical Exam  Vitals and nursing note reviewed.   Constitutional:       Appearance: He is well-developed.   HENT:      Head: Normocephalic and atraumatic.      Right Ear: External ear normal.      Left Ear: External ear normal.      Nose: Nose normal.      Mouth/Throat:      Mouth: Mucous membranes are moist.   Eyes:      Extraocular Movements: Extraocular movements intact.   Cardiovascular:      Rate and Rhythm: Normal rate and regular rhythm.      Pulses: Normal pulses.      Heart sounds: Normal heart sounds. No murmur heard.  Pulmonary:      Effort: Pulmonary effort is normal. No respiratory distress.      Breath sounds: Normal breath sounds.   Abdominal:      Palpations: Abdomen is soft.      Tenderness: There is no abdominal tenderness.   Musculoskeletal:         General: Normal range of motion.      Cervical back: Neck supple.   Skin:     General: Skin is warm and dry.   Neurological:      General: No focal deficit present.      Mental Status: He is alert.   Psychiatric:         Mood and Affect: Mood normal.         ED Medications  Medications   sodium chloride 0.9 % bolus 1,000 mL (1,000  mL Intravenous New Bag 3/16/24 1304)   aspirin tablet 325 mg (325 mg Oral Given 3/16/24 1329)   ticagrelor (BRILINTA) tablet 180 mg (180 mg Oral Given 3/16/24 1329)   heparin (porcine) injection 4,000 Units (4,000 Units Intravenous Given 3/16/24 1332)       Diagnostic Studies  Results Reviewed       Procedure Component Value Units Date/Time    FLU/RSV/COVID - if FLU/RSV clinically relevant [[id number]]  (Normal) Collected: 03/16/24 1303    Lab Status: Final result Specimen: Nares from Nose Updated: 03/16/24 1354     SARS-CoV-2 Negative     INFLUENZA A PCR Negative     INFLUENZA B PCR Negative     RSV PCR Negative    Narrative:      FOR PEDIATRIC PATIENTS - copy/paste COVID Guidelines URL to browser: [email or web address]    SARS-CoV-2 assay is a Nucleic Acid Amplification assay intended for the  qualitative detection of nucleic acid from SARS-CoV-2 in nasopharyngeal  swabs. Results are for the presumptive identification of SARS-CoV-2 RNA.    Positive results are indicative of infection with SARS-CoV-2, the virus  causing COVID-19, but do not rule out bacterial infection or co-infection  with other viruses. Laboratories within the [place] and its  territories are required to report all positive results to the appropriate  public health authorities. Negative results do not preclude SARS-CoV-2  infection and should not be used as the sole basis for treatment or other  patient management decisions. Negative results must be combined with  clinical observations, patient history, and epidemiological information.  This test has not been FDA cleared or approved.    This test has been authorized by FDA under an Emergency Use Authorization  (EUA). This test is only authorized for the duration of time the  declaration that circumstances exist justifying the authorization of the  emergency use of an in vitro diagnostic tests for detection of SARS-CoV-2  virus and/or  diagnosis of COVID-19 infection under section 564(b)(1) of  the Act, 21 U.S.C. 360bbb-3(b)(1), unless the authorization is terminated  or revoked sooner. The test has been validated but independent review by FDA  and CLIA is pending.    Test performed using [employer] GeneXpert: This RT-PCR assay targets N2,  a region unique to SARS-CoV-2. A conserved region in the E-gene was chosen  for pan-Sarbecovirus detection which includes SARS-CoV-2.    According to CMS-2020-01-R, this platform meets the definition of high-throughput technology.    HS Troponin I 2hr [[id number]]     Lab Status: No result Specimen: Blood     HS Troponin 0hr (reflex protocol) [[id number]]  (Normal) Collected: 03/16/24 1303    Lab Status: Final result Specimen: Blood from Arm, Right Updated: 03/16/24 1335     hs TnI 0hr 23 ng/L     Comprehensive metabolic panel [[id number]]  (Abnormal) Collected: 03/16/24 1303    Lab Status: Final result Specimen: Blood from Arm, Right Updated: 03/16/24 1330     Sodium 132 mmol/L      Potassium 3.9 mmol/L      Chloride 97 mmol/L      CO2 28 mmol/L      ANION GAP 7 mmol/L      BUN 10 mg/dL      Creatinine 0.70 mg/dL      Glucose 118 mg/dL      Calcium 9.3 mg/dL      AST 23 U/L      ALT 16 U/L      Alkaline Phosphatase 90 U/L      Total Protein 7.4 g/dL      Albumin 4.3 g/dL      Total Bilirubin 0.98 mg/dL      eGFR 103 ml/min/1.73sq m     Narrative:      National Kidney Disease Foundation guidelines for Chronic Kidney Disease (CKD):     Stage 1 with normal or high GFR (GFR > 90 mL/min/1.73 square meters)    Stage 2 Mild CKD (GFR = 60-89 mL/min/1.73 square meters)    Stage 3A Moderate CKD (GFR = 45-59 mL/min/1.73 square meters)    Stage 3B Moderate CKD (GFR = 30-44 mL/min/1.73 square meters)    Stage 4 Severe CKD (GFR = 15-29 mL/min/1.73 square meters)    Stage 5 End Stage CKD (GFR <15 mL/min/1.73 square meters)  Note: GFR calculation is accurate only with a steady state creatinine    CBC and differential [[id number]]   (Abnormal) Collected: 03/16/24 1303    Lab Status: Final result Specimen: Blood from Arm, Right Updated: 03/16/24 1324     WBC 7.02 Thousand/uL      RBC 5.06 Million/uL      Hemoglobin 16.3 g/dL      Hematocrit 47.4 %      MCV 94 fL      MCH 32.2 pg      MCHC 34.4 g/dL      RDW 13.1 %      MPV 9.3 fL      Platelets 122 Thousands/uL      nRBC 0 /100 WBCs      Neutrophils Relative 79 %      Immature Grans % 0 %      Lymphocytes Relative 12 %      Monocytes Relative 8 %      Eosinophils Relative 1 %      Basophils Relative 0 %      Neutrophils Absolute 5.60 Thousands/µL      Absolute Immature Grans 0.02 Thousand/uL      Absolute Lymphocytes 0.81 Thousands/µL      Absolute Monocytes 0.53 Thousand/µL      Eosinophils Absolute 0.04 Thousand/µL      Basophils Absolute 0.02 Thousands/µL                    No orders to display         Procedures  ECG 12 Lead Documentation Only    Date/Time: 3/16/2024 2:19 PM    Performed by: Alfred Vitale DO  Authorized by: Alfred Vitale DO    Indications / Diagnosis:  CP  Patient location:  ED  Interpretation:     Interpretation: normal    Rate:     ECG rate:  78    ECG rate assessment: normal    Rhythm:     Rhythm: sinus rhythm    Ectopy:     Ectopy: PVCs      PVCs:  Infrequent  QRS:     QRS axis:  Normal    QRS intervals:  Normal  Conduction:     Conduction: normal    ST segments:     ST segments:  Abnormal    Elevation:  V2, V3 and V4  T waves:     T waves: inverted      Inverted:  II, III and aVF        ED Course  ED Course as of 03/16/24 1422   Sat Mar 16, 2024   1330 STEMI alert called after discussion with interventional cardiologist Dr. Nixon.  Patient given 4000 units heparin bolus and aspirin and Brilinta.  Pickup time 15 minutes to transfer to [facility].                             SBIRT 20yo+      Flowsheet Row Most Recent Value   Initial Alcohol Screen: US AUDIT-C     1. How often do you have a drink containing alcohol? 1 Filed at: 03/16/2024 7006   2. How many  drinks containing alcohol do you have on a typical day you are drinking?  1 Filed at: 03/16/2024 1228   3a. Male UNDER 65: How often do you have five or more drinks on one occasion? 0 Filed at: 03/16/2024 1228   3b. FEMALE Any Age, or MALE 65+: How often do you have 4 or more drinks on one occassion? 0 Filed at: 03/16/2024 1228   Audit-C Score 2 Filed at: 03/16/2024 1228   TJ: How many times in the past year have you...    Used an illegal drug or used a prescription medication for non-medical reasons? Never Filed at: 03/16/2024 1228                  Medical Decision Making  Mal is a deaf male who came in due to lightheadedness and chest pain.  Was found to have STEMI on EKG.  Discussed the patient with interventional cardiology, Dr. Nixon, and the decision was made to call a STEMI alert and transfer the patient emergently to [facility] for interventions.  Patient was given 325 aspirin, Brilinta, and 4000 units bolus of heparin.  Patient transferred successfully.    Amount and/or Complexity of Data Reviewed  Labs: ordered.    Risk  OTC drugs.  Prescription drug management.          Disposition  Final diagnoses:   STEMI (ST elevation myocardial infarction) (HCC)     Time reflects when diagnosis was documented in both MDM as applicable and the Disposition within this note       Time User Action Codes Description Comment    3/16/2024  2:18 PM Alfred Vitale Add [I21.3] STEMI (ST elevation myocardial infarction) (HCC)           ED Disposition       ED Disposition   Transfer to Another Facility-In Network    Condition   --    Date/Time   Sat Mar 16, 2024 1479    Comment   Mal Encarnacion should be transferred out to [facility].               MD Documentation      Flowsheet Row Most Recent Value   Patient Condition The patient has been stabilized such that within reasonable medical probability, no material deterioration of the patient condition or the condition of the unborn child(brittni) is likely to result from the  transfer   Reason for Transfer Level of Care needed not available at this facility   Benefits of Transfer Specialized equipment and/or services available at the receiving facility (Include comment)________________________   Risks of Transfer Potential for delay in receiving treatment   Accepting Physician Dr. Nixon   Accepting Facility Name, [facility], [place], [place]    (Name & Tel number) [facility]   Transported by (Company and Unit #) [facility]   Sending MD Dr. Vitale   Provider Certification General risk, such as traffic hazards, adverse weather conditions, rough terrain or turbulence, possible failure of equipment (including vehicle or aircraft), or consequences of actions of persons outside the control of the transport personnel, Unanticipated needs of medical equipment and personnel during transport          RN Documentation      Flowsheet Row Most Recent Value   Accepting Facility Name, [facility], [place], [place]    (Name & Tel number) [facility]   Report Given to spoke with Madie LEA for report   Transported by (Company and Unit #) [facility]          Follow-up Information    None         Discharge Medication List as of 3/16/2024  2:00 PM        CONTINUE these medications which have NOT CHANGED    Details   amLODIPine (NORVASC) 10 mg tablet Take 1 tablet (10 mg total) by mouth daily, Starting Tue 1/30/2024, Until Fri 6/28/2024, Normal      Blood Pressure KIT Use 2 (two) times a day, Starting Wed 11/3/2021, Normal      hydrocortisone-acetic acid (VOSOL-HC) otic solution Administer 3 drops into both ears every 6 (six) hours, Starting Tue 9/26/2023, Normal      loratadine (CLARITIN) 10 mg tablet Take 1 tablet (10 mg total) by mouth 2 (two) times a day for 5 days, Starting Thu 2/8/2024, Until Tue 2/13/2024, Normal      pantoprazole (PROTONIX) 20 mg tablet Take 1 tablet (20 mg total) by mouth daily before breakfast, Starting Tue 1/30/2024, Until Sun 7/28/2024, Normal            No discharge procedures on file.    PDMP Review       None             ED Provider  Attending physically available and evaluated Mal Encarnacion. I managed the patient along with the ED Attending.    Electronically Signed by           Alfred Vitale DO  03/16/24 1651

## 2024-03-16 NOTE — ASSESSMENT & PLAN NOTE
Platelet        Results from last 7 days   Lab Units 03/16/24  1303   PLATELETS Thousands/uL 122*     Plan:  -Continue to monitor CBCs  -Discontinue DVT prophylaxis for platelets <50

## 2024-03-16 NOTE — ED NOTES
As per Dr. Caraballo and Dr. Vitale MI alert was called, PACs aware     Luis Carrillo RN  03/16/24 8009

## 2024-03-16 NOTE — QUICK NOTE
Talk to patient mother on the phone.  Updated her on current plan.  Unable to reach patient's daughter as we do not have patient's daughter phone number in chart.

## 2024-03-16 NOTE — ASSESSMENT & PLAN NOTE
Patient is 59-year-old male with past medical history significant for hypertension, grade 1 diastolic dysfunction, history of MI presenting with 2 days of lightheadedness and 1 hour of chest pain.  Patient was found to be hypertensive to 180/105 on admission.  Initial troponin was 23, EKG showed ST segment elevations in V2, V3, V4 and T wave inversions in leads II, III and aVF with bradycardia to the 40s.  STEMI alert was called, patient was loaded with heparin, aspirin and Brilinta and transferred to [facility].  Patient underwent cardiac cath which showed mild diffuse disease with no culprit lesion, no STEMI.  Patient appears to have LVH which could be contributing to rhythm abnormalities.  Troponin negative     Plan:  Echocardiogram ordered results pending   Continue telemetry  Per cardiology plan for two week zio to monitor bradycardia. Appreciate recommendations.

## 2024-03-16 NOTE — ASSESSMENT & PLAN NOTE
"Lab Results   Component Value Date    HGBA1C 5.2 01/30/2024       No results for input(s): \"POCGLU\" in the last 72 hours.    Blood Sugar Average: Last 72 hrs:    Patient has history of type 2 diabetes however, on chart review most recent A1c was in the normal range.    Plan:  Cardiac diet  Glucose checks per routine  Can consider adding sliding scale insulin if glucose above normal hospitalized range 140-180  "

## 2024-03-16 NOTE — EMTALA/ACUTE CARE TRANSFER
[facility] EMERGENCY DEPARTMENT  [address] [place] [address]  [phone]  Dept: [phone]      EMTALA TRANSFER CONSENT    NAME Mal Encarnacion                                         1965                              MRN [id number]    I have been informed of my rights regarding examination, treatment, and transfer   by Dr. Fito Caraballo DO    Benefits: Specialized equipment and/or services available at the receiving facility (Include comment)________________________    Risks: Potential for delay in receiving treatment      Consent for Transfer:  I acknowledge that my medical condition has been evaluated and explained to me by the emergency department physician or other qualified medical person and/or my attending physician, who has recommended that I be transferred to the service of  Accepting Physician: Dr. Nixon at Accepting Facility Name, City & State : [facility], [place], [place]. The above potential benefits of such transfer, the potential risks associated with such transfer, and the probable risks of not being transferred have been explained to me, and I fully understand them.  The doctor has explained that, in my case, the benefits of transfer outweigh the risks.  I agree to be transferred.    I authorize the performance of emergency medical procedures and treatments upon me in both transit and upon arrival at the receiving facility.  Additionally, I authorize the release of any and all medical records to the receiving facility and request they be transported with me, if possible.  I understand that the safest mode of transportation during a medical emergency is an ambulance and that the Hospital advocates the use of this mode of transport. Risks of traveling to the receiving facility by car, including absence of medical control, life sustaining equipment, such as oxygen, and medical personnel has been explained to me and I fully understand them.    (DEBI CORRECT BOX  BELOW)  [  ]  I consent to the stated transfer and to be transported by ambulance/helicopter.  [  ]  I consent to the stated transfer, but refuse transportation by ambulance and accept full responsibility for my transportation by car.  I understand the risks of non-ambulance transfers and I exonerate the Hospital and its staff from any deterioration in my condition that results from this refusal.    X___________________________________________    DATE  24  TIME________  Signature of patient or legally responsible individual signing on patient behalf           RELATIONSHIP TO PATIENT_________________________          Provider Certification    NAME Mal Encarnacion                                         1965                              MRN [id number]    A medical screening exam was performed on the above named patient.  Based on the examination:    Condition Necessitating Transfer There were no encounter diagnoses.    Patient Condition: The patient has been stabilized such that within reasonable medical probability, no material deterioration of the patient condition or the condition of the unborn child(brittni) is likely to result from the transfer    Reason for Transfer: Level of Care needed not available at this facility    Transfer Requirements: Facility [facility], [place], [place]   Space available and qualified personnel available for treatment as acknowledged by [facility]  Agreed to accept transfer and to provide appropriate medical treatment as acknowledged by       Dr. Nixon  Appropriate medical records of the examination and treatment of the patient are provided at the time of transfer   STAFF INITIAL WHEN COMPLETED _______  Transfer will be performed by qualified personnel from [facility]  and appropriate transfer equipment as required, including the use of necessary and appropriate life support measures.    Provider Certification: I have examined the patient and explained the following risks and benefits of being  transferred/refusing transfer to the patient/family:  General risk, such as traffic hazards, adverse weather conditions, rough terrain or turbulence, possible failure of equipment (including vehicle or aircraft), or consequences of actions of persons outside the control of the transport personnel, Unanticipated needs of medical equipment and personnel during transport      Based on these reasonable risks and benefits to the patient and/or the unborn child(brittni), and based upon the information available at the time of the patient’s examination, I certify that the medical benefits reasonably to be expected from the provision of appropriate medical treatments at another medical facility outweigh the increasing risks, if any, to the individual’s medical condition, and in the case of labor to the unborn child, from effecting the transfer.    X____________________________________________ DATE 03/16/24        TIME_______      ORIGINAL - SEND TO MEDICAL RECORDS   COPY - SEND WITH PATIENT DURING TRANSFER

## 2024-03-17 ENCOUNTER — APPOINTMENT (INPATIENT)
Dept: NON INVASIVE DIAGNOSTICS | Facility: HOSPITAL | Age: 59
DRG: 287 | End: 2024-03-17
Payer: MEDICARE

## 2024-03-17 VITALS
HEIGHT: 61 IN | RESPIRATION RATE: 16 BRPM | SYSTOLIC BLOOD PRESSURE: 161 MMHG | DIASTOLIC BLOOD PRESSURE: 81 MMHG | HEART RATE: 84 BPM | OXYGEN SATURATION: 95 % | TEMPERATURE: 97.9 F | BODY MASS INDEX: 24.92 KG/M2 | WEIGHT: 132 LBS

## 2024-03-17 PROBLEM — I25.10 CAD (CORONARY ARTERY DISEASE): Status: ACTIVE | Noted: 2024-03-17

## 2024-03-17 LAB
ANION GAP SERPL CALCULATED.3IONS-SCNC: 9 MMOL/L (ref 4–13)
AORTIC ROOT: 3.5 CM
AORTIC VALVE MEAN VELOCITY: 7.2 M/S
APICAL FOUR CHAMBER EJECTION FRACTION: 55 %
ASCENDING AORTA: 2.9 CM
AV AREA BY CONTINUOUS VTI: 3.5 CM2
AV AREA PEAK VELOCITY: 3.7 CM2
AV LVOT MEAN GRADIENT: 2 MMHG
AV LVOT PEAK GRADIENT: 3 MMHG
AV MEAN GRADIENT: 2 MMHG
AV PEAK GRADIENT: 4 MMHG
AV REGURGITATION PRESSURE HALF TIME: 356 MS
AV VALVE AREA: 3.46 CM2
AV VELOCITY RATIO: 0.9
BSA FOR ECHO PROCEDURE: 1.58 M2
BUN SERPL-MCNC: 9 MG/DL (ref 5–25)
CALCIUM SERPL-MCNC: 8.8 MG/DL (ref 8.4–10.2)
CHLORIDE SERPL-SCNC: 102 MMOL/L (ref 96–108)
CO2 SERPL-SCNC: 24 MMOL/L (ref 21–32)
CREAT SERPL-MCNC: 0.51 MG/DL (ref 0.6–1.3)
DOP CALC AO PEAK VEL: 1.01 M/S
DOP CALC AO VTI: 19.8 CM
DOP CALC LVOT AREA: 4.15 CM2
DOP CALC LVOT CARDIAC INDEX: 3.61 L/MIN/M2
DOP CALC LVOT CARDIAC OUTPUT: 5.75 L/MIN
DOP CALC LVOT DIAMETER: 2.3 CM
DOP CALC LVOT PEAK VEL VTI: 16.5 CM
DOP CALC LVOT PEAK VEL: 0.91 M/S
DOP CALC LVOT STROKE INDEX: 44.7 ML/M2
DOP CALC LVOT STROKE VOLUME: 68.52
E WAVE DECELERATION TIME: 252 MS
E/A RATIO: 0.53
ERYTHROCYTE [DISTWIDTH] IN BLOOD BY AUTOMATED COUNT: 13.2 % (ref 11.6–15.1)
FRACTIONAL SHORTENING: 20 (ref 28–44)
GFR SERPL CREATININE-BSD FRML MDRD: 117 ML/MIN/1.73SQ M
GLUCOSE SERPL-MCNC: 96 MG/DL (ref 65–140)
HCT VFR BLD AUTO: 43.8 % (ref 36.5–49.3)
HGB BLD-MCNC: 15 G/DL (ref 12–17)
INTERVENTRICULAR SEPTUM IN DIASTOLE (PARASTERNAL SHORT AXIS VIEW): 1.6 CM
INTERVENTRICULAR SEPTUM: 1.6 CM (ref 0.6–1.1)
LAAS-AP2: 17.9 CM2
LAAS-AP4: 18.2 CM2
LEFT ATRIUM SIZE: 3.6 CM
LEFT ATRIUM VOLUME (MOD BIPLANE): 52 ML
LEFT ATRIUM VOLUME INDEX (MOD BIPLANE): 32.7 ML/M2
LEFT INTERNAL DIMENSION IN SYSTOLE: 2.8 CM (ref 2.1–4)
LEFT VENTRICULAR INTERNAL DIMENSION IN DIASTOLE: 3.5 CM (ref 3.5–6)
LEFT VENTRICULAR POSTERIOR WALL IN END DIASTOLE: 1.6 CM
LEFT VENTRICULAR STROKE VOLUME: 23 ML
LVSV (TEICH): 23 ML
MCH RBC QN AUTO: 31.8 PG (ref 26.8–34.3)
MCHC RBC AUTO-ENTMCNC: 34.2 G/DL (ref 31.4–37.4)
MCV RBC AUTO: 93 FL (ref 82–98)
MV E'TISSUE VEL-LAT: 8 CM/S
MV E'TISSUE VEL-SEP: 6 CM/S
MV PEAK A VEL: 0.85 M/S
MV PEAK E VEL: 45 CM/S
MV STENOSIS PRESSURE HALF TIME: 73 MS
MV VALVE AREA P 1/2 METHOD: 3.01
PLATELET # BLD AUTO: 100 THOUSANDS/UL (ref 149–390)
PMV BLD AUTO: 9.8 FL (ref 8.9–12.7)
POTASSIUM SERPL-SCNC: 3.8 MMOL/L (ref 3.5–5.3)
RBC # BLD AUTO: 4.72 MILLION/UL (ref 3.88–5.62)
RIGHT ATRIUM AREA SYSTOLE A4C: 12.8 CM2
RIGHT VENTRICLE ID DIMENSION: 3.2 CM
SL CV AV DECELERATION TIME RETROGRADE: 1227 MS
SL CV AV PEAK GRADIENT RETROGRADE: 40 MMHG
SL CV LEFT ATRIUM LENGTH A2C: 5 CM
SL CV LV EF: 50
SL CV PED ECHO LEFT VENTRICLE DIASTOLIC VOLUME (MOD BIPLANE) 2D: 52 ML
SL CV PED ECHO LEFT VENTRICLE SYSTOLIC VOLUME (MOD BIPLANE) 2D: 29 ML
SODIUM SERPL-SCNC: 135 MMOL/L (ref 135–147)
TRICUSPID ANNULAR PLANE SYSTOLIC EXCURSION: 1.7 CM
WBC # BLD AUTO: 7.14 THOUSAND/UL (ref 4.31–10.16)

## 2024-03-17 PROCEDURE — 93306 TTE W/DOPPLER COMPLETE: CPT | Performed by: INTERNAL MEDICINE

## 2024-03-17 PROCEDURE — 85027 COMPLETE CBC AUTOMATED: CPT | Performed by: INTERNAL MEDICINE

## 2024-03-17 PROCEDURE — 99239 HOSP IP/OBS DSCHRG MGMT >30: CPT | Performed by: FAMILY MEDICINE

## 2024-03-17 PROCEDURE — 97163 PT EVAL HIGH COMPLEX 45 MIN: CPT

## 2024-03-17 PROCEDURE — 93306 TTE W/DOPPLER COMPLETE: CPT

## 2024-03-17 PROCEDURE — 97166 OT EVAL MOD COMPLEX 45 MIN: CPT

## 2024-03-17 PROCEDURE — 99232 SBSQ HOSP IP/OBS MODERATE 35: CPT | Performed by: INTERNAL MEDICINE

## 2024-03-17 PROCEDURE — 80048 BASIC METABOLIC PNL TOTAL CA: CPT | Performed by: INTERNAL MEDICINE

## 2024-03-17 RX ORDER — LOSARTAN POTASSIUM 50 MG/1
50 TABLET ORAL DAILY
Status: DISCONTINUED | OUTPATIENT
Start: 2024-03-17 | End: 2024-03-17 | Stop reason: HOSPADM

## 2024-03-17 RX ORDER — ATORVASTATIN CALCIUM 40 MG/1
40 TABLET, FILM COATED ORAL
Qty: 30 TABLET | Refills: 0 | Status: SHIPPED | OUTPATIENT
Start: 2024-03-17 | End: 2024-04-16

## 2024-03-17 RX ORDER — LOSARTAN POTASSIUM 50 MG/1
50 TABLET ORAL DAILY
Qty: 30 TABLET | Refills: 0 | Status: SHIPPED | OUTPATIENT
Start: 2024-03-18 | End: 2024-04-17

## 2024-03-17 RX ORDER — FOLIC ACID 1 MG/1
1 TABLET ORAL DAILY
Qty: 30 TABLET | Refills: 0 | Status: SHIPPED | OUTPATIENT
Start: 2024-03-18 | End: 2024-04-17

## 2024-03-17 RX ORDER — ASPIRIN 81 MG/1
81 TABLET, CHEWABLE ORAL DAILY
Status: DISCONTINUED | OUTPATIENT
Start: 2024-03-17 | End: 2024-03-17 | Stop reason: HOSPADM

## 2024-03-17 RX ORDER — ASPIRIN 81 MG/1
81 TABLET, CHEWABLE ORAL DAILY
Qty: 30 TABLET | Refills: 0 | Status: SHIPPED | OUTPATIENT
Start: 2024-03-18 | End: 2024-04-17

## 2024-03-17 RX ORDER — LANOLIN ALCOHOL/MO/W.PET/CERES
100 CREAM (GRAM) TOPICAL DAILY
Qty: 30 TABLET | Refills: 0 | Status: SHIPPED | OUTPATIENT
Start: 2024-03-18 | End: 2024-04-17

## 2024-03-17 RX ADMIN — THIAMINE HCL TAB 100 MG 100 MG: 100 TAB at 10:30

## 2024-03-17 RX ADMIN — FOLIC ACID 1 MG: 1 TABLET ORAL at 10:28

## 2024-03-17 RX ADMIN — AMLODIPINE BESYLATE 10 MG: 10 TABLET ORAL at 10:27

## 2024-03-17 RX ADMIN — PANTOPRAZOLE SODIUM 20 MG: 20 TABLET, DELAYED RELEASE ORAL at 06:32

## 2024-03-17 RX ADMIN — LOSARTAN POTASSIUM 50 MG: 50 TABLET, FILM COATED ORAL at 13:36

## 2024-03-17 RX ADMIN — ASPIRIN 81 MG CHEWABLE TABLET 81 MG: 81 TABLET CHEWABLE at 13:36

## 2024-03-17 NOTE — PHYSICAL THERAPY NOTE
Physical Therapy Evaluation     Patient's Name: Mal Encarnacion    Admitting Diagnosis  STEMI (ST elevation myocardial infarction) ([facility]) [I21.3]    Problem List  Patient Active Problem List   Diagnosis    Primary hypertension    Diastolic dysfunction without heart failure    Thrombocytopenia (HCC)    Encounter for immunization    Other hyperlipidemia    Type 2 diabetes mellitus without complication, without long-term current use of insulin ([facility])    Congenital deafness    Hyponatremia    Need for transfer to another facility    Food insecurity    Epigastric pain    Ear pain, bilateral    Junctional bradycardia    Alcohol use disorder       Past Medical History  Past Medical History:   Diagnosis Date    Deaf     Heart murmur     Hypertension        Past Surgical History  No past surgical history on file.         03/17/24 1023   PT Last Visit   PT Visit Date 03/17/24   Note Type   Note type Evaluation   Pain Assessment   Pain Assessment Tool FLACC   Pain Location/Orientation Location: Back;Location: Neck   Pain Onset/Description Other (Comment)  (tightness)   Effect of Pain on Daily Activities pt gesturing pain and tightness in back and neck w/ functional mobility   Patient's Stated Pain Goal No pain   Hospital Pain Intervention(s) Repositioned;Ambulation/increased activity;Emotional support   Pain Rating: FLACC (Rest) - Face 1   Pain Rating: FLACC (Rest) - Legs 0   Pain Rating: FLACC (Rest) - Activity 0   Pain Rating: FLACC (Rest) - Cry 1   Pain Rating: FLACC (Rest) - Consolability 0   Score: FLACC (Rest) 2   Pain Rating: FLACC (Activity) - Face 1   Pain Rating: FLACC (Activity) - Legs 0   Pain Rating: FLACC (Activity) - Activity 0   Pain Rating: FLACC (Activity) - Cry 1   Pain Rating: FLACC (Activity) - Consolability 0   Score: FLACC (Activity) 2   Restrictions/Precautions   Weight Bearing Precautions Per Order No   Other Precautions Cognitive;Fall Risk;Pain  (pt has congential deafness; communicates via sign  language or writing on paper.)   Home Living   Type of Home Apartment   Home Layout One level   Home Equipment Cane   Additional Comments Attempted to communicate w/ pt via Ipad for sign language interpretor but unable to get through to person; instead attempted communication via writing on paper. Pt able to read English however deferred to asking therapist to call his mother to speak w/ her. OT called pt's mother who speaks primarly [place] but does understand basic English. Pt's mother reporting pt lives alone in an apartment. She believes he is I w/ his ADLS and that someone gave him a cane to use. He does not drive or work. Pt's mother lives close by but does not drive either.   Prior Function   Level of [place] Independent with ADLs;Independent with functional mobility;Independent with IADLS   Lives With (S)  Alone   Receives Help From Family  (mother nearby)   IADLs Family/Friend/Other provides transportation;Independent with meal prep;Independent with medication management   Vocational On disability   General   Family/Caregiver Present No   Cognition   Overall Cognitive Status WFL   Arousal/Participation Alert   Attention Within functional limits   Orientation Level Unable to assess   Memory Within functional limits   Following Commands Follows one step commands without difficulty   Comments Patient pleasant and cooperative   Subjective   Subjective Patient agreeable to PT eval   RUE Assessment   RUE Assessment WFL   LUE Assessment   LUE Assessment WFL   RLE Assessment   RLE Assessment X   Strength RLE   RLE Overall Strength 4/5   LLE Assessment   LLE Assessment X   Strength LLE   LLE Overall Strength 4/5   Bed Mobility   Supine to Sit 5  Supervision   Additional items HOB elevated;Increased time required;Verbal cues   Sit to Supine 5  Supervision   Additional items Increased time required;Verbal cues   Transfers   Sit to Stand 5  Supervision   Additional items Increased time required;Verbal cues   Stand  to Sit 5  Supervision   Additional items Increased time required;Verbal cues   Additional Comments HHA   Ambulation/Elevation   Gait pattern Improper Weight shift;Inconsistent pretty;Short stride  (increased lateral sway)   Gait Assistance 4  Minimal assist  (CGA)   Additional items Assist x 1;Verbal cues   Assistive Device   (HHA)   Distance 70'x2   Balance   Static Sitting Good   Dynamic Sitting Fair +   Static Standing Fair   Dynamic Standing Fair -   Ambulatory Poor +   Endurance Deficit   Endurance Deficit Yes   Endurance Deficit Description fatigue, weakness, pain   Activity Tolerance   Activity Tolerance Patient limited by fatigue;Patient limited by pain   Medical Staff Made Aware OT   Nurse Made Aware RN cleared   Assessment   Prognosis Good   Problem List Decreased strength;Decreased endurance;Impaired balance;Decreased mobility;Pain   Assessment Pt is a 59 y.o. male seen for a high complexity PT evaluation due to Ongoing medical management for primary dx, Decreased activity tolerance compared to baseline, Fall risk. Patient is s/p admit to [facility] on 3/16/2024 for STEMI (ST elevation myocardial infarction) (HCC) (I21.3). Patient  has a past medical history of Deaf, Heart murmur, and Hypertension..     PT now consulted to assess functional mobility and needs for safe d/c planning. Prior to admission, pt independent with functional mobility, independent ADLs, and independent IADLs. To continue to confirm with CM, pt interview limited d/t communication barrier.  not available at this time. Therapy spoke with pt's mother, but she is unable to provide all info d/t not living with the patient.  Personal factors affecting status include lives alone and medical status     Currently pt requires supervision for bed mobility, supervision for functional transfers with one hand hold ; CGA-minimal assistance x1 for ambulation with one hand hold. Pt presents functioning below baseline and w/  overall mobility deficits 2* to: decreased strength, decreased endurance, decreased mobility, impaired balance. These impairments place pt at risk for falls.     Pt will continue to benefit from skilled PT interventions to address stated impairments; to maximize functional potential; for ongoing pt/family education; and DME needs. The patient's AM-[facility] Basic Mobility Inpatient Short Form Raw Score Is 18. PT is currently recommending Level 3 - Minimum Resource Intensity on d/c from hospital. Will continue to follow as able.   Goals   Patient Goals unable to state 2* deafness and communication barrier   STG Expiration Date 03/31/24   Short Term Goal #1 In 14 days, patient will 1) increase strength in BUE/BLE by 1/2 to 1 full grade for increased strength and stability needed for functional mobility 2) improve bed mobility to MI for improved mobility and decreased need for assist 3) sit EOB x30' with MI to facilitate trunk stability and safety for completion of ADL tasks 4) increase functional transfers to MI for improved safety and functional mobility 5) ambulate 250ft with MI using LRAD vs no AD for increased endurance and safety ambulating home and community environments 6) improve balance by 1 grade for improved safety and stability and decreased risk for falls.   PT Treatment Day 0   Plan   Treatment/Interventions ADL retraining;Functional transfer training;LE strengthening/ROM;Therapeutic exercise;Endurance training;Patient/family training;Equipment eval/education;Bed mobility;Gait training;Spoke to nursing;Spoke to case management;OT;Elevations   PT Frequency 3-5x/wk   Discharge Recommendation   Rehab Resource Intensity Level, PT III (Minimum Resource Intensity)   AM-PAC Basic Mobility Inpatient   Turning in Flat Bed Without Bedrails 3   Lying on Back to Sitting on Edge of Flat Bed Without Bedrails 3   Moving Bed to Chair 3   Standing Up From Chair Using Arms 3   Walk in Room 3   Climb 3-5 Stairs With Railing 3    Basic Mobility Inpatient Raw Score 18   Basic Mobility Standardized Score 41.05   [facility] Highest Level Of Mobility   -HL Goal 6: Walk 10 steps or more   -HLM Achieved 7: Walk 25 feet or more   Modified Charbel Scale   Modified Charbel Scale 4   End of Consult   Patient Position at End of Consult All needs within reach;Supine       Danita Nolan, PT, DPT

## 2024-03-17 NOTE — PROGRESS NOTES
Cardiology Progress Note   Mal Encarnacion 59 y.o. male MRN: [id number]  Unit/Bed#: [facility] 501-01 Encounter: [id number]    Hospital Course/Assessment  59M with PMH of congenital deafness, HTN, DM2 who presented to the ED with dizziness and chest pain and was transferred for urgent coronary angiography which showed no obstructive CAD- soon after the procedure he developed junctional bradycardia.  Since that time he has been monitored on telemetry and had no further episodes of bradycardia.      Subjective:   Today the patient was seen and examined at bedside.  They had no acute events overnight.  Telemetry showing normal sinus rhythm with one 4 beat run of NSVT.  No further episodes of bradycardia.  iPad [employer]  service was not working, however, the patient reported feeling well and having no acute complaints.  The patient's mother was contacted and updated as well.    Plan  #Chest pain  Not ACS based on cath. Initial trop normal. CP may have been from HTN.   -TTE showing moderate concentric hypertrophy.  Low normal systolic function with an EF of 50-55%.  Grade 1 diastolic dysfunction.     #non-obstructive CAD  Patient came in as STEMI alert but cath did not show evidence of ACS. EKG changes were likely due to LVH with repolarization abnormality. LHC did show 50% LAD and 40% D1 which should be treated medically.   two months ago.   -Continue atorvastatin 40mg daily and ASA 81 mg daily     #junctional bradycardia  Patient noted to have junctional bradycardia with HR 40 soon after catheterization. He was feeling dizziness at the time. This could be SSS vs. Secondary to contrast injection or medications used during the catheterization (midazolam/fentanyl). Monitor on tele. If any further symptomatic bradycardia should be considered for PPM.   -TTE showing moderate concentric hypertrophy.  Low normal systolic function with an EF of 50-55%.  Grade 1 diastolic dysfunction.  -No new episodes of bradycardia  "noted on telemetry.  He remains in normal sinus rhythm with a 4 beat run of NSVT.  -Outpatient 2-week ZIO monitor ordered.     #HTN  /90s on arrival to cath lab. Resumed home amlodipine 10mg. Will add losartan 50 mg daily for additional blood pressure control.     #DM2: A1c 6.6--> 5.2. not on meds     #deafness:    via ipad as needed.      Cardiology will sign off.  Okay for discharge from a cardiology standpoint.  2-week outpatient ZIO monitor ordered to evaluate for further episodes of bradycardia.  Will plan for 4-week outpatient cardiology follow-up.      Stefano Jefferson MD  -PGY-4 Cardiology Fellow  -Tiger text enabled    ======================================================  Objective  VS: Blood pressure 166/93, pulse 84, temperature 97.9 °F (36.6 °C), temperature source Oral, resp. rate 16, height 5' 1\" (1.549 m), weight 59.9 kg (132 lb), SpO2 95%.  Gen: Well appearing. Deaf.  Psych: Awake and alert.  Skin: Intact  Cardiac: S1, S2, regular rate, no S3 or S4 appreciated. No murmurs. +2 PT, radial pulses. No peripheral edema. No carotid bruits.  Resp: CTABL. No crackles  Abd: Nontender, nondistended.   LN: No cervical LAD  Rheum: No joint deformities in UE or LE  ======================================================  TREADMILL STRESS  No results found for this or any previous visit.     ----------------------------------------------------------------------------------------------  NUCLEAR STRESS TEST: No results found for this or any previous visit.    No results found for this or any previous visit.      --------------------------------------------------------------------------------  CATH:  No results found for this or any previous visit.    --------------------------------------------------------------------------------  ECHO:   Results for orders placed during the hospital encounter of 09/27/21    Echo complete with contrast if indicated    Narrative  [facility] " [address] [address]    Transthoracic Echocardiogram  2D, M-mode, Doppler, and Color Doppler    Study date:  28-Sep-2021    Patient: ALPHONSE LOVELACE  MR number: [id number]  Account number: [id number]  : 1965  Age: 56 years  Gender: Male  Status: Outpatient  Location: [facility]  Height: 61 in  Weight: 157.7 lb  BP: 159/ 81 mmHg    Indications: Hypertension    Diagnoses: I10. - Essential (primary) hypertension    Sonographer:  CECILIA Jameson  Interpreting Physician:  Smooth Alicea MD  Primary Physician:  Yash Quinones MD  Referring Physician:  David Troy MD  Group:  [facility]    IMPRESSIONS:  Technical quality: Good  Cardiac rhythm: Sinus    1. Mild concentric left ventricular hypertrophy, normal left ventricular systolic function, grade 1 diastolic dysfunction. EF around 56%.  2. Normal right ventricular size and systolic function.  3. Normal left and right atrial size.  4. Trace aortic valve regurgitation.  5. Trace mitral, tricuspid and pulmonic valve regurgitation.  6. No obvious pulmonary hypertension.  7. No pericardial effusion.    No previous echocardiogram is available for comparison.    SUMMARY    LEFT VENTRICLE:  Normal left ventricular cavity size, mild concentric left ventricular hypertrophy, normal left ventricular systolic function, normal regional wall motion. Ejection fraction is estimated as around 57%. Possible grade 1 diastolic  dysfunction. Estimated left ventricular filling pressure is borderline increased.    RIGHT VENTRICLE:  Normal right ventricular size and systolic function. Normal estimated right ventricular systolic pressure, 16 mmHg.    LEFT ATRIUM:  Normal left atrial cavity size. Intact interatrial septum.    RIGHT ATRIUM:  Normal right atrial cavity size.    MITRAL VALVE:  Mild mitral valve leaflet sclerosis with slightly restricted leaflet mobility. No mitral valve stenosis. Trace mitral valve  regurgitation.    AORTIC VALVE:  Tricuspid aortic valve with minimal sclerosis. Good cuspal separation. No aortic valve stenosis. Trace aortic valve regurgitation.    TRICUSPID VALVE:  Trace tricuspid valve regurgitation.    PULMONIC VALVE:  Trace pulmonic valve regurgitation.    AORTA:  Aortic root and proximal ascending aorta are normal in size on 2D imaging.    IVC, HEPATIC VEINS:  Inferior vena cava is normal in size and demonstrates appropriate respiratory phasic changes in diameter.    PERICARDIUM:  No pericardial effusion.    HISTORY: PRIOR HISTORY: Risk factors: hypertension and hypercholesterolemia.    PROCEDURE: The study was performed in the [facility]. This was a routine study. The transthoracic approach was used. The study included complete 2D imaging, M-mode, complete spectral Doppler, and color Doppler. The heart rate was 78  bpm, at the start of the study. Images were obtained from the parasternal, apical, subcostal, and suprasternal notch acoustic windows. Image quality was adequate.    LEFT VENTRICLE: Normal left ventricular cavity size, mild concentric left ventricular hypertrophy, normal left ventricular systolic function, normal regional wall motion. Ejection fraction is estimated as around 57%. Possible grade 1  diastolic dysfunction. Estimated left ventricular filling pressure is borderline increased.    RIGHT VENTRICLE: Normal right ventricular size and systolic function. Normal estimated right ventricular systolic pressure, 16 mmHg.    LEFT ATRIUM: Normal left atrial cavity size. Intact interatrial septum.    RIGHT ATRIUM: Normal right atrial cavity size.    MITRAL VALVE: Mild mitral valve leaflet sclerosis with slightly restricted leaflet mobility. No mitral valve stenosis. Trace mitral valve regurgitation.    AORTIC VALVE: Tricuspid aortic valve with minimal sclerosis. Good cuspal separation. No aortic valve stenosis. Trace aortic valve regurgitation.    TRICUSPID VALVE: Trace  tricuspid valve regurgitation.    PULMONIC VALVE: Trace pulmonic valve regurgitation.    PERICARDIUM: No pericardial effusion.    AORTA: Aortic root and proximal ascending aorta are normal in size on 2D imaging.    SYSTEMIC VEINS: IVC: Inferior vena cava is normal in size and demonstrates appropriate respiratory phasic changes in diameter.    SYSTEM MEASUREMENT TABLES    2D  %FS: 31.62 %  Ao Diam: 2.59 cm  EDV(Teich): 68.42 ml  EF(Teich): 60.18 %  ESV(Teich): 27.24 ml  IVSd: 1.16 cm  LA Area: 17.94 cm2  LA Diam: 4.75 cm  LVEDV MOD A4C: 120.05 ml  LVEF MOD A4C: 56.58 %  LVESV MOD A4C: 52.13 ml  LVIDd: 3.96 cm  LVIDs: 2.71 cm  LVLd A4C: 9.07 cm  LVLs A4C: 7.33 cm  LVPWd: 1.12 cm  RA Area: 12.13 cm2  RVIDd: 2.98 cm  SV MOD A4C: 67.93 ml  SV(Teich): 41.18 ml    CW  TR Vmax: 1.19 m/s  TR maxP.7 mmHg    MM  TAPSE: 2.16 cm    PW  E' Sept: 0.06 m/s  E/E' Sept: 14.44  MV A [place]: 0.99 m/s  MV Dec [place]: 3.94 m/s2  MV DecT: 204.2 ms  MV E [place]: 0.8 m/s  MV E/A Ratio: 0.81  MV PHT: 59.22 ms  MVA By PHT: 3.72 cm2    [facility]    Prepared and electronically signed by    Smooth Alicea MD  Signed 28-Sep-2021 11:13:26    No results found for this or any previous visit.    --------------------------------------------------------------------------------  HOLTER  No results found for this or any previous visit.    --------------------------------------------------------------------------------  CAROTIDS  No results found for this or any previous visit.       [unfilled]   =====================================================    Active Meds    Current Facility-Administered Medications:     amLODIPine (NORVASC) tablet 10 mg, 10 mg, Oral, Daily, Tiffany Poole MD, 10 mg at 24 1027    aspirin chewable tablet 81 mg, 81 mg, Oral, Daily, Vane Gallo MD    atorvastatin (LIPITOR) tablet 40 mg, 40 mg, Oral, Daily With Dinner, Alfred Cespedes MD, 40 mg at  "03/16/24 1830    folic acid (FOLVITE) tablet 1 mg, 1 mg, Oral, Daily, Yareli Magana MD, 1 mg at 03/17/24 1028    losartan (COZAAR) tablet 50 mg, 50 mg, Oral, Daily, Stefano Jefferson MD    pantoprazole (PROTONIX) EC tablet 20 mg, 20 mg, Oral, Daily Before Breakfast, Tiffany Poole MD, 20 mg at 03/17/24 0632    thiamine tablet 100 mg, 100 mg, Oral, Daily, Yareli Magana MD, 100 mg at 03/17/24 1030    Labs & Results  Lab Results   Component Value Date    TROPONINI 0.05 (H) 09/27/2021    TROPONINI 0.05 (H) 09/27/2021    TROPONINI 0.04 (H) 09/27/2021     Lab Results   Component Value Date    CALCIUM 8.8 03/17/2024    K 3.8 03/17/2024    CO2 24 03/17/2024     03/17/2024    BUN 9 03/17/2024    CREATININE 0.51 (L) 03/17/2024     Lab Results   Component Value Date    WBC 7.14 03/17/2024    HGB 15.0 03/17/2024    HCT 43.8 03/17/2024    MCV 93 03/17/2024     (L) 03/17/2024         No results found for: \"CHOL\"  Lab Results   Component Value Date    HDL 58 01/30/2024    HDL 59 09/27/2021     Lab Results   Component Value Date    LDLCALC 104 (H) 01/30/2024    LDLCALC 153 (H) 09/27/2021     Lab Results   Component Value Date    TRIG 58 01/30/2024    TRIG 90 09/27/2021     Lab Results   Component Value Date    ALT 16 03/16/2024    AST 23 03/16/2024    ALKPHOS 90 03/16/2024          "

## 2024-03-17 NOTE — PLAN OF CARE
Problem: PHYSICAL THERAPY ADULT  Goal: Performs mobility at highest level of function for planned discharge setting.  See evaluation for individualized goals.  Description: Treatment/Interventions: ADL retraining, Functional transfer training, LE strengthening/ROM, Therapeutic exercise, Endurance training, Patient/family training, Equipment eval/education, Bed mobility, Gait training, Spoke to nursing, Spoke to case management, OT, Elevations          See flowsheet documentation for full assessment, interventions and recommendations.  Outcome: Progressing  Note: Prognosis: Good  Problem List: Decreased strength, Decreased endurance, Impaired balance, Decreased mobility, Pain  Assessment: Pt is a 59 y.o. male seen for a high complexity PT evaluation due to Ongoing medical management for primary dx, Decreased activity tolerance compared to baseline, Fall risk. Patient is s/p admit to [facility] on 3/16/2024 for STEMI (ST elevation myocardial infarction) (HCC) (I21.3). Patient  has a past medical history of Deaf, Heart murmur, and Hypertension..     PT now consulted to assess functional mobility and needs for safe d/c planning. Prior to admission, pt independent with functional mobility, independent ADLs, and independent IADLs. To continue to confirm with CM, pt interview limited d/t communication barrier.  not available at this time. Therapy spoke with pt's mother, but she is unable to provide all info d/t not living with the patient.  Personal factors affecting status include lives alone and medical status     Currently pt requires supervision for bed mobility, supervision for functional transfers with one hand hold ; CGA-minimal assistance x1 for ambulation with one hand hold. Pt presents functioning below baseline and w/ overall mobility deficits 2* to: decreased strength, decreased endurance, decreased mobility, impaired balance. These impairments place pt at risk for falls.     Pt will  continue to benefit from skilled PT interventions to address stated impairments; to maximize functional potential; for ongoing pt/family education; and DME needs. The patient's AM-PAC Basic Mobility Inpatient Short Form Raw Score Is 18. PT is currently recommending Level 3 - Minimum Resource Intensity on d/c from hospital. Will continue to follow as able.        Rehab Resource Intensity Level, PT: III (Minimum Resource Intensity)    See flowsheet documentation for full assessment.

## 2024-03-17 NOTE — DISCHARGE SUMMARY
Discharge Summary - Mal Encarnacion 59 y.o. male MRN: [id number]    Unit/Bed#: [facility] 501-01 Encounter: [id number]    Admission Date: 3/16/2024  Discharge Date: 3/17/2024     Admission Diagnosis: STEMI (ST elevation myocardial infarction) (HCC) [I21.3]    Important Physician Related Follow Up:   Follow up with Cardiology   Follow up with PCP      HPI: Per H&P on 3/16/24     59 y.o. male with hearing impairment, who has a past medical history of hypertension, thrombocytopenia and previous MI who presented to [facility] for 2 days lightheadedness.  In the ED patient was also noted to be having chest pain, with blood pressure of 200s over 100s.  Initial troponin done were 23, EKG showed ST segment elevations in V2, V3, V4 and T wave inversions in leads II, III and aVF with bradycardia to the 40s. STEMI alert was called, patient was loaded with heparin, aspirin and Brilinta. Given chest pain and St elevations patient was transferred to [facility] for catheterization.  There was concern for ischemia versus ST elevation due to LVH during catheterization patient noted to have no significant occlusions and patient had resolution of dizziness.  However after procedure patient was found to bradycardic to the 40s and 50s.  At this time patient was reporting dizziness.  On monitoring per cardiology there was concern for junctional bradycardia.  Symptoms have now resolved but cardiology would like to admit patient for observation.  Per cardiology if patient continues to have these episodes of junctional bradycardia, patient will likely need pacemaker early next week if patient is in agreement.     Hospital Course:     Upon admission to [facility] patient was evaluated by cardiology and underwent a left heart catheterization showing no evidence of ACS. He was noted to have mild nonobstructive CAD. Patient started on ASA 81 mg daily and lipitor 40 mg daily given nonobstructive CAD. He developed junctional bradycardia after coronary  angiography and was monitored on telemetry with no further episodes of bradycardia. A TTE was completed during admission, EF 50-55% with grade 1 diastolic dysfunction. Per cardiology's recommendations, patient will need a ZIO monitor for two weeks outpatient. During admission, patient's BP was poorly controlled on home regimen. Losartan 50 mg was added.     On day of discharge, patient's vitals signs are stable, tolerating PO diet without difficulties, and is ambulating in room without difficulties. He denies chest pain, shortness of breath, dizziness, lightheadedness, near syncope. Patient is stable for discharge to home. He will need close follow up with PCP and will follow up with cardiology in one month.     Procedures Performed:   Orders Placed This Encounter   Procedures    Cardiac catheterization         Significant Findings, Care, Treatment and Services Provided:   Left heart catheterization: No evidence of ACS   Echo 3/17/24: Moderate concentric hypertrophy, LVEF 50-55%, no diagnostic regional wall motion abnormality was identified. Diastolic function mildly abnormal .    Complications: None     Discharge Diagnosis:   Principal Problem:    Junctional bradycardia  Active Problems:    Primary hypertension    Thrombocytopenia (HCC)    Type 2 diabetes mellitus without complication, without long-term current use of insulin (HCC)    Hyponatremia    Food insecurity    Alcohol use disorder    CAD (coronary artery disease)      Exam on Day of Discharge:  Physical Exam  Constitutional:       General: He is not in acute distress.     Appearance: Normal appearance.   HENT:      Head: Normocephalic and atraumatic.   Eyes:      Extraocular Movements: Extraocular movements intact.   Cardiovascular:      Rate and Rhythm: Normal rate and regular rhythm.      Pulses: Normal pulses.      Heart sounds: No murmur heard.  Pulmonary:      Effort: Pulmonary effort is normal. No respiratory distress.      Breath sounds: Normal  breath sounds.   Abdominal:      General: Abdomen is flat. Bowel sounds are normal. There is no distension.      Palpations: Abdomen is soft.      Tenderness: There is no abdominal tenderness.   Musculoskeletal:         General: Normal range of motion.      Cervical back: Normal range of motion.      Right lower leg: No edema.      Left lower leg: No edema.   Skin:     General: Skin is warm.   Neurological:      General: No focal deficit present.      Mental Status: He is alert. Mental status is at baseline.          Condition at Discharge: good     Discharge instructions/Information to patient and family:   See after visit summary for information provided to patient and family.      Provisions for Follow-Up Care:  See after visit summary for information related to follow-up care and any pertinent home health orders.      Disposition: Home    Planned Readmission: No    Discharge Statement   I spent 30 minutes discharging the patient. This time was spent on the day of discharge. I had direct contact with the patient on the day of discharge. Additional documentation is required if more than 30 minutes were spent on discharge.     Discharge Medications:  See after visit summary for reconciled discharge medications provided to patient and family. Of note significant medication changes made this admission:  Lipitor 40 mg daily  ASA 81 mg daily   Losartan 50 mg daily   Folic acid 1 mg daily   Thiamine 100 mg daily   Continue all home medications as previously prescribed     Vane Gallo  [facility] Medicine PGY2   3/17/2024  2:24 PM

## 2024-03-17 NOTE — PLAN OF CARE
Problem: OCCUPATIONAL THERAPY ADULT  Goal: Performs self-care activities at highest level of function for planned discharge setting.  See evaluation for individualized goals.  Description: Treatment Interventions: ADL retraining, Functional transfer training, Endurance training, Cognitive reorientation, Patient/family training, Equipment evaluation/education, Compensatory technique education, Continued evaluation, Activityengagement, Energy conservation          See flowsheet documentation for full assessment, interventions and recommendations.   Note: Limitation: Decreased ADL status, Decreased Safe judgement during ADL, Decreased endurance, Decreased high-level ADLs, Decreased self-care trans  Prognosis: Fair  Assessment: Pt is a 60 y/o male seen for OT eval s/p adm to \[facility]\"" as a transfer from \[facility]\"" for 2 days of lightheadedness. Pt having chest pain in ED. Pt transferred to \[facility]\"" for cardiac cath. After procedure, pt noted to be bradycardic. Pt is dx'd w/ junctional bradycardia. Pt  has a past medical history of Deaf, Heart murmur, and Hypertension. Pt with active OT orders and activity as tolerated orders. Pt lives alone in an apt. Pt was I w/  ADLS and IADLS, does not drive, & required use of DME PTA including SPC. Pt is currently demonstrating the following occupational deficits: S UB ADLS, CGA LB ADLS, S bed mobility and transfers and Min A functional mobility w/ HHA. These deficits that are impacting pt's baseline areas of occupation are a result of the following impairments: pain, endurance, activity tolerance, functional mobility, balance, unsupportive home environment, decreased I w/ ADLS/IADLS, and decreased safety awareness.The following Occupational Performance Areas to address include: bathing/shower, toilet hygiene, dressing, medication management, socialization, health maintenance, functional mobility, community mobility, clothing management, and household maintenance. Recommend home OT upon D/C,  pending progress. Pt to continue to benefit from acute immediate OT services to address the following goals 2-3x/week to  w/in 10-14 days:     Rehab Resource Intensity Level, OT: III (Minimum Resource Intensity)        Celestina Hanson MS, OTR/L

## 2024-03-17 NOTE — PLAN OF CARE
Problem: PAIN - ADULT  Goal: Verbalizes/displays adequate comfort level or baseline comfort level  Description: Interventions:  - Encourage patient to monitor pain and request assistance  - Assess pain using appropriate pain scale  - Administer analgesics based on type and severity of pain and evaluate response  - Implement non-pharmacological measures as appropriate and evaluate response  - Consider cultural and social influences on pain and pain management  - Notify physician/advanced practitioner if interventions unsuccessful or patient reports new pain  Outcome: Progressing     Problem: INFECTION - ADULT  Goal: Absence or prevention of progression during hospitalization  Description: INTERVENTIONS:  - Assess and monitor for signs and symptoms of infection  - Monitor lab/diagnostic results  - Monitor all insertion sites, i.e. indwelling lines, tubes, and drains  - Monitor endotracheal if appropriate and nasal secretions for changes in amount and color  - Stuart appropriate cooling/warming therapies per order  - Administer medications as ordered  - Instruct and encourage patient and family to use good hand hygiene technique  - Identify and instruct in appropriate isolation precautions for identified infection/condition  Outcome: Progressing  Goal: Absence of fever/infection during neutropenic period  Description: INTERVENTIONS:  - Monitor WBC    Outcome: Progressing     Problem: CARDIOVASCULAR - ADULT  Goal: Maintains optimal cardiac output and hemodynamic stability  Description: INTERVENTIONS:  - Monitor I/O, vital signs and rhythm  - Monitor for S/S and trends of decreased cardiac output  - Administer and titrate ordered vasoactive medications to optimize hemodynamic stability  - Assess quality of pulses, skin color and temperature  - Assess for signs of decreased coronary artery perfusion  - Instruct patient to report change in severity of symptoms  Outcome: Progressing  Goal: Absence of cardiac  dysrhythmias or at baseline rhythm  Description: INTERVENTIONS:  - Continuous cardiac monitoring, vital signs, obtain 12 lead EKG if ordered  - Administer antiarrhythmic and heart rate control medications as ordered  - Monitor electrolytes and administer replacement therapy as ordered  Outcome: Progressing     Problem: SKIN/TISSUE INTEGRITY - ADULT  Goal: Skin Integrity remains intact(Skin Breakdown Prevention)  Description: Assess:  -Perform Skip assessment every   -Clean and moisturize skin every   -Inspect skin when repositioning, toileting, and assisting with ADLS  -Assess under medical devices such as  every   -Assess extremities for adequate circulation and sensation     Bed Management:  -Have minimal linens on bed & keep smooth, unwrinkled  -Change linens as needed when moist or perspiring  -Avoid sitting or lying in one position for more than  hours while in bed  -Keep HOB at degrees     Toileting:  -Offer bedside commode  -Assess for incontinence every   -Use incontinent care products after each incontinent episode such as     Activity:  -Mobilize patient  times a day  -Encourage activity and walks on unit  -Encourage or provide ROM exercises   -Turn and reposition patient every  Hours  -Use appropriate equipment to lift or move patient in bed  -Instruct/ Assist with weight shifting every  when out of bed in chair  -Consider limitation of chair time  hour intervals    Skin Care:  -Avoid use of baby powder, tape, friction and shearing, hot water or constrictive clothing  -Relieve pressure over bony prominences using   -Do not massage red bony areas    Next Steps:  -Teach patient strategies to minimize risks such as    -Consider consults to  interdisciplinary teams such as   Outcome: Progressing  Goal: Incision(s), wounds(s) or drain site(s) healing without S/S of infection  Description: INTERVENTIONS  - Assess and document dressing, incision, wound bed, drain sites and surrounding tissue  - Provide patient  and family education  - Perform skin care/dressing changes every   Outcome: Progressing

## 2024-03-17 NOTE — ED ATTENDING ATTESTATION
3/16/2024  I, Fito Caraballo DO, saw and evaluated the patient. I have discussed the patient with the resident/non-physician practitioner and agree with the resident's/non-physician practitioner's findings, Plan of Care, and MDM as documented in the resident's/non-physician practitioner's note, except where noted. All available labs and Radiology studies were reviewed.  I was present for key portions of any procedure(s) performed by the resident/non-physician practitioner and I was immediately available to provide assistance.       At this point I agree with the current assessment done in the Emergency Department.  I have conducted an independent evaluation of this patient a history and physical is as follows:    ED Course     Patient is a 59-year-old male who presents for concerns of what was initially triaged as feeling dizzy and weak associated with exposure to bleach.  Patient uses American sign language, there was a delay in being able to obtain an .  Was attempting to use handwritten notes for communication.  1  was obtained.  Patient endorses some neck pain and vague chest discomfort on initial evaluation.  Upon repeat assessment by me, using , patient states that he has had previous MI, currently had chest pain for about 1 hour that feels similar to his previous MI with chest pressure that is nonradiating.  Patient's EKG was noted to have ST elevations in leads V2, V3 and V4 without reciprocal changes.  On initial inspection it looks very similar to previous EKG done in December 2023.  Interventional cardiology was consulted and based on symptoms was recommended to have cardiac catheterization activation performed.  Patient was consented and transferred in stable condition for emergent catheterization.    General: VSS, NAD  Head: NCAT, NT.  Eyes: PERRL, EOMI.   ENT: MMM, Pharynx normal.   Neck: Trachea midline. No JVD.  CV: RRR. No M/R/G.    Lungs: CTAB,  No wheezes, rales.   Abd: +BS, soft, NT/ND. No guarding/rebound   MSK: Full ROM B/L UE/LE. No lower extremity edema.   Back: No C/T/L-spine tenderness.   Skin: Dry, intact. No abrasions, lacerations.  Neuro: AAOx3, GCS 15, CN II-XII grossly intact. Motor/sensory grossly intact.      Critical Care Time  CriticalCare Time    Date/Time: 3/16/2024 1:50 PM    Performed by: Fito Caraballo DO  Authorized by: Fito Caraballo DO    Critical care provider statement:     Critical care time (minutes):  30    Critical care time was exclusive of:  Separately billable procedures and treating other patients and teaching time    Critical care was necessary to treat or prevent imminent or life-threatening deterioration of the following conditions:  Cardiac failure    Critical care was time spent personally by me on the following activities:  Blood draw for specimens, obtaining history from patient or surrogate, development of treatment plan with patient or surrogate, evaluation of patient's response to treatment, examination of patient, ordering and performing treatments and interventions, ordering and review of laboratory studies, ordering and review of radiographic studies, re-evaluation of patient's condition, review of old charts, discussions with consultants and interpretation of cardiac output measurements  Comments:      Upon my evaluation, this patient has a high probability of imminent or life-threatening deterioration due to STEMI which required my direct attention, intervention, and personal management.     I have personally provided 30 minutes of critical care time, exclusive of procedures, teaching, and any prior time recorded by providers other than myself. Time includes review of laboratory data, radiology results, discussion with consultants, and monitoring for potential decompensation.

## 2024-03-17 NOTE — CONSULTS
Consult received for malnutrition, 2 criteria not met to classify malnutrition.  Pt with adequate intake during hospital stay. Case management was consulted for connection with outpatient resources

## 2024-03-17 NOTE — PLAN OF CARE
Problem: PAIN - ADULT  Goal: Verbalizes/displays adequate comfort level or baseline comfort level  Description: Interventions:  - Encourage patient to monitor pain and request assistance  - Assess pain using appropriate pain scale  - Administer analgesics based on type and severity of pain and evaluate response  - Implement non-pharmacological measures as appropriate and evaluate response  - Consider cultural and social influences on pain and pain management  - Notify physician/advanced practitioner if interventions unsuccessful or patient reports new pain  Outcome: Progressing     Problem: INFECTION - ADULT  Goal: Absence or prevention of progression during hospitalization  Description: INTERVENTIONS:  - Assess and monitor for signs and symptoms of infection  - Monitor lab/diagnostic results  - Monitor all insertion sites, i.e. indwelling lines, tubes, and drains  - Monitor endotracheal if appropriate and nasal secretions for changes in amount and color  - Horatio appropriate cooling/warming therapies per order  - Administer medications as ordered  - Instruct and encourage patient and family to use good hand hygiene technique  - Identify and instruct in appropriate isolation precautions for identified infection/condition  Outcome: Progressing  Goal: Absence of fever/infection during neutropenic period  Description: INTERVENTIONS:  - Monitor WBC    Outcome: Progressing     Problem: CARDIOVASCULAR - ADULT  Goal: Maintains optimal cardiac output and hemodynamic stability  Description: INTERVENTIONS:  - Monitor I/O, vital signs and rhythm  - Monitor for S/S and trends of decreased cardiac output  - Administer and titrate ordered vasoactive medications to optimize hemodynamic stability  - Assess quality of pulses, skin color and temperature  - Assess for signs of decreased coronary artery perfusion  - Instruct patient to report change in severity of symptoms  Outcome: Progressing  Goal: Absence of cardiac  dysrhythmias or at baseline rhythm  Description: INTERVENTIONS:  - Continuous cardiac monitoring, vital signs, obtain 12 lead EKG if ordered  - Administer antiarrhythmic and heart rate control medications as ordered  - Monitor electrolytes and administer replacement therapy as ordered  Outcome: Progressing     Problem: SKIN/TISSUE INTEGRITY - ADULT  Goal: Skin Integrity remains intact(Skin Breakdown Prevention)  Description: Assess:  -Perform Skip assessment every   -Clean and moisturize skin every   -Inspect skin when repositioning, toileting, and assisting with ADLS  -Assess under medical devices such as  every   -Assess extremities for adequate circulation and sensation     Bed Management:  -Have minimal linens on bed & keep smooth, unwrinkled  -Change linens as needed when moist or perspiring  -Avoid sitting or lying in one position for more than  hours while in bed  -Keep HOB at degrees     Toileting:  -Offer bedside commode  -Assess for incontinence every   -Use incontinent care products after each incontinent episode such as     Activity:  -Mobilize patient  times a day  -Encourage activity and walks on unit  -Encourage or provide ROM exercises   -Turn and reposition patient every  Hours  -Use appropriate equipment to lift or move patient in bed  -Instruct/ Assist with weight shifting every  when out of bed in chair  -Consider limitation of chair time  hour intervals    Skin Care:  -Avoid use of baby powder, tape, friction and shearing, hot water or constrictive clothing  -Relieve pressure over bony prominences using   -Do not massage red bony areas    Next Steps:  -Teach patient strategies to minimize risks such as    -Consider consults to  interdisciplinary teams such as   Outcome: Progressing  Goal: Incision(s), wounds(s) or drain site(s) healing without S/S of infection  Description: INTERVENTIONS  - Assess and document dressing, incision, wound bed, drain sites and surrounding tissue  - Provide patient  and family education  - Perform skin care/dressing changes every  Outcome: Progressing

## 2024-03-17 NOTE — OCCUPATIONAL THERAPY NOTE
Occupational Therapy Evaluation     Patient Name: Mal Encarnacion  Today's Date: 3/17/2024  Problem List  Principal Problem:    Junctional bradycardia  Active Problems:    Primary hypertension    Thrombocytopenia (HCC)    Type 2 diabetes mellitus without complication, without long-term current use of insulin (HCC)    Hyponatremia    Food insecurity    Alcohol use disorder    Past Medical History  Past Medical History:   Diagnosis Date    Deaf     Heart murmur     Hypertension      Past Surgical History  No past surgical history on file.      03/17/24 1028   OT Last Visit   OT Visit Date 03/17/24   Note Type   Note type Evaluation   Pain Assessment   Pain Assessment Tool FLACC   Pain Location/Orientation Location: Back;Location: Neck   Pain Onset/Description Other (Comment)  (tightness)   Effect of Pain on Daily Activities pt gesturing pain and tightness in back and neck w/ functional mobility   Pain Rating: FLACC (Rest) - Face 1   Pain Rating: FLACC (Rest) - Legs 0   Pain Rating: FLACC (Rest) - Activity 0   Pain Rating: FLACC (Rest) - Cry 1   Pain Rating: FLACC (Rest) - Consolability 0   Score: FLACC (Rest) 2   Pain Rating: FLACC (Activity) - Face 1   Pain Rating: FLACC (Activity) - Legs 0   Pain Rating: FLACC (Activity) - Activity 0   Pain Rating: FLACC (Activity) - Cry 1   Pain Rating: FLACC (Activity) - Consolability 0   Score: FLACC (Activity) 2   Restrictions/Precautions   Weight Bearing Precautions Per Order No   Other Precautions Cognitive;Fall Risk;Pain  (pt has congential deafness; communicates via sign language or writing on paper.)   Home Living   Type of Home Apartment   Home Layout One level   Home Equipment Cane   Additional Comments Attempted to communicate w/ pt via Ipad for sign language interpretor but unable to get through to person; instead attempted communication via writing on paper. Pt able to read English however deferred to asking OT to call his mother to speak w/ her. Called pt's mother  who speaks primarily Thai but does understand basic English. Pt's mother reporting pt lives alone in an apartment. She believes he is I w/ his ADLS and that someone gave him a cane to use. He does not drive or work. Pt's mother lives close by but does not drive either.   Prior Function   Level of [place] Independent with ADLs;Independent with functional mobility;Independent with IADLS   Lives With (S)  Alone   Receives Help From Family  (mother nearby)   IADLs Family/Friend/Other provides transportation;Independent with meal prep;Independent with medication management   Vocational On disability   Comments (-)    Lifestyle   Autonomy I w/ ADLS, IADLS, Mod I w/ transfers and functional mobility PTA   Reciprocal Relationships pt lives alone   Service to Others Does not work   Intrinsic Gratification Unable to report; will continue to assess   ADL   Eating Assistance 7  Independent   Grooming Assistance 5  Supervision/Setup   UB Bathing Assistance 5  Supervision/Setup   LB Bathing Assistance 4  Minimal Assistance   UB Dressing Assistance 5  Supervision/Setup   LB Dressing Assistance 4  Minimal Assistance   Toileting Assistance  4  Minimal Assistance   Functional Assistance 4  Minimal Assistance   Functional Deficit Steadying;Increased time to complete;Supervision/safety;Verbal cueing   Bed Mobility   Supine to Sit 5  Supervision   Additional items HOB elevated;Increased time required;Verbal cues   Sit to Supine 5  Supervision   Additional items Increased time required;Verbal cues   Additional Comments pt sat EOB w/ G balance/trunk control   Transfers   Sit to Stand 5  Supervision   Additional items Increased time required;Verbal cues   Stand to Sit 5  Supervision   Additional items Increased time required;Verbal cues   Additional Comments HHA   Functional Mobility   Functional Mobility 4  Minimal assistance   Additional Comments pt engaged in short household distance functional mobility w/ Min AX1; HHA  used. BP upon return to EOB, 168/89   Additional items Hand hold assistance   Balance   Static Sitting Good   Dynamic Sitting Fair +   Static Standing Fair   Dynamic Standing Fair -   Ambulatory Poor +   Activity Tolerance   Activity Tolerance Patient limited by fatigue   Medical Staff Made Aware PT   Nurse Made Aware yes   RUE Assessment   RUE Assessment WFL   LUE Assessment   LUE Assessment WFL   Hand Function   Gross Motor Coordination Functional   Fine Motor Coordination Functional   Cognition   Overall Cognitive Status WFL   Arousal/Participation Responsive;Cooperative   Attention Within functional limits   Orientation Level Unable to assess   Memory Within functional limits   Following Commands Follows one step commands without difficulty   Comments pt is pleasant and cooperative   Assessment   Limitation Decreased ADL status;Decreased Safe judgement during ADL;Decreased endurance;Decreased high-level ADLs;Decreased self-care trans   Prognosis Fair   Assessment Pt is a 60 y/o male seen for OT eval s/p adm to [facility] a transfer from [facility] for 2 days of lightheadedness. Pt having chest pain in ED. Pt transferred to [facility] for cardiac cath. After procedure, pt noted to be bradycardic. Pt is dx'd w/ junctional bradycardia. Pt  has a past medical history of Deaf, Heart murmur, and Hypertension. Pt with active OT orders and activity as tolerated orders. Pt lives alone in an apt. Pt was I w/  ADLS and IADLS, does not drive, & required use of DME PTA including SPC. Pt is currently demonstrating the following occupational deficits: S UB ADLS, CGA LB ADLS, S bed mobility and transfers and Min A functional mobility w/ HHA. These deficits that are impacting pt's baseline areas of occupation are a result of the following impairments: pain, endurance, activity tolerance, functional mobility, balance, unsupportive home environment, decreased I w/ ADLS/IADLS, and decreased safety awareness.The following Occupational Performance  Areas to address include: bathing/shower, toilet hygiene, dressing, medication management, socialization, health maintenance, functional mobility, community mobility, clothing management, and household maintenance. Recommend home OT upon D/C, pending progress. Pt to continue to benefit from acute immediate OT services to address the following goals 2-3x/week to  w/in 10-14 days:   Goals   Patient Goals unable to state 2/2 deafness and communication barrier   LTG Time Frame 10-14   Long Term Goal #1 see below listed goals   Plan   Treatment Interventions ADL retraining;Functional transfer training;Endurance training;Cognitive reorientation;Patient/family training;Equipment evaluation/education;Compensatory technique education;Continued evaluation;Activityengagement;Energy conservation   Goal Expiration Date 24   OT Frequency 2-3x/wk   Discharge Recommendation   Rehab Resource Intensity Level, OT III (Minimum Resource Intensity)   Additional Comments  The patient's raw score on the AM-PAC Daily Activity Inpatient Short Form is 21. A raw score of greater than or equal to 19 suggests the patient may benefit from discharge to home. Please refer to the recommendation of the Occupational Therapist for safe discharge planning.   Additional Comments 2 Pt seen as a co-session due to the patient's co-morbidities, clinically unstable presentation, and present impairments which are a regression from the patient's baseline.   AM-PAC Daily Activity Inpatient   Lower Body Dressing 3   Bathing 3   Toileting 3   Upper Body Dressing 4   Grooming 4   Eating 4   Daily Activity Raw Score 21   Daily Activity Standardized Score (Calc for Raw Score >=11) 44.27   AM-PAC Applied Cognition Inpatient   Following a Speech/Presentation 3   Understanding Ordinary Conversation 4   Taking Medications 3   Remembering Where Things Are Placed or Put Away 3   Remembering List of 4-5 Errands 3   Taking Care of Complicated Tasks 3   Applied  Cognition Raw Score 19   Applied Cognition Standardized Score 39.77   End of Consult   Education Provided Yes   Patient Position at End of Consult Supine;All needs within reach   Nurse Communication Nurse aware of consult       GOALS    1) Pt will improve activity tolerance to G for 30 min txment sessions for increase engagement in functional tasks  2) Pt will complete UB/LB dressing/self care w/ mod I using adaptive device and DME as needed  3) Pt will complete bathing w/ Mod I w/ use of AE and DME as needed  4) Pt will complete toileting w/ mod I w/ G hygiene/thoroughness using DME as needed  5) Pt will improve functional transfers to Mod I on/off all surfaces using DME as needed w/ G balance/safety   6) Pt will improve functional mobility during ADL/IADL/leisure tasks to Mod I using DME as needed w/ G balance/safety   7) Pt will participate in simulated IADL management task to increase independence to Mod I w/ G safety and endurance  8) Pt will be attentive 100% of the time during ongoing cognitive assessment w/ G participation to assist w/ safe d/c planning/recommendations  9) Pt will demonstrate G carryover of pt/caregiver education and training as appropriate w/o cues w/ good tolerance to increase safety during functional tasks  10) Pt will demonstrate 100% carryover of energy conservation techniques t/o functional I/ADL/leisure tasks w/o cues s/p skilled education to increase endurance during functional tasks     Celestina Hanson MS, OTR/L

## 2024-03-17 NOTE — PROGRESS NOTES
"    PROGRESS NOTE - Family Medicine Residency Eduardo Encarnacion 1965, 59 y.o. male.  MRN: [id number]    Unit/Bed#: [facility] 501-01 Encounter: [id number]  Primary Care Provider: Awa Shepherd MD      Admission Date: 3/16/2024  Length of Stay: 1 days  Code Status:  Level 1 - Full Code  Diet: Diet Cardiovascular; Cardiac  Consult:   IP CONSULT TO NUTRITION SERVICES  IP CONSULT TO CASE MANAGEMENT      Assessment & Plan:     Discussed with [facility] team and finalization is pending attending physician attestation.    Alcohol use disorder  Assessment & Plan  Patient reports significant weekend binge drinking consuming about 12 beers every weekend from Friday to Saturday.  Patient denies any past history of alcohol withdrawal.  Denies any other drug use    Plan:  CIWA protocol ordered  Thiamine and folate levels ordered  Thiamine and folate supplementation ordered  Nutrition consult  Prealbumin ordered    Food insecurity  Assessment & Plan  Patient reports food insecurity    Plan:  Case management consult for connection with outpatient resources  Nutrition consult placed    Hyponatremia  Assessment & Plan  Patient hyponatremic to 132 on admission CMP    Plan:  Continue to monitor daily BMPs  If hyponatremia continues, consider SIADH workup including urine sodium, serum osm, urine osm    Type 2 diabetes mellitus without complication, without long-term current use of insulin (HCC)  Assessment & Plan  Lab Results   Component Value Date    HGBA1C 5.2 01/30/2024       No results for input(s): \"POCGLU\" in the last 72 hours.    Blood Sugar Average: Last 72 hrs:    Patient has history of type 2 diabetes however, on chart review most recent A1c was in the normal range.    Plan:  Cardiac diet  Glucose checks per routine  Can consider adding sliding scale insulin if glucose above normal hospitalized range 140-180    Thrombocytopenia (HCC)  Assessment & Plan  Platelet        Results from last 7 days   Lab Units 03/16/24  1303 "   PLATELETS Thousands/uL 122*     Plan:  -Continue to monitor CBCs  -Discontinue DVT prophylaxis for platelets <50     Primary hypertension  Assessment & Plan  - Most recent BP Blood Pressure: 137/78  or - 24H SBP Systolic (24hrs), Av , Min:137 , Max:189        Patient has history of hypertension previously on carvedilol and chlorthalidone, most recently transition to amlodipine 10 mg in 2024.  Patient presented with dizziness and blood pressure was elevated to 180s systolic on arrival to the ED.    Plan:  Continue close monitoring of blood pressures inpatient  Cardiology following, appreciate recs  Continue amlodipine 10 mg daily  Consider PRN hydralazine     * Junctional bradycardia  Assessment & Plan  Patient is 59-year-old male with past medical history significant for hypertension, grade 1 diastolic dysfunction, history of MI presenting with 2 days of lightheadedness and 1 hour of chest pain.  Patient was found to be hypertensive to 180/105 on admission.  Initial troponin was 23, EKG showed ST segment elevations in V2, V3, V4 and T wave inversions in leads II, III and aVF with bradycardia to the 40s.  STEMI alert was called, patient was loaded with heparin, aspirin and Brilinta and transferred to [facility].  Patient underwent cardiac cath which showed mild diffuse disease with no culprit lesion, no STEMI.  Patient appears to have LVH which could be contributing to rhythm abnormalities.      Plan:  Echocardiogram ordered  Continue telemetry  0-hr trop 23  Follow up 2-hr and 4-hr troponin  Per cardiology will consider pacemaker placement if bradycardia continues  Cardio following appreciate recs  Continue to check daily electrolytes and correct any abnormalities  Check prealbumin          Principal Problem:    Junctional bradycardia  Active Problems:    Primary hypertension    Thrombocytopenia (HCC)    Type 2 diabetes mellitus without complication, without long-term current use of insulin  "(HCC)    Hyponatremia    Food insecurity    Alcohol use disorder      VTE Pharm PPX: no dvt prophylaxis pending possible procedure   VTE [facility] PPX: sequential compression device and/or foot pump applied unless otherwise contraindicated    Subjective     Hospital Course & 24hr events:     Hospital course:  59 y.o. male admitted 3/16/2024, now HD# 1, for chest pain      Overnight/24hr events:  Overnight events: Per sign out from night team. Patient seen and examined at bedside.     Objective     Vitals:     Vitals:    03/17/24 0704 03/17/24 0920 03/17/24 1021 03/17/24 1056   BP: 161/84 161/84 163/89 166/93   BP Location: Left arm      Pulse: 80 83 83 84   Resp: 19   15   Temp: 98.3 °F (36.8 °C)   97.8 °F (36.6 °C)   TempSrc: Oral      SpO2: 94%  93% 95%   Weight:  59.9 kg (132 lb)     Height:  5' 1\" (1.549 m)       Temp:  [97.8 °F (36.6 °C)-98.3 °F (36.8 °C)] 97.8 °F (36.6 °C)  HR:  [67-96] 84  Resp:  [15-19] 15  BP: (137-189)/() 166/93  Weight (last 2 days)       Date/Time Weight    03/17/24 0920 59.9 (132)            Intake/Output Summary (Last 24 hours) at 3/17/2024 1124  Last data filed at 3/17/2024 0701  Gross per 24 hour   Intake 760.1 ml   Output 1905 ml   Net -1144.9 ml     Invasive Devices       Peripheral Intravenous Line  Duration             Peripheral IV 03/16/24 Distal;Left;Upper;Ventral (anterior) Arm <1 day    Peripheral IV 03/16/24 Right Antecubital <1 day                      Labs:    I have personally reviewed pertinent reports.      Results from last 7 days   Lab Units 03/17/24  0519 03/16/24  1303   WBC Thousand/uL 7.14 7.02   HEMOGLOBIN g/dL 15.0 16.3   HEMATOCRIT % 43.8 47.4   PLATELETS Thousands/uL 100* 122*   NEUTROS ABS Thousands/µL  --  5.60       Results from last 7 days   Lab Units 03/17/24  0519 03/16/24 2014 03/16/24  1303   POTASSIUM mmol/L 3.8  --  3.9   CHLORIDE mmol/L 102  --  97   CO2 mmol/L 24  --  28   BUN mg/dL 9  --  10   CREATININE mg/dL 0.51*  --  0.70   CALCIUM mg/dL " 8.8  --  9.3   AST U/L  --   --  23   ALT U/L  --   --  16   ALK PHOS U/L  --   --  90   EGFR ml/min/1.73sq m 117  --  103   MAGNESIUM mg/dL  --  2.0  --    PHOSPHORUS mg/dL  --  3.4  --                     EKG, Pathology, Imaging, and Other Studies:   I have personally reviewed pertinent reports.      No results found.      Meds/Allergies     All medications and allergies reviewed  No Known Allergies    Continuous Infusions:       Scheduled Meds:  Current Facility-Administered Medications   Medication Dose Route Frequency Provider Last Rate    amLODIPine  10 mg Oral Daily Tiffany Poole MD      atorvastatin  40 mg Oral Daily With Dinner Alfred Cespedes MD      folic acid  1 mg Oral Daily Yareli Magana MD      losartan  50 mg Oral Daily Stefano Jefferson MD      pantoprazole  20 mg Oral Daily Before Breakfast Tiffany Poole MD      thiamine  100 mg Oral Daily Yareli Magana MD         PRN Meds:        Physical Exam   Physical Exam  Constitutional:       General: He is not in acute distress.     Appearance: Normal appearance.   HENT:      Head: Normocephalic and atraumatic.   Cardiovascular:      Rate and Rhythm: Normal rate and regular rhythm.      Pulses: Normal pulses.      Heart sounds: No murmur heard.  Pulmonary:      Effort: Pulmonary effort is normal. No respiratory distress.      Breath sounds: Normal breath sounds.   Abdominal:      General: Bowel sounds are normal.      Palpations: Abdomen is soft.      Tenderness: There is no abdominal tenderness.   Musculoskeletal:         General: Normal range of motion.      Cervical back: Normal range of motion.      Right lower leg: No edema.      Left lower leg: No edema.   Skin:     General: Skin is warm.   Neurological:      General: No focal deficit present.      Mental Status: He is alert and oriented to person, place, and time.              Vane Gallo MD  PGY-2, [facility]  03/17/24, 11:24 AM

## 2024-03-18 ENCOUNTER — OFFICE VISIT (OUTPATIENT)
Dept: FAMILY MEDICINE CLINIC | Facility: CLINIC | Age: 59
End: 2024-03-18

## 2024-03-18 ENCOUNTER — TELEPHONE (OUTPATIENT)
Dept: FAMILY MEDICINE CLINIC | Facility: CLINIC | Age: 59
End: 2024-03-18

## 2024-03-18 VITALS
RESPIRATION RATE: 16 BRPM | DIASTOLIC BLOOD PRESSURE: 80 MMHG | WEIGHT: 130 LBS | HEART RATE: 100 BPM | OXYGEN SATURATION: 95 % | SYSTOLIC BLOOD PRESSURE: 140 MMHG | TEMPERATURE: 98.7 F | BODY MASS INDEX: 24.55 KG/M2 | HEIGHT: 61 IN

## 2024-03-18 DIAGNOSIS — Z09 HOSPITAL DISCHARGE FOLLOW-UP: Primary | ICD-10-CM

## 2024-03-18 DIAGNOSIS — Z59.82 TRANSPORTATION INSECURITY: ICD-10-CM

## 2024-03-18 DIAGNOSIS — R00.1 JUNCTIONAL BRADYCARDIA: ICD-10-CM

## 2024-03-18 DIAGNOSIS — Z12.11 ENCOUNTER FOR SCREENING COLONOSCOPY: ICD-10-CM

## 2024-03-18 DIAGNOSIS — I10 PRIMARY HYPERTENSION: ICD-10-CM

## 2024-03-18 DIAGNOSIS — Z59.41 FOOD INSECURITY: Primary | ICD-10-CM

## 2024-03-18 DIAGNOSIS — R10.13 EPIGASTRIC PAIN: ICD-10-CM

## 2024-03-18 PROCEDURE — 99496 TRANSJ CARE MGMT HIGH F2F 7D: CPT | Performed by: FAMILY MEDICINE

## 2024-03-18 RX ORDER — PANTOPRAZOLE SODIUM 20 MG/1
20 TABLET, DELAYED RELEASE ORAL
Qty: 30 TABLET | Refills: 5 | Status: SHIPPED | OUTPATIENT
Start: 2024-03-18 | End: 2024-09-14

## 2024-03-18 RX ORDER — AMLODIPINE BESYLATE 10 MG/1
10 TABLET ORAL DAILY
Qty: 30 TABLET | Refills: 4 | Status: SHIPPED | OUTPATIENT
Start: 2024-03-18 | End: 2024-08-15

## 2024-03-18 SDOH — ECONOMIC STABILITY - FOOD INSECURITY: FOOD INSECURITY: Z59.41

## 2024-03-18 SDOH — ECONOMIC STABILITY - TRANSPORTATION SECURITY: TRANSPORTATION INSECURITY: Z59.82

## 2024-03-18 NOTE — TELEPHONE ENCOUNTER
first attempt to contact patient. left message to return my call on answering machine     LVM FOR PT TO RETURN PHONE CALL AND SCHEDULE TCM APPT, IF PT RETURNS PHONE CALL PLEASE ASSIST WITH APPT.    Severely reduced

## 2024-03-18 NOTE — ASSESSMENT & PLAN NOTE
New medication added after hospitalization losartan 50 mg daily     Plan:   Continue losartan 50 mg daily   Continue norvasc 10 mg daily   Encouraged to take blood pressures daily   FU with cardiology

## 2024-03-18 NOTE — UTILIZATION REVIEW
"NOTIFICATION OF INPATIENT ADMISSION   AUTHORIZATION REQUEST   SERVICING FACILITY:   [facility]  Address: [address] [place] [address]  Tax ID: [id number]  NPI: [id number] ATTENDING PROVIDER:  Attending Name and NPI#: Judy Barney Md [[id number]]  Address: [address] [place] [address]  Phone: [phone]   ADMISSION INFORMATION:  Place of Service: Inpatient [facility]  Place of Service Code: 21  Inpatient Admission Date/Time: 3/16/24  3:20 PM  Discharge Date/Time: 3/17/2024  5:00 PM  Admitting Diagnosis Code/Description:  STEMI (ST elevation myocardial infarction) (HCC) [I21.3]     UTILIZATION REVIEW CONTACT:  Janessa \"Romana\"Mariano Utilization   Network Utilization Review Department  Phone: [phone]  Fax: [phone]  Email: [email or web address]  Contact for approvals/pending authorizations, clinical reviews, and discharge.     PHYSICIAN ADVISORY SERVICES:  Medical Necessity Denial & [id number] Review  Phone: [phone]  Fax: [phone]  Email: [email or web address]     DISCHARGE SUPPORT TEAM:  For Patients Discharge Needs & Updates  Phone: [phone] opt. 2 Fax: [phone]  Email: [email or web address]     "

## 2024-03-18 NOTE — UTILIZATION REVIEW
Initial Clinical Review  The patient was transferred to [facility] on 03/16 from [facility], where care began on 03/16.  0 midnights have already been surpassed with active ongoing care.     Admission: Date/Time/Statement:   Admission Orders (From admission, onward)       Ordered        03/16/24 1520  Inpatient Admission  Once                          Orders Placed This Encounter   Procedures    Inpatient Admission     Standing Status:   Standing     Number of Occurrences:   1     Order Specific Question:   Level of Care     Answer:   Med Surg [16]     Order Specific Question:   Estimated length of stay     Answer:   More than 2 Midnights     Order Specific Question:   Certification     Answer:   I certify that inpatient services are medically necessary for this patient for a duration of greater than two midnights. See H&P and MD Progress Notes for additional information about the patient's course of treatment.     ED Arrival Information       Patient not seen in ED                       No chief complaint on file.      Initial Presentation: 59 y.o. male w/ PMH of HTN, T2DM, thrombocytopenia and previous MI was transferred from [facility] for a higher level of care.  Pt initially presented to [facility] w/ 2 days of lightheadedness.  In the ED, he was noted to be having chest pain, BP of 200s over 100s.  Initial troponin 23, EKG showed ST segment elevations in V2, V3, V4 and T wave inversions in leads II, III and aVF with bradycardia to the 40s. STEMI alert was called, he was loaded with heparin, aspirin and Brilinta.    Transferred to [facility] for cardiac catheterization.     Cardiac cath showed  showed mild diffuse disease with no culprit lesion, no STEMI. He appears to have LVH which could be contributing to rhythm abnormalities.    After procedure, he was found w/ bradycardia to the 40s-50s and reports dizziness. Concern for junctional bradycardia.      Admit  as Inpatient for evaluation and treatment of junctional bradycardia.  Plan: Echocardiogram ordered. Continue telemetry. Follow up 2-hr and 4-hr troponin. Per cardiology will consider pacemaker placement if bradycardia continues. Continue to check daily electrolytes and correct any abnormalities. Check prealbumin     03/16 Cardiology Consult: junctional bradycardia with HR 40 soon after catheterization:  Plan: Monitor on tele. If any further symptomatic bradycardia should be considered for PPP. check echo    Date:  03/17  Day 2: Pt had no acute events overnight.  Telemetry showing normal sinus rhythm with one 4 beat run of NSVT.  No further episodes of bradycardia.   TTE showing moderate concentric hypertrophy.  Low normal systolic function with an EF of 50-55%.  Grade 1 diastolic dysfunction.   Cont statin and ASA. OP Zio monitor. Losartan 50 mg was added for BP management.    ED Triage Vitals   Temperature Pulse Respirations Blood Pressure SpO2   03/16/24 1949 03/16/24 1540 03/16/24 1949 03/16/24 1540 03/16/24 1421   97.9 °F (36.6 °C) 67 18 137/78 98 %      Temp Source Heart Rate Source Patient Position - Orthostatic VS BP Location FiO2 (%)   03/16/24 1949 -- 03/16/24 1949 03/16/24 1949 --   Oral  Lying Left arm       Pain Score       03/16/24 1441       No Pain          Wt Readings from Last 1 Encounters:   03/17/24 59.9 kg (132 lb)     Additional Vital Signs:   Date/Time Temp Pulse Resp BP MAP (mmHg) SpO2 O2 Device Patient Position - Orthostatic VS   03/17/24 1335 -- -- -- 161/81 -- -- -- Sitting   03/17/24 1100 97.9 °F (36.6 °C) -- 16 -- -- -- -- Lying   03/17/24 10:56:20 97.8 °F (36.6 °C) 84 15 166/93 117 95 % -- --   03/17/24 10:21:14 -- 83 -- 163/89 114 93 % -- --   03/17/24 0920 -- 83 -- 161/84 -- -- -- --   03/17/24 07:04:32 98.3 °F (36.8 °C) 80 19 161/84 110 94 % -- Lying   03/17/24 02:43:37 98.1 °F (36.7 °C) 71 16 158/75 103 96 % -- --   03/16/24 22:06:27 97.9 °F (36.6 °C) 67 18 157/73 101 95 % -- Lying    03/16/24 2100 -- 67 -- 160/82 108 96 % -- --   03/16/24 19:49:52 97.9 °F (36.6 °C) 82 18 163/80 108 95 % -- Lying   03/16/24 1915 -- -- -- -- -- -- None (Room air) --   03/16/24 1900 -- 83 -- 157/87 110 95 % -- --   03/16/24 1820 -- 93 -- 157/78 104 -- -- --   03/16/24 1750 -- 86 -- 159/79 106 -- -- --   03/16/24 1720 -- 84 -- 160/80 107 -- -- --   03/16/24 1655 -- 91 -- 153/78 103 -- -- --   03/16/24 1620 -- 69 -- 158/91 113 -- -- --   03/16/24 1556 -- 69 -- 139/79 -- -- -- --   03/16/24 15:40:53 -- 67 -- 137/78 98 95 % None (Room air) --       Pertinent Labs/Diagnostic Test Results:   03/16  EKG result:Sinus rhythm with occasional Premature ventricular complexes  T wave abnormality, consider inferolateral ischemia    Cardiac PCI:    1st Diag lesion is 40% stenosed.    Prox LAD lesion is 50% stenosed.     1v CAD/non obstructive  No STEMI  Nl LV function/LVEDP  Recommendation:  BP Rx  Lipid rx     EKG 2:Sinus bradycardia  ST & T wave abnormality, consider inferior ischemia  ST & T wave abnormality, consider anterolateral ischemia    03/17 ECHO:    Left Ventricle: Left ventricular cavity size is normal. Wall thickness is moderately increased. There is moderate concentric hypertrophy. The left ventricular ejection fraction is 50-55% by visual estimation. Systolic function is low normal. Diastolic function is mildly abnormal, consistent with grade I (abnormal) relaxation.  Left atrial filling pressure is normal.    Right Ventricle: Right ventricular cavity size is normal. Systolic function is low normal.    Aortic Valve: There is mild regurgitation. The aortic valve has no significant stenosis.  Results from last 7 days   Lab Units 03/16/24  1303   SARS-COV-2  Negative     Results from last 7 days   Lab Units 03/17/24  0519 03/16/24  1303   WBC Thousand/uL 7.14 7.02   HEMOGLOBIN g/dL 15.0 16.3   HEMATOCRIT % 43.8 47.4   PLATELETS Thousands/uL 100* 122*   NEUTROS ABS Thousands/µL  --  5.60         Results from last 7  days   Lab Units 03/17/24  0519 03/16/24 2014 03/16/24  1303   SODIUM mmol/L 135  --  132*   POTASSIUM mmol/L 3.8  --  3.9   CHLORIDE mmol/L 102  --  97   CO2 mmol/L 24  --  28   ANION GAP mmol/L 9  --  7   BUN mg/dL 9  --  10   CREATININE mg/dL 0.51*  --  0.70   EGFR ml/min/1.73sq m 117  --  103   CALCIUM mg/dL 8.8  --  9.3   MAGNESIUM mg/dL  --  2.0  --    PHOSPHORUS mg/dL  --  3.4  --      Results from last 7 days   Lab Units 03/16/24  1303   AST U/L 23   ALT U/L 16   ALK PHOS U/L 90   TOTAL PROTEIN g/dL 7.4   ALBUMIN g/dL 4.3   TOTAL BILIRUBIN mg/dL 0.98         Results from last 7 days   Lab Units 03/17/24  0519 03/16/24  1303   GLUCOSE RANDOM mg/dL 96 118           Results from last 7 days   Lab Units 03/16/24  2226 03/16/24 2014 03/16/24  1637 03/16/24  1303   HS TNI 0HR ng/L  --   --  25 23   HS TNI 2HR ng/L  --  33  --   --    HSTNI D2 ng/L  --  8  --   --    HS TNI 4HR ng/L 43  --   --   --    HSTNI D4 ng/L 18  --   --   --              Results from last 7 days   Lab Units 03/16/24  1637   TSH 3RD GENERATON uIU/mL 1.229                 Results from last 7 days   Lab Units 03/16/24  1303   INFLUENZA A PCR  Negative   INFLUENZA B PCR  Negative   RSV PCR  Negative                                               ED Treatment:   Medication Administration - No Administrations Displayed (No Start Event Found)       None          Past Medical History:   Diagnosis Date    Deaf     Heart murmur     Hypertension      Present on Admission:   Primary hypertension   Type 2 diabetes mellitus without complication, without long-term current use of insulin (HCC)   Thrombocytopenia (HCC)   Hyponatremia      Admitting Diagnosis: STEMI (ST elevation myocardial infarction) (HCC) [I21.3]  Age/Sex: 59 y.o. male  Admission Orders:  Continuous pulse ox  Apply foot pumps only  Tele    Scheduled Medications:  amLODIPine (NORVASC) tablet 10 mg po daily  aspirin chewable tablet 81 mg po daily  atorvastatin (LIPITOR) tablet 40 mg po  daily  folic acid (FOLVITE) tablet 1 mg po daily  losartan (COZAAR) tablet 50 mg po daily  pantoprazole (PROTONIX) EC tablet 20 mg po daily  thiamine tablet 100 mg po daily    Continuous IV Infusions:  lactated ringers infusion  Rate: 125 mL/hr Dose: 125 mL/hr  Freq: Continuous Route: IV  Indications of Use: IV Hydration  Last Dose: Stopped (03/16/24 2046)  Start: 03/16/24 1530 End: 03/16/24 1950     PRN Meds:        IP CONSULT TO NUTRITION SERVICES    Network Utilization Review Department  ATTENTION: Please call with any questions or concerns to [phone] and carefully listen to the prompts so that you are directed to the right person. All voicemails are confidential.   For Discharge needs, contact Care Management DC Support Team at [phone] opt. [phone]  Send all requests for admission clinical reviews, approved or denied determinations and any other requests to dedicated fax number below belonging to the campus where the patient is receiving treatment. List of dedicated fax numbers for the Facilities:  FACILITY NAME UR FAX NUMBER   ADMISSION DENIALS (Administrative/Medical Necessity) [phone]   DISCHARGE SUPPORT TEAM (NETWORK) [phone]   PARENT CHILD HEALTH (Maternity/NICU/Pediatrics) [phone]   [facility] [phone]   [facility] [phone]   [facility] [phone]   [facility] [phone]   [facility] [phone]   General acute hospital [phone]   General acute hospital [phone]   [facility] [phone]   [facility] [phone]   [facility] [phone]   [facility] [phone]   [facility] [phone]

## 2024-03-18 NOTE — ASSESSMENT & PLAN NOTE
Patient's dizziness and chest pain has improved and not recurred since discharge      Plan:   reviewed medications   Review FU with cardiology, appt set 3/22/24  Reviewed imaging and results from hospital stay

## 2024-03-18 NOTE — PROGRESS NOTES
Assessment & Plan     1. Hospital discharge follow-up  Assessment & Plan:  Patient's dizziness and chest pain has improved and not recurred since discharge      Plan:   reviewed medications   Review FU with cardiology, appt set 3/22/24  Reviewed imaging and results from hospital stay        2. Junctional bradycardia  Assessment & Plan:  Plan:   Will follow up with cardiac rehab for 2 week holter monitor       3. Epigastric pain  -     pantoprazole (PROTONIX) 20 mg tablet; Take 1 tablet (20 mg total) by mouth daily before breakfast    4. Primary hypertension  Assessment & Plan:  New medication added after hospitalization losartan 50 mg daily     Plan:   Continue losartan 50 mg daily   Continue norvasc 10 mg daily   Encouraged to take blood pressures daily   FU with cardiology      Orders:  -     amLODIPine (NORVASC) 10 mg tablet; Take 1 tablet (10 mg total) by mouth daily    5. Encounter for screening colonoscopy  Assessment & Plan:  Will consider colonoscopy  in 2-6 months after cardiac testing and rehabilitation      6. Transportation insecurity  Assessment & Plan:  Referral to social work     Orders:  -     Ambulatory Referral to Social Work Care Management Program; Future         Subjective     Transitional Care Management Review:   Mal Quarles is a 59 y.o. male here for TCM follow up.     During the TCM phone call patient stated:  TCM Call       None          TCM Call       None          HPI    Mal quarles  59-year-old with past medical history of prediabetes, hypertension, congenital deafness was admitted to [facility] 3/16/2024 to 3/17/2020 for left heart cardiac catheterization showing no evidence of ACS.  Originally presented to the ED with 2 days of lightheadedness and 1 hour of chest pain, which felt very similar to his previous MI. Found to have mild obstructive CAD, started on aspirin 81 daily and Lipitor 40 mg daily.  Complications include junctional bradycardia after coronary angiography  "which was monitored on telemetry with no further episodes.  TTE showed EF of 50-55 with grade 1 diastolic dysfunction.  Per cardiology will need a ZIO monitor for 2 weeks outpatient.  Appointment with cardiac lab supposed to be 3x a week for 12- 18 weeks    New medications for blood pressure control to include losartan 50 mg.  Follow-up with cardiology in 1 month after discharge.  Still neds to make appointment with cardiac lab supposed to be 3x a week for 12- 18 weeks    Today, new medications at discharge 81 aspirin, folic acid, losartan 50, thiamine 100 MG     Patient does not have chest pain, dizziness, or headache He is feeling better and improved since he has been discharged. He reports that he has been taking the losartan but not the norvasc, but his BP is not elevated this visit.         Review of Systems   Constitutional:  Negative for chills and fever.   HENT:  Negative for congestion, rhinorrhea, sore throat and trouble swallowing.    Respiratory:  Negative for cough, chest tightness and shortness of breath.    Cardiovascular:  Negative for chest pain, palpitations and leg swelling.   Gastrointestinal:  Negative for abdominal pain and vomiting.   Genitourinary:  Negative for dysuria.   Neurological:  Negative for dizziness and headaches.     Objective     /80 (BP Location: Right arm, Patient Position: Sitting, Cuff Size: Standard)   Pulse 100   Temp 98.7 °F (37.1 °C) (Temporal)   Resp 16   Ht 5' 1\" (1.549 m)   Wt 59 kg (130 lb)   SpO2 95%   BMI 24.56 kg/m²      Physical Exam  Vitals and nursing note reviewed.   Constitutional:       General: He is not in acute distress.     Appearance: He is well-developed.   HENT:      Head: Normocephalic and atraumatic.      Mouth/Throat:      Mouth: Mucous membranes are moist.   Eyes:      Conjunctiva/sclera: Conjunctivae normal.   Cardiovascular:      Rate and Rhythm: Normal rate and regular rhythm.      Heart sounds: No murmur heard.  Pulmonary:      " Effort: Pulmonary effort is normal. No respiratory distress.      Breath sounds: Normal breath sounds.   Abdominal:      General: Abdomen is flat. There is no distension.      Palpations: Abdomen is soft.      Tenderness: There is no abdominal tenderness. There is no guarding.   Musculoskeletal:         General: No swelling.      Cervical back: Neck supple.   Skin:     General: Skin is warm and dry.      Capillary Refill: Capillary refill takes less than 2 seconds.   Neurological:      Mental Status: He is alert.   Psychiatric:         Mood and Affect: Mood normal.   Medications have been reviewed by provider in current encounter    Awa Shepherd MD

## 2024-03-19 ENCOUNTER — TRANSITIONAL CARE MANAGEMENT (OUTPATIENT)
Dept: FAMILY MEDICINE CLINIC | Facility: CLINIC | Age: 59
End: 2024-03-19

## 2024-03-20 ENCOUNTER — PATIENT OUTREACH (OUTPATIENT)
Dept: FAMILY MEDICINE CLINIC | Facility: CLINIC | Age: 59
End: 2024-03-20

## 2024-03-20 LAB — VIT B1 BLD-SCNC: 100.6 NMOL/L (ref 66.5–200)

## 2024-03-20 NOTE — PROGRESS NOTES
Incoming call IB.    MIAH HAWKINS did receive an IB from provider Awa Shepherd stating that patient has appointments coming up on 3/22 for cardiology, and 3/27 for gastroenterology. Patient and mother wanted assistance in transportation for these appointments. You have seen/ spoke with this patient previously.      Per chart review this indicates that  MIAH HAWKINS assisted Pt in the past and closed the referral 11/10/2024. After chart review MIAH HAWKINS did place a call to Pt's mom Janessa. MIAH HAWKINS spoke with Janessa. Janessa requested MIAH HAWKINS to help Pt to apply for Waiver Program. MIAH HAWKINS informed Janessa that since Pt is independent even is deaf might won't qualify for the program above. Since Pt is deaf he will be benefit to receive an equipment a (captioned telephone) that will help him to communicate with other people. Per chart review this seems that MIAH HAWKINS contacted [facility] at ([phone], ext [phone]) 8/31/2023. MIAH HAWKINS was told that they can assists Pt but they should contact him. MIAH HAWKINS contacted Janessa at time, however, Janessa explained MIAH HAWKINS that she did not see Pt often.     Also, MIAH HAWKINS informed Janessa that MIAH HAWKINS contacted her today since provider inquired MIAH HAWKINS to assist Pt to schedule transportation for Pt's upcoming appointments. Janessa expressed that she is not aware if Pt has transportation through his insurance. MIAH HAWKINS explained Janessa that MIAH HAWKINS will call the insurance to assure if Pt has available trips. Also, MIAH HAWKINS will call Janessa Back. Janessa seems understanding.     MIAH HAWKINS did place a call to [facility] at ([phone]). MIAH HAWKINS introduced herself, her role, and informed the purpose of this call. Staff Jim explained MIAH HAWKINS that Pt has 100 trips one way or 50 round trips. In addition, MIAH HAWKINS was told that staff can not help MIAH HAWKINS to schedule Pt's transportation since there is no way to contact Pt at this time. MIAH HAWKINS expressed thanked to staff for her support.    Outgoing call.    MIAH HAWKINS  called Janessa and provided Janessa the insurance information. Also, MIAH HAWKINS explained Janessa that she should call the insurance and schedule Pt's appointment since she needs to provide insurance company information in regard if she would contact Pt. Also, MIAH HAWKINS provided Janessa the insurance phone number and Pt upcoming appointment 3/22/2024 date and time for his. Janessa seems willing to contact the insurance. MIAH HAWKINS informed Janessa that she can reach out the MIAH  for further assistance Monday through Friday within office hours. Janessa seems understanding and thankful for the   MIAH HAWKINS assistance.     MIAH HAWKINS is remain available for further assistance as needed.

## 2024-03-22 ENCOUNTER — HOSPITAL ENCOUNTER (OUTPATIENT)
Dept: NON INVASIVE DIAGNOSTICS | Facility: HOSPITAL | Age: 59
Discharge: HOME | End: 2024-03-22

## 2024-03-22 VITALS — WEIGHT: 130 LBS | HEIGHT: 61 IN | BODY MASS INDEX: 24.55 KG/M2

## 2024-03-22 DIAGNOSIS — I10 PRIMARY HYPERTENSION: ICD-10-CM

## 2024-03-28 ENCOUNTER — PATIENT OUTREACH (OUTPATIENT)
Dept: FAMILY MEDICINE CLINIC | Facility: CLINIC | Age: 59
End: 2024-03-28

## 2024-03-28 NOTE — PROGRESS NOTES
MIAH HAWKINS did place a call to Pt's mom Janessa but she did not  the phone, VM left. MIAH HAWKINS will attempt to reach out José Luis later time.     MIAH HAWKINS is remain available for further assistance as needed.

## 2024-04-05 ENCOUNTER — PATIENT OUTREACH (OUTPATIENT)
Dept: FAMILY MEDICINE CLINIC | Facility: CLINIC | Age: 59
End: 2024-04-05

## 2024-05-08 ENCOUNTER — PATIENT OUTREACH (OUTPATIENT)
Dept: FAMILY MEDICINE CLINIC | Facility: CLINIC | Age: 59
End: 2024-05-08

## 2024-05-08 NOTE — PROGRESS NOTES
Per chart review this seems that Pt was scheduled to see provider Awa Merchant today, however, Pt has been rescheduled on 5/28/2024. MIAH HAWKINS sent TT to provider and inquired if she can informs MIAH HAWKINS if Pt shows up for his upcoming appointment since MIAH HAWKINS would like to meet up with him. Provider replied back in regard Pt's appointment.    MIAH HAWKINS is remain available for further assistance as needed.  MIAH HAWKINS will continue to follow-up.

## 2024-05-09 ENCOUNTER — APPOINTMENT (EMERGENCY)
Dept: CT IMAGING | Facility: HOSPITAL | Age: 59
DRG: 149 | End: 2024-05-09
Payer: MEDICARE

## 2024-05-09 ENCOUNTER — APPOINTMENT (INPATIENT)
Dept: MRI IMAGING | Facility: HOSPITAL | Age: 59
DRG: 149 | End: 2024-05-09
Payer: MEDICARE

## 2024-05-09 ENCOUNTER — PATIENT OUTREACH (OUTPATIENT)
Dept: FAMILY MEDICINE CLINIC | Facility: CLINIC | Age: 59
End: 2024-05-09

## 2024-05-09 ENCOUNTER — HOSPITAL ENCOUNTER (INPATIENT)
Facility: HOSPITAL | Age: 59
LOS: 1 days | Discharge: HOME | DRG: 149 | End: 2024-05-10
Attending: EMERGENCY MEDICINE | Admitting: STUDENT IN AN ORGANIZED HEALTH CARE EDUCATION/TRAINING PROGRAM
Payer: MEDICARE

## 2024-05-09 DIAGNOSIS — G45.9 TIA (TRANSIENT ISCHEMIC ATTACK): ICD-10-CM

## 2024-05-09 DIAGNOSIS — R51.9 HEADACHE: ICD-10-CM

## 2024-05-09 DIAGNOSIS — R42 VERTIGO: Primary | ICD-10-CM

## 2024-05-09 PROBLEM — I63.531 STROKE DUE TO OCCLUSION OF RIGHT POSTERIOR CEREBRAL ARTERY (HCC): Status: ACTIVE | Noted: 2024-05-09

## 2024-05-09 PROBLEM — R94.31 ST ELEVATION: Status: ACTIVE | Noted: 2024-05-09

## 2024-05-09 LAB
2HR DELTA HS TROPONIN: 10 NG/L
4HR DELTA HS TROPONIN: 24 NG/L
ANION GAP SERPL CALCULATED.3IONS-SCNC: 14 MMOL/L (ref 4–13)
APTT PPP: 27 SECONDS (ref 23–37)
ATRIAL RATE: 116 BPM
ATRIAL RATE: 70 BPM
BUN SERPL-MCNC: 8 MG/DL (ref 5–25)
CALCIUM SERPL-MCNC: 9.6 MG/DL (ref 8.4–10.2)
CARDIAC TROPONIN I PNL SERPL HS: 19 NG/L
CARDIAC TROPONIN I PNL SERPL HS: 29 NG/L
CARDIAC TROPONIN I PNL SERPL HS: 43 NG/L
CHLORIDE SERPL-SCNC: 98 MMOL/L (ref 96–108)
CO2 SERPL-SCNC: 25 MMOL/L (ref 21–32)
CREAT SERPL-MCNC: 0.69 MG/DL (ref 0.6–1.3)
ERYTHROCYTE [DISTWIDTH] IN BLOOD BY AUTOMATED COUNT: 13.4 % (ref 11.6–15.1)
FLUAV RNA RESP QL NAA+PROBE: NEGATIVE
FLUBV RNA RESP QL NAA+PROBE: NEGATIVE
GFR SERPL CREATININE-BSD FRML MDRD: 103 ML/MIN/1.73SQ M
GLUCOSE SERPL-MCNC: 113 MG/DL (ref 65–140)
GLUCOSE SERPL-MCNC: 97 MG/DL (ref 65–140)
HCT VFR BLD AUTO: 49.7 % (ref 36.5–49.3)
HGB BLD-MCNC: 16.9 G/DL (ref 12–17)
INR PPP: 0.99 (ref 0.84–1.19)
MCH RBC QN AUTO: 32.9 PG (ref 26.8–34.3)
MCHC RBC AUTO-ENTMCNC: 34 G/DL (ref 31.4–37.4)
MCV RBC AUTO: 97 FL (ref 82–98)
P AXIS: 38 DEGREES
P AXIS: 71 DEGREES
PLATELET # BLD AUTO: 142 THOUSANDS/UL (ref 149–390)
PMV BLD AUTO: 9.5 FL (ref 8.9–12.7)
POTASSIUM SERPL-SCNC: 3.9 MMOL/L (ref 3.5–5.3)
PR INTERVAL: 174 MS
PR INTERVAL: 176 MS
PROTHROMBIN TIME: 13.4 SECONDS (ref 11.6–14.5)
QRS AXIS: 46 DEGREES
QRS AXIS: 58 DEGREES
QRSD INTERVAL: 78 MS
QRSD INTERVAL: 92 MS
QT INTERVAL: 310 MS
QT INTERVAL: 400 MS
QTC INTERVAL: 430 MS
QTC INTERVAL: 432 MS
RBC # BLD AUTO: 5.14 MILLION/UL (ref 3.88–5.62)
RSV RNA RESP QL NAA+PROBE: NEGATIVE
SARS-COV-2 RNA RESP QL NAA+PROBE: NEGATIVE
SODIUM SERPL-SCNC: 137 MMOL/L (ref 135–147)
T WAVE AXIS: 113 DEGREES
T WAVE AXIS: 87 DEGREES
VENTRICULAR RATE: 116 BPM
VENTRICULAR RATE: 70 BPM
WBC # BLD AUTO: 6.98 THOUSAND/UL (ref 4.31–10.16)

## 2024-05-09 PROCEDURE — 70551 MRI BRAIN STEM W/O DYE: CPT

## 2024-05-09 PROCEDURE — 36415 COLL VENOUS BLD VENIPUNCTURE: CPT | Performed by: EMERGENCY MEDICINE

## 2024-05-09 PROCEDURE — 93005 ELECTROCARDIOGRAM TRACING: CPT

## 2024-05-09 PROCEDURE — 99284 EMERGENCY DEPT VISIT MOD MDM: CPT

## 2024-05-09 PROCEDURE — 85610 PROTHROMBIN TIME: CPT | Performed by: EMERGENCY MEDICINE

## 2024-05-09 PROCEDURE — 99285 EMERGENCY DEPT VISIT HI MDM: CPT | Performed by: EMERGENCY MEDICINE

## 2024-05-09 PROCEDURE — 70496 CT ANGIOGRAPHY HEAD: CPT

## 2024-05-09 PROCEDURE — 0241U HB NFCT DS VIR RESP RNA 4 TRGT: CPT | Performed by: EMERGENCY MEDICINE

## 2024-05-09 PROCEDURE — 70498 CT ANGIOGRAPHY NECK: CPT

## 2024-05-09 PROCEDURE — 80048 BASIC METABOLIC PNL TOTAL CA: CPT | Performed by: EMERGENCY MEDICINE

## 2024-05-09 PROCEDURE — 85730 THROMBOPLASTIN TIME PARTIAL: CPT | Performed by: EMERGENCY MEDICINE

## 2024-05-09 PROCEDURE — 85027 COMPLETE CBC AUTOMATED: CPT | Performed by: EMERGENCY MEDICINE

## 2024-05-09 PROCEDURE — 82948 REAGENT STRIP/BLOOD GLUCOSE: CPT

## 2024-05-09 PROCEDURE — 84484 ASSAY OF TROPONIN QUANT: CPT | Performed by: EMERGENCY MEDICINE

## 2024-05-09 PROCEDURE — 93010 ELECTROCARDIOGRAM REPORT: CPT | Performed by: INTERNAL MEDICINE

## 2024-05-09 PROCEDURE — 99223 1ST HOSP IP/OBS HIGH 75: CPT | Performed by: FAMILY MEDICINE

## 2024-05-09 RX ORDER — FOLIC ACID 1 MG/1
1 TABLET ORAL DAILY
Status: DISCONTINUED | OUTPATIENT
Start: 2024-05-10 | End: 2024-05-10 | Stop reason: HOSPADM

## 2024-05-09 RX ORDER — LABETALOL HYDROCHLORIDE 5 MG/ML
10 INJECTION, SOLUTION INTRAVENOUS EVERY 6 HOURS PRN
Status: DISCONTINUED | OUTPATIENT
Start: 2024-05-09 | End: 2024-05-09

## 2024-05-09 RX ORDER — DIAZEPAM 5 MG/1
5 TABLET ORAL ONCE
Status: COMPLETED | OUTPATIENT
Start: 2024-05-09 | End: 2024-05-09

## 2024-05-09 RX ORDER — AMLODIPINE BESYLATE 10 MG/1
10 TABLET ORAL DAILY
Status: DISCONTINUED | OUTPATIENT
Start: 2024-05-10 | End: 2024-05-09

## 2024-05-09 RX ORDER — ATORVASTATIN CALCIUM 40 MG/1
40 TABLET, FILM COATED ORAL
Status: DISCONTINUED | OUTPATIENT
Start: 2024-05-09 | End: 2024-05-10 | Stop reason: HOSPADM

## 2024-05-09 RX ORDER — METOPROLOL TARTRATE 50 MG/1
50 TABLET, FILM COATED ORAL ONCE
Status: COMPLETED | OUTPATIENT
Start: 2024-05-09 | End: 2024-05-09

## 2024-05-09 RX ORDER — ACETAMINOPHEN 325 MG/1
650 TABLET ORAL EVERY 8 HOURS PRN
Status: DISCONTINUED | OUTPATIENT
Start: 2024-05-09 | End: 2024-05-10 | Stop reason: HOSPADM

## 2024-05-09 RX ORDER — LABETALOL HYDROCHLORIDE 5 MG/ML
10 INJECTION, SOLUTION INTRAVENOUS ONCE
Status: COMPLETED | OUTPATIENT
Start: 2024-05-09 | End: 2024-05-09

## 2024-05-09 RX ORDER — PANTOPRAZOLE SODIUM 20 MG/1
20 TABLET, DELAYED RELEASE ORAL
Status: DISCONTINUED | OUTPATIENT
Start: 2024-05-10 | End: 2024-05-10 | Stop reason: HOSPADM

## 2024-05-09 RX ORDER — LOSARTAN POTASSIUM 50 MG/1
50 TABLET ORAL DAILY
Status: DISCONTINUED | OUTPATIENT
Start: 2024-05-10 | End: 2024-05-09

## 2024-05-09 RX ORDER — MECLIZINE HCL 12.5 MG/1
50 TABLET ORAL ONCE
Status: COMPLETED | OUTPATIENT
Start: 2024-05-09 | End: 2024-05-09

## 2024-05-09 RX ORDER — LANOLIN ALCOHOL/MO/W.PET/CERES
100 CREAM (GRAM) TOPICAL DAILY
Status: DISCONTINUED | OUTPATIENT
Start: 2024-05-10 | End: 2024-05-10 | Stop reason: HOSPADM

## 2024-05-09 RX ORDER — CLOPIDOGREL BISULFATE 75 MG/1
300 TABLET ORAL ONCE
Status: COMPLETED | OUTPATIENT
Start: 2024-05-09 | End: 2024-05-09

## 2024-05-09 RX ORDER — ASPIRIN 325 MG
325 TABLET ORAL ONCE
Status: COMPLETED | OUTPATIENT
Start: 2024-05-09 | End: 2024-05-09

## 2024-05-09 RX ORDER — HYDRALAZINE HYDROCHLORIDE 20 MG/ML
5 INJECTION INTRAMUSCULAR; INTRAVENOUS EVERY 6 HOURS PRN
Status: DISCONTINUED | OUTPATIENT
Start: 2024-05-09 | End: 2024-05-09

## 2024-05-09 RX ADMIN — IOHEXOL 85 ML: 350 INJECTION, SOLUTION INTRAVENOUS at 13:02

## 2024-05-09 RX ADMIN — ATORVASTATIN CALCIUM 40 MG: 40 TABLET, FILM COATED ORAL at 16:46

## 2024-05-09 RX ADMIN — LABETALOL HYDROCHLORIDE 10 MG: 5 INJECTION, SOLUTION INTRAVENOUS at 18:14

## 2024-05-09 RX ADMIN — ASPIRIN 325 MG ORAL TABLET 325 MG: 325 PILL ORAL at 15:08

## 2024-05-09 RX ADMIN — CLOPIDOGREL BISULFATE 300 MG: 75 TABLET ORAL at 15:08

## 2024-05-09 RX ADMIN — METOPROLOL TARTRATE 50 MG: 50 TABLET, FILM COATED ORAL at 12:24

## 2024-05-09 RX ADMIN — HYDRALAZINE HYDROCHLORIDE 5 MG: 20 INJECTION INTRAMUSCULAR; INTRAVENOUS at 16:43

## 2024-05-09 RX ADMIN — DIAZEPAM 5 MG: 5 TABLET ORAL at 12:24

## 2024-05-09 RX ADMIN — MECLIZINE 50 MG: 12.5 TABLET ORAL at 12:24

## 2024-05-09 NOTE — ED NOTES
Family medicine at bedside. Pt will be transported to med-surg after.      Michaela Stearns RN  05/09/24 2672    
No

## 2024-05-09 NOTE — ASSESSMENT & PLAN NOTE
Home meds: Losartan 50 mg daily  and norvasc  10 mg daily     Plan:   Hold home medications for now

## 2024-05-09 NOTE — PROGRESS NOTES
Incoming call IB.     MIAH HAWKINS did receive an IB notification. Per chart review this seems that Pt is at ED due to dizzy / fatigue.     MIAH HAWKINS is remain available for further assistance as needed.  MIAH HAWKINS will continue to follow-up.

## 2024-05-09 NOTE — ASSESSMENT & PLAN NOTE
Stroke R/O   Sx: dizziness - Not associated with any weakness or recent head trauma.   NIH score : unable to fully assess language, or visual fields. However, all other components normal   EKG : ST elevation anterior leads  HTN highest readings: 232/114   Blood Pressure: 170/88    CT scan:   1.  No acute intracranial CT abnormality. 2.  Stable sequela of chronic right PCA territory infarct involving right occipital and posteromedial right temporal lobes. Stable chronic right basal ganglia lacunar infarcts. 3.  Chronic microangiopathic change. 4.  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 Recommend further assessment with brain MRI. 5. 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 6.  No hemodynamically significant stenosis in bilateral cervical carotid arteries and right vertebral artery.   S/P aspirin 325 mg , clopidogrel 300 mg   MRI: limited study, patient could not tolerate . Within limitation, no definite areas of diffusion abnormality to suggest acute ischemia.   BP better controlled  Lower suspicion      Plan:   Permissive HTN with goal below 220/120   Vitals signs  Neurochecks per protocol   D/C Neurology consult  Dysphagia screen   Speech and language eval   PT/OT  Physical medicine   Lipid panel   Hba1c   Telemetry   Atorvastatin 40 mg   Fall precautions    Cardiology

## 2024-05-09 NOTE — H&P
History and Physical - [facility]    Patient Information: Mal Encarnacion 59 y.o. male MRN: [id number]  Unit/Bed#: 7T [facility] 713-01 Encounter: [id number]  Admitting Physician: Awa Shepherd MD  PCP: Awa Shepherd MD  Date of Admission:  05/09/24    Assessment and Plan    * TIA (transient ischemic attack)  Assessment & Plan  Stroke R/O   Sx: dizziness - Not associated with any weakness or recent head trauma.   NIH score : unable to fully assess language, or visual fields. However, all other components normal   EKG : ST elevation anterior leads  HTN highest readings: 232/114   Blood Pressure: (!) 191/91    CT scan:   1.  No acute intracranial CT abnormality. 2.  Stable sequela of chronic right PCA territory infarct involving right occipital and posteromedial right temporal lobes. Stable chronic right basal ganglia lacunar infarcts. 3.  Chronic microangiopathic change. 4.  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 Recommend further assessment with brain MRI. 5. 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 6.  No hemodynamically significant stenosis in bilateral cervical carotid arteries and right vertebral artery.   S/P aspirin 325 mg , clopidogrel 300 mg       Plan:   Permissive HTN with goal below 220/120   Vitals signs  Neurochecks per protocol   Neurology consult  Dysphagia screen   Speech and language eval   PT/OT  Physical medicine   Lipid panel   Hba1c   MRI   Telemetry   Atorvastatin 40 mg   Fall precautions      ST elevation  Assessment & Plan    ST elevations seen in V1, V2,V3  Denies chest symptoms   Cardiology curb sided, non-spec finding in the settings of his previous ECGs. No need to transfer   Recent cath showing mild non-occlusive atherosclerotic vascular disease   Trops negative   Catherization 03/3024 - nonobstructive CAD      Plan:   Monitor for symptoms   Telemetry          Dizziness  Assessment  & Plan  History of BPPV usually controlled with meclizine   CT  1. No acute intracranial CT abnormality. 2. Stable sequela of chronic right PCA territory infarct involving right occipital and posteromedial right temporal lobes. Stable chronic right basal ganglia lacunar infarcts. 3. Chronic microangiopathic change. 4. 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 Recommend further assessment with brain MRI. 5. 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 6. No hemodynamically significant stenosis in bilateral cervical carotid arteries and right vertebral artery.     Plan:   Monitor symptoms    Type 2 diabetes mellitus without complication, without long-term current use of insulin ([facility])  Assessment & Plan  Lab Results   Component Value Date    HGBA1C 5.2 01/30/2024       Recent Labs     05/09/24  1204   POCGLU 113       Blood Sugar Average: Last 72 hrs:  (P) 113  Diet controlled  Hba1c = pre-diabetic     Plan:   Monitor daily BG  Diabetic diet       Other hyperlipidemia  Assessment & Plan  Lab Results   Component Value Date    LDLCALC 104 (H) 01/30/2024      Home med: 40 mg atorvastatin     Plan:   Continue home medications     Primary hypertension  Assessment & Plan  Home meds: Losartan 50 mg daily  and norvasc  10 mg daily     Plan:   Hold home medications for now         VTE Prophylaxis: VTE Score: 2 Low Risk (Score 0-2) - Encourage Ambulation.  Code Status: Prior  Anticipated Length of Stay:  Patient will be admitted on an Inpatient basis with an anticipated length of stay of  more than 2 midnights.     Justification for Hospital Stay: stroke r/o  Total Time for Visit, including Counseling / Coordination of Care: 60 mins. Greater than 50% of this total time spent on direct patient counseling and coordination of care.          Chief Complaint:     Chief Complaint   Patient presents with    Dizziness     Woke this morning feeling weak and  dizzy - c/o R sided abd pain with nausea - took daily meds this AM     History of Present Illness:    Mal Encarnacion is a 59 y.o. male who presents with dizziness and RUQ. It started last night and has been constant. RUQ seems unrelated to food, but is very mild at the moment.  Previous takes medication for it, but does not always take his medication. He is unable to describe his dizziness as to if it is room spinning. Denies chest pain currently. Reports that he is able to walk around normally, but having trouble because of dizziness, no falls or head trauma.     He is unsure of his blood pressure medications, and on medication reconciliation was unfamiliar with the ones inquired .    ED COURSE:   VITALS: significant for hypertension   LABS: CBC, trop 0/2Hr, BMP, flu/rsv/covid, CMP PT/PTT, INR, significant for   IMAGING: CTA chronic right infarcts PCA territoty involving right occipital and posteromedial right temporal lobes, lacunar infarcts, and changes, with possible occluded right posterior cerebral artery, severe atherosclerotic stenosis at the origin if the left vertebral artery  MEDS: diazepam 5mg, meclizine 50mg, lopressor 50mg      Review of Systems:  Review of Systems   Constitutional:  Negative for chills and fever.   HENT:  Positive for congestion. Negative for sinus pressure and sneezing.    Eyes:  Positive for visual disturbance.   Respiratory:  Positive for cough.    Cardiovascular:  Positive for chest pain. Negative for palpitations.   Gastrointestinal:  Positive for abdominal pain. Negative for blood in stool, constipation, diarrhea, nausea and vomiting.        Abdominal pain in RUQ, negative calabrese    Genitourinary:  Negative for dysuria and hematuria.   Neurological:  Positive for dizziness and headaches. Negative for syncope, weakness, light-headedness and numbness.   Psychiatric/Behavioral:  Negative for agitation.        Past Medical and Surgical History:   Past Medical History:   Diagnosis  Date    Deaf     Heart murmur     Hypertension      Past Surgical History:   Procedure Laterality Date    CARDIAC CATHETERIZATION N/A 3/16/2024    Procedure: Cardiac pci;  Surgeon: Mal Nixon MD;  Location: [facility];  Service: Cardiology     Meds/Allergies:  Allergies: No Known Allergies  Prior to Admission Medications   Prescriptions Last Dose Informant Patient Reported? Taking?   Blood Pressure KIT Unknown Self No No   Sig: Use 2 (two) times a day   amLODIPine (NORVASC) 10 mg tablet Unknown  No No   Sig: Take 1 tablet (10 mg total) by mouth daily   aspirin 81 mg chewable tablet   No No   Sig: Chew 1 tablet (81 mg total) daily   atorvastatin (LIPITOR) 40 mg tablet   No No   Sig: Take 1 tablet (40 mg total) by mouth daily with dinner   folic acid (FOLVITE) 1 mg tablet   No No   Sig: Take 1 tablet (1 mg total) by mouth daily   losartan (COZAAR) 50 mg tablet   No No   Sig: Take 1 tablet (50 mg total) by mouth daily   pantoprazole (PROTONIX) 20 mg tablet Unknown  No No   Sig: Take 1 tablet (20 mg total) by mouth daily before breakfast   thiamine 100 MG tablet   No No   Sig: Take 1 tablet (100 mg total) by mouth daily      Facility-Administered Medications: None     Social History:     Social History     Socioeconomic History    Marital status: Single     Spouse name: Not on file    Number of children: Not on file    Years of education: Not on file    Highest education level: Not on file   Occupational History    Not on file   Tobacco Use    Smoking status: Never     Passive exposure: Never    Smokeless tobacco: Never   Vaping Use    Vaping status: Never Used   Substance and Sexual Activity    Alcohol use: Yes     Comment: 1 beer occasionally    Drug use: Never    Sexual activity: Not on file   Other Topics Concern    Not on file   Social History Narrative    Not on file     Social Determinants of Health     Financial Resource Strain: Low Risk  (8/25/2023)    Overall Financial Resource Strain (CARDIA)      "Difficulty of Paying Living Expenses: Not very hard   Food Insecurity: No Food Insecurity (8/25/2023)    Hunger Vital Sign     Worried About Running Out of Food in the Last Year: Never true     Ran Out of Food in the Last Year: Never true   Transportation Needs: No Transportation Needs (8/25/2023)    PRAPARE - Transportation     Lack of Transportation (Medical): No     Lack of Transportation (Non-Medical): No   Physical Activity: Not on file   Stress: Not on file   Social Connections: Not on file   Intimate Partner Violence: Not on file   Housing Stability: Not on file     Patient Pre-hospital Living Situation: home  Patient Pre-hospital Level of Mobility: full  Patient Pre-hospital Diet Restrictions: cardiac diet     Family History:  Family History   Problem Relation Age of Onset    Hypertension Mother     Diabetes Mother     Heart disease Mother         pacemaker    Arthritis Mother         chronic       Physical Exam:   Vitals:   Blood Pressure: (!) 183/88 (05/09/24 2100)  Pulse: 78 (05/09/24 2100)  Temperature: 97.6 °F (36.4 °C) (05/09/24 2100)  Temp Source: Temporal (05/09/24 2100)  Respirations: 18 (05/09/24 2100)  Height: 5' 1\" (154.9 cm) (05/09/24 1600)  Weight - Scale: 59 kg (130 lb 1.1 oz) (05/09/24 1600)  SpO2: 95 % (05/09/24 1900)    Physical Exam  Constitutional:       General: He is not in acute distress.     Appearance: He is normal weight. He is not ill-appearing or toxic-appearing.   HENT:      Head: Normocephalic and atraumatic.      Right Ear: External ear normal.      Left Ear: External ear normal.      Nose: Nose normal.      Mouth/Throat:      Mouth: Mucous membranes are moist.      Pharynx: No oropharyngeal exudate or posterior oropharyngeal erythema.   Eyes:      General: No scleral icterus.  Cardiovascular:      Rate and Rhythm: Normal rate and regular rhythm.      Pulses: Normal pulses.      Heart sounds: Normal heart sounds. No murmur heard.  Pulmonary:      Effort: Pulmonary effort is " "normal. No respiratory distress.      Breath sounds: Normal breath sounds. No wheezing or rales.   Abdominal:      General: Abdomen is flat. Bowel sounds are normal. There is no distension.      Palpations: Abdomen is soft.      Tenderness: There is abdominal tenderness. There is no right CVA tenderness, left CVA tenderness or guarding.      Comments: RUQ tenderness, mild negative calabrese   Musculoskeletal:         General: No swelling or tenderness.      Right lower leg: No edema.      Left lower leg: No edema.      Comments: Calf pain    Skin:     General: Skin is warm.      Capillary Refill: Capillary refill takes less than 2 seconds.   Neurological:      General: No focal deficit present.      Mental Status: He is alert and oriented to person, place, and time.      Cranial Nerves: No cranial nerve deficit or facial asymmetry.      Sensory: Sensation is intact. No sensory deficit.      Motor: No weakness, tremor, atrophy, abnormal muscle tone, seizure activity or pronator drift.      Coordination: Coordination normal. Finger-Nose-Finger Test and Heel to Shin Test normal.      Comments: Oriented to person, place, and month however, does not know year.   Unable to assess speak ( due to congenital deafness) and visual fields due to patient not understanding examination    Unable to access romberg due to patient's dizziness   Unable to assess gait due to dizziness            Lab Results: I have personally reviewed pertinent reports.    Results from last 7 days   Lab Units 05/09/24  1206   WBC Thousand/uL 6.98   HEMOGLOBIN g/dL 16.9   HEMATOCRIT % 49.7*   PLATELETS Thousands/uL 142*     Results from last 7 days   Lab Units 05/09/24  1209   POTASSIUM mmol/L 3.9   CHLORIDE mmol/L 98   CO2 mmol/L 25   BUN mg/dL 8   CREATININE mg/dL 0.69   CALCIUM mg/dL 9.6   EGFR ml/min/1.73sq m 103     Results from last 7 days   Lab Units 05/09/24  1209   INR  0.99                            Invalid input(s): \"URIBILINOGEN\"      "     Imaging: I have personally reviewed pertinent reports.    CTA head and neck with and without contrast    Result Date: 5/9/2024  Narrative: CTA NECK AND BRAIN WITH AND WITHOUT CONTRAST INDICATION: rule out stroke, patient complaining of vertigo COMPARISON:   Head CT 12/4/2023 TECHNIQUE:  Routine CT imaging of the Brain without contrast.  Post contrast imaging was performed after administration of iodinated contrast through the neck and brain. Post contrast axial 0.625 mm images timed to opacify the arterial system. 3D rendering was performed on an independent workstation.   MIP reconstructions performed. Coronal reconstructions were performed of the noncontrast portion of the brain. Radiation dose length product (DLP) for this visit:  1195 mGy-cm .  This examination, like all CT scans performed in the [facility], was performed utilizing techniques to minimize radiation dose exposure, including the use of iterative reconstruction and automated exposure control. IV Contrast:  85 mL of iohexol (OMNIPAQUE) IMAGE QUALITY:   Diagnostic FINDINGS: NONCONTRAST BRAIN PARENCHYMA: Stable chronic right PCA territory infarct with gliosis and encephalomalacia involving right occipital lobe and posterior medial right temporal lobe. Stable chronic lacunar infarcts involving right caudate and right putamen. Nonspecific  decreased attenuation involving periventricular and subcortical white matter, most compatible with mild microangiopathic change. No intracranial mass, mass effect or midline shift. No CT signs of acute infarction.  No acute parenchymal hemorrhage. VENTRICLES AND EXTRA-AXIAL SPACES:  Normal for the patient's age. VISUALIZED ORBITS: Normal visualized orbits. PARANASAL SINUSES: Normal visualized paranasal sinuses. CERVICAL VASCULATURE AORTIC ARCH AND GREAT VESSELS: Mild atherosclerotic calcification of aortic arch.  Great vessel origins are patent without significant stenosis. RIGHT VERTEBRAL ARTERY  CERVICAL SEGMENT: Difficult to reliably evaluate the origin due to artifactually degraded image quality. The origin is patent. The vessel is patent throughout the neck without high-grade stenosis. LEFT VERTEBRAL ARTERY CERVICAL SEGMENT: Near occlusive stenosis at the origin due to calcified plaque. The remainder vessel is patent throughout the neck without high-grade stenosis. RIGHT EXTRACRANIAL CAROTID SEGMENT: Partially calcified atherosclerotic plaque at the bifurcation and proximal internal carotid artery. No hemodynamically significant stenosis of cervical ICA by NASCET criteria ( less than 50%) LEFT EXTRACRANIAL CAROTID SEGMENT: Partially calcified atherosclerotic plaque at the bifurcation and proximal internal carotid artery. No hemodynamically significant stenosis of cervical ICA by NASCET criteria. INTRACRANIAL VASCULATURE INTERNAL CAROTID ARTERIES: Atherosclerotic plaque involving bilateral cavernous and supraclinoid ICAs. There is moderate stenosis in distal cavernous/proximal supraclinoid ICAs.   Normal ophthalmic artery origins. ANTERIOR CIRCULATION:  Normal A1 segments.  Bilateral anterior cerebral arteries are unremarkable. Normal anterior comminuting artery. MIDDLE CEREBRAL ARTERY CIRCULATION: Bilateral M1 segments and major M2 branches are patent without high-grade stenosis. DISTAL VERTEBRAL ARTERIES:  Distal vertebral arteries are patent without high-grade stenosis. Patent PICA origins. BASILAR ARTERY:  Basilar artery is unremarkable. Normal superior cerebellar arteries. POSTERIOR CEREBRAL ARTERIES: Hypoplastic left P1 segment with persistent fetal origin of the left posterior cerebral artery. Age-indeterminate occluded right posterior cerebral artery from its origin. Hypoplastic right posterior communicating artery. DURAL VENOUS SINUSES:  Unremarkable. NON VASCULAR ANATOMY BONY STRUCTURES: No acute or aggressive appearing osseous abnormality. SOFT TISSUES OF THE NECK:  Unremarkable. THORACIC  INLET:  Unremarkable.     Impression: 1.  No acute intracranial CT abnormality. 2.  Stable sequela of chronic right PCA territory infarct involving right occipital and posteromedial right temporal lobes. Stable chronic right basal ganglia lacunar infarcts. 3.  Chronic microangiopathic change. 4.  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 Recommend further assessment with brain MRI. 5.  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 6.  No hemodynamically significant stenosis in bilateral cervical carotid arteries and right vertebral artery. I personally discussed this study with HAILEY ORNELAS on 5/9/2024 1:44 PM. Workstation performed: [id number]       EKG, Pathology, and Other Studies Reviewed on Admission:   EKG  Result Date: 05/09/24  Impression:  ST segment elevation in V1,V2,V3. Otherwise normal. Consulted cardiology, no concern for MI at this time.     Entire H&P was discussed with Dr. Daniels who agreed to what is noted above    Awa Shepherd MD  05/09/24  9:28 PM

## 2024-05-09 NOTE — ASSESSMENT & PLAN NOTE
ST elevations seen in V1, V2,V3  Denies chest symptoms   Cardiology curb sided, non-spec finding in the settings of his previous ECGs. No need to transfer   Recent cath showing mild non-occlusive atherosclerotic vascular disease   Trops negative     Plan:   Monitor for symptoms

## 2024-05-09 NOTE — ASSESSMENT & PLAN NOTE
History of BPPV usually controlled with meclizine     In light of congestion, may be vestibular neuritis jamia with vertiginous symptoms at rest without head movement.     Stroke R/O   Sx: dizziness - Not associated with any weakness or recent head trauma.   NIH score : unable to fully assess language, or visual fields. However, all other components normal.  EKG : ST elevation anterior leads  HTN highest readings: 232/114   Blood Pressure: 170/88    CT scan:   1.  No acute intracranial CT abnormality. 2.  Stable sequela of chronic right PCA territory infarct involving right occipital and posteromedial right temporal lobes. Stable chronic right basal ganglia lacunar infarcts. 3.  Chronic microangiopathic change. 4.  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 Recommend further assessment with brain MRI. 5. 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 6.  No hemodynamically significant stenosis in bilateral cervical carotid arteries and right vertebral artery.   S/P aspirin 325 mg , clopidogrel 300 mg   MRI: limited study, patient could not tolerate . Within limitation, no definite areas of diffusion abnormality to suggest acute ischemia.   On the floor, his blood pressure was allowed to reach 220/120 while ruling out stroke, vitals signs were monitors  Today, BP better controlled, and regular BP meds continued.     Echo : The left ventricular ejection fraction is 65%. Systolic function is normal. Wall motion is normal. Diastolic function is mildly abnormal, consistent with grade I (abnormal) relaxation.     Neurochecks were normal   Telemetry initially placed but discontinued      Plan:   Continue meclizine 25 mg PRN   PCP FU outpatient   Cardiology FU outpatient   Per PT, given information to set up appointment with vestibular therapy

## 2024-05-09 NOTE — ASSESSMENT & PLAN NOTE
"Home medications: atorvastatin 40   Lab Results   Component Value Date    LDLCALC 104 (H) 01/30/2024        Plan\"   Continue home medications   "

## 2024-05-09 NOTE — ED PROVIDER NOTES
History  Chief Complaint   Patient presents with    Dizziness     Woke this morning feeling weak and dizzy - c/o R sided abd pain with nausea - took daily meds this AM     HPI  Patient is a 59-year-old male presenting with vertigo.  Patient has a history of vertigo that subsides automatically however states that he has been symptomatic since 2 AM this morning.  Patient also complains of headache as well as generalized bodyaches.  Denies any chest pain or shortness of breath.  Patient had a recent cardiac catheterization due to suppose a STEMI however did not reveal ACS.  Patient has been started on daily aspirin.  Also states that his blood pressure has not been controlled well despite taking all his blood pressure medications as prescribed.  Due to the room spinning sensation patient states that he is having difficulty walking.  Patient denies any other complaints.    Prior to Admission Medications   Prescriptions Last Dose Informant Patient Reported? Taking?   Blood Pressure KIT Unknown Self No No   Sig: Use 2 (two) times a day   amLODIPine (NORVASC) 10 mg tablet Unknown  No No   Sig: Take 1 tablet (10 mg total) by mouth daily   aspirin 81 mg chewable tablet   No No   Sig: Chew 1 tablet (81 mg total) daily   atorvastatin (LIPITOR) 40 mg tablet   No No   Sig: Take 1 tablet (40 mg total) by mouth daily with dinner   folic acid (FOLVITE) 1 mg tablet   No No   Sig: Take 1 tablet (1 mg total) by mouth daily   losartan (COZAAR) 50 mg tablet   No No   Sig: Take 1 tablet (50 mg total) by mouth daily   pantoprazole (PROTONIX) 20 mg tablet Unknown  No No   Sig: Take 1 tablet (20 mg total) by mouth daily before breakfast   thiamine 100 MG tablet   No No   Sig: Take 1 tablet (100 mg total) by mouth daily      Facility-Administered Medications: None       Past Medical History:   Diagnosis Date    Deaf     Heart murmur     Hypertension        Past Surgical History:   Procedure Laterality Date    CARDIAC CATHETERIZATION N/A  3/16/2024    Procedure: Cardiac pci;  Surgeon: Mal Nixon MD;  Location: [facility];  Service: Cardiology       Family History   Problem Relation Age of Onset    Hypertension Mother     Diabetes Mother     Heart disease Mother         pacemaker    Arthritis Mother         chronic     I have reviewed and agree with the history as documented.    E-Cigarette/Vaping    E-Cigarette Use Never User      E-Cigarette/Vaping Substances    Nicotine No     THC No     CBD No     Flavoring No     Other No     Unknown No      Social History     Tobacco Use    Smoking status: Never     Passive exposure: Never    Smokeless tobacco: Never   Vaping Use    Vaping status: Never Used   Substance Use Topics    Alcohol use: Yes     Comment: 1 beer occasionally    Drug use: Never       Review of Systems   Constitutional:  Negative for chills, diaphoresis, fever and unexpected weight change.   HENT:  Negative for ear pain and sore throat.    Eyes:  Negative for visual disturbance.   Respiratory:  Negative for cough, chest tightness and shortness of breath.    Cardiovascular:  Negative for chest pain and leg swelling.   Gastrointestinal:  Negative for abdominal distention, abdominal pain, constipation, diarrhea, nausea and vomiting.   Endocrine: Negative.    Genitourinary:  Negative for difficulty urinating and dysuria.   Musculoskeletal:  Positive for myalgias.   Skin: Negative.    Allergic/Immunologic: Negative.    Neurological:  Positive for dizziness and headaches.   Hematological: Negative.    Psychiatric/Behavioral: Negative.     All other systems reviewed and are negative.      Physical Exam  Physical Exam  Vitals and nursing note reviewed.   Constitutional:       General: He is not in acute distress.     Appearance: Normal appearance. He is not ill-appearing.   HENT:      Head: Normocephalic and atraumatic.      Right Ear: External ear normal.      Left Ear: External ear normal.      Nose: Nose normal.      Mouth/Throat:       Mouth: Mucous membranes are moist.      Pharynx: Oropharynx is clear.   Eyes:      General: No scleral icterus.        Right eye: No discharge.         Left eye: No discharge.      Extraocular Movements: Extraocular movements intact.      Conjunctiva/sclera: Conjunctivae normal.      Pupils: Pupils are equal, round, and reactive to light.   Cardiovascular:      Rate and Rhythm: Normal rate and regular rhythm.      Pulses: Normal pulses.      Heart sounds: Normal heart sounds.   Pulmonary:      Effort: Pulmonary effort is normal.      Breath sounds: Normal breath sounds.   Abdominal:      General: Abdomen is flat. Bowel sounds are normal. There is no distension.      Palpations: Abdomen is soft.      Tenderness: There is no abdominal tenderness. There is no guarding or rebound.   Musculoskeletal:         General: Normal range of motion.      Cervical back: Normal range of motion and neck supple.   Skin:     General: Skin is warm and dry.      Capillary Refill: Capillary refill takes less than 2 seconds.   Neurological:      General: No focal deficit present.      Mental Status: He is alert and oriented to person, place, and time. Mental status is at baseline.      Sensory: No sensory deficit.      Gait: Gait abnormal.   Psychiatric:         Mood and Affect: Mood normal.         Behavior: Behavior normal.         Thought Content: Thought content normal.         Judgment: Judgment normal.         Vital Signs  ED Triage Vitals   Temperature Pulse Respirations Blood Pressure SpO2   05/09/24 1147 05/09/24 1147 05/09/24 1147 05/09/24 1147 05/09/24 1147   98.1 °F (36.7 °C) (!) 116 18 (!) 219/114 97 %      Temp Source Heart Rate Source Patient Position - Orthostatic VS BP Location FiO2 (%)   05/09/24 1147 05/09/24 1147 05/09/24 1147 05/09/24 1147 --   Tympanic Monitor Sitting Left arm       Pain Score       05/09/24 1233       7           Vitals:    05/10/24 0300 05/10/24 0727 05/10/24 0826 05/10/24 1100   BP: 170/88  (!) 192/87 (!) 192/87 (!) 177/91   Pulse: 74 75 75 83   Patient Position - Orthostatic VS: Lying Lying           Visual Acuity  Visual Acuity      Flowsheet Row Most Recent Value   L Pupil Size (mm) 2   R Pupil Size (mm) 2   L Pupil Shape Round   R Pupil Shape Round            ED Medications  Medications   atorvastatin (LIPITOR) tablet 40 mg (40 mg Oral Given 5/9/24 1646)   folic acid (FOLVITE) tablet 1 mg (1 mg Oral Given 5/10/24 0904)   pantoprazole (PROTONIX) EC tablet 20 mg (20 mg Oral Given 5/10/24 0600)   thiamine tablet 100 mg (100 mg Oral Given 5/10/24 0904)   acetaminophen (TYLENOL) tablet 650 mg (has no administration in time range)   losartan (COZAAR) tablet 50 mg (50 mg Oral Given 5/10/24 0904)   amLODIPine (NORVASC) tablet 5 mg (5 mg Oral Given 5/10/24 0904)   meclizine (ANTIVERT) tablet 25 mg (25 mg Oral Given 5/10/24 0904)   meclizine (ANTIVERT) tablet 50 mg (50 mg Oral Given 5/9/24 1224)   diazepam (VALIUM) tablet 5 mg (5 mg Oral Given 5/9/24 1224)   metoprolol tartrate (LOPRESSOR) tablet 50 mg (50 mg Oral Given 5/9/24 1224)   iohexol (OMNIPAQUE) 350 MG/ML injection (MULTI-DOSE) 85 mL (85 mL Intravenous Given 5/9/24 1302)   clopidogrel (PLAVIX) tablet 300 mg (300 mg Oral Given 5/9/24 1508)   aspirin tablet 325 mg (325 mg Oral Given 5/9/24 1508)   labetalol (NORMODYNE) injection 10 mg (10 mg Intravenous Given 5/9/24 1814)       Diagnostic Studies  Results Reviewed       Procedure Component Value Units Date/Time    HS Troponin I 4hr [[id number]]  (Abnormal) Collected: 05/09/24 1717    Lab Status: Final result Specimen: Blood from Arm, Left Updated: 05/09/24 1748     hs TnI 4hr 43 ng/L      Delta 4hr hsTnI 24 ng/L     HS Troponin I 2hr [[id number]]  (Normal) Collected: 05/09/24 1401    Lab Status: Final result Specimen: Blood from Arm, Right Updated: 05/09/24 1433     hs TnI 2hr 29 ng/L      Delta 2hr hsTnI 10 ng/L     FLU/RSV/COVID - if FLU/RSV clinically relevant [[id number]]  (Normal) Collected:  05/09/24 1206    Lab Status: Final result Specimen: Nares from Nasopharyngeal Swab Updated: 05/09/24 1302     SARS-CoV-2 Negative     INFLUENZA A PCR Negative     INFLUENZA B PCR Negative     RSV PCR Negative    Narrative:      FOR PEDIATRIC PATIENTS - copy/paste COVID Guidelines URL to browser: [email or web address]    SARS-CoV-2 assay is a Nucleic Acid Amplification assay intended for the  qualitative detection of nucleic acid from SARS-CoV-2 in nasopharyngeal  swabs. Results are for the presumptive identification of SARS-CoV-2 RNA.    Positive results are indicative of infection with SARS-CoV-2, the virus  causing COVID-19, but do not rule out bacterial infection or co-infection  with other viruses. Laboratories within the [place] and its  territories are required to report all positive results to the appropriate  public health authorities. Negative results do not preclude SARS-CoV-2  infection and should not be used as the sole basis for treatment or other  patient management decisions. Negative results must be combined with  clinical observations, patient history, and epidemiological information.  This test has not been FDA cleared or approved.    This test has been authorized by FDA under an Emergency Use Authorization  (EUA). This test is only authorized for the duration of time the  declaration that circumstances exist justifying the authorization of the  emergency use of an in vitro diagnostic tests for detection of SARS-CoV-2  virus and/or diagnosis of COVID-19 infection under section 564(b)(1) of  the Act, 21 U.S.C. 360bbb-3(b)(1), unless the authorization is terminated  or revoked sooner. The test has been validated but independent review by FDA  and CLIA is pending.    Test performed using watAgame: This RT-PCR assay targets N2,  a region unique to SARS-CoV-2. A conserved region in the E-gene was chosen  for pan-Sarbecovirus detection  which includes SARS-CoV-2.    According to CMS-2020-01-R, this platform meets the definition of high-throughput technology.    HS Troponin 0hr (reflex protocol) [[id number]]  (Normal) Collected: 05/09/24 1206    Lab Status: Final result Specimen: Blood from Arm, Right Updated: 05/09/24 1244     hs TnI 0hr 19 ng/L     Basic metabolic panel [[id number]]  (Abnormal) Collected: 05/09/24 1209    Lab Status: Final result Specimen: Blood from Arm, Right Updated: 05/09/24 1237     Sodium 137 mmol/L      Potassium 3.9 mmol/L      Chloride 98 mmol/L      CO2 25 mmol/L      ANION GAP 14 mmol/L      BUN 8 mg/dL      Creatinine 0.69 mg/dL      Glucose 97 mg/dL      Calcium 9.6 mg/dL      eGFR 103 ml/min/1.73sq m     Narrative:      National Kidney Disease Foundation guidelines for Chronic Kidney Disease (CKD):     Stage 1 with normal or high GFR (GFR > 90 mL/min/1.73 square meters)    Stage 2 Mild CKD (GFR = 60-89 mL/min/1.73 square meters)    Stage 3A Moderate CKD (GFR = 45-59 mL/min/1.73 square meters)    Stage 3B Moderate CKD (GFR = 30-44 mL/min/1.73 square meters)    Stage 4 Severe CKD (GFR = 15-29 mL/min/1.73 square meters)    Stage 5 End Stage CKD (GFR <15 mL/min/1.73 square meters)  Note: GFR calculation is accurate only with a steady state creatinine    Protime-INR [[id number]]  (Normal) Collected: 05/09/24 1209    Lab Status: Final result Specimen: Blood from Arm, Right Updated: 05/09/24 1235     Protime 13.4 seconds      INR 0.99    APTT [[id number]]  (Normal) Collected: 05/09/24 1209    Lab Status: Final result Specimen: Blood from Arm, Right Updated: 05/09/24 1235     PTT 27 seconds     CBC and Platelet [[id number]]  (Abnormal) Collected: 05/09/24 1206    Lab Status: Final result Specimen: Blood from Arm, Right Updated: 05/09/24 1226     WBC 6.98 Thousand/uL      RBC 5.14 Million/uL      Hemoglobin 16.9 g/dL      Hematocrit 49.7 %      MCV 97 fL      MCH 32.9 pg      MCHC 34.0 g/dL      RDW 13.4 %      Platelets 142  Thousands/uL      MPV 9.5 fL     Fingerstick Glucose (POCT) [[id number]]  (Normal) Collected: 05/09/24 1204    Lab Status: Final result Specimen: Blood Updated: 05/09/24 1205     POC Glucose 113 mg/dl                    MRI brain wo contrast   Final Result by Abner Griffin MD (05/09 1824)      Limited evaluation/incomplete study as above. Consider repeat under sedation as clinically warranted.      Within limitation, no definite areas of diffusion abnormality to suggest acute ischemia.      Workstation performed: [id number]         CTA head and neck with and without contrast   Final Result by Camila Cortes MD (05/09 1351)      1.  No acute intracranial CT abnormality.   2.  Stable sequela of chronic right PCA territory infarct involving right occipital and posteromedial right temporal lobes. Stable chronic right basal ganglia lacunar infarcts.   3.  Chronic microangiopathic change.   4.  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 Recommend further assessment with brain MRI.   5.  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   6.  No hemodynamically significant stenosis in bilateral cervical carotid arteries and right vertebral artery.                           I personally discussed this study with HAILEY ORNELAS on 5/9/2024 1:44 PM.            Workstation performed: [id number]                    Procedures  Procedures         ED Course                               SBIRT 20yo+      Flowsheet Row Most Recent Value   Initial Alcohol Screen: US AUDIT-C     1. How often do you have a drink containing alcohol? 0 Filed at: 05/09/2024 1147   2. How many drinks containing alcohol do you have on a typical day you are drinking?  0 Filed at: 05/09/2024 1147   3a. Male UNDER 65: How often do you have five or more drinks on one occasion? 0 Filed at: 05/09/2024 1147   Audit-C Score 0 Filed at: 05/09/2024 1147   TJ: How many times in the past year have you...     Used an illegal drug or used a prescription medication for non-medical reasons? Never Filed at: 05/09/2024 1147                      Medical Decision Making  59 year old male presenting with persistent room spinning sensation   Ordered stroke workup   CT reviewed by neurology and not concerning for large embolic stroke with possible thrombectomy   N:eurology recommended aspirin and plavix load and to admit for stroke pathway   Patient admitted to FM for stroke pathway     Problems Addressed:  Headache: acute illness or injury  Vertigo: acute illness or injury    Amount and/or Complexity of Data Reviewed  Labs: ordered.  Radiology: ordered.    Risk  OTC drugs.  Prescription drug management.  Decision regarding hospitalization.             Disposition  Final diagnoses:   Vertigo   Headache     Time reflects when diagnosis was documented in both MDM as applicable and the Disposition within this note       Time User Action Codes Description Comment    5/9/2024  2:58 PM Micheal Wong Add [R42] Vertigo     5/9/2024  2:58 PM Micheal Wong Add [R51.9] Headache     5/9/2024  4:01 PM Awa Shepherd Add [G45.9] TIA (transient ischemic attack)           ED Disposition       ED Disposition   Admit    Condition   Stable    Date/Time   Thu May 9, 2024 5483    Comment   Case was discussed with FM and the patient's admission status was agreed to be Admission Status: inpatient status to the service of Dr. Horn .               Follow-up Information       Follow up With Specialties Details Why Contact Info Additional Information    [facility] Call today make an appointment to see as soon as possible [address]  [facility] [address]  [phone]       Smooth Alicea MD Cardiology, Internal Medicine Call today Make an appointment for next availability [address] [id number]  [phone]       [facility] Medicine Call today to make  appointment for next week    FOLLOW UP WITH DR. Awa Merchant Cora [address], [address]  [facility] [address] [phone] [facility], [address], [address], [place], [place], [address] [phone]            Current Discharge Medication List        CONTINUE these medications which have NOT CHANGED    Details   amLODIPine (NORVASC) 10 mg tablet Take 1 tablet (10 mg total) by mouth daily  Qty: 30 tablet, Refills: 4    Associated Diagnoses: Primary hypertension      aspirin 81 mg chewable tablet Chew 1 tablet (81 mg total) daily  Qty: 30 tablet, Refills: 0    Associated Diagnoses: CAD (coronary artery disease)      atorvastatin (LIPITOR) 40 mg tablet Take 1 tablet (40 mg total) by mouth daily with dinner  Qty: 30 tablet, Refills: 0    Associated Diagnoses: CAD (coronary artery disease)      Blood Pressure KIT Use 2 (two) times a day  Qty: 1 kit, Refills: 0    Associated Diagnoses: Hypertension, unspecified type      folic acid (FOLVITE) 1 mg tablet Take 1 tablet (1 mg total) by mouth daily  Qty: 30 tablet, Refills: 0    Associated Diagnoses: Alcohol use disorder      losartan (COZAAR) 50 mg tablet Take 1 tablet (50 mg total) by mouth daily  Qty: 30 tablet, Refills: 0    Associated Diagnoses: Primary hypertension      pantoprazole (PROTONIX) 20 mg tablet Take 1 tablet (20 mg total) by mouth daily before breakfast  Qty: 30 tablet, Refills: 5    Associated Diagnoses: Epigastric pain      thiamine 100 MG tablet Take 1 tablet (100 mg total) by mouth daily  Qty: 30 tablet, Refills: 0    Associated Diagnoses: Alcohol use disorder             No discharge procedures on file.    PDMP Review       None            ED Provider  Electronically Signed by             Micheal Wong MD  05/10/24 8694

## 2024-05-09 NOTE — ASSESSMENT & PLAN NOTE
ST elevations seen in V1, V2,V3  Denies chest symptoms   Cardiology curb sided, non-spec finding in the settings of his previous ECGs. No need to transfer   03/2024 catheterization  showing mild non-occlusive atherosclerotic vascular disease   Trops negative   Catherization 03/3024 - nonobstructive CAD     H/o junctional rhythm    Initially on telemetry, but Discontinued at discharge     Plan:   Monitor for symptoms   Telemetry   FU with cardiology - informed him to make an appointment

## 2024-05-09 NOTE — ASSESSMENT & PLAN NOTE
Lab Results   Component Value Date    HGBA1C 5.2 01/30/2024       Recent Labs     05/09/24  1204   POCGLU 113       Blood Sugar Average: Last 72 hrs:  (P) 113  Diet controlled  Hba1c = pre-diabetic     Plan:   Monitor daily BG  Diabetic diet

## 2024-05-09 NOTE — PLAN OF CARE
Problem: Neurological Deficit  Goal: Neurological status is stable or improving  Description: Interventions:  - Monitor and assess patient's level of consciousness, motor function, sensory function, and level of assistance needed for ADLs.   - Monitor and report changes from baseline. Collaborate with interdisciplinary team to initiate plan and implement interventions as ordered.   - Provide and maintain a safe environment.  - Consider seizure precautions.  - Consider fall precautions.  - Consider aspiration precautions.  - Consider bleeding precautions.  Outcome: Progressing     Problem: Activity Intolerance/Impaired Mobility  Goal: Mobility/activity is maintained at optimum level for patient  Description: Interventions:  - Assess and monitor patient  barriers to mobility and need for assistive/adaptive devices.  - Assess patient's emotional response to limitations.  - Collaborate with interdisciplinary team and initiate plans and interventions as ordered.  - Encourage independent activity per ability.  - Maintain proper body alignment.  - Perform active/passive rom as tolerated/ordered.  - Plan activities to conserve energy.  - Turn patient as appropriate  Outcome: Progressing     Problem: Communication Impairment  Goal: Ability to express needs and understand communication  Description: Assess patient's communication skills and ability to understand information.  Patient will demonstrate use of effective communication techniques, alternative methods of communication and understanding even if not able to speak.     - Encourage communication and provide alternate methods of communication as needed.  - Collaborate with case management/ for discharge needs.  - Include patient/family/caregiver in decisions related to communication.  Outcome: Progressing     Problem: Potential for Aspiration  Goal: Non-ventilated patient's risk of aspiration is minimized  Description: Assess and monitor vital signs,  respiratory status, and labs (WBC).  Monitor for signs of aspiration (tachypnea, cough, rales, wheezing, cyanosis, fever).    - Assess and monitor patient's ability to swallow.  - Place patient up in chair to eat if possible.  - HOB up at 90 degrees to eat if unable to get patient up into chair.  - Supervise patient during oral intake.   - Instruct patient/ family to take small bites.  - Instruct patient/ family to take small single sips when taking liquids.  - Follow patient-specific strategies generated by speech pathologist.  Outcome: Progressing  Goal: Ventilated patient's risk of aspiration is minimized  Description: Assess and monitor vital signs, respiratory status, airway cuff pressure, and labs (WBC).  Monitor for signs of aspiration (tachypnea, cough, rales, wheezing, cyanosis, fever).    - Elevate head of bed 30 degrees if patient has tube feeding.  - Monitor tube feeding.  Outcome: Progressing     Problem: Nutrition  Goal: Nutrition/Hydration status is improving  Description: Monitor and assess patient's nutrition/hydration status for malnutrition (ex- brittle hair, bruises, dry skin, pale skin and conjunctiva, muscle wasting, smooth red tongue, and disorientation). Collaborate with interdisciplinary team and initiate plan and interventions as ordered.  Monitor patient's weight and dietary intake as ordered or per policy. Utilize nutrition screening tool and intervene per policy. Determine patient's food preferences and provide high-protein, high-caloric foods as appropriate.     - Assist patient with eating.  - Allow adequate time for meals.  - Encourage patient to take dietary supplement as ordered.  - Collaborate with clinical nutritionist.  - Include patient/family/caregiver in decisions related to nutrition.  Outcome: Progressing     Problem: PAIN - ADULT  Goal: Verbalizes/displays adequate comfort level or baseline comfort level  Description: Interventions:  - Encourage patient to monitor pain and  request assistance  - Assess pain using appropriate pain scale  - Administer analgesics based on type and severity of pain and evaluate response  - Implement non-pharmacological measures as appropriate and evaluate response  - Consider cultural and social influences on pain and pain management  - Notify physician/advanced practitioner if interventions unsuccessful or patient reports new pain  Outcome: Progressing     Problem: INFECTION - ADULT  Goal: Absence or prevention of progression during hospitalization  Description: INTERVENTIONS:  - Assess and monitor for signs and symptoms of infection  - Monitor lab/diagnostic results  - Monitor all insertion sites, i.e. indwelling lines, tubes, and drains  - Monitor endotracheal if appropriate and nasal secretions for changes in amount and color  - [place] appropriate cooling/warming therapies per order  - Administer medications as ordered  - Instruct and encourage patient and family to use good hand hygiene technique  - Identify and instruct in appropriate isolation precautions for identified infection/condition  Outcome: Progressing  Goal: Absence of fever/infection during neutropenic period  Description: INTERVENTIONS:  - Monitor WBC    Outcome: Progressing     Problem: SAFETY ADULT  Goal: Patient will remain free of falls  Description: INTERVENTIONS:  - Educate patient/family on patient safety including physical limitations  - Instruct patient to call for assistance with activity   - Consult OT/PT to assist with strengthening/mobility   - Keep Call bell within reach  - Keep bed low and locked with side rails adjusted as appropriate  - Keep care items and personal belongings within reach  - Initiate and maintain comfort rounds  - Make Fall Risk Sign visible to staff  - - Apply yellow socks and bracelet for high fall risk patients  - Consider moving patient to room near nurses station  Outcome: Progressing  Goal: Maintain or return to baseline ADL  function  Description: INTERVENTIONS:  -  Assess patient's ability to carry out ADLs; assess patient's baseline for ADL function and identify physical deficits which impact ability to perform ADLs (bathing, care of mouth/teeth, toileting, grooming, dressing, etc.)  - Assess/evaluate cause of self-care deficits   - Assess range of motion  - Assess patient's mobility; develop plan if impaired  - Assess patient's need for assistive devices and provide as appropriate  - Encourage maximum independence but intervene and supervise when necessary  - Involve family in performance of ADLs  - Assess for home care needs following discharge   - Consider OT consult to assist with ADL evaluation and planning for discharge  - Provide patient education as appropriate  Outcome: Progressing  Goal: Maintains/Returns to pre admission functional level  Description: INTERVENTIONS:  - Perform AM-PAC 6 Click Basic Mobility/ Daily Activity assessment daily.  - Set and communicate daily mobility goal to care team and patient/family/caregiver.   - Collaborate with rehabilitation services on mobility goals if consulted  - - Record patient progress and toleration of activity level   Outcome: Progressing     Problem: Knowledge Deficit  Goal: Patient/family/caregiver demonstrates understanding of disease process, treatment plan, medications, and discharge instructions  Description: Complete learning assessment and assess knowledge base.  Interventions:  - Provide teaching at level of understanding  - Provide teaching via preferred learning methods  Outcome: Progressing

## 2024-05-09 NOTE — ASSESSMENT & PLAN NOTE
Lab Results   Component Value Date    HGBA1C 5.2 01/30/2024     Am glucose 97    Blood Sugar Average: Last 72 hrs:  (P) 113  Diet controlled  Hba1c = pre-diabetic   Glucose controlled during admission with diet     Home meds: metformin 500 mg, jardiance 10 mg daily ( was not taking before)     Plan:   Hold metformin at discharge   Continue jardiance 10 mg QD after discharge

## 2024-05-09 NOTE — ASSESSMENT & PLAN NOTE
Sx: dizziness - Not associated with any weakness or recent head trauma.   NIH score : unable to fully assess language   EKG : sinus tachy   HTN highest readings: 232/114   Blood Pressure: (!) 191/91     1.  No acute intracranial CT abnormality. 2.  Stable sequela of chronic right PCA territory infarct involving right occipital and posteromedial right temporal lobes. Stable chronic right basal ganglia lacunar infarcts. 3.  Chronic microangiopathic change. 4.  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 Recommend further assessment with brain MRI. 5.  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 6.  No hemodynamically significant stenosis in bilateral cervical carotid arteries and right vertebral artery.       Plan:   Permissive HTN with goal below 220/120   Vitals signs  Neurochecks per protocol   Neurology consult  Dysphagia screen   Speech and language eval   PT/OT  Physical medicine   Lipid panel   Hba1c   MRI   Telemetry   Atorvastatin 40 mg   Fall precautions

## 2024-05-09 NOTE — ASSESSMENT & PLAN NOTE
History of BPPV   Concerning CT scan : chronic right PCA territory infarct involving right occipital and posteromedial right temporal lobes   S/p meclizine in ED, and dizziness persisted

## 2024-05-09 NOTE — ASSESSMENT & PLAN NOTE
BP Readings from Last 3 Encounters:   05/10/24 (!) 192/87   03/18/24 140/80   03/17/24 161/81      Home meds: Losartan 50 mg daily  and norvasc  10 mg daily   Home medications previously held to allow permissive hypertension.    Plan:   Restart home meds after discharge

## 2024-05-09 NOTE — ASSESSMENT & PLAN NOTE
Lab Results   Component Value Date    LDLCALC 104 (H) 01/30/2024      Home med: 40 mg atorvastatin     Plan:   Continue home medications after discharge

## 2024-05-10 ENCOUNTER — PATIENT OUTREACH (OUTPATIENT)
Dept: FAMILY MEDICINE CLINIC | Facility: CLINIC | Age: 59
End: 2024-05-10

## 2024-05-10 ENCOUNTER — APPOINTMENT (INPATIENT)
Dept: NON INVASIVE DIAGNOSTICS | Facility: HOSPITAL | Age: 59
DRG: 149 | End: 2024-05-10
Payer: MEDICARE

## 2024-05-10 VITALS
BODY MASS INDEX: 24.55 KG/M2 | HEIGHT: 61 IN | WEIGHT: 130 LBS | HEART RATE: 83 BPM | DIASTOLIC BLOOD PRESSURE: 91 MMHG | TEMPERATURE: 98.2 F | RESPIRATION RATE: 18 BRPM | OXYGEN SATURATION: 95 % | SYSTOLIC BLOOD PRESSURE: 177 MMHG

## 2024-05-10 PROBLEM — G45.9 TIA (TRANSIENT ISCHEMIC ATTACK): Status: RESOLVED | Noted: 2024-05-09 | Resolved: 2024-05-10

## 2024-05-10 PROBLEM — I51.7 LEFT VENTRICULAR HYPERTROPHY: Status: ACTIVE | Noted: 2024-05-09

## 2024-05-10 LAB
ANION GAP SERPL CALCULATED.3IONS-SCNC: 14 MMOL/L (ref 4–13)
AORTIC ROOT: 3.4 CM
AORTIC VALVE MEAN VELOCITY: 8.1 M/S
APICAL FOUR CHAMBER EJECTION FRACTION: 72 %
ASCENDING AORTA: 2.8 CM
AV LVOT MEAN GRADIENT: 2 MMHG
AV LVOT PEAK GRADIENT: 3 MMHG
AV MEAN GRADIENT: 3 MMHG
AV PEAK GRADIENT: 7 MMHG
AV VELOCITY RATIO: 0.68
BSA FOR ECHO PROCEDURE: 1.57 M2
BUN SERPL-MCNC: 11 MG/DL (ref 5–25)
CALCIUM SERPL-MCNC: 9.3 MG/DL (ref 8.4–10.2)
CHLORIDE SERPL-SCNC: 99 MMOL/L (ref 96–108)
CHOLEST SERPL-MCNC: 212 MG/DL
CO2 SERPL-SCNC: 24 MMOL/L (ref 21–32)
CREAT SERPL-MCNC: 0.63 MG/DL (ref 0.6–1.3)
DOP CALC AO PEAK VEL: 1.3 M/S
DOP CALC AO VTI: 25.45 CM
DOP CALC LVOT PEAK VEL VTI: 17.85 CM
DOP CALC LVOT PEAK VEL: 0.89 M/S
E WAVE DECELERATION TIME: 241 MS
E/A RATIO: 0.67
EST. AVERAGE GLUCOSE BLD GHB EST-MCNC: 108 MG/DL
FRACTIONAL SHORTENING: 34 (ref 28–44)
GFR SERPL CREATININE-BSD FRML MDRD: 107 ML/MIN/1.73SQ M
GLUCOSE SERPL-MCNC: 85 MG/DL (ref 65–140)
HBA1C MFR BLD: 5.4 %
HDLC SERPL-MCNC: 89 MG/DL
INTERVENTRICULAR SEPTUM IN DIASTOLE (PARASTERNAL SHORT AXIS VIEW): 1.3 CM
INTERVENTRICULAR SEPTUM: 1.3 CM (ref 0.6–1.1)
LDLC SERPL CALC-MCNC: 99 MG/DL (ref 0–100)
LEFT ATRIUM SIZE: 3.6 CM
LEFT INTERNAL DIMENSION IN SYSTOLE: 2.5 CM (ref 2.1–4)
LEFT VENTRICULAR INTERNAL DIMENSION IN DIASTOLE: 3.8 CM (ref 3.5–6)
LEFT VENTRICULAR POSTERIOR WALL IN END DIASTOLE: 1 CM
LEFT VENTRICULAR STROKE VOLUME: 39 ML
LVSV (TEICH): 39 ML
MV E'TISSUE VEL-SEP: 7 CM/S
MV PEAK A VEL: 0.88 M/S
MV PEAK E VEL: 59 CM/S
MV STENOSIS PRESSURE HALF TIME: 70 MS
MV VALVE AREA P 1/2 METHOD: 3.14
POTASSIUM SERPL-SCNC: 3.9 MMOL/L (ref 3.5–5.3)
SL CV LV EF: 65
SL CV PED ECHO LEFT VENTRICLE DIASTOLIC VOLUME (MOD BIPLANE) 2D: 62 ML
SL CV PED ECHO LEFT VENTRICLE SYSTOLIC VOLUME (MOD BIPLANE) 2D: 23 ML
SODIUM SERPL-SCNC: 137 MMOL/L (ref 135–147)
TRIGL SERPL-MCNC: 118 MG/DL

## 2024-05-10 PROCEDURE — 99239 HOSP IP/OBS DSCHRG MGMT >30: CPT | Performed by: FAMILY MEDICINE

## 2024-05-10 PROCEDURE — 93325 DOPPLER ECHO COLOR FLOW MAPG: CPT | Performed by: INTERNAL MEDICINE

## 2024-05-10 PROCEDURE — 93325 DOPPLER ECHO COLOR FLOW MAPG: CPT

## 2024-05-10 PROCEDURE — 92610 EVALUATE SWALLOWING FUNCTION: CPT

## 2024-05-10 PROCEDURE — 80061 LIPID PANEL: CPT

## 2024-05-10 PROCEDURE — 93321 DOPPLER ECHO F-UP/LMTD STD: CPT

## 2024-05-10 PROCEDURE — 93321 DOPPLER ECHO F-UP/LMTD STD: CPT | Performed by: INTERNAL MEDICINE

## 2024-05-10 PROCEDURE — 80048 BASIC METABOLIC PNL TOTAL CA: CPT

## 2024-05-10 PROCEDURE — 83036 HEMOGLOBIN GLYCOSYLATED A1C: CPT

## 2024-05-10 PROCEDURE — 97163 PT EVAL HIGH COMPLEX 45 MIN: CPT

## 2024-05-10 PROCEDURE — 93308 TTE F-UP OR LMTD: CPT

## 2024-05-10 PROCEDURE — 93308 TTE F-UP OR LMTD: CPT | Performed by: INTERNAL MEDICINE

## 2024-05-10 RX ORDER — MECLIZINE HCL 12.5 MG/1
25 TABLET ORAL EVERY 8 HOURS SCHEDULED
Status: DISCONTINUED | OUTPATIENT
Start: 2024-05-10 | End: 2024-05-10 | Stop reason: HOSPADM

## 2024-05-10 RX ORDER — MECLIZINE HYDROCHLORIDE 25 MG/1
25 TABLET ORAL EVERY 8 HOURS SCHEDULED
Qty: 30 TABLET | Refills: 0 | Status: SHIPPED | OUTPATIENT
Start: 2024-05-10

## 2024-05-10 RX ORDER — LOSARTAN POTASSIUM 50 MG/1
50 TABLET ORAL DAILY
Status: DISCONTINUED | OUTPATIENT
Start: 2024-05-10 | End: 2024-05-10 | Stop reason: HOSPADM

## 2024-05-10 RX ORDER — AMLODIPINE BESYLATE 5 MG/1
5 TABLET ORAL DAILY
Status: DISCONTINUED | OUTPATIENT
Start: 2024-05-10 | End: 2024-05-10 | Stop reason: HOSPADM

## 2024-05-10 RX ADMIN — AMLODIPINE BESYLATE 5 MG: 5 TABLET ORAL at 09:04

## 2024-05-10 RX ADMIN — FOLIC ACID 1 MG: 1 TABLET ORAL at 09:04

## 2024-05-10 RX ADMIN — LOSARTAN POTASSIUM 50 MG: 50 TABLET, FILM COATED ORAL at 09:04

## 2024-05-10 RX ADMIN — THIAMINE HCL TAB 100 MG 100 MG: 100 TAB at 09:04

## 2024-05-10 RX ADMIN — MECLIZINE 25 MG: 12.5 TABLET ORAL at 09:04

## 2024-05-10 RX ADMIN — PANTOPRAZOLE SODIUM 20 MG: 20 TABLET, DELAYED RELEASE ORAL at 06:00

## 2024-05-10 NOTE — DISCHARGE SUMMARY
Discharge Summary - [facility]    Patient Information: Mal Encarnacion 59 y.o. male MRN: [id number]  Unit/Bed#: 7T [facility] 713-01 Encounter: [id number]    Discharging Physician / Practitioner: John Rivera   PCP: Awa Shepherd MD  Admission Date:   Admission Orders (From admission, onward)       Ordered        05/09/24 1459  INPATIENT ADMISSION  Once                          Discharge Date: 05/10/2024    Reason for Admission: Dizziness     Discharge Diagnoses:     Principal Problem:    Dizziness  Active Problems:    Primary hypertension    Other hyperlipidemia    Type 2 diabetes mellitus without complication, without long-term current use of insulin (HCC)    Left ventricular hypertrophy  Resolved Problems:    * No resolved hospital problems. *        * Dizziness  Assessment & Plan  History of BPPV usually controlled with meclizine     In light of congestion, may be vestibular neuritis jamia with vertiginous symptoms at rest without head movement.     Stroke R/O   Sx: dizziness - Not associated with any weakness or recent head trauma.   NIH score : unable to fully assess language, or visual fields. However, all other components normal.  EKG : ST elevation anterior leads  HTN highest readings: 232/114   Blood Pressure: 170/88    CT scan:   1.  No acute intracranial CT abnormality. 2.  Stable sequela of chronic right PCA territory infarct involving right occipital and posteromedial right temporal lobes. Stable chronic right basal ganglia lacunar infarcts. 3.  Chronic microangiopathic change. 4.  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 Recommend further assessment with brain MRI. 5. 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 6.  No hemodynamically significant stenosis in bilateral cervical carotid arteries and right vertebral artery.   S/P aspirin 325 mg , clopidogrel 300 mg   MRI: limited study, patient could not  tolerate . Within limitation, no definite areas of diffusion abnormality to suggest acute ischemia.   On the floor, his blood pressure was allowed to reach 220/120 while ruling out stroke, vitals signs were monitors  Today, BP better controlled, and regular BP meds continued.     Echo : The left ventricular ejection fraction is 65%. Systolic function is normal. Wall motion is normal. Diastolic function is mildly abnormal, consistent with grade I (abnormal) relaxation.     Neurochecks were normal   Telemetry initially placed but discontinued      Plan:   Continue meclizine 25 mg PRN   PCP FU outpatient   Cardiology FU outpatient   Per PT, given information to set up appointment with vestibular therapy          Left ventricular hypertrophy  Assessment & Plan    ST elevations seen in V1, V2,V3  Denies chest symptoms   Cardiology curb sided, non-spec finding in the settings of his previous ECGs. No need to transfer   03/2024 catheterization  showing mild non-occlusive atherosclerotic vascular disease   Trops negative   Catherization 03/3024 - nonobstructive CAD     H/o junctional rhythm    Initially on telemetry, but Discontinued at discharge     Plan:   Monitor for symptoms   Telemetry   FU with cardiology - informed him to make an appointment          Type 2 diabetes mellitus without complication, without long-term current use of insulin ([facility])  Assessment & Plan  Lab Results   Component Value Date    HGBA1C 5.2 01/30/2024     Am glucose 97    Blood Sugar Average: Last 72 hrs:  (P) 113  Diet controlled  Hba1c = pre-diabetic   Glucose controlled during admission with diet     Home meds: metformin 500 mg, jardiance 10 mg daily ( was not taking before)     Plan:   Hold metformin at discharge   Continue jardiance 10 mg QD after discharge       Other hyperlipidemia  Assessment & Plan  Lab Results   Component Value Date    LDLCALC 104 (H) 01/30/2024      Home med: 40 mg atorvastatin     Plan:   Continue home medications  after discharge    Primary hypertension  Assessment & Plan  BP Readings from Last 3 Encounters:   05/10/24 (!) 192/87   03/18/24 140/80   03/17/24 161/81      Home meds: Losartan 50 mg daily  and norvasc  10 mg daily   Home medications previously held to allow permissive hypertension.    Plan:   Restart home meds after discharge          Consultations During Hospital Stay:  Curbsided cardiology, case management , PT     Procedures Performed:   None     Significant Findings / Test Results:   CT:  1.  No acute intracranial CT abnormality. 2.  Stable sequela of chronic right PCA territory infarct involving right occipital and posteromedial right temporal lobes. Stable chronic right basal ganglia lacunar infarcts. 3.  Chronic microangiopathic change. 4.  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 Recommend further assessment with brain MRI. 5. 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 6.  No hemodynamically significant stenosis in bilateral cervical carotid arteries and right vertebral artery.   MRI: limited study, patient could not tolerate . Within limitation, no definite areas of diffusion abnormality to suggest acute ischemia.        Incidental Findings:   None      Test Results Pending at Discharge (will require follow up):   None      Outpatient Tests Requested:  none    Outpatient follow-up Requested:  PCP  Cardiology   Vestibular therapy     Complications:  none    Hospital Course:     Mal Encarnacion is a 59 y.o. male patient who originally presented to the hospital on 5/9/2024 due to dizziness and RUQ.     Patient has a history of BPPV and was given meclizine 50 mg but did not respond. 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 CT scan was done which revealed an age indeterminate occlusion of the PCA for which MRI was recommended. He was given a  "loading dose of clopidogrel and aspirin and stroke rule/out was initiated on the floor following the stroke pathway. MRI was done which was limited by patient movement and not tolerating the exam, however was not indicative of acute stroke. Echo performed with bubble study was largely unchanged from previous. Today, patient's dizziness and RUQ pain have resolved, and he is able to ambulate independently. PT evaluated, gave him a cane, and recommended OP vestibular therapy. He was provided with a list of available locations and contact information. He is stable for discharge. He will be discharged with meclizine as needed, with encouragement to follow up with PCP, cardiology, and Vestibular therapy outpatient.     Review of Systems   Constitutional:  Negative for chills and fever.   HENT:  Positive for congestion.    Respiratory:  Negative for cough, shortness of breath and wheezing.    Cardiovascular:  Negative for chest pain, palpitations and leg swelling.   Gastrointestinal:  Negative for abdominal pain, constipation and vomiting.   Genitourinary:  Negative for dysuria and hematuria.   Neurological:  Negative for dizziness and seizures.       Condition at Discharge: stable     Discharge Day Visit / Exam:     Vitals: Blood Pressure: (!) 177/91 (05/10/24 1100)  Pulse: 83 (05/10/24 1100)  Temperature: 98.2 °F (36.8 °C) (05/10/24 1100)  Temp Source: Temporal (05/10/24 1100)  Respirations: 18 (05/10/24 1100)  Height: 5' 1\" (154.9 cm) (05/10/24 0826)  Weight - Scale: 59 kg (130 lb) (05/10/24 0826)  SpO2: 95 % (05/10/24 1100)  Exam:   Physical Exam  Constitutional:       General: He is not in acute distress.     Appearance: He is normal weight. He is not ill-appearing or toxic-appearing.   HENT:      Head: Normocephalic and atraumatic.      Right Ear: External ear normal.      Left Ear: External ear normal.      Nose: Nose normal. No congestion.   Cardiovascular:      Rate and Rhythm: Normal rate and regular rhythm.      " Pulses: Normal pulses.      Heart sounds: Normal heart sounds.   Pulmonary:      Effort: Pulmonary effort is normal. No respiratory distress.      Breath sounds: Normal breath sounds. No wheezing or rales.   Abdominal:      General: Abdomen is flat. Bowel sounds are normal. There is no distension.      Palpations: Abdomen is soft.      Tenderness: There is no abdominal tenderness. There is no right CVA tenderness or guarding.   Musculoskeletal:      Right lower leg: No edema.      Left lower leg: No edema.   Skin:     General: Skin is warm.      Capillary Refill: Capillary refill takes less than 2 seconds.   Neurological:      General: No focal deficit present.      Mental Status: He is alert.         Discussion with Family: Attempted to call mother Janessa Encarnacion 4 times. Left voice mail and [facility] number to starr back.       Discharge instructions/Information to patient and family:   See after visit summary for information provided to patient and family.      Discharge Medications:  amLODIPine Besylate 10 mg Oral Daily    Aspirin 81 mg Oral Daily    Atorvastatin Calcium 40 mg Oral Daily with dinner    Blood Pressure Monitoring Does not apply 2 times daily    Folic Acid 1 mg Oral Daily    Losartan Potassium 50 mg Oral Daily    Meclizine HCl 25 mg Oral Every 8 hours scheduled, Use for the next 3-4 days for dizziness    Pantoprazole Sodium 20 mg Oral Daily before breakfast    Thiamine HCl 100 mg Oral Daily    Provisions for Follow-Up Care:  See after visit summary for information related to follow-up care and any pertinent home health orders.      Disposition:     Home    For Discharges to [facility] SNF:   Not Applicable to this Patient - Not Applicable to this Patient    Planned Readmission: none     Discharge Statement:  I spent 60 minutes discharging the patient. This time was spent on the day of discharge. I had direct contact with the patient on the day of discharge. Greater than 50% of the total  time was spent examining patient, answering all patient questions, arranging and discussing plan of care with patient as well as directly providing post-discharge instructions.  Additional time then spent on discharge activities.    ** Please Note: This note has been constructed using a voice recognition system **    Awa Shepherd MD  05/10/24  2:15 PM

## 2024-05-10 NOTE — PLAN OF CARE
Problem: Activity Intolerance/Impaired Mobility  Goal: Mobility/activity is maintained at optimum level for patient  Description: Interventions:  - Assess and monitor patient  barriers to mobility and need for assistive/adaptive devices.  - Assess patient's emotional response to limitations.  - Collaborate with interdisciplinary team and initiate plans and interventions as ordered.  - Encourage independent activity per ability.  - Maintain proper body alignment.  - Perform active/passive rom as tolerated/ordered.  - Plan activities to conserve energy.  - Turn patient as appropriate  Outcome: Progressing     Problem: PAIN - ADULT  Goal: Verbalizes/displays adequate comfort level or baseline comfort level  Description: Interventions:  - Encourage patient to monitor pain and request assistance  - Assess pain using appropriate pain scale  - Administer analgesics based on type and severity of pain and evaluate response  - Implement non-pharmacological measures as appropriate and evaluate response  - Consider cultural and social influences on pain and pain management  - Notify physician/advanced practitioner if interventions unsuccessful or patient reports new pain  Outcome: Progressing

## 2024-05-10 NOTE — UTILIZATION REVIEW
NOTIFICATION OF INPATIENT MEDICAL ADMISSION   AUTHORIZATION REQUEST   SERVICING FACILITY:   [address] [address]  Tax ID: [id number]  NPI: [id number] ATTENDING PROVIDER:  Attending Name and NPI#: Yessy Daniels Md [[id number]]  Address: [address] [place] [address]  Phone: [phone]     ADMISSION INFORMATION:  Place of Service: Inpatient [facility]  Place of Service Code: 21  Inpatient Admission Date/Time: 5/9/24  2:59 PM  Discharge Date/Time: 5/10/2024  2:46 PM  Admitting Diagnosis Code/Description:  Vertigo [R42]  Headache [R51.9]     UTILIZATION REVIEW CONTACT:  Becca Watson Utilization   Network Utilization Review Department  Phone: [phone]  Fax [phone]  Email: [email or web address]  Contact for approvals/pending authorizations, clinical reviews, and discharge.     PHYSICIAN ADVISORY SERVICES:  Medical Necessity Denial & Qsmp-qb-Edcy Review  Phone: [phone]  Fax: [phone]  Email: [email or web address]     DISCHARGE SUPPORT TEAM:  For Patients Discharge Needs & Updates  Phone: [phone] opt. 2 Fax: [phone]  Email: [email or web address]

## 2024-05-10 NOTE — INCIDENTAL FINDINGS
The following findings require follow up:  Radiographic finding CTA head/neck and MRI brain   Finding: CT:chronic right PCA territory infarct involving right occipital and posteriomedial right temporal lobes. Stable chronic right basal ganglia lacunar infarcts. 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 MRI: chronic microangiopathic change. Focal encephalomalacia posterior medial right temporal and occipital lobes consistent with sequela of remote right PCA territory infarct   Follow up required: Neurology to evaluate for comprehension deficits   Follow up should be done within: month(s)    Please notify the following clinician to assist with the follow up:   Dr. Shepherd, and can also contact  Rafaelina Reyes and or [facility] referral team

## 2024-05-10 NOTE — PLAN OF CARE
Problem: Neurological Deficit  Goal: Neurological status is stable or improving  Description: Interventions:  - Monitor and assess patient's level of consciousness, motor function, sensory function, and level of assistance needed for ADLs.   - Monitor and report changes from baseline. Collaborate with interdisciplinary team to initiate plan and implement interventions as ordered.   - Provide and maintain a safe environment.  - Consider seizure precautions.  - Consider fall precautions.  - Consider aspiration precautions.  - Consider bleeding precautions.  Outcome: Progressing     Problem: Activity Intolerance/Impaired Mobility  Goal: Mobility/activity is maintained at optimum level for patient  Description: Interventions:  - Assess and monitor patient  barriers to mobility and need for assistive/adaptive devices.  - Assess patient's emotional response to limitations.  - Collaborate with interdisciplinary team and initiate plans and interventions as ordered.  - Encourage independent activity per ability.  - Maintain proper body alignment.  - Perform active/passive rom as tolerated/ordered.  - Plan activities to conserve energy.  - Turn patient as appropriate  Outcome: Progressing     Problem: INFECTION - ADULT  Goal: Absence or prevention of progression during hospitalization  Description: INTERVENTIONS:  - Assess and monitor for signs and symptoms of infection  - Monitor lab/diagnostic results  - Monitor all insertion sites, i.e. indwelling lines, tubes, and drains  - Monitor endotracheal if appropriate and nasal secretions for changes in amount and color  - [place] appropriate cooling/warming therapies per order  - Administer medications as ordered  - Instruct and encourage patient and family to use good hand hygiene technique  - Identify and instruct in appropriate isolation precautions for identified infection/condition  Outcome: Progressing     Problem: PAIN - ADULT  Goal: Verbalizes/displays adequate  comfort level or baseline comfort level  Description: Interventions:  - Encourage patient to monitor pain and request assistance  - Assess pain using appropriate pain scale  - Administer analgesics based on type and severity of pain and evaluate response  - Implement non-pharmacological measures as appropriate and evaluate response  - Consider cultural and social influences on pain and pain management  - Notify physician/advanced practitioner if interventions unsuccessful or patient reports new pain  Outcome: Progressing     Problem: Knowledge Deficit  Goal: Patient/family/caregiver demonstrates understanding of disease process, treatment plan, medications, and discharge instructions  Description: Complete learning assessment and assess knowledge base.  Interventions:  - Provide teaching at level of understanding  - Provide teaching via preferred learning methods  Outcome: Progressing     Problem: DISCHARGE PLANNING  Goal: Discharge to home or other facility with appropriate resources  Description: INTERVENTIONS:  - Identify barriers to discharge w/patient and caregiver  - Arrange for needed discharge resources and transportation as appropriate  - Identify discharge learning needs (meds, wound care, etc.)  - Arrange for interpretive services to assist at discharge as needed  - Refer to Case Management Department for coordinating discharge planning if the patient needs post-hospital services based on physician/advanced practitioner order or complex needs related to functional status, cognitive ability, or social support system  Outcome: Progressing

## 2024-05-10 NOTE — NURSING NOTE
Went over discharge instructions with ALS . Patient had no questions. He will go home via lyft and stated he will  his own medication.

## 2024-05-10 NOTE — PHYSICAL THERAPY NOTE
Physical Therapy Evaluation    Patient's Name: Mal Encarnacion    Admitting Diagnosis  Vertigo [R42]  Headache [R51.9]    Problem List  Patient Active Problem List   Diagnosis    Primary hypertension    Diastolic dysfunction without heart failure    Thrombocytopenia (HCC)    Encounter for immunization    Other hyperlipidemia    Type 2 diabetes mellitus without complication, without long-term current use of insulin (HCC)    Congenital deafness    Hyponatremia    Need for transfer to another facility    Food insecurity    Dizziness    Epigastric pain    Ear pain, bilateral    Junctional bradycardia    Alcohol use disorder    CAD (coronary artery disease)    Encounter for screening colonoscopy    Transportation insecurity    Hospital discharge follow-up    Left ventricular hypertrophy       Past Medical History  Past Medical History:   Diagnosis Date    Deaf     Heart murmur     Hypertension        Past Surgical History  Past Surgical History:   Procedure Laterality Date    CARDIAC CATHETERIZATION N/A 3/16/2024    Procedure: Cardiac pci;  Surgeon: Mal Nixon MD;  [facility]: [facility];  Service: Cardiology       Recent Imaging  MRI brain wo contrast   Final Result by Abner Griffin MD (05/09 4306)      Limited evaluation/incomplete study as above. Consider repeat under sedation as clinically warranted.      Within limitation, no definite areas of diffusion abnormality to suggest acute ischemia.      Workstation performed: [id number]         CTA head and neck with and without contrast   Final Result by Camila Cortes MD (05/09 2708)      1.  No acute intracranial CT abnormality.   2.  Stable sequela of chronic right PCA territory infarct involving right occipital and posteromedial right temporal lobes. Stable chronic right basal ganglia lacunar infarcts.   3.  Chronic microangiopathic change.   4.  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 Recommend further  "assessment with brain MRI.   5.  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   6.  No hemodynamically significant stenosis in bilateral cervical carotid arteries and right vertebral artery.                           I personally discussed this study with HAILEY ORNELAS on 5/9/2024 1:44 PM.            Workstation performed: [id number]             Recent Vital Signs  Vitals:    05/10/24 0300 05/10/24 0727 05/10/24 0826 05/10/24 1100   BP: 170/88 (!) 192/87 (!) 192/87 (!) 177/91   BP Location: Left arm Left arm     Pulse: 74 75 75 83   Resp: 18 18  18   Temp: 98.4 °F (36.9 °C) 98.3 °F (36.8 °C)  98.2 °F (36.8 °C)   TempSrc: Temporal Temporal  Temporal   SpO2: 96% 96%  95%   Weight:   59 kg (130 lb)    Height:   5' 1\" (1.549 m)         05/10/24 1035   PT Last Visit   PT Visit Date 05/10/24   Note Type   Note type Evaluation   Pain Assessment   Pain Assessment Tool 0-10   Pain Score 3   Pain Location/Orientation Orientation: Bilateral;Location: Foot   Restrictions/Precautions   Weight Bearing Precautions Per Order No   Prior Function   Level of [place] Independent with ADLs;Independent with functional mobility   Falls in the last 6 months 0   General   Family/Caregiver Present No   Cognition   Overall Cognitive Status WFL   Arousal/Participation Alert   Orientation Level Oriented X4   Memory Within functional limits   Following Commands Follows all commands and directions without difficulty   RLE Assessment   RLE Assessment   (3+/5)   LLE Assessment   LLE Assessment   (3+/5)   Coordination   Movements are Fluid and Coordinated 0   Coordination and Movement Description some decreased general coordination and mildy unsteady gait due to reported vertigo   Sensation X   Light Touch   RLE Light Touch Impaired   LLE Light Touch Impaired   Bed Mobility   Supine to Sit 6  Modified independent   Additional items Increased time required   Sit to Supine 6  Modified independent   Additional items " Increased time required   Transfers   Sit to Stand 6  Modified independent   Additional items Increased time required   Stand to Sit 6  Modified independent   Additional items Increased time required   Ambulation/Elevation   Gait pattern Step through pattern;Decreased toe off;Decreased heel strike;Decreased foot clearance;Improper Weight shift   Gait Assistance 5  Supervision   Additional items Assist x 1;Verbal cues;Tactile cues   Assistive Device None   Distance 220ft   Balance   Static Sitting Fair +   Dynamic Sitting Fair +   Static Standing Fair   Dynamic Standing Fair -   Ambulatory Fair -   Endurance Deficit   Endurance Deficit Yes   Endurance Deficit Description reporting SOB with ambulation   Activity Tolerance   Activity Tolerance Patient limited by fatigue;Patient limited by pain   Medical Staff Made Aware spoke to CM   Nurse Made Aware spoke to RN   Assessment   Prognosis Good   Problem List Decreased strength;Decreased endurance;Impaired balance;Decreased mobility;Decreased coordination;Impaired sensation;Pain   Barriers to Discharge Inaccessible home environment;Decreased caregiver support   Goals   Patient Goals to have less vertigo symptoms   Discharge Recommendation   Rehab Resource Intensity Level, PT III (Minimum Resource Intensity)  (Vestibular Rehab outpatient)   Equipment Recommended Cane   AM-PAC Basic Mobility Inpatient   Turning in Flat Bed Without Bedrails 4   Lying on Back to Sitting on Edge of Flat Bed Without Bedrails 4   Moving Bed to Chair 4   Standing Up From Chair Using Arms 4   Walk in Room 3   Climb 3-5 Stairs With Railing 3   Basic Mobility Inpatient Raw Score 22   Basic Mobility Standardized Score 47.4   [facility] Highest Level Of Mobility   -HLM Goal 7: Walk 25 feet or more   -HLM Achieved 7: Walk 25 feet or more   End of Consult   Patient Position at End of Consult All needs within reach;Supine       ASSESSMENT                                                                                                                      Mal Encarnacion is a 59 y.o. male admitted to [facility] on 5/9/2024 for Dizziness. Pt  has a past medical history of Deaf, Heart murmur, and Hypertension.. PT was consulted and pt was seen on 5/10/2024 for mobility assessment and d/c planning.  Impairments limiting pt at this time include decreased ROM, impaired balance, decreased endurance, decreased coordination, decreased IADLS, pain, decreased activity tolerance, SOB upon exertion, decreased sensation, and decreased strength. Pt is currently functioning at a modified independent assistance level for bed mobility, modified independent assistance level for transfers, supervision assistance x1 level for ambulation with no assistive device. The patient's AM-PAC Basic Mobility Inpatient Short Form Raw Score is 22. A Raw score of greater than 16 suggests the patient may benefit from discharge to home. Please also refer to the recommendation of the Physical Therapist for safe discharge planning.    Recommendations                                                                                                              Pt will benefit from continued skilled IP PT to address the above mentioned impairments in order to maximize recovery and increase functional independence when completing mobility and ADLs. See flow sheet for goals and POC.     DME: Single Point Cane    Discharge Disposition:  Home with Outpatient Physical Therapy       Andi Barcenas PT, DPT

## 2024-05-10 NOTE — PROGRESS NOTES
Incoming call IB.    MIAH CM did receive an IB notification stating that Pt has been discharged today.     MIAH CM is remain available for further assistance as needed.   MIAH HAWKINS will continue to follow-up.   Patient Education        Influenza (Flu): Care Instructions  Your Care Instructions    Influenza (flu) is an infection in the lungs and breathing passages. It is caused by the influenza virus. There are different strains, or types, of the flu virus from year to year. Unlike the common cold, the flu comes on suddenly and the symptoms, such as a cough, congestion, fever, chills, fatigue, aches, and pains, are more severe. These symptoms may last up to 10 days. Although the flu can make you feel very sick, it usually doesn't cause serious health problems. Home treatment is usually all you need for flu symptoms. But your doctor may prescribe antiviral medicine to prevent other health problems, such as pneumonia, from developing. Older people and those who have a long-term health condition, such as lung disease, are most at risk for having pneumonia or other health problems. Follow-up care is a key part of your treatment and safety. Be sure to make and go to all appointments, and call your doctor if you are having problems. It's also a good idea to know your test results and keep a list of the medicines you take. How can you care for yourself at home? · Get plenty of rest.  · Drink plenty of fluids, enough so that your urine is light yellow or clear like water. If you have kidney, heart, or liver disease and have to limit fluids, talk with your doctor before you increase the amount of fluids you drink. · Take an over-the-counter pain medicine if needed, such as acetaminophen (Tylenol), ibuprofen (Advil, Motrin), or naproxen (Aleve), to relieve fever, headache, and muscle aches. Read and follow all instructions on the label. No one younger than 20 should take aspirin. It has been linked to Reye syndrome, a serious illness. · Do not smoke. Smoking can make the flu worse. If you need help quitting, talk to your doctor about stop-smoking programs and medicines.  These can increase your chances of quitting for good.  · Breathe moist air from a hot shower or from a sink filled with hot water to help clear a stuffy nose. · Before you use cough and cold medicines, check the label. These medicines may not be safe for young children or for people with certain health problems. · If the skin around your nose and lips becomes sore, put some petroleum jelly on the area. · To ease coughing:  ? Drink fluids to soothe a scratchy throat. ? Suck on cough drops or plain hard candy. ? Take an over-the-counter cough medicine that contains dextromethorphan to help you get some sleep. Read and follow all instructions on the label. ? Raise your head at night with an extra pillow. This may help you rest if coughing keeps you awake. · Take any prescribed medicine exactly as directed. Call your doctor if you think you are having a problem with your medicine. To avoid spreading the flu  · Wash your hands regularly, and keep your hands away from your face. · Stay home from school, work, and other public places until you are feeling better and your fever has been gone for at least 24 hours. The fever needs to have gone away on its own without the help of medicine. · Ask people living with you to talk to their doctors about preventing the flu. They may get antiviral medicine to keep from getting the flu from you. · To prevent the flu in the future, get a flu vaccine every fall. Encourage people living with you to get the vaccine. · Cover your mouth when you cough or sneeze. When should you call for help? Call 911 anytime you think you may need emergency care.  For example, call if:    · You have severe trouble breathing.    Call your doctor now or seek immediate medical care if:    · You have new or worse trouble breathing.     · You seem to be getting much sicker.     · You feel very sleepy or confused.     · You have a new or higher fever.     · You get a new rash.    Watch closely for changes in your health, and be sure to contact your doctor if:    · You begin to get better and then get worse.     · You are not getting better after 1 week. Where can you learn more? Go to http://arlette-michel.info/. Enter Q744 in the search box to learn more about \"Influenza (Flu): Care Instructions. \"  Current as of: September 5, 2018  Content Version: 11.9  © 2239-3575 [employer]. Care instructions adapted under license by [employer] (which disclaims liability or warranty for this information). If you have questions about a medical condition or this instruction, always ask your healthcare professional. [place] [address] any warranty or liability for your use of this information.

## 2024-05-10 NOTE — CASE MANAGEMENT
Case Management Progress Note    Patient name Mal Encarnacion  Location 7T [facility] 713/7T [facility] 713-01 MRN [id number]  : 1965 Date 5/10/2024       LOS (days): 1  Geometric Mean LOS (GMLOS) (days): 1.9  Days to GMLOS:0.9        OBJECTIVE:        Current admission status: Inpatient  Preferred Pharmacy:    [facility] [address] [address]  Phone: [phone] Fax: [phone]    Primary Care Provider: Awa Shepherd MD    Primary Insurance: [employer] [facility]  Secondary Insurance: [facility]    PROGRESS NOTE:    Per medicine Team in rounds no CM needs.  Needs a LYFT requested via Round Trip.  LYFT waiver signed.

## 2024-05-10 NOTE — SPEECH THERAPY NOTE
"  Speech Pathology Bedside Swallow Evaluation:                    SLP RECOMMENDATIONS:         Diet: Regular consistency         Liquids: Thin liquids         Medications: as best tolerated / thin and whole        Summary:  Pt seen for bedside swallow evaluation in setting of admission under stroke pathway. Pt admitted with chief complaint of dizziness. CTA of head/neck showed: \"Stable sequela of chronic right PCA territory infarct involving right occipital and posteromedial right temporal lobes. Stable chronic right basal ganglia lacunar infarcts.\" MRI was limited by motion artifact.   Stratus  utilized for session; pt's primary language is ASL. Pt denies difficulty with communication or difficulty with swallowing. Pt reports some difficulty chewing at baseline 2/2 dentition, but just avoids certain coarse foods on the menu to compensate. Pt's oral mech/CN exam was grossly WNL. Pt observed with AM pills whole with thin liquids with functional manipulation of bolus and no overt s/s aspiration. Pt demonstrated functional mastication with regular solids. Bite-strength is adequate.   Recommend continuing Regular/thin liquid diet. ST services not indicated at this time. Please re-consult with any new concerns.       Eval only, No f/u tx indicated.     Vitals:    05/10/24 0100 05/10/24 0300 05/10/24 0727 05/10/24 0826   BP: 141/81 170/88 (!) 192/87 (!) 192/87   BP Location: Left arm Left arm Left arm    Pulse: 76 74 75 75   Resp: 18 18 18    Temp: 97.5 °F (36.4 °C) 98.4 °F (36.9 °C) 98.3 °F (36.8 °C)    TempSrc: Temporal Temporal Temporal    SpO2: 96% 96% 96%    Weight:    59 kg (130 lb)   Height:    5' 1\" (1.549 m)     Lab Results   Component Value Date    WBC 6.98 05/09/2024    HGB 16.9 05/09/2024    HCT 49.7 (H) 05/09/2024    MCV 97 05/09/2024     (L) 05/09/2024         H&P/Admit info/ pertinent provider notes: (PMH noted above)  Per ED Notes:     Mal Encarnacion is a 59 y.o. male who presents with " dizziness and RUQ. It started last night and has been constant. RUQ seems unrelated to food, but is very mild at the moment.  Previous takes medication for it, but does not always take his medication. He is unable to describe his dizziness as to if it is room spinning. Denies chest pain currently. Reports that he is able to walk around normally, but having trouble because of dizziness, no falls or head trauma.          Special Studies:  No recent Chest XR results available for this patient.    CTA head/neck:    IMPRESSION:     1.  No acute intracranial CT abnormality.  2.  Stable sequela of chronic right PCA territory infarct involving right occipital and posteromedial right temporal lobes. Stable chronic right basal ganglia lacunar infarcts.  3.  Chronic microangiopathic change.  4.  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 Recommend further assessment with brain MRI.  5.  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  6.  No hemodynamically significant stenosis in bilateral cervical carotid arteries and right vertebral artery.            Previous VBS:  None     Patient's goal: none stated     Did the pt report pain? No   If yes, was nursing notified/was it addressed?    Reason for consult:  New neuro event  Stroke protocol        Food allergies:  No Known Allergies     Current diet:  Regular/thin    Premorbid diet:  Regular/thin    O2 requirements:  RA   Voice/Speech:  WNL   Social:  Intact    Follows commands:  Intact    Cognitive status:  Alert and oriented          Results d/w:  Pt, nursing,

## 2024-05-10 NOTE — UTILIZATION REVIEW
Initial Clinical Review    Admission: Date/Time/Statement:   Admission Orders (From admission, onward)       Ordered        05/09/24 1459  INPATIENT ADMISSION  Once                          Orders Placed This Encounter   Procedures    INPATIENT ADMISSION     Standing Status:   Standing     Number of Occurrences:   1     Order Specific Question:   Level of Care     Answer:   Med Surg [16]     Order Specific Question:   Estimated length of stay     Answer:   More than 2 Midnights     Order Specific Question:   Certification     Answer:   I certify that inpatient services are medically necessary for this patient for a duration of greater than two midnights. See H&P and MD Progress Notes for additional information about the patient's course of treatment.     ED Arrival Information       Expected   -    Arrival   5/9/2024 11:33    Acuity   Urgent              Means of arrival   Walk-In    Escorted by   Self    Service   Family Medicine    Admission type   Emergency              Arrival complaint   dizzy / fatigue             Chief Complaint   Patient presents with    Dizziness     Woke this morning feeling weak and dizzy - c/o R sided abd pain with nausea - took daily meds this AM       Initial Presentation: 59 y.o. male who presents with dizziness and RUQ pain. It started last night and has been constant. RUQ seems unrelated to food, but is very mild at the moment.  Previous takes medication for it, but does not always take his medication. He is unable to describe his dizziness as to if it is room spinning. Denies chest pain currently. Reports that he is able to walk around normally, but having trouble because of dizziness, no falls or head trauma. Plan: Inpatient admission for evaluation and treatment of TIA, ST elevation, dizziness, DM, HLD, HTN: permissive HTN, neuro checks, Neurology consult, PT/OT eval, lipid panel. HgbA1c, MRI brain, telemetry, atorvastatin 40 mg, fall precautions, diabetic diet, hold home  antihypertensives.     Anticipated Length of Stay/Certification Statement:  Patient will be admitted on an Inpatient basis with an anticipated length of stay of  more than 2 midnights.        Date: 5/10   Day 2:     Internal medicine: MRI: limited study, patient could not tolerate . Within limitation, no definite areas of diffusion abnormality to suggest acute ischemia. Echo : The left ventricular ejection fraction is 65%. Systolic function is normal. Wall motion is normal. Diastolic function is mildly abnormal, consistent with grade I (abnormal) relaxation. PT gave information to set up appointment with vestibular therapy. Hold metformin and continue Jardiance. Continue atorvastatin, losartan and Norvasc.     ED Triage Vitals   Temperature Pulse Respirations Blood Pressure SpO2   05/09/24 1147 05/09/24 1147 05/09/24 1147 05/09/24 1147 05/09/24 1147   98.1 °F (36.7 °C) (!) 116 18 (!) 219/114 97 %      Temp Source Heart Rate Source Patient Position - Orthostatic VS BP Location FiO2 (%)   05/09/24 1147 05/09/24 1147 05/09/24 1147 05/09/24 1147 --   Tympanic Monitor Sitting Left arm       Pain Score       05/09/24 1233       7          Wt Readings from Last 1 Encounters:   05/10/24 59 kg (130 lb)     Additional Vital Signs:     Date/Time Temp Pulse Resp BP MAP (mmHg) SpO2 O2 Device   05/10/24 1100 98.2 °F (36.8 °C) 83 18 177/91 Abnormal  139 95 % None (Room air)   05/10/24 0826 -- 75 -- 192/87 Abnormal  -- -- --   05/10/24 0727 98.3 °F (36.8 °C) 75 18 192/87 Abnormal  129 96 % None (Room air)   05/10/24 0300 98.4 °F (36.9 °C) 74 18 170/88 122 96 % None (Room air)   05/10/24 0100 97.5 °F (36.4 °C) 76 18 141/81 -- 96 % None (Room air)   05/09/24 2300 97.6 °F (36.4 °C) 77 18 159/87 108 95 % None (Room air)   05/09/24 2100 97.6 °F (36.4 °C) 78 18 183/88 Abnormal  -- -- --   05/09/24 1900 -- 82 16 157/89 106 95 % None (Room air)   05/09/24 1800 -- 88 16 161/87 103 -- --   05/09/24 1700 98.6 °F (37 °C) 75 16 191/91 Abnormal   137 -- --   05/09/24 1600 97.6 °F (36.4 °C) 70 16 200/100 Abnormal  -- 96 % None (Room air)   05/09/24 1437 -- 71 16 178/93 Abnormal  121 96 % None (Room air)   05/09/24 1356 -- 78 17 186/105 Abnormal  130 95 % None (Room air)   05/09/24 1233 -- -- -- -- -- -- None (Room air)   05/09/24 1226 -- -- 20 -- -- 96 % None (Room air)   05/09/24 1224 -- 101 -- 203/103 Abnormal  -- -- --     Pertinent Labs/Diagnostic Test Results:   MRI brain wo contrast   Final Result by Abner Griffin MD (05/09 1054)      Limited evaluation/incomplete study as above. Consider repeat under sedation as clinically warranted.      Within limitation, no definite areas of diffusion abnormality to suggest acute ischemia.      Workstation performed: [id number]         CTA head and neck with and without contrast   Final Result by Camila Cortes MD (05/09 1351)      1.  No acute intracranial CT abnormality.   2.  Stable sequela of chronic right PCA territory infarct involving right occipital and posteromedial right temporal lobes. Stable chronic right basal ganglia lacunar infarcts.   3.  Chronic microangiopathic change.   4.  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 Recommend further assessment with brain MRI.   5.  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   6.  No hemodynamically significant stenosis in bilateral cervical carotid arteries and right vertebral artery.                           I personally discussed this study with HAILEY ORNELAS on 5/9/2024 1:44 PM.            Workstation performed: [id number]           5/10 Echo:  Interpretation Summary    Left Ventricle: Left ventricular cavity size is normal. Wall thickness is moderately increased. There is moderate concentric hypertrophy. The left ventricular ejection fraction is 65%. Systolic function is normal. Wall motion is normal. Diastolic function is mildly abnormal, consistent with grade I (abnormal)  relaxation.    Atrial Septum: No patent foramen ovale detected, confirmed at rest using agitated saline contrast, confirmed by provocation with cough, using agitated saline contrast and with valsalva, using agitated saline contrast.    Aortic Valve: There is mild regurgitation.    5/9 EKG:  Sinus tachycardia  Abnormal ECG  When compared with ECG of 16-MAR-2024 15:05,  Vent. rate has increased BY  60 BPM  T wave inversion no longer evident in Inferior leads  T wave inversion no longer evident in Anterolateral leads    Results from last 7 days   Lab Units 05/09/24  1206   SARS-COV-2  Negative     Results from last 7 days   Lab Units 05/09/24  1206   WBC Thousand/uL 6.98   HEMOGLOBIN g/dL 16.9   HEMATOCRIT % 49.7*   PLATELETS Thousands/uL 142*         Results from last 7 days   Lab Units 05/10/24  0537 05/09/24  1209   SODIUM mmol/L 137 137   POTASSIUM mmol/L 3.9 3.9   CHLORIDE mmol/L 99 98   CO2 mmol/L 24 25   ANION GAP mmol/L 14* 14*   BUN mg/dL 11 8   CREATININE mg/dL 0.63 0.69   EGFR ml/min/1.73sq m 107 103   CALCIUM mg/dL 9.3 9.6         Results from last 7 days   Lab Units 05/09/24  1204   POC GLUCOSE mg/dl 113     Results from last 7 days   Lab Units 05/10/24  0537 05/09/24  1209   GLUCOSE RANDOM mg/dL 85 97         Results from last 7 days   Lab Units 05/10/24  0537   HEMOGLOBIN A1C % 5.4   EAG mg/dl 108       Results from last 7 days   Lab Units 05/09/24  1717 05/09/24  1401 05/09/24  1206   HS TNI 0HR ng/L  --   --  19   HS TNI 2HR ng/L  --  29  --    HSTNI D2 ng/L  --  10  --    HS TNI 4HR ng/L 43  --   --    HSTNI D4 ng/L 24*  --   --          Results from last 7 days   Lab Units 05/09/24  1209   PROTIME seconds 13.4   INR  0.99   PTT seconds 27           Results from last 7 days   Lab Units 05/09/24  1206   INFLUENZA A PCR  Negative   INFLUENZA B PCR  Negative   RSV PCR  Negative         ED Treatment:   Medication Administration from 05/09/2024 1133 to 05/09/2024 1600         Date/Time Order Dose Route  Action     05/09/2024 1224 EDT meclizine (ANTIVERT) tablet 50 mg 50 mg Oral Given     05/09/2024 1224 EDT diazepam (VALIUM) tablet 5 mg 5 mg Oral Given     05/09/2024 1224 EDT metoprolol tartrate (LOPRESSOR) tablet 50 mg 50 mg Oral Given     05/09/2024 1302 EDT iohexol (OMNIPAQUE) 350 MG/ML injection (MULTI-DOSE) 85 mL 85 mL Intravenous Given     05/09/2024 1508 EDT clopidogrel (PLAVIX) tablet 300 mg 300 mg Oral Given     05/09/2024 1508 EDT aspirin tablet 325 mg 325 mg Oral Given          Past Medical History:   Diagnosis Date    Deaf     Heart murmur     Hypertension      Present on Admission:   Primary hypertension   Other hyperlipidemia   Dizziness   Type 2 diabetes mellitus without complication, without long-term current use of insulin ([facility])      Admitting Diagnosis: Vertigo [R42]  Headache [R51.9]  Age/Sex: 59 y.o. male  Admission Orders:  Scheduled Medications:  amLODIPine, 5 mg, Oral, Daily  atorvastatin, 40 mg, Oral, Daily With Dinner  folic acid, 1 mg, Oral, Daily  losartan, 50 mg, Oral, Daily  meclizine, 25 mg, Oral, Q8H RALF  pantoprazole, 20 mg, Oral, Daily Before Breakfast  thiamine, 100 mg, Oral, Daily      Continuous IV Infusions:     PRN Meds:  acetaminophen, 650 mg, Oral, Q8H PRN        IP CONSULT TO CASE MANAGEMENT  IP CONSULT TO PHYSICAL MEDICINE REHAB    Network Utilization Review Department  ATTENTION: Please call with any questions or concerns to [phone] and carefully listen to the prompts so that you are directed to the right person. All voicemails are confidential.   For Discharge needs, contact Care Management DC Support Team at [phone] opt. 2  Send all requests for admission clinical reviews, approved or denied determinations and any other requests to dedicated fax number below belonging to the campus where the patient is receiving treatment. List of dedicated fax numbers for the Facilities:  FACILITY NAME UR FAX NUMBER   ADMISSION DENIALS (Administrative/Medical Necessity)  [phone]   DISCHARGE SUPPORT TEAM (NETWORK) [phone]   PARENT CHILD HEALTH (Maternity/NICU/Pediatrics) [phone]   [facility] [phone]   [facility] [phone]   [facility] [phone]   General acute hospital [phone]   [facility] [phone]   [facility] [phone]   [facility] [phone]   [facility] [phone]   [facility] [phone]   [facility] [phone]   [facility] [phone]   [facility] [phone]

## 2024-05-13 ENCOUNTER — TRANSITIONAL CARE MANAGEMENT (OUTPATIENT)
Dept: FAMILY MEDICINE CLINIC | Facility: CLINIC | Age: 59
End: 2024-05-13

## 2024-05-13 ENCOUNTER — TELEPHONE (OUTPATIENT)
Dept: FAMILY MEDICINE CLINIC | Facility: CLINIC | Age: 59
End: 2024-05-13

## 2024-05-13 ENCOUNTER — TELEPHONE (OUTPATIENT)
Dept: NEUROLOGY | Facility: CLINIC | Age: 59
End: 2024-05-13

## 2024-05-14 NOTE — UTILIZATION REVIEW
NOTIFICATION OF ADMISSION DISCHARGE   This is a Notification of Discharge from [facility]. Please be advised that this patient has been discharge from our facility. Below you will find the admission and discharge date and time including the patient’s disposition.   UTILIZATION REVIEW CONTACT:  Becca Watson MA  Utilization   Network Utilization Review Department  Phone: [phone] x carefully listen to the prompts. All voicemails are confidential.  Email: [email or web address]     ADMISSION INFORMATION  PRESENTATION DATE: 5/9/2024 11:38 AM  OBERVATION ADMISSION DATE:   INPATIENT ADMISSION DATE: 5/9/24  2:59 PM   DISCHARGE DATE: 5/10/2024  2:46 PM   DISPOSITION:Home/Self Care    Network Utilization Review Department  ATTENTION: Please call with any questions or concerns to [phone] and carefully listen to the prompts so that you are directed to the right person. All voicemails are confidential.   For Discharge needs, contact Care Management DC Support Team at [phone] opt. 2  Send all requests for admission clinical reviews, approved or denied determinations and any other requests to dedicated fax number below belonging to the campus where the patient is receiving treatment. List of dedicated fax numbers for the Facilities:  FACILITY NAME UR FAX NUMBER   ADMISSION DENIALS (Administrative/Medical Necessity) [phone]   DISCHARGE SUPPORT TEAM ([facility]) [phone]   PARENT CHILD HEALTH (Maternity/NICU/Pediatrics) [phone]   [facility] [phone]   [facility] [phone]   [facility] [phone]   [facility] [phone]   [facility] [phone]   [facility] [phone]   [facility] [phone]   [facility] [phone]    [facility] [phone]   [facility] [phone]   [facility] [phone]   [facility] [phone]

## 2024-05-21 ENCOUNTER — RA CDI HCC (OUTPATIENT)
Dept: OTHER | Facility: HOSPITAL | Age: 59
End: 2024-05-21

## 2024-05-26 NOTE — ASSESSMENT & PLAN NOTE
Lab Results   Component Value Date    HGBA1C 5.4 05/10/2024     Controlled, currently in prediabetic range  DFE  Home medication: No antidiabetic medications, atorvastatin 40 mg daily    Plan:  Follow-up with ophthalmology  Atorvastatin 40 mg daily  Follow-up with HbA1c in 6 months

## 2024-05-26 NOTE — PROGRESS NOTES
Ambulatory Visit  Name: Mal Encarnacion      : 1965      MRN: [id number]  Encounter Provider: Awa Shepherd MD  Encounter Date: 2024   Encounter department: [facility]    Assessment & Plan   1. Primary hypertension  Assessment & Plan:  BP Readings from Last 3 Encounters:   05/10/24 (!) 177/91   24 140/80   24 161/81   Patient recently hospitalized on 5/10/2024 for elevated blood pressure and possible strokelike symptoms.     Medications: Losartan 50 mg  daily, amlodipine 10 mg daily     Plan:  Increase losartan to 100 mg diaily    Continue amlodipine 10 mg daily   Follow-up in 1 month    Orders:  -     amLODIPine (NORVASC) 10 mg tablet; Take 1 tablet (10 mg total) by mouth daily  -     losartan (COZAAR) 100 MG tablet; Take 1 tablet (100 mg total) by mouth daily  2. Dizziness  Assessment & Plan:  Patient was admitted to [place] for dizziness from 2024 to 5/10/2024.     CT scan head: Age-indeterminate likely chronic occluded right posterior cerebral artery from its origin. Recommend further assessment with brain MRI.   MRI brain: No acute ischemia  Patient was advised to follow-up with vestibular PT    Plan:  Follow-up with vestibular PT  Orders:  -     Ambulatory Referral to Physical Therapy; Future  3. Junctional bradycardia  Assessment & Plan:  Patient previously hospitalized for possible ST elevation myocardial infarction, cath was performed, nonobstructive CAD.  Patient was told to follow-up for Holter monitor.    Plan:  Follow-up with cardiology as scheduled on 2024  4. Type 2 diabetes mellitus without complication, without long-term current use of insulin ([facility])  Assessment & Plan:    Lab Results   Component Value Date    HGBA1C 5.4 05/10/2024     Controlled, currently in prediabetic range  DFE  Home medication: No antidiabetic medications, atorvastatin 40 mg daily    Plan:  Follow-up with ophthalmology  Atorvastatin 40 mg  daily  Follow-up with HbA1c in 6 months  5. Encounter for screening colonoscopy  Assessment & Plan:  Plan:  Ambulatory referral to gastroenterology  Orders:  -     Ambulatory Referral to Gastroenterology; Future  6. Alcohol use disorder  -     folic acid (FOLVITE) 1 mg tablet; Take 1 tablet (1 mg total) by mouth daily  7. Thrombocytopenia (HCC)  Assessment & Plan:  History of thrombocytopenia ranging from 100 K to 150 K chronic  No easy bruising reported by patient, bleeding from nose gums urinary tract or GI.  8. Vertigo  -     Ambulatory Referral to Physical Therapy; Future  -     meclizine (ANTIVERT) 25 mg tablet; Take 1 tablet (25 mg total) by mouth every 8 (eight) hours Use for the next 3-4 days for dizziness  9. CAD (coronary artery disease)  -     atorvastatin (LIPITOR) 40 mg tablet; Take 1 tablet (40 mg total) by mouth daily with dinner  -     aspirin 81 mg chewable tablet; Chew 1 tablet (81 mg total) daily  10. Epigastric pain  -     Ambulatory Referral to Gastroenterology; Future       History of Present Illness     HPI    Patient is seen for follow-up of dizziness.  Patient was recently admitted from May 9 to May 10 for stroke rule out.  Patient was encouraged to follow-up with outpatient vestibular therapy (was provided a list of available locations with contact information), and cardiology after discharge.  He was discharged with meclizine as needed.  Currently his dizziness has subsided. He takes a medication to decrease the dizziness. Every time he takes it the dizziness goes away. He does not recall being provided with any information for vestibular PT therefore has not made an appointment.   Hes been using a cane, but he has to be very careful as to not get dizzy, and uses the meclizine everyday and it helps. No falls or syncope.    Review of Systems   Constitutional:  Negative for chills, fatigue and fever.   Respiratory:  Negative for cough, chest tightness, shortness of breath and wheezing.   "  Cardiovascular:  Negative for chest pain, palpitations and leg swelling.   Neurological:  Negative for dizziness, syncope and weakness.     Pertinent Medical History   HLD, CAD, DM, Hypertension, dizziness, congenital deafness    Objective     /80   Pulse 76   Temp 98.3 °F (36.8 °C) (Temporal)   Resp 18   Ht 5' 1\" (1.549 m)   Wt 58.2 kg (128 lb 6.4 oz)   SpO2 99%   BMI 24.26 kg/m²     Physical Exam  Constitutional:       General: He is not in acute distress.     Appearance: Normal appearance. He is normal weight. He is not ill-appearing or toxic-appearing.   HENT:      Head: Normocephalic and atraumatic.      Right Ear: External ear normal.      Left Ear: External ear normal.      Nose: Nose normal.      Mouth/Throat:      Mouth: Mucous membranes are moist.   Eyes:      Conjunctiva/sclera: Conjunctivae normal.   Cardiovascular:      Rate and Rhythm: Normal rate and regular rhythm.      Pulses: Normal pulses. no weak pulses.           Dorsalis pedis pulses are 2+ on the right side and 2+ on the left side.        Posterior tibial pulses are 2+ on the right side and 2+ on the left side.      Heart sounds: Normal heart sounds. No murmur heard.     No friction rub. No gallop.   Pulmonary:      Effort: Pulmonary effort is normal.      Breath sounds: Normal breath sounds.   Feet:      Right foot:      Skin integrity: No ulcer, skin breakdown, erythema, warmth, callus or dry skin.      Left foot:      Skin integrity: No ulcer, skin breakdown, erythema, warmth, callus or dry skin.   Skin:     General: Skin is warm.      Capillary Refill: Capillary refill takes less than 2 seconds.   Neurological:      Mental Status: He is alert.     Administrative Statements   I have spent a total time of 15 minutes on 05/28/24 In caring for this patient including Prognosis, Importance of tx compliance, Documenting in the medical record, and Reviewing / ordering tests, medicine, procedures  .    Patient's shoes and socks " removed.    Right Foot/Ankle   Right Foot Inspection  Skin Exam: skin normal. Skin not intact, no dry skin, no warmth, no callus, no erythema, no maceration, no abnormal color, no pre-ulcer, no ulcer and no callus.     Toe Exam: No swelling, no tenderness, erythema and  no right toe deformity    Sensory   Proprioception: intact  Monofilament testing: intact    Vascular  Capillary refills: < 3 seconds  The right DP pulse is 2+. The right PT pulse is 2+.     Left Foot/Ankle  Left Foot Inspection  Skin Exam: skin normal and skin intact. No dry skin, no warmth, no erythema, no maceration, normal color, no pre-ulcer, no ulcer and no callus.     Toe Exam: No swelling, no tenderness, no erythema and no left toe deformity.     Sensory   Proprioception: intact  Monofilament testing: intact    Vascular  Capillary refills: < 3 seconds  The left DP pulse is 2+. The left PT pulse is 2+.     Assign Risk Category  No deformity present  No loss of protective sensation  No weak pulses  Risk: 0

## 2024-05-26 NOTE — ASSESSMENT & PLAN NOTE
Patient previously hospitalized for possible ST elevation myocardial infarction, cath was performed, nonobstructive CAD.  Patient was told to follow-up for Holter monitor.    Plan:  Follow-up with cardiology as scheduled on July 1, 2024

## 2024-05-26 NOTE — ASSESSMENT & PLAN NOTE
Patient was admitted to [place] for dizziness from 5/09/2024 to 5/10/2024.     CT scan head: Age-indeterminate likely chronic occluded right posterior cerebral artery from its origin. Recommend further assessment with brain MRI.   MRI brain: No acute ischemia  Patient was advised to follow-up with vestibular PT    Plan:  Follow-up with vestibular PT

## 2024-05-26 NOTE — ASSESSMENT & PLAN NOTE
BP Readings from Last 3 Encounters:   05/10/24 (!) 177/91   03/18/24 140/80   03/17/24 161/81   Patient recently hospitalized on 5/10/2024 for elevated blood pressure and possible strokelike symptoms.     Medications: Losartan 50 mg  daily, amlodipine 10 mg daily     Plan:  Increase losartan to 100 mg diaily    Continue amlodipine 10 mg daily   Follow-up in 1 month

## 2024-05-27 NOTE — ASSESSMENT & PLAN NOTE
History of thrombocytopenia ranging from 100 K to 150 K chronic  No easy bruising reported by patient, bleeding from nose gums urinary tract or GI.

## 2024-05-28 ENCOUNTER — OFFICE VISIT (OUTPATIENT)
Dept: FAMILY MEDICINE CLINIC | Facility: CLINIC | Age: 59
End: 2024-05-28

## 2024-05-28 VITALS
HEART RATE: 76 BPM | RESPIRATION RATE: 18 BRPM | WEIGHT: 128.4 LBS | SYSTOLIC BLOOD PRESSURE: 160 MMHG | TEMPERATURE: 98.3 F | DIASTOLIC BLOOD PRESSURE: 80 MMHG | BODY MASS INDEX: 24.24 KG/M2 | HEIGHT: 61 IN | OXYGEN SATURATION: 99 %

## 2024-05-28 DIAGNOSIS — R42 VERTIGO: ICD-10-CM

## 2024-05-28 DIAGNOSIS — F10.90 ALCOHOL USE DISORDER: ICD-10-CM

## 2024-05-28 DIAGNOSIS — R00.1 JUNCTIONAL BRADYCARDIA: ICD-10-CM

## 2024-05-28 DIAGNOSIS — I25.10 CAD (CORONARY ARTERY DISEASE): ICD-10-CM

## 2024-05-28 DIAGNOSIS — R42 DIZZINESS: ICD-10-CM

## 2024-05-28 DIAGNOSIS — E11.9 TYPE 2 DIABETES MELLITUS WITHOUT COMPLICATION, WITHOUT LONG-TERM CURRENT USE OF INSULIN (HCC): ICD-10-CM

## 2024-05-28 DIAGNOSIS — Z12.11 ENCOUNTER FOR SCREENING COLONOSCOPY: ICD-10-CM

## 2024-05-28 DIAGNOSIS — D69.6 THROMBOCYTOPENIA (HCC): ICD-10-CM

## 2024-05-28 DIAGNOSIS — R10.13 EPIGASTRIC PAIN: ICD-10-CM

## 2024-05-28 DIAGNOSIS — I10 PRIMARY HYPERTENSION: Primary | ICD-10-CM

## 2024-05-28 PROCEDURE — G2211 COMPLEX E/M VISIT ADD ON: HCPCS | Performed by: FAMILY MEDICINE

## 2024-05-28 PROCEDURE — 3077F SYST BP >= 140 MM HG: CPT | Performed by: FAMILY MEDICINE

## 2024-05-28 PROCEDURE — 99213 OFFICE O/P EST LOW 20 MIN: CPT | Performed by: FAMILY MEDICINE

## 2024-05-28 PROCEDURE — 3079F DIAST BP 80-89 MM HG: CPT | Performed by: FAMILY MEDICINE

## 2024-05-28 RX ORDER — LOSARTAN POTASSIUM 100 MG/1
100 TABLET ORAL DAILY
Qty: 30 TABLET | Refills: 0 | Status: SHIPPED | OUTPATIENT
Start: 2024-05-28 | End: 2024-06-03

## 2024-05-28 RX ORDER — ATORVASTATIN CALCIUM 40 MG/1
40 TABLET, FILM COATED ORAL
Qty: 30 TABLET | Refills: 0 | Status: SHIPPED | OUTPATIENT
Start: 2024-05-28 | End: 2024-06-27

## 2024-05-28 RX ORDER — MECLIZINE HYDROCHLORIDE 25 MG/1
25 TABLET ORAL EVERY 8 HOURS SCHEDULED
Qty: 30 TABLET | Refills: 0 | Status: SHIPPED | OUTPATIENT
Start: 2024-05-28

## 2024-05-28 RX ORDER — FOLIC ACID 1 MG/1
1 TABLET ORAL DAILY
Qty: 30 TABLET | Refills: 3 | Status: SHIPPED | OUTPATIENT
Start: 2024-05-28 | End: 2024-09-25

## 2024-05-28 RX ORDER — ASPIRIN 81 MG/1
81 TABLET, CHEWABLE ORAL DAILY
Qty: 30 TABLET | Refills: 3 | Status: SHIPPED | OUTPATIENT
Start: 2024-05-28 | End: 2024-09-25

## 2024-05-28 RX ORDER — AMLODIPINE BESYLATE 10 MG/1
10 TABLET ORAL DAILY
Qty: 30 TABLET | Refills: 4 | Status: SHIPPED | OUTPATIENT
Start: 2024-05-28 | End: 2024-10-25

## 2024-05-29 ENCOUNTER — PATIENT OUTREACH (OUTPATIENT)
Dept: FAMILY MEDICINE CLINIC | Facility: CLINIC | Age: 59
End: 2024-05-29

## 2024-05-29 NOTE — PROGRESS NOTES
Per chart review this seems that Pt did show up for his appointment 5/28/2024. MIAH HAWKINS could not meet up with Pt since MIAH HAWKINS was out of the office. Noted that Pt has an upcoming appointment 6/3/2024. MIAH HAWKINS will attempt to meet up with Pt if shows up.    MIAH HAWKINS is remain available for further assistance as needed.  MIAH HAWKINS will continue to follow-up.

## 2024-06-03 ENCOUNTER — OFFICE VISIT (OUTPATIENT)
Dept: FAMILY MEDICINE CLINIC | Facility: CLINIC | Age: 59
End: 2024-06-03

## 2024-06-03 VITALS
HEART RATE: 69 BPM | TEMPERATURE: 97.9 F | HEIGHT: 61 IN | DIASTOLIC BLOOD PRESSURE: 78 MMHG | OXYGEN SATURATION: 97 % | BODY MASS INDEX: 23.98 KG/M2 | WEIGHT: 127 LBS | SYSTOLIC BLOOD PRESSURE: 185 MMHG | RESPIRATION RATE: 18 BRPM

## 2024-06-03 DIAGNOSIS — I51.7 LEFT VENTRICULAR HYPERTROPHY: ICD-10-CM

## 2024-06-03 DIAGNOSIS — F10.90 ALCOHOL USE DISORDER: ICD-10-CM

## 2024-06-03 DIAGNOSIS — R10.13 EPIGASTRIC PAIN: Primary | ICD-10-CM

## 2024-06-03 DIAGNOSIS — R42 DIZZINESS: ICD-10-CM

## 2024-06-03 DIAGNOSIS — R42 VERTIGO: ICD-10-CM

## 2024-06-03 DIAGNOSIS — I10 PRIMARY HYPERTENSION: ICD-10-CM

## 2024-06-03 PROBLEM — E87.1 HYPONATREMIA: Status: RESOLVED | Noted: 2023-03-02 | Resolved: 2024-06-03

## 2024-06-03 PROCEDURE — 3078F DIAST BP <80 MM HG: CPT | Performed by: FAMILY MEDICINE

## 2024-06-03 PROCEDURE — 99214 OFFICE O/P EST MOD 30 MIN: CPT | Performed by: FAMILY MEDICINE

## 2024-06-03 PROCEDURE — G2211 COMPLEX E/M VISIT ADD ON: HCPCS | Performed by: FAMILY MEDICINE

## 2024-06-03 PROCEDURE — 3077F SYST BP >= 140 MM HG: CPT | Performed by: FAMILY MEDICINE

## 2024-06-03 RX ORDER — OMEPRAZOLE 40 MG/1
40 CAPSULE, DELAYED RELEASE ORAL
Qty: 30 CAPSULE | Refills: 5 | Status: SHIPPED | OUTPATIENT
Start: 2024-06-03 | End: 2024-11-30

## 2024-06-03 RX ORDER — LOSARTAN POTASSIUM 50 MG/1
50 TABLET ORAL DAILY
Qty: 30 TABLET | Refills: 1 | Status: SHIPPED | OUTPATIENT
Start: 2024-06-03 | End: 2024-07-03

## 2024-06-03 RX ORDER — HYDROCHLOROTHIAZIDE 25 MG/1
25 TABLET ORAL DAILY
Qty: 30 TABLET | Refills: 1 | Status: SHIPPED | OUTPATIENT
Start: 2024-06-03

## 2024-06-03 NOTE — ASSESSMENT & PLAN NOTE
History of ST elevation from recent EKG  Patient currently asymptomatic from a cardiac standpoint  Follow-up outpatient with cardiology

## 2024-06-03 NOTE — PROGRESS NOTES
Ambulatory Visit  Name: Mal Encarnacion      : 1965      MRN: [id number]  Encounter Provider: Evelio King MD  Encounter Date: 6/3/2024   Encounter department: [facility]    Assessment & Plan   1. Epigastric pain  Assessment & Plan:  Chronic intermittent abdominal pain  No improvement with pantoprazole  Trial of omeprazole  Referral to GI for further evaluation  Orders:  -     omeprazole (PriLOSEC) 40 MG capsule; Take 1 capsule (40 mg total) by mouth daily before breakfast  2. Alcohol use disorder  Assessment & Plan:  History of binge drinking on the weekends.  Patient reports that he stopped drinking alcohol a few weeks ago.  Encourage patient to continue abstinence from heavy alcohol use.  3. Primary hypertension  Assessment & Plan:  Poorly controlled  Patient stopped losartan 100 mg due to abdominal pain.  He seems to tolerate losartan 50 mg without issues  Continue losartan 50 mg  Continue amlodipine 10 mg  Will add thiazide  Check renal function in 2 weeks  DASH diet discussed with patient and caregiver  Orders:  -     losartan (COZAAR) 50 mg tablet; Take 1 tablet (50 mg total) by mouth daily  -     hydroCHLOROthiazide 25 mg tablet; Take 1 tablet (25 mg total) by mouth daily  -     Basic metabolic panel; Future  4. Vertigo  -     Ambulatory Referral to Physical Therapy; Future  5. Dizziness  Assessment & Plan:  Occasional intermittent vertigo  Referred to PT for vestibular therapy  6. Left ventricular hypertrophy  Assessment & Plan:  History of ST elevation from recent EKG  Patient currently asymptomatic from a cardiac standpoint  Follow-up outpatient with cardiology       History of Present Illness     59-year-old male who is hard of hearing with a history of hypertension and alcohol abuse who presents today for follow-up.  Patient is here with his caregiver.  Patient has intermittent abdominal pain for the past few months.  According to his caregiver he  "was experiencing a lot of upset stomach the other day.  He tried using pantoprazole but it was not helpful.  Patient also reports that he is stomach upset was made worse when she started higher dose of losartan 100 mg.  Patient reports that he has returned to taking 50 mg of losartan with improvement in symptoms.  He would like to see a gastroenterologist for further evaluation.  He also has a history of intermittent vertigo/dizziness.  These episodes do not last long.  Meclizine does improves his symptoms.              Review of Systems   Constitutional:  Negative for chills, fatigue and fever.   Cardiovascular:  Negative for chest pain and palpitations.   Gastrointestinal:  Positive for abdominal pain. Negative for blood in stool, nausea and vomiting.   Genitourinary:  Negative for dysuria.   Musculoskeletal:  Negative for back pain.   Skin:  Negative for rash.   Neurological:  Positive for dizziness. Negative for syncope and light-headedness.   Psychiatric/Behavioral:  Negative for confusion.        Objective     BP (!) 185/78 (BP Location: Right arm, Patient Position: Sitting, Cuff Size: Standard)   Pulse 69   Temp 97.9 °F (36.6 °C) (Temporal)   Resp 18   Ht 5' 1\" (1.549 m)   Wt 57.6 kg (127 lb)   SpO2 97%   BMI 24.00 kg/m²     Physical Exam  Vitals reviewed.   Constitutional:       General: He is not in acute distress.     Appearance: Normal appearance. He is well-developed. He is not toxic-appearing or diaphoretic.   HENT:      Head: Normocephalic and atraumatic.      Right Ear: Tympanic membrane, ear canal and external ear normal. There is no impacted cerumen.      Left Ear: Tympanic membrane, ear canal and external ear normal. There is no impacted cerumen.      Nose: Nose normal.      Mouth/Throat:      Mouth: Mucous membranes are moist.      Pharynx: No oropharyngeal exudate or posterior oropharyngeal erythema.   Eyes:      General: No scleral icterus.        Right eye: No discharge.         Left " eye: No discharge.      Extraocular Movements: Extraocular movements intact.      Conjunctiva/sclera: Conjunctivae normal.      Pupils: Pupils are equal, round, and reactive to light.   Cardiovascular:      Rate and Rhythm: Normal rate and regular rhythm.      Heart sounds: Normal heart sounds. No murmur heard.     No friction rub. No gallop.   Pulmonary:      Effort: Pulmonary effort is normal. No respiratory distress.      Breath sounds: Normal breath sounds. No stridor. No wheezing or rhonchi.   Abdominal:      General: Bowel sounds are normal. There is no distension.      Palpations: Abdomen is soft. There is no mass.      Tenderness: There is no abdominal tenderness. There is no guarding.   Musculoskeletal:         General: Normal range of motion.      Cervical back: Normal range of motion.      Right lower leg: No edema.      Left lower leg: No edema.   Skin:     General: Skin is warm.      Capillary Refill: Capillary refill takes less than 2 seconds.      Findings: No rash.   Neurological:      General: No focal deficit present.      Mental Status: He is alert and oriented to person, place, and time.   Psychiatric:         Mood and Affect: Mood normal.         Behavior: Behavior normal.       Administrative Statements

## 2024-06-03 NOTE — ASSESSMENT & PLAN NOTE
Poorly controlled  Patient stopped losartan 100 mg due to abdominal pain.  He seems to tolerate losartan 50 mg without issues  Continue losartan 50 mg  Continue amlodipine 10 mg  Will add thiazide  Check renal function in 2 weeks  DASH diet discussed with patient and caregiver

## 2024-06-03 NOTE — ASSESSMENT & PLAN NOTE
Chronic intermittent abdominal pain  No improvement with pantoprazole  Trial of omeprazole  Referral to GI for further evaluation

## 2024-06-03 NOTE — ASSESSMENT & PLAN NOTE
History of binge drinking on the weekends.  Patient reports that he stopped drinking alcohol a few weeks ago.  Encourage patient to continue abstinence from heavy alcohol use.

## 2024-06-12 ENCOUNTER — TELEPHONE (OUTPATIENT)
Dept: GASTROENTEROLOGY | Facility: MEDICAL CENTER | Age: 59
End: 2024-06-12

## 2024-06-12 ENCOUNTER — CONSULT (OUTPATIENT)
Dept: GASTROENTEROLOGY | Facility: MEDICAL CENTER | Age: 59
End: 2024-06-12
Payer: MEDICARE

## 2024-06-12 VITALS
HEIGHT: 61 IN | TEMPERATURE: 97.6 F | OXYGEN SATURATION: 98 % | SYSTOLIC BLOOD PRESSURE: 146 MMHG | DIASTOLIC BLOOD PRESSURE: 80 MMHG | HEART RATE: 68 BPM | WEIGHT: 125.4 LBS | BODY MASS INDEX: 23.68 KG/M2

## 2024-06-12 DIAGNOSIS — Z12.11 COLON CANCER SCREENING: ICD-10-CM

## 2024-06-12 DIAGNOSIS — R10.13 EPIGASTRIC PAIN: ICD-10-CM

## 2024-06-12 DIAGNOSIS — Z12.11 ENCOUNTER FOR SCREENING COLONOSCOPY: ICD-10-CM

## 2024-06-12 DIAGNOSIS — R10.11 RUQ PAIN: Primary | ICD-10-CM

## 2024-06-12 DIAGNOSIS — K74.69 OTHER CIRRHOSIS OF LIVER (HCC): ICD-10-CM

## 2024-06-12 PROCEDURE — 99204 OFFICE O/P NEW MOD 45 MIN: CPT | Performed by: PHYSICIAN ASSISTANT

## 2024-06-12 RX ORDER — POLYETHYLENE GLYCOL 3350 17 G/17G
17 POWDER, FOR SOLUTION ORAL DAILY
Qty: 238 G | Refills: 0 | Status: SHIPPED | OUTPATIENT
Start: 2024-06-12

## 2024-06-12 RX ORDER — BISACODYL 5 MG/1
TABLET, DELAYED RELEASE ORAL
Qty: 2 TABLET | Refills: 0 | Status: SHIPPED | OUTPATIENT
Start: 2024-06-12

## 2024-06-12 NOTE — PROGRESS NOTES
[facility] - Outpatient Consultation  Mal Encarnacion 59 y.o. male MRN: [id number]  Encounter: [id number]      Assessment and Plan    1. RUQ pain  The patient is having right upper quadrant pain with no other associated symptoms including nausea or vomiting.  The patient states that sometimes it is there when he wakes up and it also occurs after eating.  He had a recent CT scan without any etiology although it did reveal cirrhotic morphology. He is on omeprazole without any improvement  -Obtain RUQ U/S  -Discussed possible EGD however the patient wishes to hold on this for now     2. Constipation  The patient states he has 1-2 bowel movements a day but they are hard and he has to strain.  -Recommend MiraLAX 17 g once daily to titrate to have soft formed daily bowel movements    3. Colon cancer screening   No prior colon cancer screening.  Recent CT scan of the abdomen and pelvis with IV contrast revealed short segment sigmoid colon wall thickening concerning for focal colitis versus underdistention, colonoscopy was recommended to rule out neoplasm  -Discussed colonoscopy, the patient is agreeable to proceeding  -I obtained informed consent from the patient. The risks/benefits/alternatives of the procedure were discussed with the patient. Risks included, but not limited to, infection, bleeding, perforation, injury to organs in the abdomen, missed lesion and incomplete procedure were discussed. Patient was agreeable and electronic signature was obtained.    4.  Abnormal CT scan  Upon reviewing the patient's after he left I noted recent CT scan revealed cirrhotic morphology. His liver enzymes are within normal limits as is his INR and albumin however his PLT count is low at 142.  His chart states that he has alcohol abuse disorder.  Uncertain if he is currently drinking.  -Will call the patient and recommend U/S elastography to determine if true cirrhosis, if true cirrhosis will proceed with  serologic liver workup, recommend alcohol cessation, and proceed with EGD for variceal screening     Follow up after colonoscopy     ______________________________________________________________________    History of Present Illness  Mal Encarnacion is a 59 y.o. male with HTN, CAD, DM2 here for consultation of right upper quadrant pain.  The patient states that this has been ongoing for just a couple of days.  It is not associated with any nausea or vomiting.  The patient takes omeprazole and states that this is an effective.  When asked if he has acid reflux he states that he does not and he is not sure how long he has been taking omeprazole.  He did have a recent CT scan while in the hospital secondary to dizziness which is now relieved on Antivert.  CT scan revealed cirrhotic liver morphology and some questionable sigmoid colitis.  The patient has not had a prior EGD or colonoscopy.      Review of Systems   Constitutional:  Negative for activity change, appetite change, chills, diaphoresis, fatigue, fever and unexpected weight change.   Gastrointestinal:  Positive for abdominal pain and constipation. Negative for abdominal distention, anal bleeding, blood in stool, diarrhea, nausea, rectal pain and vomiting.       Past Medical History  Past Medical History:   Diagnosis Date    Deaf     Heart murmur     Hypertension        Past Social history  Past Surgical History:   Procedure Laterality Date    CARDIAC CATHETERIZATION N/A 3/16/2024    Procedure: Cardiac pci;  Surgeon: Mal Nixon MD;  Location: [facility];  Service: Cardiology     Social History     Socioeconomic History    Marital status: Single     Spouse name: Not on file    Number of children: Not on file    Years of education: Not on file    Highest education level: Not on file   Occupational History    Not on file   Tobacco Use    Smoking status: Never     Passive exposure: Never    Smokeless tobacco: Never   Vaping Use    Vaping status: Never  Used   Substance and Sexual Activity    Alcohol use: Yes     Comment: 1 beer occasionally    Drug use: Never    Sexual activity: Not on file   Other Topics Concern    Not on file   Social History Narrative    Not on file     Social Determinants of Health     Financial Resource Strain: Low Risk  (8/25/2023)    Overall Financial Resource Strain (CARDIA)     Difficulty of Paying Living Expenses: Not very hard   Food Insecurity: No Food Insecurity (5/10/2024)    Hunger Vital Sign     Worried About Running Out of Food in the Last Year: Never true     Ran Out of Food in the Last Year: Never true   Transportation Needs: No Transportation Needs (5/10/2024)    PRAPARE - Transportation     Lack of Transportation (Medical): No     Lack of Transportation (Non-Medical): No   Physical Activity: Not on file   Stress: Not on file   Social Connections: Not on file   Intimate Partner Violence: Not on file   Housing Stability: Low Risk  (5/10/2024)    Housing Stability Vital Sign     Unable to Pay for Housing in the Last Year: No     Number of Times Moved in the Last Year: 1     Homeless in the Last Year: No     Social History     Substance and Sexual Activity   Alcohol Use Yes    Comment: 1 beer occasionally     Social History     Substance and Sexual Activity   Drug Use Never     Social History     Tobacco Use   Smoking Status Never    Passive exposure: Never   Smokeless Tobacco Never       Past Family History  Family History   Problem Relation Age of Onset    Hypertension Mother     Diabetes Mother     Heart disease Mother         pacemaker    Arthritis Mother         chronic       Current Medications  Current Outpatient Medications   Medication Sig Dispense Refill    amLODIPine (NORVASC) 10 mg tablet Take 1 tablet (10 mg total) by mouth daily 30 tablet 4    aspirin 81 mg chewable tablet Chew 1 tablet (81 mg total) daily 30 tablet 3    atorvastatin (LIPITOR) 40 mg tablet Take 1 tablet (40 mg total) by mouth daily with dinner 30  "tablet 0    bisacodyl (DULCOLAX) 5 mg EC tablet Take as directed as per written office instructions 2 tablet 0    Blood Pressure KIT Use 2 (two) times a day 1 kit 0    folic acid (FOLVITE) 1 mg tablet Take 1 tablet (1 mg total) by mouth daily 30 tablet 3    hydroCHLOROthiazide 25 mg tablet Take 1 tablet (25 mg total) by mouth daily 30 tablet 1    losartan (COZAAR) 50 mg tablet Take 1 tablet (50 mg total) by mouth daily 30 tablet 1    meclizine (ANTIVERT) 25 mg tablet Take 1 tablet (25 mg total) by mouth every 8 (eight) hours Use for the next 3-4 days for dizziness 30 tablet 0    omeprazole (PriLOSEC) 40 MG capsule Take 1 capsule (40 mg total) by mouth daily before breakfast 30 capsule 5    polyethylene glycol (GLYCOLAX) 17 GM/SCOOP powder Take 17 g by mouth daily Take as directed as per written office instructions 238 g 0    polyethylene glycol (GOLYTELY) 4000 mL solution Take 4,000 mL by mouth once for 1 dose 4000 mL 0    thiamine 100 MG tablet Take 1 tablet (100 mg total) by mouth daily 30 tablet 0     No current facility-administered medications for this visit.       Allergies  No Known Allergies      The following portions of the patient's history were reviewed and updated as appropriate: allergies, current medications, past medical history, past social history, past surgical history and problem list.      Vitals  Vitals:    06/12/24 1115   BP: 146/80   BP Location: Left arm   Pulse: 68   Temp: 97.6 °F (36.4 °C)   SpO2: 98%   Weight: 56.9 kg (125 lb 6.4 oz)   Height: 5' 1\" (1.549 m)         Physical Exam  Constitutional   General appearance: Patient is seated and in no acute distress, well appearing and well nourished.   Head and Face   Head and face: Normal.    Eyes   Conjunctiva and lids: No erythema, swelling or discharge.  Anicteric.  Ears, Nose, Mouth, and Throat   Hearing: Normal.    Neck: Supple, trachea midline.  Pulmonary   Respiratory effort: No increased work of breathing or signs of respiratory " distress.    Cardiovascular   Examination of extremities for edema and/or varicosities: Normal.    Musculoskeletal   Gait and station: Normal   Skin   Skin and subcutaneous tissue: Warm, dry, and intact. No visible jaundice, lesions or rashes.  Psychiatric   Judgment and insight: Normal  Recent and remote memory:  Normal  Mood and affect: Normal      Results  No visits with results within 1 Day(s) from this visit.   Latest known visit with results is:   Admission on 05/09/2024, Discharged on 05/10/2024   Component Date Value    Sodium 05/09/2024 137     Potassium 05/09/2024 3.9     Chloride 05/09/2024 98     CO2 05/09/2024 25     ANION GAP 05/09/2024 14 (H)     BUN 05/09/2024 8     Creatinine 05/09/2024 0.69     Glucose 05/09/2024 97     Calcium 05/09/2024 9.6     eGFR 05/09/2024 103     WBC 05/09/2024 6.98     RBC 05/09/2024 5.14     Hemoglobin 05/09/2024 16.9     Hematocrit 05/09/2024 49.7 (H)     MCV 05/09/2024 97     MCH 05/09/2024 32.9     MCHC 05/09/2024 34.0     RDW 05/09/2024 13.4     Platelets 05/09/2024 142 (L)     MPV 05/09/2024 9.5     Protime 05/09/2024 13.4     INR 05/09/2024 0.99     PTT 05/09/2024 27     hs TnI 0hr 05/09/2024 19     SARS-CoV-2 05/09/2024 Negative     INFLUENZA A PCR 05/09/2024 Negative     INFLUENZA B PCR 05/09/2024 Negative     RSV PCR 05/09/2024 Negative     POC Glucose 05/09/2024 113     hs TnI 2hr 05/09/2024 29     Delta 2hr hsTnI 05/09/2024 10     hs TnI 4hr 05/09/2024 43     Delta 4hr hsTnI 05/09/2024 24 (H)     Ventricular Rate 05/09/2024 116     Atrial Rate 05/09/2024 116     MT Interval 05/09/2024 174     QRSD Interval 05/09/2024 78     QT Interval 05/09/2024 310     QTC Interval 05/09/2024 430     P Axis 05/09/2024 71     QRS Axis 05/09/2024 58     T Wave [place] 05/09/2024 87     Ventricular Rate 05/09/2024 70     Atrial Rate 05/09/2024 70     MT Interval 05/09/2024 176     QRSD Interval 05/09/2024 92     QT Interval 05/09/2024 400     QTC Interval 05/09/2024 432     P Axis  05/09/2024 38     QRS [place] 05/09/2024 46     T Wave [place] 05/09/2024 113     Cholesterol 05/10/2024 212 (H)     Triglycerides 05/10/2024 118     HDL, Direct 05/10/2024 89     LDL Calculated 05/10/2024 99     Hemoglobin A1C 05/10/2024 5.4     EAG 05/10/2024 108     Sodium 05/10/2024 137     Potassium 05/10/2024 3.9     Chloride 05/10/2024 99     CO2 05/10/2024 24     ANION GAP 05/10/2024 14 (H)     BUN 05/10/2024 11     Creatinine 05/10/2024 0.63     Glucose 05/10/2024 85     Calcium 05/10/2024 9.3     eGFR 05/10/2024 107     BSA 05/10/2024 1.57     A4C EF 05/10/2024 72     LVIDd 05/10/2024 3.80     LVIDS 05/10/2024 2.50     IVSd 05/10/2024 1.30     LVPWd 05/10/2024 1.00     LVOT peak VTI 05/10/2024 17.85     FS 05/10/2024 34     MV E' Tissue Velocity Se* 05/10/2024 7     E/A ratio 05/10/2024 0.67     E wave deceleration time 05/10/2024 241     MV Peak E Abraham 05/10/2024 59     MV Peak A Abraham 05/10/2024 0.88     AV LVOT peak gradient 05/10/2024 3     LVOT peak abraham 05/10/2024 0.89     LA size 05/10/2024 3.6     Aortic valve peak veloci* 05/10/2024 1.3     Ao VTI 05/10/2024 25.45     AV mean gradient 05/10/2024 3     LVOT mn grad 05/10/2024 2.0     AV peak gradient 05/10/2024 7     MV stenosis pressure 1/2* 05/10/2024 70     MV valve area p 1/2 meth* 05/10/2024 3.14     Ao root 05/10/2024 3.40     Asc Ao 05/10/2024 2.8     Aortic valve mean veloci* 05/10/2024 8.10     Left ventricular stroke * 05/10/2024 39.00     IVS 05/10/2024 1.3     LEFT VENTRICLE SYSTOLIC * 05/10/2024 23     LV DIASTOLIC VOLUME (MOD* 05/10/2024 62     LVSV, 2D 05/10/2024 39     DVI 05/10/2024 0.68     LV EF 05/10/2024 65        Radiology Results  No results found.    Orders  Orders Placed This Encounter   Procedures    US right upper quadrant     Standing Status:   Future     Standing Expiration Date:   6/12/2028     Scheduling Instructions:      NPO Adult Preps- patients over 13 years of age             If your appointment is scheduled BEFORE  12:00pm, you may not eat or drink anything other than water, after midnight on the day of you test. Medications can be taken with water. Diabetics can have juice.              If your appointment is AFTER 12:00pm, you may have a fat free breakfast before 8:00am and remain on clear liquids (no soda, you may have clear tea without dairy, juice or water) until your test is complete.             If your appointment is scheduled AFTER 4:00pm, you may not have anything to eat after 12 noon on the day of the test. Water and juice is allowed up to the appointment time. Do not drink any carbonated beverages. Medications may be taken as needed with water.             Diabetics who need PM appointments may have a light breakfast but cannot have any additional food or drinks after breakfast. Schedule PM appointments for a time 6-8 hours after that morning meal.                  NPO Pediatric Preps birth to 12 years             Infants- Birth to 1 year of age. Hold the feeding closest to the appointment time but not more than 2 hours prior.  Do not bottle or breast feed or give solid foods to the baby during that 2 hour period prior to the appointment.             1 year to 12 years- low fat meal (no dairy) a minimum of 4 hours prior to the appointment. No carbonated beverages are allowed. Water, Juice and clear tea are allowed.              Medications may be taken with water.       ---------------------------------------------------------------------------------------------------------------------             ELASTOGRAPHY PATIENTS ONLY: all preps are for an 6-8 hour period- MINIMUM- NO Medications allowed during that 8 hour period, sips of water or ice chips can be allowed.      Please bring your insurance cards, a form of photo ID and a list of your medications with you. Arrive 15 minutes prior to your appointment time in order to register.            To schedule this appointment, please contact Central Scheduling at [phone].                Colonoscopy     Standing Status:   Future     Standing Expiration Date:   6/12/2025     Order Specific Question:   Requested Site     Answer:   [facility]     Order Specific Question:   Performing Location     Answer:   Endoscopy Dept     Order Specific Question:   Anesthesia required?     Answer:   Yes    EGD     Standing Status:   Future     Standing Expiration Date:   6/12/2025     Order Specific Question:   Requested Site     Answer:   [facility]     Order Specific Question:   Performing Location     Answer:   Endoscopy Dept     Order Specific Question:   Anesthesia required?     Answer:   Yes

## 2024-06-12 NOTE — TELEPHONE ENCOUNTER
Procedure: Colonoscopy  Date: 06/25/2024  Physician performing: Dr. Rooney  Location of procedure:  [facility]  Instructions given to patient: Yes  Diabetic: No  Clearances: N/A

## 2024-06-14 ENCOUNTER — APPOINTMENT (OUTPATIENT)
Dept: LAB | Facility: HOSPITAL | Age: 59
End: 2024-06-14
Payer: MEDICARE

## 2024-06-14 DIAGNOSIS — I10 PRIMARY HYPERTENSION: ICD-10-CM

## 2024-06-14 LAB
ANION GAP SERPL CALCULATED.3IONS-SCNC: 9 MMOL/L (ref 4–13)
BUN SERPL-MCNC: 10 MG/DL (ref 5–25)
CALCIUM SERPL-MCNC: 9.9 MG/DL (ref 8.4–10.2)
CHLORIDE SERPL-SCNC: 101 MMOL/L (ref 96–108)
CO2 SERPL-SCNC: 29 MMOL/L (ref 21–32)
CREAT SERPL-MCNC: 0.77 MG/DL (ref 0.6–1.3)
GFR SERPL CREATININE-BSD FRML MDRD: 99 ML/MIN/1.73SQ M
GLUCOSE P FAST SERPL-MCNC: 102 MG/DL (ref 65–99)
POTASSIUM SERPL-SCNC: 4.4 MMOL/L (ref 3.5–5.3)
SODIUM SERPL-SCNC: 139 MMOL/L (ref 135–147)

## 2024-06-14 PROCEDURE — 36415 COLL VENOUS BLD VENIPUNCTURE: CPT

## 2024-06-14 PROCEDURE — 80048 BASIC METABOLIC PNL TOTAL CA: CPT

## 2024-06-21 ENCOUNTER — HOSPITAL ENCOUNTER (OUTPATIENT)
Dept: ULTRASOUND IMAGING | Facility: HOSPITAL | Age: 59
End: 2024-06-21
Payer: MEDICARE

## 2024-06-21 DIAGNOSIS — R10.11 RUQ PAIN: ICD-10-CM

## 2024-06-21 DIAGNOSIS — K74.69 OTHER CIRRHOSIS OF LIVER (HCC): ICD-10-CM

## 2024-06-21 PROCEDURE — 76705 ECHO EXAM OF ABDOMEN: CPT

## 2024-06-21 PROCEDURE — 76981 USE PARENCHYMA: CPT

## 2024-06-24 RX ORDER — ONDANSETRON 2 MG/ML
4 INJECTION INTRAMUSCULAR; INTRAVENOUS ONCE AS NEEDED
OUTPATIENT
Start: 2024-06-24

## 2024-06-24 RX ORDER — SODIUM CHLORIDE 9 MG/ML
125 INJECTION, SOLUTION INTRAVENOUS CONTINUOUS
OUTPATIENT
Start: 2024-06-24

## 2024-06-24 RX ORDER — ALBUTEROL SULFATE 2.5 MG/3ML
2.5 SOLUTION RESPIRATORY (INHALATION) ONCE AS NEEDED
OUTPATIENT
Start: 2024-06-24

## 2024-06-24 NOTE — ASSESSMENT & PLAN NOTE
No acute signs of bleeding or easy bruising   Platelets downtrending since 12/23   Possible causes include infection or medications however no acute infection or medications likely to cause thrombocytopenia identified     Plan:   Recpeat CBC

## 2024-06-24 NOTE — ASSESSMENT & PLAN NOTE
Home medication: amlodipine 10 mg, QD  Losartan 50 mg QD , HCTZ 12.5 QD  Patient at last visit, uncontrolled added hydrochlorothiazide 12.5 once daily.    Patient is accompanied by a home health aide.  Home health aide has been assisting this patient  For the past 2 weeks.  Patient and home health aide are both unaware that new medication was started.        Plan:   Diet and exercise counseling   Continue home medications   Explained the importance of medication compliance for blood pressure and encouraged him to  the prescription for the new medication.  Follow-up in 2 weeks for blood pressure

## 2024-06-24 NOTE — PATIENT INSTRUCTIONS
Medicare Preventive Visit Patient Instructions  Thank you for completing your Welcome to Medicare Visit or Medicare Annual Wellness Visit today. Your next wellness visit will be due in one year (6/24/2025).  The screening/preventive services that you may require over the next 5-10 years are detailed below. Some tests may not apply to you based off risk factors and/or age. Screening tests ordered at today's visit but not completed yet may show as past due. Also, please note that scanned in results may not display below.  Preventive Screenings:  Service Recommendations Previous Testing/Comments   Colorectal Cancer Screening  Colonoscopy    Fecal Occult Blood Test (FOBT)/Fecal Immunochemical Test (FIT)  Fecal DNA/Cologuard Test  Flexible Sigmoidoscopy Age: 45-75 years old   Colonoscopy: every 10 years (May be performed more frequently if at higher risk)  OR  FOBT/FIT: every 1 year  OR  Cologuard: every 3 years  OR  Sigmoidoscopy: every 5 years  Screening may be recommended earlier than age 45 if at higher risk for colorectal cancer. Also, an individualized decision between you and your healthcare provider will decide whether screening between the ages of 76-85 would be appropriate. Colonoscopy: Not on file  FOBT/FIT: Not on file  Cologuard: Not on file  Sigmoidoscopy: Not on file          Prostate Cancer Screening Individualized decision between patient and health care provider in men between ages of 55-69   Medicare will cover every 12 months beginning on the day after your 50th birthday PSA: No results in last 5 years           Hepatitis C Screening Once for adults born between 1945 and 1965  More frequently in patients at high risk for Hepatitis C Hep C Antibody: 04/05/2023        Diabetes Screening 1-2 times per year if you're at risk for diabetes or have pre-diabetes Fasting glucose: 102 mg/dL (6/14/2024)  A1C: 5.4 % (5/10/2024)      Cholesterol Screening Once every 5 years if you don't have a lipid disorder. May  order more often based on risk factors. Lipid panel: 05/10/2024         Other Preventive Screenings Covered by Medicare:  Abdominal Aortic Aneurysm (AAA) Screening: covered once if your at risk. You're considered to be at risk if you have a family history of AAA or a male between the age of 65-75 who smoking at least 100 cigarettes in your lifetime.  Lung Cancer Screening: covers low dose CT scan once per year if you meet all of the following conditions: (1) Age 55-77; (2) No signs or symptoms of lung cancer; (3) Current smoker or have quit smoking within the last 15 years; (4) You have a tobacco smoking history of at least 20 pack years (packs per day x number of years you smoked); (5) You get a written order from a healthcare provider.  Glaucoma Screening: covered annually if you're considered high risk: (1) You have diabetes OR (2) Family history of glaucoma OR (3)  aged 50 and older OR (4)  American aged 65 and older  Osteoporosis Screening: covered every 2 years if you meet one of the following conditions: (1) Have a vertebral abnormality; (2) On glucocorticoid therapy for more than 3 months; (3) Have primary hyperparathyroidism; (4) On osteoporosis medications and need to assess response to drug therapy.  HIV Screening: covered annually if you're between the age of 15-65. Also covered annually if you are younger than 15 and older than 65 with risk factors for HIV infection. For pregnant patients, it is covered up to 3 times per pregnancy.    Immunizations:  Immunization Recommendations   Influenza Vaccine Annual influenza vaccination during flu season is recommended for all persons aged >= 6 months who do not have contraindications   Pneumococcal Vaccine   * Pneumococcal conjugate vaccine = PCV13 (Prevnar 13), PCV15 (Vaxneuvance), PCV20 (Prevnar 20)  * Pneumococcal polysaccharide vaccine = PPSV23 (Pneumovax) Adults 19-63 yo with certain risk factors or if 65+ yo  If never received any  pneumonia vaccine: recommend Prevnar 20 (PCV20)  Give PCV20 if previously received 1 dose of PCV13 or PPSV23   Hepatitis B Vaccine 3 dose series if at intermediate or high risk (ex: diabetes, end stage renal disease, liver disease)   Respiratory syncytial virus (RSV) Vaccine - COVERED BY MEDICARE PART D  * RSVPreF3 (Arexvy) CDC recommends that adults 60 years of age and older may receive a single dose of RSV vaccine using shared clinical decision-making (SCDM)   Tetanus (Td) Vaccine - COST NOT COVERED BY MEDICARE PART B Following completion of primary series, a booster dose should be given every 10 years to maintain immunity against tetanus. Td may also be given as tetanus wound prophylaxis.   Tdap Vaccine - COST NOT COVERED BY MEDICARE PART B Recommended at least once for all adults. For pregnant patients, recommended with each pregnancy.   Shingles Vaccine (Shingrix) - COST NOT COVERED BY MEDICARE PART B  2 shot series recommended in those 19 years and older who have or will have weakened immune systems or those 50 years and older     Health Maintenance Due:      Topic Date Due   • Colorectal Cancer Screening  Never done   • HIV Screening  Completed   • Hepatitis C Screening  Completed     Immunizations Due:      Topic Date Due   • Hepatitis A Vaccine (1 of 2 - Risk 2-dose series) Never done   • COVID-19 Vaccine (3 - 2023-24 season) 09/01/2023     Advance Directives   What are advance directives?  Advance directives are legal documents that state your wishes and plans for medical care. These plans are made ahead of time in case you lose your ability to make decisions for yourself. Advance directives can apply to any medical decision, such as the treatments you want, and if you want to donate organs.   What are the types of advance directives?  There are many types of advance directives, and each state has rules about how to use them. You may choose a combination of any of the following:  Living will:  This is a  written record of the treatment you want. You can also choose which treatments you do not want, which to limit, and which to stop at a certain time. This includes surgery, medicine, IV fluid, and tube feedings.   Durable power of  for healthcare (DPAHC):  This is a written record that states who you want to make healthcare choices for you when you are unable to make them for yourself. This person, called a proxy, is usually a family member or a friend. You may choose more than 1 proxy.  Do not resuscitate (DNR) order:  A DNR order is used in case your heart stops beating or you stop breathing. It is a request not to have certain forms of treatment, such as CPR. A DNR order may be included in other types of advance directives.  Medical directive:  This covers the care that you want if you are in a coma, near death, or unable to make decisions for yourself. You can list the treatments you want for each condition. Treatment may include pain medicine, surgery, blood transfusions, dialysis, IV or tube feedings, and a ventilator (breathing machine).  Values history:  This document has questions about your views, beliefs, and how you feel and think about life. This information can help others choose the care that you would choose.  Why are advance directives important?  An advance directive helps you control your care. Although spoken wishes may be used, it is better to have your wishes written down. Spoken wishes can be misunderstood, or not followed. Treatments may be given even if you do not want them. An advance directive may make it easier for your family to make difficult choices about your care.       © Copyright [employer] 2018 Information is for End User's use only and may not be sold, redistributed or otherwise used for commercial purposes. All illustrations and images included in CareNotes® are the copyrighted property of [employer] or [employer]

## 2024-06-24 NOTE — PROGRESS NOTES
Ambulatory Visit  Name: Mal Encarnacion      : 1965      MRN: [id number]  Encounter Provider: Awa Shepherd MD  Encounter Date: 2024   Encounter department: [facility]    Assessment & Plan   1. Primary hypertension  Assessment & Plan:  Home medication: amlodipine 10 mg,  Losartan 50 mg       Plan:   Diet and exercise counseling   Continue home medications     2. Thrombocytopenia (HCC)  3. Medicare annual wellness visit, initial       Preventive health issues were discussed with patient, and age appropriate screening tests were ordered as noted in patient's After Visit Summary. Personalized health advice and appropriate referrals for health education or preventive services given if needed, as noted in patient's After Visit Summary.    History of Present Illness     HPI   Patient Care Team:  Awa Shepherd MD as PCP - General (Family Medicine)  Rafaelina Reyes as  Care Manager (Care Coordination)    Review of Systems  Medical History Reviewed by provider this encounter:       Annual Wellness Visit Questionnaire       PREVENTIVE SCREENINGS      Cardiovascular Screening:    General: Screening Not Indicated and History Lipid Disorder      Diabetes Screening:     General: Screening Not Indicated and History Diabetes      Lung Cancer Screening:     General: Screening Not Indicated      Hepatitis C Screening:    General: Screening Current    Social Determinants of Health     Financial Resource Strain: Low Risk  (2023)    Overall Financial Resource Strain (CARDIA)    • Difficulty of Paying Living Expenses: Not very hard   Food Insecurity: No Food Insecurity (5/10/2024)    Hunger Vital Sign    • Worried About Running Out of Food in the Last Year: Never true    • Ran Out of Food in the Last Year: Never true   Transportation Needs: No Transportation Needs (5/10/2024)    PRAPARE - Transportation    • Lack of Transportation (Medical): No    • Lack of  Transportation (Non-Medical): No   Housing Stability: Low Risk  (5/10/2024)    Housing Stability Vital Sign    • Unable to Pay for Housing in the Last Year: No    • Number of Times Moved in the Last Year: 1    • Homeless in the Last Year: No   Utilities: Not At Risk (5/10/2024)    [facility] Utilities    • Threatened with loss of utilities: No     No results found.    Objective     There were no vitals taken for this visit.    Physical Exam  Administrative Statements

## 2024-06-25 ENCOUNTER — PATIENT OUTREACH (OUTPATIENT)
Dept: FAMILY MEDICINE CLINIC | Facility: CLINIC | Age: 59
End: 2024-06-25

## 2024-06-25 ENCOUNTER — TELEPHONE (OUTPATIENT)
Age: 59
End: 2024-06-25

## 2024-06-25 ENCOUNTER — OFFICE VISIT (OUTPATIENT)
Dept: FAMILY MEDICINE CLINIC | Facility: CLINIC | Age: 59
End: 2024-06-25

## 2024-06-25 VITALS
HEIGHT: 61 IN | RESPIRATION RATE: 16 BRPM | WEIGHT: 122 LBS | DIASTOLIC BLOOD PRESSURE: 70 MMHG | SYSTOLIC BLOOD PRESSURE: 170 MMHG | HEART RATE: 65 BPM | BODY MASS INDEX: 23.03 KG/M2 | OXYGEN SATURATION: 98 % | TEMPERATURE: 97.8 F

## 2024-06-25 DIAGNOSIS — I10 PRIMARY HYPERTENSION: Primary | ICD-10-CM

## 2024-06-25 PROCEDURE — G2211 COMPLEX E/M VISIT ADD ON: HCPCS | Performed by: FAMILY MEDICINE

## 2024-06-25 PROCEDURE — 3078F DIAST BP <80 MM HG: CPT | Performed by: FAMILY MEDICINE

## 2024-06-25 PROCEDURE — 3077F SYST BP >= 140 MM HG: CPT | Performed by: FAMILY MEDICINE

## 2024-06-25 PROCEDURE — 99214 OFFICE O/P EST MOD 30 MIN: CPT | Performed by: FAMILY MEDICINE

## 2024-06-25 NOTE — PROGRESS NOTES
Per chart review Pt has an appointment with provider today at 1:00 pm. MIAH HAWKINS sent a message to provider and inquired to send a notification to MIAH HAWKINS if Pt shows up. MIAH HAWKINS would like to meet up with Pt to assist him as needed.     Provider came to MIAH HAWKINS office and informed that Pt is at exam room and MIAH HAWKINS can meet up with him. Both discussed regarding Pt's issues since Pt does not hear but can read. Provider stated that even for the  was hard to understand the Pt within the visit. MIAH HAWKINS explained provider that MIAH HAWKINS would like to assist Pt to apply for a phone that he can uses to communicate with others. Provider informed that she will let Pt's and his caregiver to wait for MIAH HAWKINS at the exam room. MIAH HAWKINS expressed thanked to provider for her support.     MIAH HAWKINS got into the room but Pt left. MIAH HAWKINS spoke with the MA at the  and they informed that Pt and his care giver just left even they were told do not leave. MIAH HAWKINS explained the MA that she would like to see Pt at his next appointment. They stated that will keep MIAH HAWKINS posted in regard Pt next visit. MIAH HAWKINS expressed appreciation to the MA team for their support.     MIAH HAWKINS is remain available for further assistance as needed.  MIAH HAWKINS will continue to follow-up.

## 2024-06-25 NOTE — PROGRESS NOTES
Ambulatory Visit  Name: Mal Encarnacion      : 1965      MRN: [id number]  Encounter Provider: Awa Shepherd MD  Encounter Date: 2024   Encounter department: [facility]    Assessment & Plan   1. Primary hypertension  Assessment & Plan:  Home medication: amlodipine 10 mg, QD  Losartan 50 mg QD , HCTZ 12.5 QD  Patient at last visit, uncontrolled added hydrochlorothiazide 12.5 once daily.    Patient is accompanied by a home health aide.  Home health aide has been assisting this patient  For the past 2 weeks.  Patient and home health aide are both unaware that new medication was started.        Plan:   Diet and exercise counseling   Continue home medications   Explained the importance of medication compliance for blood pressure and encouraged him to  the prescription for the new medication.  Follow-up in 2 weeks for blood pressure         History of Present Illness     HPI  Ku[place]i interpretation as well as ASL sign language interpretation was used concurrently during this visit.     Patient seen for follow-up of blood pressure.  At last visit earlier this month for blood pressure patient had a new medication hydrochlorothiazide added to the regimen.  The patient is accompanied by her home health aide who is with him on Tuesday and Thursday afternoons only.  She assist him with his medical appointments as well as driving him.  Patient's blood pressure is elevated today, and patient was unaware that there was a new medication added to his regimen for blood pressure.  Excessive education on the importance of blood pressure compliance was discussed with the patient and the caregiver.  Sticky note with the blood pressure medications outlined with special emphasis circled and read on the medication that needs to be picked up at the pharmacy called hydrochlorothiazide.  He was encouraged to follow-up again in 2 weeks and to take the 3 blood pressure medications daily  "until then. They were told that they need to stay for the to meet the  in order to get further assistance     Review of Systems   Constitutional:  Negative for chills and fever.   HENT:  Negative for ear pain and sore throat.    Eyes:  Negative for pain and visual disturbance.   Respiratory:  Negative for cough and shortness of breath.    Cardiovascular:  Negative for chest pain and palpitations.   Gastrointestinal:  Negative for abdominal pain and vomiting.   Genitourinary:  Negative for dysuria and hematuria.   Musculoskeletal:  Negative for arthralgias and back pain.   Skin:  Negative for color change and rash.   Neurological:  Negative for seizures and syncope.   All other systems reviewed and are negative.      Objective     /70 (BP Location: Right arm, Patient Position: Sitting, Cuff Size: Standard)   Pulse 65   Temp 97.8 °F (36.6 °C) (Temporal)   Resp 16   Ht 5' 1\" (1.549 m)   Wt 55.3 kg (122 lb)   SpO2 98%   BMI 23.05 kg/m²     Physical Exam  Vitals and nursing note reviewed.   Constitutional:       General: He is not in acute distress.     Appearance: He is well-developed.   HENT:      Head: Normocephalic and atraumatic.      Mouth/Throat:      Mouth: Mucous membranes are moist.   Eyes:      Conjunctiva/sclera: Conjunctivae normal.   Cardiovascular:      Rate and Rhythm: Normal rate and regular rhythm.      Heart sounds: No murmur heard.  Pulmonary:      Effort: Pulmonary effort is normal. No respiratory distress.      Breath sounds: Normal breath sounds.   Abdominal:      Palpations: Abdomen is soft.      Tenderness: There is no abdominal tenderness.   Musculoskeletal:         General: No swelling.      Cervical back: Neck supple.   Skin:     General: Skin is warm and dry.      Capillary Refill: Capillary refill takes less than 2 seconds.   Neurological:      Mental Status: He is alert.   Psychiatric:         Mood and Affect: Mood normal.     Administrative Statements   I have " spent a total time of 40 minutes on 06/27/24 In caring for this patient including Instructions for management, Patient and family education, Importance of tx compliance, Documenting in the medical record, Reviewing / ordering tests, medicine, procedures  , and Obtaining or reviewing history  .

## 2024-06-26 ENCOUNTER — PATIENT OUTREACH (OUTPATIENT)
Dept: FAMILY MEDICINE CLINIC | Facility: CLINIC | Age: 59
End: 2024-06-26

## 2024-06-26 NOTE — PROGRESS NOTES
MIAH HAWKINS did call Pt's care give Devang Quinones ([phone]). MIAH HAWKINS did introduced herself, her role and explained Joni the purpose of this call in [place]. MIAH HAWKINS informed Joni that MIAH HAWKINS would like to assist Pt with applying captioned telephone but MIAH HAWKINS needs to see Pt in person in order to assist with. Joni stated that Pt's mom Janessa was present and she did put Janessa on the call. Janessa expressed that she rather to speak with Pt's provider first and then will let MIAH HAWKINS knows if Pt is interested in applying for the service above. MIAH HAWKINS explained Joni and Janessa that MIAH HAWKINS is available for further support Monday trough Friday within office hours. They seems understanding.    MIAH HAWKINS is remain available for further assistance as needed.  MIAH HAWKINS will continue to follow-up.

## 2024-06-28 ENCOUNTER — EVALUATION (OUTPATIENT)
Dept: PHYSICAL THERAPY | Facility: CLINIC | Age: 59
End: 2024-06-28
Payer: MEDICARE

## 2024-06-28 ENCOUNTER — PATIENT OUTREACH (OUTPATIENT)
Dept: FAMILY MEDICINE CLINIC | Facility: CLINIC | Age: 59
End: 2024-06-28

## 2024-06-28 VITALS — DIASTOLIC BLOOD PRESSURE: 78 MMHG | SYSTOLIC BLOOD PRESSURE: 160 MMHG

## 2024-06-28 DIAGNOSIS — R42 VERTIGO: Primary | ICD-10-CM

## 2024-06-28 DIAGNOSIS — Z91.81 AT RISK FOR FALLS: ICD-10-CM

## 2024-06-28 DIAGNOSIS — M54.2 NECK PAIN: ICD-10-CM

## 2024-06-28 PROCEDURE — 97110 THERAPEUTIC EXERCISES: CPT | Performed by: PHYSICAL THERAPIST

## 2024-06-28 PROCEDURE — 97163 PT EVAL HIGH COMPLEX 45 MIN: CPT | Performed by: PHYSICAL THERAPIST

## 2024-06-28 NOTE — PROGRESS NOTES
MIAH HAWKINS did receive a message from  staff stating that Pt and his caregiver came to the office and requested to speak with MIAH HAWKINS, however they left and inquired if MIAH HAWKINS can give them a call.    MIAH HAWKINS did place a call to Devang Quinones. Devang and MIAH HAWKINS discussed about Pt's captioned telephone. MIAH HAWKINS was told that Pt has two caregivers during the week. MIAH HAWKINS explained Devang that MIAH HAWKINS will contact the [facility] this upcoming week to inquire if they can give Pt's an appointment at home to discuss about the phone mentioned above. Devang expressed that if Pt can gets the evaluation at home will be benefit for him since he gets anxious when needs to wait in the office. MIAH HAWKINS informed Devang that she will reach out her once gets the information. In addition, MIAH HAWKINS informed Devang that she can contact MIAH HAWKINS for further support Monday through Friday within office hours. Devang expressed thanked to MIAH HAWKINS for her assistance and time.     MIAH HAWKINS is remain available for further assistance as needed.  MIAH HAWKINS will continue to follow-up.

## 2024-06-28 NOTE — PROGRESS NOTES
PT Evaluation     Today's date: 2024  Patient name: Mal Encarnacion  : 1965  MRN: [id number]  Referring provider: Evelio King*  Dx:   Encounter Diagnosis     ICD-10-CM    1. Vertigo  R42 Ambulatory Referral to Physical Therapy      2. Neck pain  M54.2       3. At risk for falls  Z91.81                      Assessment  Impairments: abnormal coordination, abnormal or restricted ROM, abnormal movement, activity intolerance, difficulty understanding, impaired balance, impaired physical strength, pain with function, poor posture  and poor body mechanics  Symptom irritability: moderate    Assessment details: Pt is a 59 y.o. year old male presenting to physical therapy for Vertigo, Neck pain, and at risk for falls.  He presents with the following impairments: cervical AROM deficits, UT/LS muscle stiffness, balance impairments, LE weakness, and (+) [place] affecting his function with walking, standing, stair navigation, balance, exercising, transferring, and getting out of bed.  Pt will benefit from skilled physical therapy to address functional limitations noted in evaluation and meet patient goals.  Barriers to therapy: Hearing impairment, needs ASL     Goals  ST.  Pt will reduce his dizziness to 2/10 or less.  2.  Pt will improve cervical extension to nil deficits.    LT. Pt will have (-) [place] hallpike for improved ability to transfer.  2. Pt will improve b/l hip flexion to 4/5 for improved walking.  3. Pt will meet projected FOTO score.    Plan  Patient would benefit from: skilled physical therapy and PT eval  Planned modality interventions: biofeedback, cryotherapy, electrical stimulation/Russian stimulation, TENS and thermotherapy: hydrocollator packs    Frequency: 1x week  Duration in weeks: 6  Treatment plan discussed with: caregiver        Subjective Evaluation    History of Present Illness  Mechanism of injury: Hx obtained via :    Pt reports his head  feels weird and he feels weak and sometimes it is hard to breathe.  He has dizziness and occasional headaches.  He also has concerns about his balance and falling although he says he is very careful and has not had any falls recently.  He takes meds for his BP and also takes meclizine for his dizziness.  He reports some neck pain and dizziness and has some difficulty sleeping.  He reports some room spinning dizziness.  He ambulates slowly with SPC because his legs feel weak and he does not want to fall.  He sits for long periods at home and is not very active.  He denies any recent changes in medical Hx.  Pain  Current pain ratin          Objective     Concurrent Complaints  Positive for headaches.     Postural Observations  Seated posture: poor  Standing posture: poor  Correction of posture: makes symptoms worse      Palpation   Left   Hypertonic in the levator scapulae and upper trapezius.     Right   Hypertonic in the levator scapulae and upper trapezius.     Active Range of Motion   Cervical/Thoracic Spine       Cervical    Flexion:  WFL  Extension:  Restriction level: minimal  Left lateral flexion:  WFL  Right lateral flexion:  WFL  Left rotation:  Restriction level: minimal  Right rotation:  Restriction level: minimal    Strength/Myotome Testing     Left Hip   Planes of Motion   Flexion: 3+    Right Hip   Planes of Motion   Flexion: 3+    Left Knee   Flexion: 4-  Extension: 4+    Right Knee   Flexion: 4-  Extension: 4+    Additional Strength Details  Balance:  FT EO = 30s  FT EC = 30s  Tandem EO = 30s w moderate sqay  Tandem EC = 3 secs    Tests   Cervical     Left   Negative Spurling's Test A.     Right   Positive Spurling's Test A.     Left Shoulder   Negative ULTT1.     Right Shoulder   Negative ULTT1.     Ambulation   Weight-Bearing Status   Assistive device used: single point cane    Observational Gait   Gait: asymmetric   Walking speed and stride length within functional limits.   Neuro Exam:      Dizziness  Positive for disequilibrium and vertigo.   Negative for motion sickness and diplopia.     Exacerbating factors  Positive for walking, turning head and walking in busy environment.   Negative for looking up.     Headaches   Patient reports headaches: Yes.     Oculomotor exam   Oculomotor ROM: WNL  Resting nystagmus: not present   Gaze holding nystagmus: not present left  and not present right  Smooth pursuits: within normal limits  Vertical saccades: normal  Horizontal saccades: normal  Convergence: 10in  Convergence: abnormal  Head thrust: left normal and right normal    Positional testing   [place]pike   Left posterior canal: symptomatic, torsional and upbeating  Right posterior canal: WNL             Precautions: Requires     Date 6/28            Visit # IE            FOTO IE             Re-eval IE              Manuals 6/28            Cervical PROM             UT/LS str w STM             Epleys 2x L                         Neuro Re-Ed 6/28            VORx1             Saccades             Convergence             Airex             Tandem walking             No money HEP                                      Ther Ex             Bike             Leg press             Bridges HEP            SLR HEP            S/l hip ABD HEP            Rows HEP            Pec str                          Ther Activity             Squats HEP                         Gait Training                                       Modalities

## 2024-07-01 ENCOUNTER — PATIENT OUTREACH (OUTPATIENT)
Dept: FAMILY MEDICINE CLINIC | Facility: CLINIC | Age: 59
End: 2024-07-01

## 2024-07-01 ENCOUNTER — OFFICE VISIT (OUTPATIENT)
Dept: CARDIOLOGY CLINIC | Facility: CLINIC | Age: 59
End: 2024-07-01
Payer: MEDICARE

## 2024-07-01 VITALS
DIASTOLIC BLOOD PRESSURE: 70 MMHG | HEIGHT: 61 IN | SYSTOLIC BLOOD PRESSURE: 140 MMHG | WEIGHT: 119 LBS | BODY MASS INDEX: 22.47 KG/M2 | HEART RATE: 88 BPM

## 2024-07-01 DIAGNOSIS — R00.1 JUNCTIONAL BRADYCARDIA: ICD-10-CM

## 2024-07-01 DIAGNOSIS — I25.10 CORONARY ARTERY DISEASE INVOLVING NATIVE CORONARY ARTERY OF NATIVE HEART WITHOUT ANGINA PECTORIS: Primary | ICD-10-CM

## 2024-07-01 DIAGNOSIS — I11.9 HYPERTENSIVE HEART DISEASE WITHOUT HEART FAILURE: ICD-10-CM

## 2024-07-01 DIAGNOSIS — Z01.810 PRE-OPERATIVE CARDIOVASCULAR EXAMINATION: ICD-10-CM

## 2024-07-01 DIAGNOSIS — I49.3 PREMATURE VENTRICULAR CONTRACTIONS: ICD-10-CM

## 2024-07-01 DIAGNOSIS — I10 PRIMARY HYPERTENSION: ICD-10-CM

## 2024-07-01 PROBLEM — E11.9 TYPE 2 DIABETES MELLITUS WITHOUT COMPLICATION, WITHOUT LONG-TERM CURRENT USE OF INSULIN (HCC): Status: RESOLVED | Noted: 2021-09-28 | Resolved: 2024-07-01

## 2024-07-01 PROCEDURE — 93000 ELECTROCARDIOGRAM COMPLETE: CPT | Performed by: INTERNAL MEDICINE

## 2024-07-01 PROCEDURE — 99215 OFFICE O/P EST HI 40 MIN: CPT | Performed by: INTERNAL MEDICINE

## 2024-07-01 NOTE — PROGRESS NOTES
MIAH HAWKINS did place a call to [facility] at ([phone], ext [phone]) in order to schedule an appointment for Pt to assist him with getting a caption telephone, was unable to speak with staff, a detailed VM left requesting a return call.     Also, MIAH HAWKINS did place a call to Pt's care giver Devang to provide the information above. MIAH HAWKINS provided Devang with [facility] phone number ([phone], [phone]). MIAH HAWKINS explained Devang to please call the agency to inquire if they can provide an appointment to Pt for an assessment and get the service mentioned above. Devang seems understanding and willing to contact the agency.     MIAH HAWKINS inquired Devang to keep the MIAH HAWKINS posted in regard Pt's current status. She seems willing to reach out the MIAH HAWKINS once contact the [facility].     MIAH HAWKINS is remain available for further assistance as needed.  MIAH HAWKINS will continue to follow-up.

## 2024-07-01 NOTE — PROGRESS NOTES
[facility]  [facility]  Primary Office: [address] [place] [address]  Phone: [phone]; Fax: [phone]  *-*-*-*-*-*-*-*-*-*-*-*-*-*-*-*-*-*-*-*-*-*-*-*-*-*-*-*-*-*-*-*-*-*-*-*-*-*-*-*-*-*-*-*-*-*-*-*-*-*-*-*-*-*  ENCOUNTER DATE: 07/01/24 11:25 AM  PATIENT NAME: Mal Encarnacion   1965    [id number]  AGE:59 y.o.      SEX: male  ENCOUNTER PROVIDER: Smooth Alicea MD, [facility], [facility]  PRIMARY CARE PHYSICIAN: Awa Shepherd MD    DIAGNOSES:  1. Hypertensive urgency  2. Hypertensive heart disease with mild concentric left ventricular hypertrophy, without heart failure  3. grade 1 diastolic dysfunction  4. Non MI troponin elevation  5. Dyslipidemia  6. ECG abnormalities secondary to hypertensive heart disease.  7. Deaf and Mute    ECHOCARDIOGRAM  September 28, 2021:  1. Mild concentric left ventricular hypertrophy, normal left ventricular systolic function, grade 1 diastolic dysfunction. EF around 56%.  2. Normal right ventricular size and systolic function.  3. Normal left and right atrial size.  4. Trace aortic valve regurgitation.  5. Trace mitral, tricuspid and pulmonic valve regurgitation.  6. No obvious pulmonary hypertension.  7. No pericardial effusion.    CARDIAC CATHETERIZATION 3/16/2024:  Left main: LAD: Angiographically normal.  LAD: Mild diffuse disease with calcification.  50% proximal LAD stenosis moderately calcified.  40% diagonal stenosis.  No disease reported in LCx or RCA.    ECHOCARDIOGRAM 3/17/2024:  Moderately increased left ventricular wall thickness, moderate concentric LVH, low normal left ventricular systolic function with EF 50 to 5%.  Grade 1 diastolic dysfunction.  Normal RV size and function.  Normal left and right atrial Size.  Normal aortic valve, mild aortic valve regurgitation.  Normal mitral valve, trace mitral valve regurgitation.  No tricuspid valve regurgitation, trace pulmonic valve regurgitation.  No obvious pulmonary  hypertension.  No pericardial effusion.       CURRENT ECG     Results for orders placed or performed in visit on 07/01/24   POCT ECG    Narrative    Sinus rhythm, HR 88 bpm, normal axis, possible left atrial enlargement, left ventricular hypertrophy with repolarization abnormalities.  Has downsloping ST changes in T wave inversions in inferior leads noted previously also.  Single premature ventricular contraction noted.        CARDIOLOGY ASSESSMENT & PLAN      Diagnosis ICD-10-CM Associated Orders   1. Coronary artery disease involving native coronary artery of native heart without angina pectoris  I25.10 AMB extended holter monitor      2. Junctional bradycardia  R00.1 POCT ECG     AMB extended holter monitor      3. Primary hypertension  I10       4. Premature ventricular contractions  I49.3 AMB extended holter monitor      5. Hypertensive heart disease without heart failure  I11.9       6. Pre-operative cardiovascular examination  Z01.810          1. Coronary artery disease involving native coronary artery of native heart without angina pectoris  Assessment & Plan:  Mr. Mal Encarnacion is overall stable from cardiac perspective with no recent to anginal-like symptoms and no symptoms of heart failure.  He does have some palpitations.  Because he is deaf and mute communication is a little bit difficult.  I suspect he is having palpitations.  His ECG today shows single PVC.  On examination there is no evidence of pulmonary or peripheral vascular condition or signs of significant valvular heart disease.  He does have a baseline ECG abnormality related to his left ventricular hypertrophy.  His cardiac catheterization in March demonstrated nonobstructive coronary artery disease. He does not smoke and denies drinking alcohol.    -- At this time I am requesting an extended Holter monitor to evaluate for junctional bradycardia and assess frequency of PVCs and his symptoms of palpitations.  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  He is supposed to wear the monitor for 2 weeks and then mail the monitor to the company so that it can be read.  -- He has no contraindication to undergo colonoscopy procedure.  He may proceed with it without further testing.  -- I do want to see him back 1 more time in 3 months and after that less frequently.  -- I am advising him to follow-up with primary care physician very closely.  -- Dietary and medical compliance are reinforced.  -- He is advised  to report any concerning symptoms such as chest pain, shortness of breath, decline in exercise tolerance or presyncope/syncope.    Orders:  -     AMB extended holter monitor; Future; Expected date: 07/01/2024  2. Junctional bradycardia  -     POCT ECG  -     AMB extended holter monitor; Future; Expected date: 07/01/2024  3. Primary hypertension  4. Premature ventricular contractions  -     AMB extended holter monitor; Future; Expected date: 07/01/2024  5. Hypertensive heart disease without heart failure  6. Pre-operative cardiovascular examination      INTERVAL HISTORY, HISTORY OF PRESENT ILLNESS & COMPREHENSIVE VISIT INFORMATION     Mal Encarnacion is here for follow-up regarding his cardiac comorbidities which include: History of chest pains, hypertensive heart disease without heart failure, nonobstructive coronary artery disease, dyslipidemia and other comorbidities.  He was last seen by me in 2021.  He is here for visit accompanied with his mother who is also my patient.  His mother is translating as patient is deaf and mute and they communicate with each other well.  Patient was admitted at [facility] with dizziness on 5/10/2024.  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  He was started on dual antiplatelet therapy.  Echocardiogram was performed and  was unchanged from previous echo.  He was discharged with meclizine and instructions to undergo outpatient.  Prior to that he was hospitalized in March 2024 after having ST elevation MI.  He had initially presented to [facility] with lightheadedness and dizziness.  His ECG is very concerning for ST elevation MI.  During the hospitalization he was also noted to have junctional bradycardia.  His cardiac catheterization at -- showed nonobstructive coronary artery disease.  He was advised to undergo ZIO XT injector Holter monitor for evaluation of his symptoms of heart block and he was discharged home.    Today he mentions that he has been all right but does get headaches and palpitations all the time.  Has palpitations every day.  Also continues to have symptoms of dizziness and vertigo.  Denies any passing out or near passing out.  Denies any orthopnea or PND or pedal edema.  Reports knee pain.    Functional capacity status: Moderate   (Excellent- >10 METs; Good: (7-10 METs); Moderate (4-7 METs); Poor (<= 4 METs)    Any chronic stressors: None   (feeling of poor health, financial problems, and social isolation etc).    Tobacco or alcohol dependence: Denies any smoking or alcohol use.  Okay    Current cardiac medications: Aspirin 81 mg daily atorvastatin 40 mg daily amlodipine 10 mg daily HCTZ 25 mg daily losartan 50 mg daily meclizine    Last recent comprehensive blood work available:   Blood work 6/14/2024 sodium 139 potassium 4.4 chloride 109 BUN 20 evaluation.  Lipid profile 5/10/2024  Total cholesterol 212 triglyceride 118 HDL 89 calculated LDL  TSH 1.229 on 3/16/2024  Hemoglobin A1c 5.4    REVIEW OF SYSTEMS   Positive for: As noted above in HPI  Negative for: All remaining as reviewed below and in HPI.    SYSTEM SYMPTOMS REVIEWED:  General--weight change, fever, night sweats  Respiratory--cough, wheezing, shortness of breath, sputum production  Cardiovascular--chest pain, syncope, dyspnea on exertion,  "edema, decline in exercise tolerance, claudication   Gastrointestinal--persistent vomiting, diarrhea, abdominal distention, blood in stool   Muscular or skeletal--joint pain or swelling   Neurologic--headaches, syncope, abnormal movement  Hematologic--history of easy bruising and bleeding   Endocrine--thyroid enlargement, heat or cold intolerance, polyuria   Psychiatric--anxiety, depression     *-*-*-*-*-*-*-*-*-*-*-*-*-*-*-*-*-*-*-*-*-*-*-*-*-*-*-*-*-*-*-*-*-*-*-*-*-*-*-*-*-*-*-*-*-*-*-*-*-*-*-*-*-*-  VITAL SIGNS     CURRENT VITAL SIGNS:   Vitals:    07/01/24 1051   BP: 140/70   Pulse: 88   Weight: 54 kg (119 lb)   Height: 5' 1\" (1.549 m)       BMI: Body mass index is 22.48 kg/m².    WEIGHTS:   Wt Readings from Last 25 Encounters:   07/01/24 54 kg (119 lb)   06/25/24 55.3 kg (122 lb)   06/12/24 56.9 kg (125 lb 6.4 oz)   06/03/24 57.6 kg (127 lb)   05/28/24 58.2 kg (128 lb 6.4 oz)   05/10/24 59 kg (130 lb)   03/22/24 59 kg (130 lb)   03/18/24 59 kg (130 lb)   03/17/24 59.9 kg (132 lb)   03/16/24 60.3 kg (132 lb 15 oz)   02/10/24 59 kg (130 lb 1.1 oz)   02/08/24 60.1 kg (132 lb 6.4 oz)   01/30/24 60.9 kg (134 lb 4 oz)   12/04/23 61.6 kg (135 lb 12.9 oz)   11/21/23 61.1 kg (134 lb 9.6 oz)   11/16/23 61.5 kg (135 lb 9.6 oz)   11/07/23 62.1 kg (137 lb)   09/30/23 63.2 kg (139 lb 6.4 oz)   09/26/23 63 kg (139 lb)   09/19/23 62.2 kg (137 lb 1.6 oz)   08/25/23 63 kg (139 lb)   08/04/23 65 kg (143 lb 4.8 oz)   08/04/23 65.6 kg (144 lb 9.6 oz)   08/03/23 68.3 kg (150 lb 9.6 oz)   08/03/23 65.8 kg (145 lb)        *-*-*-*-*-*-*-*-*-*-*-*-*-*-*-*-*-*-*-*-*-*-*-*-*-*-*-*-*-*-*-*-*-*-*-*-*-*-*-*-*-*-*-*-*-*-*-*-*-*-*-*-*-*-  PHYSICAL EXAM     General Appearance:    Alert, cooperative, no distress, appears stated age, lean build   Head, Eyes, ENT:    No obvious abnormality, moist mucous mebranes.   Neck:   Supple, no carotid bruit. No JVD, no obvious lymphadenoapthy   Back:     Symmetric, no curvature.   Lungs:     Respirations " unlabored. Clear to auscultation bilaterally,    Chest wall:    No tenderness or deformity   Heart:    Regular rate and rhythm, S1 and S2 normal, no murmur, rub  or gallop.   Abdomen:     Soft, non-tender.   Extremities:   Extremities warm, no cyanosis or edema    Skin: No venostatic changes in lower extremities.   Normal skin color, texture, and turgor. No rashes or lesions   Neuro: Pt is alert and oriented time 3 with no gross motor deficits.   Psych/ behavioral: Mood is normal. Behavior is normal.     *-*-*-*-*-*-*-*-*-*-*-*-*-*-*-*-*-*-*-*-*-*-*-*-*-*-*-*-*-*-*-*-*-*-*-*-*-*-*-*-*-*-*-*-*-*-*-*-*-*-*-*-*-*-  CURRENT MEDICATIONS LIST     Current Outpatient Medications:     amLODIPine (NORVASC) 10 mg tablet, Take 1 tablet (10 mg total) by mouth daily, Disp: 30 tablet, Rfl: 4    aspirin 81 mg chewable tablet, Chew 1 tablet (81 mg total) daily, Disp: 30 tablet, Rfl: 3    atorvastatin (LIPITOR) 40 mg tablet, Take 1 tablet (40 mg total) by mouth daily with dinner, Disp: 30 tablet, Rfl: 0    bisacodyl (DULCOLAX) 5 mg EC tablet, Take as directed as per written office instructions, Disp: 2 tablet, Rfl: 0    Blood Pressure KIT, Use 2 (two) times a day, Disp: 1 kit, Rfl: 0    folic acid (FOLVITE) 1 mg tablet, Take 1 tablet (1 mg total) by mouth daily, Disp: 30 tablet, Rfl: 3    hydroCHLOROthiazide 25 mg tablet, Take 1 tablet (25 mg total) by mouth daily, Disp: 30 tablet, Rfl: 1    losartan (COZAAR) 50 mg tablet, Take 1 tablet (50 mg total) by mouth daily, Disp: 30 tablet, Rfl: 1    meclizine (ANTIVERT) 25 mg tablet, Take 1 tablet (25 mg total) by mouth every 8 (eight) hours Use for the next 3-4 days for dizziness, Disp: 30 tablet, Rfl: 0    omeprazole (PriLOSEC) 40 MG capsule, Take 1 capsule (40 mg total) by mouth daily before breakfast, Disp: 30 capsule, Rfl: 5    polyethylene glycol (GLYCOLAX) 17 GM/SCOOP powder, Take 17 g by mouth daily Take as directed as per written office instructions, Disp: 238 g, Rfl: 0    thiamine  100 MG tablet, Take 1 tablet (100 mg total) by mouth daily, Disp: 30 tablet, Rfl: 0    polyethylene glycol (GOLYTELY) 4000 mL solution, Take 4,000 mL by mouth once for 1 dose, Disp: 4000 mL, Rfl: 0       ALLERGIES   No Known Allergies    *-*-*-*-*-*-*-*-*-*-*-*-*-*-*-*-*-*-*-*-*-*-*-*-*-*-*-*-*-*-*-*-*-*-*-*-*-*-*-*-*-*-*-*-*-*-*-*-*-*-  SIGNATURES:   @SIG@   Smooth Alicea MD; A    *-*-*-*-*-*-*-*-*-*-*-*-*-*-*-*-*-*-*-*-*-*-*-*-*-*-*-*-*-*-*-*-*-*-*-*-*-*-*-*-*-*-*-*-*-*-*-*-*-*-*-*-*-*-  PAST MEDICAL HISTORY IN:  Past Medical History:   Diagnosis Date    Deaf     Heart murmur     Hypertension     PAST SURGICAL HISTORY:  Past Surgical History:   Procedure Laterality Date    CARDIAC CATHETERIZATION N/A 3/16/2024    Procedure: Cardiac pci;  Surgeon: Mal Nixon MD;  Location: [facility];  Service: Cardiology         FAMILY HISTORY:  Family History   Problem Relation Age of Onset    Hypertension Mother     Diabetes Mother     Heart disease Mother         pacemaker    Arthritis Mother         chronic    SOCIAL HISTORY:  Social History     Tobacco Use   Smoking Status Never    Passive exposure: Never   Smokeless Tobacco Never      Social History     Substance and Sexual Activity   Alcohol Use Yes    Comment: 1 beer occasionally     Social History     Substance and Sexual Activity   Drug Use Never    [unfilled]       *-*-*-*-*-*-*-*-*-*-*-*-*-*-*-*-*-*-*-*-*-*-*-*-*-*-*-*-*-*-*-*-*-*-*-*-*-*-*-*-*-*-*-*-*-*-*-*-*-*-*-*-*-*

## 2024-07-01 NOTE — PATIENT INSTRUCTIONS
CARDIOLOGY ASSESSMENT & PLAN      Diagnosis ICD-10-CM Associated Orders   1. Coronary artery disease involving native coronary artery of native heart without angina pectoris  I25.10 AMB extended holter monitor      2. Junctional bradycardia  R00.1 POCT ECG     AMB extended holter monitor      3. Primary hypertension  I10       4. Premature ventricular contractions  I49.3 AMB extended holter monitor      5. Hypertensive heart disease without heart failure  I11.9       6. Pre-operative cardiovascular examination  Z01.810          1. Coronary artery disease involving native coronary artery of native heart without angina pectoris  Assessment & Plan:  Mr. Mal Encarnacion is overall stable from cardiac perspective with no recent to anginal-like symptoms and no symptoms of heart failure.  He does have some palpitations.  Because he is deaf and mute communication is a little bit difficult.  I suspect he is having palpitations.  His ECG today shows single PVC.  On examination there is no evidence of pulmonary or peripheral vascular condition or signs of significant valvular heart disease.  He does have a baseline ECG abnormality related to his left ventricular hypertrophy.  His cardiac catheterization in March demonstrated nonobstructive coronary artery disease. He does not smoke and denies drinking alcohol.    -- At this time I am requesting an extended Holter monitor to evaluate for junctional bradycardia and assess frequency of PVCs and his symptoms of palpitations.  Because he has congenital deafness and weakness and there is some cognitive impairment I am going to advise him to come back to our office to put on the monitor.  He is supposed to wear the monitor for 2 weeks and then mail the monitor to the company so that it can be read.  -- He has no contraindication to undergo colonoscopy procedure.  He may proceed with it without further testing.  -- I do want to see him back 1 more time in 3 months and after that less  frequently.  -- I am advising him to follow-up with primary care physician very closely.  -- Dietary and medical compliance are reinforced.  -- He is advised  to report any concerning symptoms such as chest pain, shortness of breath, decline in exercise tolerance or presyncope/syncope.    Orders:  -     AMB extended holter monitor; Future; Expected date: 07/01/2024  2. Junctional bradycardia  -     POCT ECG  -     AMB extended holter monitor; Future; Expected date: 07/01/2024  3. Primary hypertension  4. Premature ventricular contractions  -     AMB extended holter monitor; Future; Expected date: 07/01/2024  5. Hypertensive heart disease without heart failure  6. Pre-operative cardiovascular examination

## 2024-07-01 NOTE — ASSESSMENT & PLAN NOTE
Mr. Mal Encarnacion is overall stable from cardiac perspective with no recent to anginal-like symptoms and no symptoms of heart failure.  He does have some palpitations.  Because he is deaf and mute communication is a little bit difficult.  I suspect he is having palpitations.  His ECG today shows single PVC.  On examination there is no evidence of pulmonary or peripheral vascular condition or signs of significant valvular heart disease.  He does have a baseline ECG abnormality related to his left ventricular hypertrophy.  His cardiac catheterization in March demonstrated nonobstructive coronary artery disease. He does not smoke and denies drinking alcohol.    -- At this time I am requesting an extended Holter monitor to evaluate for junctional bradycardia and assess frequency of PVCs and his symptoms of palpitations.  Because he has congenital deafness and weakness and there is some cognitive impairment I am going to advise him to come back to our office to put on the monitor.  He is supposed to wear the monitor for 2 weeks and then mail the monitor to the company so that it can be read.  -- He has no contraindication to undergo colonoscopy procedure.  He may proceed with it without further testing.  -- I do want to see him back 1 more time in 3 months and after that less frequently.  -- I am advising him to follow-up with primary care physician very closely.  -- Dietary and medical compliance are reinforced.  -- He is advised  to report any concerning symptoms such as chest pain, shortness of breath, decline in exercise tolerance or presyncope/syncope.

## 2024-07-05 ENCOUNTER — ANESTHESIA EVENT (OUTPATIENT)
Dept: ANESTHESIOLOGY | Facility: HOSPITAL | Age: 59
End: 2024-07-05

## 2024-07-05 ENCOUNTER — ANESTHESIA (OUTPATIENT)
Dept: ANESTHESIOLOGY | Facility: HOSPITAL | Age: 59
End: 2024-07-05

## 2024-07-05 ENCOUNTER — PATIENT OUTREACH (OUTPATIENT)
Dept: FAMILY MEDICINE CLINIC | Facility: CLINIC | Age: 59
End: 2024-07-05

## 2024-07-05 NOTE — PROGRESS NOTES
MIAH SHRUTHI placed a call to [facility] to inquire if she contacted the [facility]. MIAH SHRUTHI was [facility] to [facility] with [facility], a detailed VM left requesting a return call at her convenience.    MIAH HAWKINS is remain available for further assistance as needed.  MIAH HAWKINS will continue to follow-up.   
 [phone]

## 2024-07-10 ENCOUNTER — TELEPHONE (OUTPATIENT)
Dept: GASTROENTEROLOGY | Facility: MEDICAL CENTER | Age: 59
End: 2024-07-10

## 2024-07-10 NOTE — TELEPHONE ENCOUNTER
Confirming Upcoming Procedure: Colonoscopy on July 17  Physician performing: Dr. Teixeira  Location of procedure:  [facility]  Prep: [place]

## 2024-07-11 ENCOUNTER — OFFICE VISIT (OUTPATIENT)
Dept: FAMILY MEDICINE CLINIC | Facility: CLINIC | Age: 59
End: 2024-07-11

## 2024-07-11 ENCOUNTER — OFFICE VISIT (OUTPATIENT)
Dept: PHYSICAL THERAPY | Facility: CLINIC | Age: 59
End: 2024-07-11
Payer: MEDICARE

## 2024-07-11 VITALS
TEMPERATURE: 97.5 F | RESPIRATION RATE: 14 BRPM | WEIGHT: 118.6 LBS | OXYGEN SATURATION: 96 % | DIASTOLIC BLOOD PRESSURE: 70 MMHG | SYSTOLIC BLOOD PRESSURE: 150 MMHG | BODY MASS INDEX: 22.39 KG/M2 | HEIGHT: 61 IN | HEART RATE: 79 BPM

## 2024-07-11 DIAGNOSIS — Z91.81 AT RISK FOR FALLS: ICD-10-CM

## 2024-07-11 DIAGNOSIS — I10 PRIMARY HYPERTENSION: ICD-10-CM

## 2024-07-11 DIAGNOSIS — I11.9 HYPERTENSIVE HEART DISEASE WITHOUT HEART FAILURE: ICD-10-CM

## 2024-07-11 DIAGNOSIS — M54.2 NECK PAIN: ICD-10-CM

## 2024-07-11 DIAGNOSIS — R42 VERTIGO: Primary | ICD-10-CM

## 2024-07-11 DIAGNOSIS — F10.90 ALCOHOL USE DISORDER: Primary | ICD-10-CM

## 2024-07-11 DIAGNOSIS — E78.49 OTHER HYPERLIPIDEMIA: ICD-10-CM

## 2024-07-11 PROCEDURE — 97112 NEUROMUSCULAR REEDUCATION: CPT | Performed by: PHYSICAL THERAPIST

## 2024-07-11 PROCEDURE — G2211 COMPLEX E/M VISIT ADD ON: HCPCS | Performed by: FAMILY MEDICINE

## 2024-07-11 PROCEDURE — 3077F SYST BP >= 140 MM HG: CPT | Performed by: FAMILY MEDICINE

## 2024-07-11 PROCEDURE — 3078F DIAST BP <80 MM HG: CPT | Performed by: FAMILY MEDICINE

## 2024-07-11 PROCEDURE — 99213 OFFICE O/P EST LOW 20 MIN: CPT | Performed by: FAMILY MEDICINE

## 2024-07-11 PROCEDURE — 97110 THERAPEUTIC EXERCISES: CPT | Performed by: PHYSICAL THERAPIST

## 2024-07-11 RX ORDER — HYDROCHLOROTHIAZIDE 25 MG/1
50 TABLET ORAL DAILY
Qty: 30 TABLET | Refills: 1 | Status: SHIPPED | OUTPATIENT
Start: 2024-07-11

## 2024-07-11 NOTE — ASSESSMENT & PLAN NOTE
"Patient followed up with cardiology post 2-week ZIO monitor on 7/1/2024 \"to evaluate for junctional bradycardia and assess frequency of PVCs and his symptoms of palpitations. \"  Asymptomatic today    Plan:  Cardiology ordered additional 2-week extended ZIO monitor for  Notified the caretaker as well as the patient himself to watch out in the mail for the ZIO monitor and to follow-up with cardiology in September as scheduled  " constant observation

## 2024-07-11 NOTE — PROGRESS NOTES
"Ambulatory Visit  Name: Mal Encarnacion      : 1965      MRN: [id number]  Encounter Provider: Awa Shepherd MD  Encounter Date: 2024   Encounter department: [facility]    Assessment & Plan   1. Alcohol use disorder  Assessment & Plan:  Patient reports that he drank 2 days ago but he has quit 4 months and plans to quit and not drink anymore.  Caretakers have mentioned that on 2 different occasions they have noticed that he smells like alcohol and denies drinking any alcohol, they report that he is hiding the bottles and wanted to make it known of his drinking habits.    Plan:  BMP  Education on alcohol harms and effects on uncontrolled blood pressure.  Continue folic acid and thiamine  Will follow-up in 4 weeks and reevaluate, consider AA and alcohol counseling  2. Primary hypertension  Assessment & Plan:  Blood Pressure: 150/70    Asymptomatic   Current regimen: Hydrochlorothiazide 25 mg, amlodipine 10 mg, losartan 50 mg    Plan:  BMP in 2 weeks  Increase hydrochlorothiazide to 50 mg once daily  Continue amlodipine 10 and losartan 50 mg  Follow-up in 4 weeks  Continue to keep blood pressure log  Overnight's medications in 2-day and evening medications to increase compliance with medications as well as simplifying the routine for the 2 caretakers    Orders:  -     hydroCHLOROthiazide 25 mg tablet; Take 2 tablets (50 mg total) by mouth daily  -     Basic metabolic panel; Future; Expected date: 2024  3. Other hyperlipidemia  Assessment & Plan:  Lab Results   Component Value Date    LDLCALC 104 (H) 2024      Home med: 40 mg atorvastatin     Plan:   Continue home medications after discharge  Recheck lipid panel at next visit  4. Hypertensive heart disease without heart failure  Assessment & Plan:  Patient followed up with cardiology post 2-week ZIO monitor on 2024 \"to evaluate for junctional bradycardia and assess frequency of PVCs and his symptoms of " "palpitations. \"  Asymptomatic today    Plan:  Cardiology ordered additional 2-week extended ZIO monitor for  Notified the caretaker as well as the patient himself to watch out in the mail for the ZIO monitor and to follow-up with cardiology in September as scheduled       History of Present Illness     HPI  Mal Encarnacion 59 y.o. with past medical history significant for congenital deafness and mutism, nonobstructive CAD hypertensive heart disease without heart failure, hyperlipidemia, hypertension comes to the clinic today for follow-up on his hypertension.  On reviewing the blood pressure log for the past 2 weeks range is from 1 30-1 70 systolic with the majority of the readings being 140s to 150s and occasionally reaching 160s to 170s.  Caretakers had brought up the patient does at times (they recall 3 times 0smell like alcohol however denies drinking any alcoholic drinks and reportedly hides the bottles.  When confronted he said he only drank 2 days ago and otherwise has been not drinking alcohol for months.  He says that he plans to quit.  I educated the patient about the side effects of alcohol and the negative effect he can have on his currently uncontrolled blood pressure.  Patient is well aware of his cardio follow-up however is not sure about the ZIO monitor.  I discussed with them that they will have to wait for it in the mail and once they receive it follow-up with cardiology so that he can be properly placed.  As part of the visit we also reviewed his blood pressure medications and organized his blood pressure medications by taking them to a piece of paper to organize the daytime medications with the nighttime medications so as to avoid missing doses as well as simplifying the medication regiment for the two caretakers who alternate on days.  Review of Systems   Constitutional:  Negative for fever.   Respiratory:  Negative for cough, chest tightness and shortness of breath.    Cardiovascular:  Negative " "for chest pain and palpitations.   Gastrointestinal:  Negative for abdominal pain.   Genitourinary:  Negative for dysuria and hematuria.   Neurological:  Negative for dizziness and headaches.   Psychiatric/Behavioral:  Negative for agitation and confusion.        Objective     /70   Pulse 79   Temp 97.5 °F (36.4 °C) (Temporal)   Resp 14   Ht 5' 1\" (1.549 m)   Wt 53.8 kg (118 lb 9.6 oz)   SpO2 96%   BMI 22.41 kg/m²     Physical Exam  Vitals and nursing note reviewed.   Constitutional:       General: He is not in acute distress.     Appearance: He is well-developed.   HENT:      Head: Normocephalic and atraumatic.      Mouth/Throat:      Mouth: Mucous membranes are moist.   Eyes:      Conjunctiva/sclera: Conjunctivae normal.   Cardiovascular:      Rate and Rhythm: Normal rate and regular rhythm.      Pulses: Normal pulses.      Heart sounds: Normal heart sounds. No murmur heard.  Pulmonary:      Effort: Pulmonary effort is normal. No respiratory distress.      Breath sounds: Normal breath sounds.   Abdominal:      Palpations: Abdomen is soft.      Tenderness: There is no abdominal tenderness.   Musculoskeletal:         General: No swelling.      Cervical back: Neck supple.   Skin:     General: Skin is warm and dry.      Capillary Refill: Capillary refill takes less than 2 seconds.   Neurological:      Mental Status: He is alert.   Psychiatric:         Mood and Affect: Mood normal.     Administrative Statements   I have spent a total time of 40 minutes in caring for this patient on the day of the visit/encounter including Diagnostic results, Instructions for management, Patient and family education, Importance of tx compliance, Risk factor reductions, Counseling / Coordination of care, Documenting in the medical record, Reviewing / ordering tests, medicine, procedures  , and Obtaining or reviewing history  .        "

## 2024-07-11 NOTE — ASSESSMENT & PLAN NOTE
Blood Pressure: 150/70    Asymptomatic   Current regimen: Hydrochlorothiazide 25 mg, amlodipine 10 mg, losartan 50 mg    Plan:  BMP in 2 weeks  Increase hydrochlorothiazide to 50 mg once daily  Continue amlodipine 10 and losartan 50 mg  Follow-up in 4 weeks  Continue to keep blood pressure log  Overnight's medications in 2-day and evening medications to increase compliance with medications as well as simplifying the routine for the 2 caretakers

## 2024-07-11 NOTE — ASSESSMENT & PLAN NOTE
Lab Results   Component Value Date    LDLCALC 104 (H) 01/30/2024      Home med: 40 mg atorvastatin     Plan:   Continue home medications after discharge  Recheck lipid panel at next visit

## 2024-07-11 NOTE — PROGRESS NOTES
"Daily Note     Today's date: 2024  Patient name: Mal Encarnacion  : 1965  MRN: [id number]  Referring provider: Evelio King  Dx:   Encounter Diagnosis     ICD-10-CM    1. Vertigo  R42       2. Neck pain  M54.2       3. At risk for falls  Z91.81                      Subjective: Pt reports less dizziness today and was doing well after last session.      Objective: See treatment diary below      Assessment:  Pt does well w progression of today's session.  He presents with (-) beverley hallpike and does well w addition of VOR, saccades, and convergence exercises.  He does well w strengthening program for LE's after demonstration for proper form and has appropriate level of fatigue.  Patient demonstrated fatigue post treatment, exhibited good technique with therapeutic exercises, and would benefit from continued PT.      Plan: Continue per plan of care.  Progress treatment as tolerated.       Precautions: Requires     Date            Visit # IE 2           FOTO IE             Re-eval IE              Manuals            Cervical PROM             UT/LS str w STM             Epleys 2x L (-) Beverley Hallpike b/l                        Neuro Re-Ed            VORx1  2x30 ea (hor & vert)           Saccades  2x30 hor           Convergence  10x5\"           Airex             Tandem walking             No money HEP 2x10 GTB           Biodex                          Ther Ex             Bike  6'           Leg press  30x 65#           Bridges HEP 20x           SLR HEP 20x ea           S/l hip ABD HEP 20x ea           Rows HEP 30x 14#           Pec str                          Ther Activity             Squats HEP                         Gait Training                                       Modalities                                            "

## 2024-07-11 NOTE — ASSESSMENT & PLAN NOTE
Patient reports that he drank 2 days ago but he has quit 4 months and plans to quit and not drink anymore.  Caretakers have mentioned that on 2 different occasions they have noticed that he smells like alcohol and denies drinking any alcohol, they report that he is hiding the bottles and wanted to make it known of his drinking habits.    Plan:  BMP  Education on alcohol harms and effects on uncontrolled blood pressure.  Continue folic acid and thiamine  Will follow-up in 4 weeks and reevaluate, consider AA and alcohol counseling

## 2024-07-12 ENCOUNTER — PATIENT OUTREACH (OUTPATIENT)
Dept: FAMILY MEDICINE CLINIC | Facility: CLINIC | Age: 59
End: 2024-07-12

## 2024-07-12 NOTE — PROGRESS NOTES
MIAH HAWKINS did receive a message from provider stating that Pt has been scheduled to see her 7/11/2024. MIAH HAWKINS could not meet up with Pt due to MIAH HAWKINS was off at time. MIAH HAWKINS will attempt to reach out Pt a later time.    Addendum    MIAH HAWKINS did receive a returned call from Grazyna Salvador.  PT Accessibility   [facility] ([facility])  [address] [place]  [address]  (P) [phone]  (F) [phone]    MIAH HAWKINS introduced herself, her role and explained Grazyna the purpose of the call. Grazyna explained MIAH HAWKINS that she can send MIAH HAWKINS the Captioned Phone company information since MIAH HAWKINS or Pt's family can assist Pt to apply for the phone mentioned above. MIAH HAWKINS provided Grazyna with MIAH HAWKINS email address for her to send the information mentioned above. Grazyna sent the information and MIAH HAWKINS expressed appreciation to Grazyna for her support.     In addition, MIAH HAWKINS submitted an application through Captioned calls by [employer] in order to assist Pt getting a caption phone. Also, MIAH HAWKINS did call Devang Pt's care giver to provide the information above, she did not  the phone, was able to left a detailed VM requesting a return call. MIAH HAWKINS will attempt to reach out the Pt a later time.    Addendum    MIAH HAWKINS received a call from Pt's care giver Devang Quinones. MIAH HAWKINS provided her the information above and also, explained that MIAH HAWKINS can send Pt a copy of the information by mail. Devang expressed that she rather to come to the clinic with Pt on Monday 15th at 1:00 pm to  the information. MIAH HAWKINS informed that she scheduled a meeting with Pt on Monday 15th. Also, MIAH HAWKINS inquired Devang if there is any other social needs that she would like to share. She declined any social needs at this time. MIAH HAWKINS informed Devang that she can reach out the MIAH HAWKINS if she needs to reschedule the appointment. Devang seems understanding and thankful for the MIAH HAWKINS support.     MIAH HAWKINS is remain available  for further assistance as needed.   MIAH CM will continue to follow-up.

## 2024-07-15 ENCOUNTER — APPOINTMENT (OUTPATIENT)
Dept: PHYSICAL THERAPY | Facility: CLINIC | Age: 59
End: 2024-07-15
Payer: MEDICARE

## 2024-07-15 ENCOUNTER — PATIENT OUTREACH (OUTPATIENT)
Dept: FAMILY MEDICINE CLINIC | Facility: CLINIC | Age: 59
End: 2024-07-15

## 2024-07-15 NOTE — PROGRESS NOTES
MIAH HAWKINS did receive a call from Pt's care giver Devang. Devang informed MIAH HAWKINS that Pt's children did bring a cell phone to Pt over the weekend. MIAH HAWKINS explained Devang that it's easier for Pt to have the kosta for Caption phone on his phone. Devang seems understanding.     MIAH HAWKINS met with Pt and his another caregiver Danita Arteagazquez at the exam room today. MIAH HAWKINS introduced herself, her role, and explained Danita and Pt the reason for the meeting. Danita stated that Pt has two caregivers, he is doing well, and his children visited him over the weekend. MIAH HAWKINS provided Danita en Pt the information that MIAH HAWKINS received from Grazyna from [employer]. MIAH HAWKINS informed them the way that Pt can obtain the kosta on his phone. Danita expressed that she will attempt to help the Pt also, Danita is aware that she can contact Grazyna for further questions.    In addition, Danita and MIAH HAWKINS discussed about Pt furniture. She stated that Pt needs a bed. MIAH HAWKINS encouraged Danita to go to [employer] to get better price. Danita expressed that she already was at the store with Pt.     MIAH HAWKINS inquired Daniat if she gives consent to MIAH  to add her information in Pt's chart. Danita stated that MIAH HAWKINS can add her information. Dainta Escobar ([phone]). MIAH HAWKINS also inquired Danita if there is any other social needs that Pt needs hep with. Danita expressed that Pt is stable and there is no other social needs at this time. MIAH HAWKINS explained Danita that she can reach out the MIAH  for further assistance Monday through Friday within office hours. Danita seems understanding and thankful for the MIAH HAWKINS support. MIAH HAWKINS did send IB message to Sherri Bundy to add Pt's care giver information above.     MIAH HAWKINS is remain available for further assistance as needed.  MIAH HAWKINS will continue to follow-up.

## 2024-07-16 ENCOUNTER — TELEPHONE (OUTPATIENT)
Dept: GASTROENTEROLOGY | Facility: MEDICAL CENTER | Age: 59
End: 2024-07-16

## 2024-07-16 NOTE — TELEPHONE ENCOUNTER
Called patient to schedule office visit for Hepatologist, Appointment has been made will send out a letter at the request of the patient as a reminder of the date, time and location of appointment.           Message  Received: 1 week ago  Juliana Dial PA-C   [facility] [place] [facility]  Patient has questionable cirrhosis on labs, can we have him schedule a visit with hep for further workup please?          Previous Messages       ----- Message -----  From: Juliana Dial PA-C  Sent: 6/26/2024   9:07 AM EDT  To: Juliana Dial PA-C    Check on hep apt

## 2024-07-17 ENCOUNTER — ANESTHESIA EVENT (OUTPATIENT)
Dept: GASTROENTEROLOGY | Facility: MEDICAL CENTER | Age: 59
End: 2024-07-17

## 2024-07-17 ENCOUNTER — ANESTHESIA (OUTPATIENT)
Dept: GASTROENTEROLOGY | Facility: MEDICAL CENTER | Age: 59
End: 2024-07-17

## 2024-07-17 ENCOUNTER — HOSPITAL ENCOUNTER (OUTPATIENT)
Dept: GASTROENTEROLOGY | Facility: MEDICAL CENTER | Age: 59
Setting detail: OUTPATIENT SURGERY
Discharge: HOME | End: 2024-07-17
Payer: MEDICARE

## 2024-07-17 VITALS
HEART RATE: 68 BPM | BODY MASS INDEX: 22.28 KG/M2 | HEIGHT: 61 IN | OXYGEN SATURATION: 100 % | TEMPERATURE: 97.6 F | WEIGHT: 118 LBS | SYSTOLIC BLOOD PRESSURE: 157 MMHG | RESPIRATION RATE: 20 BRPM | DIASTOLIC BLOOD PRESSURE: 79 MMHG

## 2024-07-17 DIAGNOSIS — Z12.11 ENCOUNTER FOR SCREENING COLONOSCOPY: ICD-10-CM

## 2024-07-17 PROCEDURE — 45385 COLONOSCOPY W/LESION REMOVAL: CPT | Performed by: STUDENT IN AN ORGANIZED HEALTH CARE EDUCATION/TRAINING PROGRAM

## 2024-07-17 PROCEDURE — 88305 TISSUE EXAM BY PATHOLOGIST: CPT | Performed by: PATHOLOGY

## 2024-07-17 RX ORDER — ONDANSETRON 2 MG/ML
4 INJECTION INTRAMUSCULAR; INTRAVENOUS ONCE AS NEEDED
Status: DISCONTINUED | OUTPATIENT
Start: 2024-07-17 | End: 2024-07-21 | Stop reason: HOSPADM

## 2024-07-17 RX ORDER — SODIUM CHLORIDE 9 MG/ML
125 INJECTION, SOLUTION INTRAVENOUS CONTINUOUS
Status: DISCONTINUED | OUTPATIENT
Start: 2024-07-17 | End: 2024-07-21 | Stop reason: HOSPADM

## 2024-07-17 RX ORDER — ALBUTEROL SULFATE 2.5 MG/3ML
2.5 SOLUTION RESPIRATORY (INHALATION) ONCE AS NEEDED
Status: DISCONTINUED | OUTPATIENT
Start: 2024-07-17 | End: 2024-07-21 | Stop reason: HOSPADM

## 2024-07-17 RX ORDER — PROPOFOL 10 MG/ML
INJECTION, EMULSION INTRAVENOUS AS NEEDED
Status: DISCONTINUED | OUTPATIENT
Start: 2024-07-17 | End: 2024-07-17

## 2024-07-17 RX ADMIN — Medication 40 MG: at 11:52

## 2024-07-17 RX ADMIN — SODIUM CHLORIDE 125 ML/HR: 0.9 INJECTION, SOLUTION INTRAVENOUS at 11:16

## 2024-07-17 RX ADMIN — PROPOFOL 120 MCG/KG/MIN: 10 INJECTION, EMULSION INTRAVENOUS at 11:36

## 2024-07-17 RX ADMIN — PROPOFOL 80 MG: 10 INJECTION, EMULSION INTRAVENOUS at 11:35

## 2024-07-17 NOTE — ANESTHESIA PREPROCEDURE EVALUATION
Procedure:  COLONOSCOPY    Relevant Problems   CARDIO   (+) Coronary artery disease involving native coronary artery of native heart without angina pectoris   (+) Junctional bradycardia   (+) Other hyperlipidemia   (+) Primary hypertension      HEMATOLOGY   (+) Thrombocytopenia (HCC)      Nervous and Auditory   (+) Congenital deafness      Other   (+) Encounter for screening colonoscopy             Anesthesia Plan  ASA Score- 3     Anesthesia Type- IV sedation with anesthesia with ASA Monitors.         Additional Monitors:     Airway Plan:            Plan Factors-    Chart reviewed.                      Induction- intravenous.    Postoperative Plan-         Informed Consent- Anesthetic plan and risks discussed with patient.  I personally reviewed this patient with the CRNA. Discussed and agreed on the Anesthesia Plan with the CRNA..

## 2024-07-17 NOTE — ANESTHESIA POSTPROCEDURE EVALUATION
Post-Op Assessment Note    CV Status:  Stable    Pain management: adequate       Mental Status:  Alert and awake   Hydration Status:  Euvolemic   PONV Controlled:  Controlled   Airway Patency:  Patent     Post Op Vitals Reviewed: Yes    No anethesia notable event occurred.    Staff: CRNA               BP (!) 177/80 (07/17/24 1209)    Temp      Pulse 71 (07/17/24 1209)   Resp 18 (07/17/24 1209)    SpO2 100 % (07/17/24 1209)

## 2024-07-17 NOTE — H&P
"History and Physical -  [facility]  Mal Encarnacion 59 y.o. male MRN: [id number]          HPI: Mal Encarnacion is a 59 y.o. year old male who presents for initial screening colonoscopy. Recent CT also showed sigmoid colon wall thickening vs underdistension.      REVIEW OF SYSTEMS: Per the HPI, and otherwise unremarkable.    Historical Information   Past Medical History:   Diagnosis Date    Deaf     Heart murmur     Hypertension      Past Surgical History:   Procedure Laterality Date    CARDIAC CATHETERIZATION N/A 3/16/2024    Procedure: Cardiac pci;  Surgeon: Mal Nixon MD;  Location: [facility];  Service: Cardiology     Social History   Social History     Substance and Sexual Activity   Alcohol Use Yes    Comment: 1 beer occasionally     Social History     Substance and Sexual Activity   Drug Use Never     Social History     Tobacco Use   Smoking Status Never    Passive exposure: Never   Smokeless Tobacco Never     Family History   Problem Relation Age of Onset    Hypertension Mother     Diabetes Mother     Heart disease Mother         pacemaker    Arthritis Mother         chronic       Meds/Allergies       Current Outpatient Medications:     amLODIPine (NORVASC) 10 mg tablet    aspirin 81 mg chewable tablet    folic acid (FOLVITE) 1 mg tablet    hydroCHLOROthiazide 25 mg tablet    omeprazole (PriLOSEC) 40 MG capsule    atorvastatin (LIPITOR) 40 mg tablet    bisacodyl (DULCOLAX) 5 mg EC tablet    Blood Pressure KIT    losartan (COZAAR) 50 mg tablet    meclizine (ANTIVERT) 25 mg tablet    polyethylene glycol (GLYCOLAX) 17 GM/SCOOP powder    polyethylene glycol (GOLYTELY) 4000 mL solution    thiamine 100 MG tablet    Current Facility-Administered Medications:     sodium chloride 0.9 % infusion, 125 mL/hr, Intravenous, Continuous, 125 mL/hr at 07/17/24 1116    No Known Allergies    Objective     BP (!) 192/85   Pulse 78   Temp 97.6 °F (36.4 °C) (Temporal)   Resp 18   Ht 5' 1\" (1.549 " m)   Wt 53.5 kg (118 lb)   SpO2 97%   BMI 22.30 kg/m²       PHYSICAL EXAM    GEN: NAD  CARDIO: RRR  PULM: CTA bilaterally  ABD: soft, non-tender, non-distended  EXT: no lower extremity edema  NEURO: AAOx3      ASSESSMENT/PLAN:  59 y.o. year old male here for colonoscopy; he is stable and optimized for his procedure.

## 2024-07-18 ENCOUNTER — APPOINTMENT (OUTPATIENT)
Dept: LAB | Facility: HOSPITAL | Age: 59
End: 2024-07-18
Payer: MEDICARE

## 2024-07-18 ENCOUNTER — CONSULT (OUTPATIENT)
Dept: UROLOGY | Facility: MEDICAL CENTER | Age: 59
End: 2024-07-18
Payer: MEDICARE

## 2024-07-18 VITALS
SYSTOLIC BLOOD PRESSURE: 140 MMHG | OXYGEN SATURATION: 96 % | HEART RATE: 72 BPM | DIASTOLIC BLOOD PRESSURE: 70 MMHG | WEIGHT: 118 LBS | HEIGHT: 61 IN | BODY MASS INDEX: 22.28 KG/M2

## 2024-07-18 DIAGNOSIS — Z12.5 SCREENING FOR PROSTATE CANCER: ICD-10-CM

## 2024-07-18 DIAGNOSIS — N30.90 CYSTITIS: Primary | ICD-10-CM

## 2024-07-18 DIAGNOSIS — R35.0 URINARY FREQUENCY: ICD-10-CM

## 2024-07-18 LAB
BACTERIA UR QL AUTO: ABNORMAL /HPF
BILIRUB UR QL STRIP: NEGATIVE
CAOX CRY URNS QL MICRO: ABNORMAL /HPF
CLARITY UR: CLEAR
COLOR UR: YELLOW
GLUCOSE UR STRIP-MCNC: NEGATIVE MG/DL
HGB UR QL STRIP.AUTO: ABNORMAL
KETONES UR STRIP-MCNC: ABNORMAL MG/DL
LEUKOCYTE ESTERASE UR QL STRIP: NEGATIVE
MUCOUS THREADS UR QL AUTO: ABNORMAL
NITRITE UR QL STRIP: NEGATIVE
NON-SQ EPI CELLS URNS QL MICRO: ABNORMAL /HPF
PH UR STRIP.AUTO: 6.5 [PH]
POST-VOID RESIDUAL VOLUME, ML POC: 13 ML
PROT UR STRIP-MCNC: ABNORMAL MG/DL
RBC #/AREA URNS AUTO: ABNORMAL /HPF
SL AMB  POCT GLUCOSE, UA: ABNORMAL
SL AMB LEUKOCYTE ESTERASE,UA: ABNORMAL
SL AMB POCT BILIRUBIN,UA: ABNORMAL
SL AMB POCT BLOOD,UA: ABNORMAL
SL AMB POCT CLARITY,UA: CLEAR
SL AMB POCT COLOR,UA: YELLOW
SL AMB POCT KETONES,UA: ABNORMAL
SL AMB POCT NITRITE,UA: ABNORMAL
SL AMB POCT PH,UA: 6.5
SL AMB POCT SPECIFIC GRAVITY,UA: 1.01
SL AMB POCT URINE PROTEIN: ABNORMAL
SL AMB POCT UROBILINOGEN: 1
SP GR UR STRIP.AUTO: 1.02 (ref 1–1.03)
UROBILINOGEN UR STRIP-ACNC: <2 MG/DL
WBC #/AREA URNS AUTO: ABNORMAL /HPF

## 2024-07-18 PROCEDURE — 81001 URINALYSIS AUTO W/SCOPE: CPT | Performed by: UROLOGY

## 2024-07-18 PROCEDURE — 51798 US URINE CAPACITY MEASURE: CPT | Performed by: UROLOGY

## 2024-07-18 PROCEDURE — 81003 URINALYSIS AUTO W/O SCOPE: CPT | Performed by: UROLOGY

## 2024-07-18 PROCEDURE — 99204 OFFICE O/P NEW MOD 45 MIN: CPT | Performed by: UROLOGY

## 2024-07-18 RX ORDER — MIRABEGRON 25 MG/1
25 TABLET, FILM COATED, EXTENDED RELEASE ORAL DAILY
Qty: 90 TABLET | Refills: 3 | Status: SHIPPED | OUTPATIENT
Start: 2024-07-18

## 2024-07-18 NOTE — ASSESSMENT & PLAN NOTE
Urinary frequency and nocturia  No obstructive LUTS  PVR 13 cc  Normal GERMAINE  - UA  - PSA  - trial of mirabegron 25 mg daily, can increase to 50 mg if no improvements  - discussed that HCTZ is a diuretic and can increase urinary frequency

## 2024-07-18 NOTE — PROGRESS NOTES
7/18/2024    Mal Encarnacion  1965  [id number]    1. Cystitis  -     Ambulatory Referral to Urology  -     POCT urine dip auto non-scope  -     POCT Measure PVR  2. Urinary frequency  Assessment & Plan:  Urinary frequency and nocturia  No obstructive LUTS  PVR 13 cc  Normal GERMAINE  - UA  - PSA  - trial of mirabegron 25 mg daily, can increase to 50 mg if no improvements  - discussed that HCTZ is a diuretic and can increase urinary frequency  Orders:  -     Urinalysis with microscopic  -     Mirabegron ER (Myrbetriq) 25 MG TB24; Take 25 mg by mouth in the morning  3. Screening for prostate cancer  -     PSA, Total Screen; Future       History of Present Illness  59 y.o. male with a history of HTN, deaf, and hear murmur who presents with lower urinary tract symptoms    Reports increased frequency of urination q2 hours  No urgency or UUI  Nocturia x 3  No hematuria   Normal flow, no pushing/straining, no intermittency     Does take HCTZ  No history of DM  No constipation  Has never tried any OAB or BPH medications    PVR 13 cc on bladder scan     American  used       AUA Symptom Score  AUA SYMPTOM SCORE      Flowsheet Row Most Recent Value   AUA SYMPTOM SCORE    How often have you had a sensation of not emptying your bladder completely after you finished urinating? 1   How often have you had to urinate again less than two hours after you finished urinating? 2   How often have you found you stopped and started again several times when you urinate? 2   How often have you found it difficult to postpone urination? 4   How often have you had a weak urinary stream? 0   How often have you had to push or strain to begin urination? 0   How many times did you most typically get up to urinate from the time you went to bed at night until the time you got up in the morning? 1   Quality of Life: If you were to spend the rest of your life with your urinary condition just the way it is now, how would you feel  about that? 3   AUA SYMPTOM SCORE 10            Review of Systems   All other systems reviewed and are negative.      Past Medical History  Past Medical History:   Diagnosis Date    Deaf     Heart murmur     Hypertension        Past Social History  Past Surgical History:   Procedure Laterality Date    CARDIAC CATHETERIZATION N/A 3/16/2024    Procedure: Cardiac pci;  Surgeon: Mal Nixon MD;  Location: [facility];  Service: Cardiology       Past Family History  Family History   Problem Relation Age of Onset    Hypertension Mother     Diabetes Mother     Heart disease Mother         pacemaker    Arthritis Mother         chronic       Past Social history  Social History     Socioeconomic History    Marital status: Single     Spouse name: Not on file    Number of children: Not on file    Years of education: Not on file    Highest education level: Not on file   Occupational History    Not on file   Tobacco Use    Smoking status: Never     Passive exposure: Never    Smokeless tobacco: Never   Vaping Use    Vaping status: Never Used   Substance and Sexual Activity    Alcohol use: Yes     Comment: 1 beer occasionally    Drug use: Never    Sexual activity: Not on file   Other Topics Concern    Not on file   Social History Narrative    Not on file     Social Determinants of Health     Financial Resource Strain: Low Risk  (8/25/2023)    Overall Financial Resource Strain (CARDIA)     Difficulty of Paying Living Expenses: Not very hard   Food Insecurity: No Food Insecurity (5/10/2024)    Hunger Vital Sign     Worried About Running Out of Food in the Last Year: Never true     Ran Out of Food in the Last Year: Never true   Transportation Needs: No Transportation Needs (5/10/2024)    PRAPARE - Transportation     Lack of Transportation (Medical): No     Lack of Transportation (Non-Medical): No   Physical Activity: Not on file   Stress: Not on file   Social Connections: Not on file   Intimate Partner Violence: Not on file    Housing Stability: Low Risk  (5/10/2024)    Housing Stability Vital Sign     Unable to Pay for Housing in the Last Year: No     Number of Times Moved in the Last Year: 1     Homeless in the Last Year: No       Current Medications  Current Outpatient Medications   Medication Sig Dispense Refill    amLODIPine (NORVASC) 10 mg tablet Take 1 tablet (10 mg total) by mouth daily 30 tablet 4    aspirin 81 mg chewable tablet Chew 1 tablet (81 mg total) daily 30 tablet 3    atorvastatin (LIPITOR) 40 mg tablet Take 1 tablet (40 mg total) by mouth daily with dinner 30 tablet 0    folic acid (FOLVITE) 1 mg tablet Take 1 tablet (1 mg total) by mouth daily 30 tablet 3    hydroCHLOROthiazide 25 mg tablet Take 2 tablets (50 mg total) by mouth daily 30 tablet 1    meclizine (ANTIVERT) 25 mg tablet Take 1 tablet (25 mg total) by mouth every 8 (eight) hours Use for the next 3-4 days for dizziness 30 tablet 0    Mirabegron ER (Myrbetriq) 25 MG TB24 Take 25 mg by mouth in the morning 90 tablet 3    omeprazole (PriLOSEC) 40 MG capsule Take 1 capsule (40 mg total) by mouth daily before breakfast 30 capsule 5    polyethylene glycol (GLYCOLAX) 17 GM/SCOOP powder Take 17 g by mouth daily Take as directed as per written office instructions 238 g 0    thiamine 100 MG tablet Take 1 tablet (100 mg total) by mouth daily 30 tablet 0    bisacodyl (DULCOLAX) 5 mg EC tablet Take as directed as per written office instructions 2 tablet 0    Blood Pressure KIT Use 2 (two) times a day 1 kit 0    losartan (COZAAR) 50 mg tablet Take 1 tablet (50 mg total) by mouth daily 30 tablet 1     No current facility-administered medications for this visit.     Facility-Administered Medications Ordered in Other Visits   Medication Dose Route Frequency Provider Last Rate Last Admin    albuterol inhalation solution 2.5 mg  2.5 mg Nebulization Once PRN Monika Alicia MD        ondansetron (ZOFRAN) injection 4 mg  4 mg Intravenous Once PRN Monika Alicia MD        sodium  "chloride 0.9 % infusion  125 mL/hr Intravenous Continuous Monika Alicia MD   Stopped at 07/17/24 1231       Allergies  No Known Allergies    Past Medical History, Social History, Family History, medications and allergies were reviewed.    Vitals  Vitals:    07/18/24 1114   BP: 140/70   BP Location: Left arm   Patient Position: Sitting   Cuff Size: Standard   Pulse: 72   SpO2: 96%   Weight: 53.5 kg (118 lb)   Height: 5' 1\" (1.549 m)       Physical Exam  Constitutional:       Appearance: Normal appearance. He is normal weight.   HENT:      Head: Normocephalic.      Nose: Nose normal. No congestion.   Eyes:      Conjunctiva/sclera: Conjunctivae normal.   Cardiovascular:      Rate and Rhythm: Normal rate.   Pulmonary:      Effort: Pulmonary effort is normal. No respiratory distress.   Abdominal:      General: Abdomen is flat. There is no distension.      Palpations: Abdomen is soft.      Tenderness: There is no right CVA tenderness or left CVA tenderness.   Genitourinary:     Comments: GERMAINE: prostate not significantly enlarged, smooth, non-tender, no nodules   Musculoskeletal:      Comments: Normal gait   Skin:     General: Skin is warm and dry.   Neurological:      General: No focal deficit present.      Mental Status: He is alert and oriented to person, place, and time.   Psychiatric:         Mood and Affect: Mood normal.         Results  No results found for: \"PSA\"  Lab Results   Component Value Date    CALCIUM 9.9 06/14/2024    K 4.4 06/14/2024    CO2 29 06/14/2024     06/14/2024    BUN 10 06/14/2024    CREATININE 0.77 06/14/2024     Lab Results   Component Value Date    WBC 6.98 05/09/2024    HGB 16.9 05/09/2024    HCT 49.7 (H) 05/09/2024    MCV 97 05/09/2024     (L) 05/09/2024       Office Urine Dip  Recent Results (from the past 1 hour(s))   POCT Measure PVR    Collection Time: 07/18/24 11:23 AM   Result Value Ref Range    POST-VOID RESIDUAL VOLUME, ML POC 13 mL   POCT urine dip auto non-scope    " Collection Time: 07/18/24 11:27 AM   Result Value Ref Range     COLOR,UA yellow     CLARITY,UA clear     SPECIFIC GRAVITY,UA 1.015      PH,UA 6.5     LEUKOCYTE ESTERASE,UA neg     NITRITE,UA neg     GLUCOSE, UA neg     KETONES,UA 15 mg/dl     BILIRUBIN,UA small     BLOOD,UA trace-intact     POCT URINE PROTEIN neg     SL AMB POCT UROBILINOGEN 1.0    ]

## 2024-07-22 ENCOUNTER — OFFICE VISIT (OUTPATIENT)
Dept: PHYSICAL THERAPY | Facility: CLINIC | Age: 59
End: 2024-07-22
Payer: MEDICARE

## 2024-07-22 ENCOUNTER — CLINICAL SUPPORT (OUTPATIENT)
Dept: CARDIOLOGY CLINIC | Facility: CLINIC | Age: 59
End: 2024-07-22
Payer: MEDICARE

## 2024-07-22 DIAGNOSIS — M54.2 NECK PAIN: ICD-10-CM

## 2024-07-22 DIAGNOSIS — R42 VERTIGO: Primary | ICD-10-CM

## 2024-07-22 DIAGNOSIS — Z91.81 AT RISK FOR FALLS: ICD-10-CM

## 2024-07-22 DIAGNOSIS — R00.1 JUNCTIONAL BRADYCARDIA: Primary | ICD-10-CM

## 2024-07-22 PROCEDURE — 99211 OFF/OP EST MAY X REQ PHY/QHP: CPT

## 2024-07-22 PROCEDURE — 97110 THERAPEUTIC EXERCISES: CPT | Performed by: PHYSICAL THERAPIST

## 2024-07-22 PROCEDURE — 88305 TISSUE EXAM BY PATHOLOGIST: CPT | Performed by: PATHOLOGY

## 2024-07-22 PROCEDURE — 97140 MANUAL THERAPY 1/> REGIONS: CPT | Performed by: PHYSICAL THERAPIST

## 2024-07-22 PROCEDURE — 97112 NEUROMUSCULAR REEDUCATION: CPT | Performed by: PHYSICAL THERAPIST

## 2024-07-22 NOTE — PROGRESS NOTES
Patient came in to have Zio applied.  He is deaf and was not able to communicate, per [place] woman who brought him and via [employer] .  Zio applied by Rachael MARTINEZ Explained to friend who brought him that Zio needed to be returned in two weeks via mail in self addressed box.

## 2024-07-22 NOTE — PROGRESS NOTES
"Discharge Note     Today's date: 2024  Patient name: Mal Encarnacion  : 1965  MRN: [id number]  Referring provider: Evelio King*  Dx:   Encounter Diagnosis     ICD-10-CM    1. Vertigo  R42       2. Neck pain  M54.2       3. At risk for falls  Z91.81                      Subjective: Pt reports doing well with no dizziness and is walking around better at home.  He has been completing his exercises and feeling stronger.      Objective: See treatment diary below      Assessment:  Pt does well w progression of today's session.  He has improved convergence and no onset of dizziness with VOR or saccades.  He has some fatigue w LE strengthening exercises and no pain.  Patient is being DC'd w updated HEP as he is complaint w home program and has no functional deficits currently.      Plan: DC w HEP.     Precautions: Requires     Date           Visit # IE 2 3          FOTO IE  xx           Re-eval IE              Manuals           Cervical PROM   SF          UT/LS str w STM   SF          Epleys 2x L (-) Beverley Hallpike b/l                        Neuro Re-Ed           VORx1  2x30 ea (hor & vert) 2x30 ea (hor & vert)          Saccades  2x30 hor 2x30 hor          Convergence  10x5\" 10x5\"          Airex             Tandem walking             No money HEP 2x10 GTB 2x10 GTB          Biodex                          Ther Ex            Bike  6' 7'          Leg press  30x 65# 30x 65#          Bridges HEP 20x 20x          SLR HEP 20x ea 20x ea          S/l hip ABD HEP 20x ea 20x ea          Rows HEP 30x 14# 30x 15#          Pec str                          Ther Activity             Squats HEP                         Gait Training                                       Modalities                                              "

## 2024-07-24 ENCOUNTER — APPOINTMENT (OUTPATIENT)
Dept: LAB | Facility: HOSPITAL | Age: 59
End: 2024-07-24
Payer: MEDICARE

## 2024-07-24 DIAGNOSIS — Z12.5 SCREENING FOR PROSTATE CANCER: ICD-10-CM

## 2024-07-24 DIAGNOSIS — I10 PRIMARY HYPERTENSION: ICD-10-CM

## 2024-07-24 LAB
ANION GAP SERPL CALCULATED.3IONS-SCNC: 8 MMOL/L (ref 4–13)
BUN SERPL-MCNC: 6 MG/DL (ref 5–25)
CALCIUM SERPL-MCNC: 9.6 MG/DL (ref 8.4–10.2)
CHLORIDE SERPL-SCNC: 100 MMOL/L (ref 96–108)
CO2 SERPL-SCNC: 30 MMOL/L (ref 21–32)
CREAT SERPL-MCNC: 0.73 MG/DL (ref 0.6–1.3)
GFR SERPL CREATININE-BSD FRML MDRD: 101 ML/MIN/1.73SQ M
GLUCOSE P FAST SERPL-MCNC: 93 MG/DL (ref 65–99)
POTASSIUM SERPL-SCNC: 3.9 MMOL/L (ref 3.5–5.3)
PSA SERPL-MCNC: 1.65 NG/ML (ref 0–4)
SODIUM SERPL-SCNC: 138 MMOL/L (ref 135–147)

## 2024-07-24 PROCEDURE — G0103 PSA SCREENING: HCPCS

## 2024-07-24 PROCEDURE — 80048 BASIC METABOLIC PNL TOTAL CA: CPT

## 2024-07-24 PROCEDURE — 36415 COLL VENOUS BLD VENIPUNCTURE: CPT

## 2024-08-09 ENCOUNTER — CLINICAL SUPPORT (OUTPATIENT)
Dept: CARDIOLOGY CLINIC | Facility: CLINIC | Age: 59
End: 2024-08-09
Payer: MEDICARE

## 2024-08-09 DIAGNOSIS — I49.3 PREMATURE VENTRICULAR CONTRACTIONS: ICD-10-CM

## 2024-08-09 DIAGNOSIS — I25.10 CORONARY ARTERY DISEASE INVOLVING NATIVE CORONARY ARTERY OF NATIVE HEART WITHOUT ANGINA PECTORIS: ICD-10-CM

## 2024-08-09 DIAGNOSIS — R00.1 JUNCTIONAL BRADYCARDIA: ICD-10-CM

## 2024-08-11 NOTE — PATIENT INSTRUCTIONS
If interested in FREE monthly pill packing to organize your medications, please contact one of the below local pharmacies:     -[facility] [phone]    -[facility] [phone]  -[place] [facility] [phone]  -[place] [facility] [phone]  -[place] [facility] [phone]  -[facility] [phone]  -[place] [facility] [phone]  -[facility] [phone]  -[facility] [phone]    These pharmacies can also deliver your medications directly to your home!    Please have your insurance card and medication list available when you call. The pharmacy can then transfer your prescriptions from your current pharmacy, or can obtain new ones from your doctor.     Thank you,

## 2024-08-11 NOTE — PROGRESS NOTES
Ambulatory Visit  Name: Mal Encarnacion      : 1965      MRN: [id number]  Encounter Provider: Awa Shepherd MD  Encounter Date: 2024   Encounter department: [facility]    Assessment & Plan   1. Medicare annual wellness visit, initial     Preventive health issues were discussed with patient, and age appropriate screening tests were ordered as noted in patient's After Visit Summary. Personalized health advice and appropriate referrals for health education or preventive services given if needed, as noted in patient's After Visit Summary.    History of Present Illness   {Disappearing Hyperlinks I Encounters * My Last Note * Since Last Visit * History :50016}  HPI   Patient Care Team:  Awa Shepherd MD as PCP - General (Family Medicine)  Rafaelina Reyes as  Care Manager (Care Coordination)    Review of Systems  Medical History Reviewed by provider this encounter:       Annual Wellness Visit Questionnaire   Annual Wellness Visit  Social Determinants of Health     Financial Resource Strain: Low Risk  (2023)    Overall Financial Resource Strain (CARDIA)    • Difficulty of Paying Living Expenses: Not very hard   Food Insecurity: No Food Insecurity (5/10/2024)    Hunger Vital Sign    • Worried About Running Out of Food in the Last Year: Never true    • Ran Out of Food in the Last Year: Never true   Transportation Needs: No Transportation Needs (5/10/2024)    PRAPARE - Transportation    • Lack of Transportation (Medical): No    • Lack of Transportation (Non-Medical): No   Housing Stability: Low Risk  (5/10/2024)    Housing Stability Vital Sign    • Unable to Pay for Housing in the Last Year: No    • Number of Times Moved in the Last Year: 1    • Homeless in the Last Year: No   Utilities: Not At Risk (5/10/2024)    [facility] Utilities    • Threatened with loss of utilities: No     No results found.    Objective   {Disappearing Hyperlinks   Review Vitals * Enter  New Vitals * Results Review * Labs * Imaging * Cardiology * Procedures * Lung Cancer Screening :12973}  There were no vitals taken for this visit.    Physical Exam  {Administrative / Billing Section (Optional):82904}

## 2024-08-14 ENCOUNTER — OFFICE VISIT (OUTPATIENT)
Dept: FAMILY MEDICINE CLINIC | Facility: CLINIC | Age: 59
End: 2024-08-14

## 2024-08-14 ENCOUNTER — PATIENT OUTREACH (OUTPATIENT)
Dept: FAMILY MEDICINE CLINIC | Facility: CLINIC | Age: 59
End: 2024-08-14

## 2024-08-14 VITALS
OXYGEN SATURATION: 97 % | DIASTOLIC BLOOD PRESSURE: 70 MMHG | RESPIRATION RATE: 14 BRPM | TEMPERATURE: 97.6 F | SYSTOLIC BLOOD PRESSURE: 150 MMHG | HEART RATE: 99 BPM | HEIGHT: 61 IN | WEIGHT: 118 LBS | BODY MASS INDEX: 22.28 KG/M2

## 2024-08-14 DIAGNOSIS — R35.0 URINARY FREQUENCY: ICD-10-CM

## 2024-08-14 DIAGNOSIS — I25.10 CAD (CORONARY ARTERY DISEASE): ICD-10-CM

## 2024-08-14 DIAGNOSIS — F10.90 ALCOHOL USE DISORDER: ICD-10-CM

## 2024-08-14 DIAGNOSIS — I10 PRIMARY HYPERTENSION: Primary | ICD-10-CM

## 2024-08-14 RX ORDER — HYDROCHLOROTHIAZIDE 25 MG/1
50 TABLET ORAL DAILY
Qty: 60 TABLET | Refills: 2 | Status: SHIPPED | OUTPATIENT
Start: 2024-08-14

## 2024-08-14 RX ORDER — ATORVASTATIN CALCIUM 40 MG/1
40 TABLET, FILM COATED ORAL
Qty: 60 TABLET | Refills: 2 | Status: SHIPPED | OUTPATIENT
Start: 2024-08-14 | End: 2025-02-10

## 2024-08-14 RX ORDER — AMLODIPINE BESYLATE 10 MG/1
10 TABLET ORAL DAILY
Qty: 60 TABLET | Refills: 2 | Status: SHIPPED | OUTPATIENT
Start: 2024-08-14 | End: 2024-08-14

## 2024-08-14 RX ORDER — LANOLIN ALCOHOL/MO/W.PET/CERES
100 CREAM (GRAM) TOPICAL DAILY
Qty: 60 TABLET | Refills: 2 | Status: SHIPPED | OUTPATIENT
Start: 2024-08-14 | End: 2024-08-14

## 2024-08-14 RX ORDER — FOLIC ACID 1 MG/1
1 TABLET ORAL DAILY
Qty: 60 TABLET | Refills: 2 | Status: SHIPPED | OUTPATIENT
Start: 2024-08-14 | End: 2024-08-14

## 2024-08-14 RX ORDER — LOSARTAN POTASSIUM 50 MG/1
50 TABLET ORAL DAILY
Qty: 60 TABLET | Refills: 2 | Status: SHIPPED | OUTPATIENT
Start: 2024-08-14 | End: 2024-08-14

## 2024-08-14 RX ORDER — ASPIRIN 81 MG/1
81 TABLET, CHEWABLE ORAL DAILY
Qty: 60 TABLET | Refills: 2 | Status: SHIPPED | OUTPATIENT
Start: 2024-08-14 | End: 2024-08-14

## 2024-08-14 RX ORDER — LOSARTAN POTASSIUM 100 MG/1
100 TABLET ORAL DAILY
Qty: 60 TABLET | Refills: 0 | Status: CANCELLED | OUTPATIENT
Start: 2024-08-14 | End: 2024-10-13

## 2024-08-14 RX ORDER — AMLODIPINE BESYLATE 10 MG/1
10 TABLET ORAL DAILY
Qty: 60 TABLET | Refills: 2 | Status: SHIPPED | OUTPATIENT
Start: 2024-08-14 | End: 2025-02-10

## 2024-08-14 RX ORDER — HYDROCHLOROTHIAZIDE 25 MG/1
50 TABLET ORAL DAILY
Qty: 60 TABLET | Refills: 2 | Status: SHIPPED | OUTPATIENT
Start: 2024-08-14 | End: 2024-08-14

## 2024-08-14 RX ORDER — LANOLIN ALCOHOL/MO/W.PET/CERES
100 CREAM (GRAM) TOPICAL DAILY
Qty: 60 TABLET | Refills: 2 | Status: SHIPPED | OUTPATIENT
Start: 2024-08-14 | End: 2025-02-10

## 2024-08-14 RX ORDER — FOLIC ACID 1 MG/1
1 TABLET ORAL DAILY
Qty: 60 TABLET | Refills: 2 | Status: SHIPPED | OUTPATIENT
Start: 2024-08-14 | End: 2025-02-10

## 2024-08-14 RX ORDER — ASPIRIN 81 MG/1
81 TABLET, CHEWABLE ORAL DAILY
Qty: 60 TABLET | Refills: 2 | Status: SHIPPED | OUTPATIENT
Start: 2024-08-14 | End: 2025-02-10

## 2024-08-14 RX ORDER — ATORVASTATIN CALCIUM 40 MG/1
40 TABLET, FILM COATED ORAL
Qty: 60 TABLET | Refills: 2 | Status: SHIPPED | OUTPATIENT
Start: 2024-08-14 | End: 2024-08-14

## 2024-08-14 RX ORDER — LOSARTAN POTASSIUM 50 MG/1
50 TABLET ORAL DAILY
Qty: 60 TABLET | Refills: 2 | Status: SHIPPED | OUTPATIENT
Start: 2024-08-14 | End: 2025-02-10

## 2024-08-14 RX ORDER — MIRABEGRON 25 MG/1
25 TABLET, FILM COATED, EXTENDED RELEASE ORAL DAILY
Qty: 90 TABLET | Refills: 3 | Status: SHIPPED | OUTPATIENT
Start: 2024-08-14 | End: 2024-08-14

## 2024-08-14 RX ORDER — MIRABEGRON 25 MG/1
25 TABLET, FILM COATED, EXTENDED RELEASE ORAL DAILY
Qty: 90 TABLET | Refills: 3 | Status: SHIPPED | OUTPATIENT
Start: 2024-08-14

## 2024-08-14 NOTE — PROGRESS NOTES
MIAH HAWKINS did place a call to Pt's care giver for follow-up. MIAH HAWKINS spoke with Danita caregiver, inquired her regarding Pt's current state and if Pt has the caption phone already. Danita stated that Pt's daughters did bring a phone for Pt, and Pt expressed that he his happy with and does not want to apply for a caption phone at this time. Danita stated that Pt is happy, stable and there is no social needs at this time.     MIAH HAWKINS explained Danita that MIAH HAWKINS will close this referral today, however she can reach out MIAH HAWKINS for further support Monday through Friday within office hours. Danita expressed thanked to MIAH HAWKINS for her assistance.    MIAH HAWKINS is closing this referral today due to there is no social needs at this time.  MIAH HAWKINS is remain available for further assistance as needed.

## 2024-08-14 NOTE — PROGRESS NOTES
Ambulatory Visit  Name: Mal Encarnacion      : 1965      MRN: [id number]  Encounter Provider: Awa Shepherd MD  Encounter Date: 2024   Encounter department: [facility]    Assessment & Plan   1. Primary hypertension  Assessment & Plan:  Accompanied by khadra his caretaker with him on Monday, Wednesday in the morning and Friday afternoon. And [facility]  Uses very small amounts of salt   Only exercises about 1-2 days per week walking   Does not remember the ranges or numbers   Current medications: amlodipine 10 mg, hydrochlorothiazide 50 mg, losartan 50 mg   Compliance is a problem, cannot guarantee patient is taking medications regularly.     Plan:   Continue taking daily blood pressures, will re prescribe and sent to [place] pharmacy for blister packs   FU in 1 month for medicare AWV    Orders:  -     amLODIPine (NORVASC) 10 mg tablet; Take 1 tablet (10 mg total) by mouth daily  -     hydroCHLOROthiazide 25 mg tablet; Take 2 tablets (50 mg total) by mouth daily  -     losartan (COZAAR) 50 mg tablet; Take 1 tablet (50 mg total) by mouth daily  2. Alcohol use disorder  -     folic acid (FOLVITE) 1 mg tablet; Take 1 tablet (1 mg total) by mouth daily  -     thiamine 100 MG tablet; Take 1 tablet (100 mg total) by mouth daily  3. Urinary frequency  -     Mirabegron ER (Myrbetriq) 25 MG TB24; Take 25 mg by mouth in the morning  4. CAD (coronary artery disease)  -     aspirin 81 mg chewable tablet; Chew 1 tablet (81 mg total) daily  -     atorvastatin (LIPITOR) 40 mg tablet; Take 1 tablet (40 mg total) by mouth daily with dinner BLISTER PACK PLEASE       History of Present Illness     HPI  Mal Encarnacion 59 y.o. with past medical history significant for  CAD, HTN, congenital deafness comes to the clinic for a follow up of his blood pressure. Reports very little salt use, and only exercises about 1-2 days per week.  "Has 2 different care takers M-F, and no one over the weekend. Adherence an issue, and cannot be guaranteed. Patient is asymptomatic. Patient knows he should take a BP log but he often forgets to write it down and does not remember the numbers at all.     Review of Systems   Constitutional:  Negative for chills, diaphoresis and fever.   Respiratory:  Negative for cough, shortness of breath and wheezing.    Cardiovascular:  Negative for chest pain, palpitations and leg swelling.   Gastrointestinal:  Negative for abdominal pain.   Genitourinary:  Negative for dysuria and hematuria.       Objective     /70 (BP Location: Left arm, Patient Position: Sitting, Cuff Size: Standard)   Pulse 99   Temp 97.6 °F (36.4 °C) (Temporal)   Resp 14   Ht 5' 1\" (1.549 m)   Wt 53.5 kg (118 lb)   SpO2 97%   BMI 22.30 kg/m²     Physical Exam  Constitutional:       General: He is not in acute distress.     Appearance: Normal appearance. He is normal weight. He is not ill-appearing or toxic-appearing.   HENT:      Head: Normocephalic and atraumatic.      Right Ear: External ear normal.      Left Ear: External ear normal.   Eyes:      General:         Right eye: No discharge.         Left eye: No discharge.      Extraocular Movements: Extraocular movements intact.      Conjunctiva/sclera: Conjunctivae normal.   Cardiovascular:      Rate and Rhythm: Normal rate and regular rhythm.      Pulses: Normal pulses.      Heart sounds: Normal heart sounds. No murmur heard.     No gallop.   Pulmonary:      Effort: Pulmonary effort is normal. No respiratory distress.      Breath sounds: Normal breath sounds. No wheezing or rales.   Musculoskeletal:         General: Normal range of motion.   Skin:     General: Skin is warm.      Capillary Refill: Capillary refill takes less than 2 seconds.   Neurological:      General: No focal deficit present.      Mental Status: He is alert and oriented to person, place, and time.      Cranial Nerves: No " cranial nerve deficit.      Motor: No weakness.   Psychiatric:         Mood and Affect: Mood normal.       Administrative Statements

## 2024-08-15 ENCOUNTER — DOCUMENTATION (OUTPATIENT)
Dept: CARDIOLOGY CLINIC | Facility: CLINIC | Age: 59
End: 2024-08-15

## 2024-08-15 PROCEDURE — 93248 EXT ECG>7D<15D REV&INTERPJ: CPT | Performed by: INTERNAL MEDICINE

## 2024-08-16 NOTE — PROGRESS NOTES
Zio XT extended Holter monitor review report  -- Patient was monitored for 11 days and 18 hours between 7/22/2024 and 8/3/2024.  Indication for monitoring was detection of bradycardia.  -- Patient had a min HR of 43 bpm, max HR of 173 bpm, and avg HR of 74 bpm.  -- Predominant underlying rhythm was Sinus Rhythm.  Atrioventricular and interventricular conduction was normal.  -- There was no occurrence of ventricular tachycardia or atrial fibrillation or supraventricular tachycardia or clinically significant pause events or Mobitz type II or higher degree AV block.  No junctional rhythm was detected.  -- Isolated SVEs were rare (<1.0%), and no SVE Couplets or SVE Triplets were present.  -- Isolated VEs were occasional (1.7%, [id number]), and no VE Couplets or VE Triplets were present. Ventricular Bigeminy and Trigeminy were present.  -- There were 7 triggered events and 0 diary entries.  Triggered events correlated with sinus rhythm and rare premature ventricular contraction.    Impression: Normal extended Holter monitor findings with predominant sinus rhythm and no clinically significant arrhythmias.    Smooth Alicea MD

## 2024-08-28 ENCOUNTER — TELEPHONE (OUTPATIENT)
Dept: GASTROENTEROLOGY | Facility: CLINIC | Age: 59
End: 2024-08-28

## 2024-09-13 ENCOUNTER — DOCUMENTATION (OUTPATIENT)
Dept: CARDIOLOGY CLINIC | Facility: CLINIC | Age: 59
End: 2024-09-13

## 2024-09-14 NOTE — PROGRESS NOTES
Zio XT extended Holter monitor report  -- Patient was monitored for 11 days and 18 hours between 7/20/2024 and 8/3/2024.  Indication for monitoring for symptomatic bradycardia.  -- Patient had a min HR of 43 bpm, max HR of 173 bpm, and avg HR of 74 bpm.  -- Predominant underlying rhythm was Sinus Rhythm.  Atrioventricular and interventricular conduction was normal.  -- There was no occurrence of ventricular tachycardia or atrial fibrillation or clinically significant pause events or Mobitz type II or higher degree AV block.  -- Isolated SVEs were rare (<1.0%), and no SVE Couplets or SVE Triplets were present.  -- Isolated VEs were occasional (1.7%, 45711), and no VE Couplets or VE Triplets were present. Ventricular Bigeminy and Trigeminy were present.  -- There were 7 triggered events and 0 diary entries.  Triggered events correlated with artifact.    CONCLUSION: Normal extended Holter monitoring with predominant normal sinus rhythm with no clinically significant arrhythmias.        Smooth Alicea MD

## 2024-09-15 NOTE — PROGRESS NOTES
Ambulatory Visit  Name: Mal Encarnacion      : 1965      MRN: [id number]  Encounter Provider: Awa Shepherd MD  Encounter Date: 2024   Encounter department: [facility]    Assessment & Plan        No orders of the defined types were placed in this encounter.      Depression Screening and Follow-up Plan: Patient was screened for depression during today's encounter. They screened negative with a PHQ-2 score of 0.      Preventive health issues were discussed with patient, and age appropriate screening tests were ordered as noted in patient's After Visit Summary. Personalized health advice and appropriate referrals for health education or preventive services given if needed, as noted in patient's After Visit Summary.    History of Present Illness   {Disappearing Hyperlinks I Encounters * My Last Note * Since Last Visit * History :72796}      Patient Care Team:  Awa Shepherd MD as PCP - General (Family Medicine)    Review of Systems   Constitutional:  Negative for chills and fever.   HENT:  Negative for congestion.    Respiratory:  Negative for cough, chest tightness, shortness of breath and wheezing.    Cardiovascular:  Negative for chest pain and palpitations.   Gastrointestinal:  Negative for abdominal pain, blood in stool, diarrhea, nausea and vomiting.   Genitourinary:  Negative for dysuria and frequency.   Musculoskeletal:  Negative for back pain.   Skin:  Negative for rash.   Neurological:  Negative for dizziness and weakness.   Psychiatric/Behavioral:  Negative for agitation.      Annual Wellness Visit Questionnaire   Health Risk Assessment:   Patient rates overall health as good. Patient feels that their physical health rating is same. Patient is satisfied with their life. Eyesight was rated as same. Hearing was rated as same. Patient feels that their emotional and mental health rating is same. Patients states they are never, rarely angry. Patient states they  are never, rarely unusually tired/fatigued. Pain experienced in the last 7 days has been none. Patient states that he has experienced no weight loss or gain in last 6 months.      Fall Risk Screening:   In the past year, patient has experienced: no history of falling in past year       Home Safety:  Patient does not have trouble with stairs inside or outside of their home. Patient has working smoke alarms and has working carbon monoxide detector. Home safety hazards include: none.      Nutrition:   Current diet is Regular and No Added Salt. Low sodium as well.     Medications:   Patient is not currently taking any over-the-counter supplements. Patient is able to manage medications.      Activities of Daily Living (ADLs)/Instrumental Activities of Daily Living (IADLs):   Walk and transfer into and out of bed and chair?: Yes  Dress and groom yourself?: Yes    Bathe or shower yourself?: Yes    Feed yourself? Yes  Do your laundry/housekeeping?: Yes  Manage your money, pay your bills and track your expenses?: Yes  Make your own meals?: Yes    Do your own shopping?: Yes     Previous Hospitalizations:   Any hospitalizations or ED visits within the last 12 months?: No       Advance Care Planning:   Living will: No       PREVENTIVE SCREENINGS       Cardiovascular Screening:    General: Screening Not Indicated and History Lipid Disorder       Diabetes Screening:     General: Screening Not Indicated and History Diabetes       Colorectal Cancer Screening:     General: Screening Current       Prostate Cancer Screening:    General: Screening Current       Lung Cancer Screening:     General: Screening Not Indicated       Hepatitis C Screening:    General: Screening Current     Screening, Brief Intervention, and Referral to Treatment (SBIRT)     Screening  Typical number of drinks in a day: 0  Typical number of drinks in a week: 0  Interpretation: Low risk drinking behavior.     Single Item Drug Screening:  How often have you used  an illegal drug (including marijuana) or a prescription medication for non-medical reasons in the past year? never     Single Item Drug Screen Score: 0  Interpretation: Negative screen for possible drug use disorder     Social Determinants of Health   []Expand by Default           Financial Resource Strain: Low Risk  (8/25/2023)     Overall Financial Resource Strain (CARDIA)      Difficulty of Paying Living Expenses: Not very hard   Food Insecurity: No Food Insecurity (5/10/2024)     Hunger Vital Sign      Worried About Running Out of Food in the Last Year: Never true      Ran Out of Food in the Last Year: Never true   Transportation Needs: No Transportation Needs (5/10/2024)     PRAPARE - Transportation      Lack of Transportation (Medical): No      Lack of Transportation (Non-Medical): No   Housing Stability: Low Risk  (5/10/2024)     Housing Stability Vital Sign      Unable to Pay for Housing in the Last Year: No      Number of Times Moved in the Last Year: 1      Homeless in the Last Year: No   Utilities: Not At Risk (5/10/2024)     [facility] Utilities      Threatened with loss of utilities: No         No results found.         Depression Screening:   PHQ-2 Score: 0      PREVENTIVE SCREENINGS      Cardiovascular Screening:    General: Screening Not Indicated and History Lipid Disorder      Diabetes Screening:     General: Screening Not Indicated and History Diabetes      Colorectal Cancer Screening:     General: Screening Current      Prostate Cancer Screening:    General: Screening Current      Lung Cancer Screening:     General: Screening Not Indicated      Hepatitis C Screening:    General: Screening Current    Social Determinants of Health     Financial Resource Strain: Low Risk  (9/16/2024)    Overall Financial Resource Strain (CARDIA)     Difficulty of Paying Living Expenses: Not hard at all   Food Insecurity: No Food Insecurity (9/16/2024)    Hunger Vital Sign     Worried About Running Out of Food in the Last  "Year: Never true     Ran Out of Food in the Last Year: Never true   Transportation Needs: No Transportation Needs (9/16/2024)    PRAPARE - Transportation     Lack of Transportation (Medical): No     Lack of Transportation (Non-Medical): No   Housing Stability: Low Risk  (9/16/2024)    Housing Stability Vital Sign     Unable to Pay for Housing in the Last Year: No     Number of Times Moved in the Last Year: 0     Homeless in the Last Year: No   Utilities: Not At Risk (9/16/2024)    [facility] Utilities     Threatened with loss of utilities: No     No results found.    Objective   {Disappearing Hyperlinks   Review Vitals * Enter New Vitals * Results Review * Labs * Imaging * Cardiology * Procedures * Lung Cancer Screening * Surgical eConsent :38073}  /62 (BP Location: Right arm, Patient Position: Sitting, Cuff Size: Standard)   Pulse 100   Temp (!) 97.3 °F (36.3 °C) (Temporal)   Resp 16   Ht 5' 1\" (1.549 m)   Wt 54.2 kg (119 lb 6.4 oz)   SpO2 97%   BMI 22.56 kg/m²       Physical Exam  Constitutional:       General: He is not in acute distress.     Appearance: Normal appearance. He is normal weight. He is not ill-appearing or toxic-appearing.   HENT:      Head: Normocephalic and atraumatic.      Nose: Nose normal.      Mouth/Throat:      Mouth: Mucous membranes are moist.   Eyes:      General: No scleral icterus.     Extraocular Movements: Extraocular movements intact.      Conjunctiva/sclera: Conjunctivae normal.   Cardiovascular:      Rate and Rhythm: Normal rate and regular rhythm.      Pulses: Normal pulses.      Heart sounds: Normal heart sounds.   Pulmonary:      Effort: Pulmonary effort is normal. No respiratory distress.      Breath sounds: Normal breath sounds. No wheezing or rales.   Abdominal:      General: Abdomen is flat. Bowel sounds are normal.      Palpations: Abdomen is soft.   Neurological:      Mental Status: He is alert.         "

## 2024-09-15 NOTE — PATIENT INSTRUCTIONS
Medicare Preventive Visit Patient Instructions  Thank you for completing your Welcome to Medicare Visit or Medicare Annual Wellness Visit today. Your next wellness visit will be due in one year (9/17/2025).  The screening/preventive services that you may require over the next 5-10 years are detailed below. Some tests may not apply to you based off risk factors and/or age. Screening tests ordered at today's visit but not completed yet may show as past due. Also, please note that scanned in results may not display below.  Preventive Screenings:  Service Recommendations Previous Testing/Comments   Colorectal Cancer Screening  Colonoscopy    Fecal Occult Blood Test (FOBT)/Fecal Immunochemical Test (FIT)  Fecal DNA/Cologuard Test  Flexible Sigmoidoscopy Age: 45-75 years old   Colonoscopy: every 10 years (May be performed more frequently if at higher risk)  OR  FOBT/FIT: every 1 year  OR  Cologuard: every 3 years  OR  Sigmoidoscopy: every 5 years  Screening may be recommended earlier than age 45 if at higher risk for colorectal cancer. Also, an individualized decision between you and your healthcare provider will decide whether screening between the ages of 76-85 would be appropriate. Colonoscopy: 07/17/2024  FOBT/FIT: Not on file  Cologuard: Not on file  Sigmoidoscopy: Not on file    Screening Current     Prostate Cancer Screening Individualized decision between patient and health care provider in men between ages of 55-69   Medicare will cover every 12 months beginning on the day after your 50th birthday PSA: 1.649 ng/mL     Screening Current     Hepatitis C Screening Once for adults born between 1945 and 1965  More frequently in patients at high risk for Hepatitis C Hep C Antibody: 04/05/2023    Screening Current   Diabetes Screening 1-2 times per year if you're at risk for diabetes or have pre-diabetes Fasting glucose: 93 mg/dL (7/24/2024)  A1C: 5.4 % (5/10/2024)  Screening Not Indicated  History Diabetes    Cholesterol Screening Once every 5 years if you don't have a lipid disorder. May order more often based on risk factors. Lipid panel: 05/10/2024  Screening Not Indicated  History Lipid Disorder      Other Preventive Screenings Covered by Medicare:  Abdominal Aortic Aneurysm (AAA) Screening: covered once if your at risk. You're considered to be at risk if you have a family history of AAA or a male between the age of 65-75 who smoking at least 100 cigarettes in your lifetime.  Lung Cancer Screening: covers low dose CT scan once per year if you meet all of the following conditions: (1) Age 55-77; (2) No signs or symptoms of lung cancer; (3) Current smoker or have quit smoking within the last 15 years; (4) You have a tobacco smoking history of at least 20 pack years (packs per day x number of years you smoked); (5) You get a written order from a healthcare provider.  Glaucoma Screening: covered annually if you're considered high risk: (1) You have diabetes OR (2) Family history of glaucoma OR (3)  aged 50 and older OR (4)  American aged 65 and older  Osteoporosis Screening: covered every 2 years if you meet one of the following conditions: (1) Have a vertebral abnormality; (2) On glucocorticoid therapy for more than 3 months; (3) Have primary hyperparathyroidism; (4) On osteoporosis medications and need to assess response to drug therapy.  HIV Screening: covered annually if you're between the age of 15-65. Also covered annually if you are younger than 15 and older than 65 with risk factors for HIV infection. For pregnant patients, it is covered up to 3 times per pregnancy.    Immunizations:  Immunization Recommendations   Influenza Vaccine Annual influenza vaccination during flu season is recommended for all persons aged >= 6 months who do not have contraindications   Pneumococcal Vaccine   * Pneumococcal conjugate vaccine = PCV13 (Prevnar 13), PCV15 (Vaxneuvance), PCV20 (Prevnar 20)  *  Pneumococcal polysaccharide vaccine = PPSV23 (Pneumovax) Adults 19-63 yo with certain risk factors or if 65+ yo  If never received any pneumonia vaccine: recommend Prevnar 20 (PCV20)  Give PCV20 if previously received 1 dose of PCV13 or PPSV23   Hepatitis B Vaccine 3 dose series if at intermediate or high risk (ex: diabetes, end stage renal disease, liver disease)   Respiratory syncytial virus (RSV) Vaccine - COVERED BY MEDICARE PART D  * RSVPreF3 (Arexvy) CDC recommends that adults 60 years of age and older may receive a single dose of RSV vaccine using shared clinical decision-making (SCDM)   Tetanus (Td) Vaccine - COST NOT COVERED BY MEDICARE PART B Following completion of primary series, a booster dose should be given every 10 years to maintain immunity against tetanus. Td may also be given as tetanus wound prophylaxis.   Tdap Vaccine - COST NOT COVERED BY MEDICARE PART B Recommended at least once for all adults. For pregnant patients, recommended with each pregnancy.   Shingles Vaccine (Shingrix) - COST NOT COVERED BY MEDICARE PART B  2 shot series recommended in those 19 years and older who have or will have weakened immune systems or those 50 years and older     Health Maintenance Due:      Topic Date Due   • Colorectal Cancer Screening  07/17/2027   • HIV Screening  Completed   • Hepatitis C Screening  Completed     Immunizations Due:      Topic Date Due   • Hepatitis A Vaccine (1 of 2 - Risk 2-dose series) Never done   • COVID-19 Vaccine (3 - 2023-24 season) 09/01/2024   • Influenza Vaccine (1) 09/01/2024     Advance Directives   What are advance directives?  Advance directives are legal documents that state your wishes and plans for medical care. These plans are made ahead of time in case you lose your ability to make decisions for yourself. Advance directives can apply to any medical decision, such as the treatments you want, and if you want to donate organs.   What are the types of advance directives?   There are many types of advance directives, and each state has rules about how to use them. You may choose a combination of any of the following:  Living will:  This is a written record of the treatment you want. You can also choose which treatments you do not want, which to limit, and which to stop at a certain time. This includes surgery, medicine, IV fluid, and tube feedings.   Durable power of  for healthcare (DPAHC):  This is a written record that states who you want to make healthcare choices for you when you are unable to make them for yourself. This person, called a proxy, is usually a family member or a friend. You may choose more than 1 proxy.  Do not resuscitate (DNR) order:  A DNR order is used in case your heart stops beating or you stop breathing. It is a request not to have certain forms of treatment, such as CPR. A DNR order may be included in other types of advance directives.  Medical directive:  This covers the care that you want if you are in a coma, near death, or unable to make decisions for yourself. You can list the treatments you want for each condition. Treatment may include pain medicine, surgery, blood transfusions, dialysis, IV or tube feedings, and a ventilator (breathing machine).  Values history:  This document has questions about your views, beliefs, and how you feel and think about life. This information can help others choose the care that you would choose.  Why are advance directives important?  An advance directive helps you control your care. Although spoken wishes may be used, it is better to have your wishes written down. Spoken wishes can be misunderstood, or not followed. Treatments may be given even if you do not want them. An advance directive may make it easier for your family to make difficult choices about your care.       © Copyright [employer] 2018 Information is for End User's use only and may not be sold, redistributed or otherwise used for commercial  purposes. All illustrations and images included in CareNotes® are the copyrighted property of [employer] or [employer]    Medicare Preventive Visit Patient Instructions  Thank you for completing your Welcome to Medicare Visit or Medicare Annual Wellness Visit today. Your next wellness visit will be due in one year (9/17/2025).  The screening/preventive services that you may require over the next 5-10 years are detailed below. Some tests may not apply to you based off risk factors and/or age. Screening tests ordered at today's visit but not completed yet may show as past due. Also, please note that scanned in results may not display below.  Preventive Screenings:  Service Recommendations Previous Testing/Comments   Colorectal Cancer Screening  Colonoscopy    Fecal Occult Blood Test (FOBT)/Fecal Immunochemical Test (FIT)  Fecal DNA/Cologuard Test  Flexible Sigmoidoscopy Age: 45-75 years old   Colonoscopy: every 10 years (May be performed more frequently if at higher risk)  OR  FOBT/FIT: every 1 year  OR  Cologuard: every 3 years  OR  Sigmoidoscopy: every 5 years  Screening may be recommended earlier than age 45 if at higher risk for colorectal cancer. Also, an individualized decision between you and your healthcare provider will decide whether screening between the ages of 76-85 would be appropriate. Colonoscopy: 07/17/2024  FOBT/FIT: Not on file  Cologuard: Not on file  Sigmoidoscopy: Not on file    Screening Current     Prostate Cancer Screening Individualized decision between patient and health care provider in men between ages of 55-69   Medicare will cover every 12 months beginning on the day after your 50th birthday PSA: 1.649 ng/mL     Screening Current     Hepatitis C Screening Once for adults born between 1945 and 1965  More frequently in patients at high risk for Hepatitis C Hep C Antibody: 04/05/2023    Screening Current   Diabetes Screening 1-2 times per year if you're at risk for diabetes or  have pre-diabetes Fasting glucose: 93 mg/dL (7/24/2024)  A1C: 5.4 % (5/10/2024)  Screening Not Indicated  History Diabetes   Cholesterol Screening Once every 5 years if you don't have a lipid disorder. May order more often based on risk factors. Lipid panel: 05/10/2024  Screening Not Indicated  History Lipid Disorder      Other Preventive Screenings Covered by Medicare:  Abdominal Aortic Aneurysm (AAA) Screening: covered once if your at risk. You're considered to be at risk if you have a family history of AAA or a male between the age of 65-75 who smoking at least 100 cigarettes in your lifetime.  Lung Cancer Screening: covers low dose CT scan once per year if you meet all of the following conditions: (1) Age 55-77; (2) No signs or symptoms of lung cancer; (3) Current smoker or have quit smoking within the last 15 years; (4) You have a tobacco smoking history of at least 20 pack years (packs per day x number of years you smoked); (5) You get a written order from a healthcare provider.  Glaucoma Screening: covered annually if you're considered high risk: (1) You have diabetes OR (2) Family history of glaucoma OR (3)  aged 50 and older OR (4)  American aged 65 and older  Osteoporosis Screening: covered every 2 years if you meet one of the following conditions: (1) Have a vertebral abnormality; (2) On glucocorticoid therapy for more than 3 months; (3) Have primary hyperparathyroidism; (4) On osteoporosis medications and need to assess response to drug therapy.  HIV Screening: covered annually if you're between the age of 15-65. Also covered annually if you are younger than 15 and older than 65 with risk factors for HIV infection. For pregnant patients, it is covered up to 3 times per pregnancy.    Immunizations:  Immunization Recommendations   Influenza Vaccine Annual influenza vaccination during flu season is recommended for all persons aged >= 6 months who do not have contraindications    Pneumococcal Vaccine   * Pneumococcal conjugate vaccine = PCV13 (Prevnar 13), PCV15 (Vaxneuvance), PCV20 (Prevnar 20)  * Pneumococcal polysaccharide vaccine = PPSV23 (Pneumovax) Adults 19-65 yo with certain risk factors or if 65+ yo  If never received any pneumonia vaccine: recommend Prevnar 20 (PCV20)  Give PCV20 if previously received 1 dose of PCV13 or PPSV23   Hepatitis B Vaccine 3 dose series if at intermediate or high risk (ex: diabetes, end stage renal disease, liver disease)   Respiratory syncytial virus (RSV) Vaccine - COVERED BY MEDICARE PART D  * RSVPreF3 (Arexvy) CDC recommends that adults 60 years of age and older may receive a single dose of RSV vaccine using shared clinical decision-making (SCDM)   Tetanus (Td) Vaccine - COST NOT COVERED BY MEDICARE PART B Following completion of primary series, a booster dose should be given every 10 years to maintain immunity against tetanus. Td may also be given as tetanus wound prophylaxis.   Tdap Vaccine - COST NOT COVERED BY MEDICARE PART B Recommended at least once for all adults. For pregnant patients, recommended with each pregnancy.   Shingles Vaccine (Shingrix) - COST NOT COVERED BY MEDICARE PART B  2 shot series recommended in those 19 years and older who have or will have weakened immune systems or those 50 years and older     Health Maintenance Due:      Topic Date Due   • Colorectal Cancer Screening  07/17/2027   • HIV Screening  Completed   • Hepatitis C Screening  Completed     Immunizations Due:      Topic Date Due   • Hepatitis A Vaccine (1 of 2 - Risk 2-dose series) Never done   • COVID-19 Vaccine (3 - 2023-24 season) 09/01/2024   • Influenza Vaccine (1) 09/01/2024     Advance Directives   What are advance directives?  Advance directives are legal documents that state your wishes and plans for medical care. These plans are made ahead of time in case you lose your ability to make decisions for yourself. Advance directives can apply to any medical  decision, such as the treatments you want, and if you want to donate organs.   What are the types of advance directives?  There are many types of advance directives, and each state has rules about how to use them. You may choose a combination of any of the following:  Living will:  This is a written record of the treatment you want. You can also choose which treatments you do not want, which to limit, and which to stop at a certain time. This includes surgery, medicine, IV fluid, and tube feedings.   Durable power of  for healthcare (DPAHC):  This is a written record that states who you want to make healthcare choices for you when you are unable to make them for yourself. This person, called a proxy, is usually a family member or a friend. You may choose more than 1 proxy.  Do not resuscitate (DNR) order:  A DNR order is used in case your heart stops beating or you stop breathing. It is a request not to have certain forms of treatment, such as CPR. A DNR order may be included in other types of advance directives.  Medical directive:  This covers the care that you want if you are in a coma, near death, or unable to make decisions for yourself. You can list the treatments you want for each condition. Treatment may include pain medicine, surgery, blood transfusions, dialysis, IV or tube feedings, and a ventilator (breathing machine).  Values history:  This document has questions about your views, beliefs, and how you feel and think about life. This information can help others choose the care that you would choose.  Why are advance directives important?  An advance directive helps you control your care. Although spoken wishes may be used, it is better to have your wishes written down. Spoken wishes can be misunderstood, or not followed. Treatments may be given even if you do not want them. An advance directive may make it easier for your family to make difficult choices about your care.       © Copyright [employer] 2018 Information is for End User's use only and may not be sold, redistributed or otherwise used for commercial purposes. All illustrations and images included in CareNotes® are the copyrighted property of [employer] Inc. or [employer]

## 2024-09-16 ENCOUNTER — OFFICE VISIT (OUTPATIENT)
Dept: FAMILY MEDICINE CLINIC | Facility: CLINIC | Age: 59
End: 2024-09-16

## 2024-09-16 VITALS
BODY MASS INDEX: 22.54 KG/M2 | DIASTOLIC BLOOD PRESSURE: 62 MMHG | HEART RATE: 100 BPM | HEIGHT: 61 IN | OXYGEN SATURATION: 97 % | TEMPERATURE: 97.3 F | RESPIRATION RATE: 16 BRPM | WEIGHT: 119.4 LBS | SYSTOLIC BLOOD PRESSURE: 130 MMHG

## 2024-09-16 DIAGNOSIS — I25.10 CAD (CORONARY ARTERY DISEASE): ICD-10-CM

## 2024-09-16 DIAGNOSIS — E78.49 OTHER HYPERLIPIDEMIA: ICD-10-CM

## 2024-09-16 DIAGNOSIS — R10.13 EPIGASTRIC PAIN: ICD-10-CM

## 2024-09-16 DIAGNOSIS — I10 PRIMARY HYPERTENSION: ICD-10-CM

## 2024-09-16 DIAGNOSIS — R00.1 JUNCTIONAL BRADYCARDIA: ICD-10-CM

## 2024-09-16 DIAGNOSIS — R35.0 URINARY FREQUENCY: ICD-10-CM

## 2024-09-16 DIAGNOSIS — I25.10 CORONARY ARTERY DISEASE INVOLVING NATIVE CORONARY ARTERY OF NATIVE HEART WITHOUT ANGINA PECTORIS: ICD-10-CM

## 2024-09-16 DIAGNOSIS — F10.90 ALCOHOL USE DISORDER: ICD-10-CM

## 2024-09-16 DIAGNOSIS — R42 DIZZINESS: ICD-10-CM

## 2024-09-16 DIAGNOSIS — Z00.00 MEDICARE ANNUAL WELLNESS VISIT, INITIAL: Primary | ICD-10-CM

## 2024-09-16 PROCEDURE — 3078F DIAST BP <80 MM HG: CPT

## 2024-09-16 PROCEDURE — 99213 OFFICE O/P EST LOW 20 MIN: CPT

## 2024-09-16 PROCEDURE — 3075F SYST BP GE 130 - 139MM HG: CPT

## 2024-09-16 PROCEDURE — G0438 PPPS, INITIAL VISIT: HCPCS

## 2024-09-16 RX ORDER — LANOLIN ALCOHOL/MO/W.PET/CERES
100 CREAM (GRAM) TOPICAL DAILY
Qty: 60 TABLET | Refills: 2 | Status: SHIPPED | OUTPATIENT
Start: 2024-09-16 | End: 2025-03-15

## 2024-09-16 RX ORDER — MIRABEGRON 25 MG/1
25 TABLET, FILM COATED, EXTENDED RELEASE ORAL DAILY
Qty: 90 TABLET | Refills: 3 | Status: SHIPPED | OUTPATIENT
Start: 2024-09-16

## 2024-09-16 RX ORDER — LOSARTAN POTASSIUM 50 MG/1
50 TABLET ORAL DAILY
Qty: 60 TABLET | Refills: 2 | Status: SHIPPED | OUTPATIENT
Start: 2024-09-16 | End: 2025-03-15

## 2024-09-16 RX ORDER — FOLIC ACID 1 MG/1
1 TABLET ORAL DAILY
Qty: 60 TABLET | Refills: 2 | Status: SHIPPED | OUTPATIENT
Start: 2024-09-16 | End: 2025-03-15

## 2024-09-16 RX ORDER — ASPIRIN 81 MG/1
81 TABLET, CHEWABLE ORAL DAILY
Qty: 60 TABLET | Refills: 2 | Status: SHIPPED | OUTPATIENT
Start: 2024-09-16 | End: 2025-03-15

## 2024-09-16 RX ORDER — AMLODIPINE BESYLATE 10 MG/1
10 TABLET ORAL DAILY
Qty: 60 TABLET | Refills: 2 | Status: SHIPPED | OUTPATIENT
Start: 2024-09-16 | End: 2025-03-15

## 2024-09-16 RX ORDER — ATORVASTATIN CALCIUM 40 MG/1
40 TABLET, FILM COATED ORAL
Qty: 60 TABLET | Refills: 2 | Status: SHIPPED | OUTPATIENT
Start: 2024-09-16 | End: 2025-03-15

## 2024-09-16 RX ORDER — HYDROCHLOROTHIAZIDE 25 MG/1
50 TABLET ORAL DAILY
Qty: 60 TABLET | Refills: 2 | Status: SHIPPED | OUTPATIENT
Start: 2024-09-16

## 2024-09-16 RX ORDER — OMEPRAZOLE 40 MG/1
40 CAPSULE, DELAYED RELEASE ORAL
Qty: 30 CAPSULE | Refills: 5 | Status: SHIPPED | OUTPATIENT
Start: 2024-09-16 | End: 2025-03-15

## 2024-09-16 NOTE — ASSESSMENT & PLAN NOTE
Blood Pressure: 130/62   Controlled for age   Current medications:  losartan 50 mg daily, HCTZ 25 mg daily, amlodipine 10 mg tablet daily   Will send to [facility] pharmacy for blister packing to ensure compliance     Plan:   Placed in fax bin with number medication list and indicated which medications should be blister packed.   Continue current regimen  Patient instructed to decrease consumption of fried/process/ fast foods and increase whole food intake   Goal of 3-5 servings of vegetables per day   Minimize salt to < 2g per day   Portion control              Orders:    amLODIPine (NORVASC) 10 mg tablet; Take 1 tablet (10 mg total) by mouth daily    hydroCHLOROthiazide 25 mg tablet; Take 2 tablets (50 mg total) by mouth daily    losartan (COZAAR) 50 mg tablet; Take 1 tablet (50 mg total) by mouth daily

## 2024-09-16 NOTE — ASSESSMENT & PLAN NOTE
MANISH reviewed     Normal extended Holter monitoring with predominant normal sinus rhythm with no clinically significant arrhythmias.  FU with cardiology as schedule in November 2024

## 2024-09-16 NOTE — PROGRESS NOTES
Ambulatory Visit  Name: Mal Encarnacion      : 1965      MRN: [id number]  Encounter Provider: Awa Shepherd MD  Encounter Date: 2024   Encounter department: [facility]    Assessment & Plan  Medicare annual wellness visit, initial         Primary hypertension  Blood Pressure: 130/62   Controlled for age   Current medications:  losartan 50 mg daily, HCTZ 25 mg daily, amlodipine 10 mg tablet daily   Will send to [facility] pharmacy for blister packing to ensure compliance     Plan:   Placed in fax bin with number medication list and indicated which medications should be blister packed.   Continue current regimen  Patient instructed to decrease consumption of fried/process/ fast foods and increase whole food intake   Goal of 3-5 servings of vegetables per day   Minimize salt to < 2g per day   Portion control              Orders:    amLODIPine (NORVASC) 10 mg tablet; Take 1 tablet (10 mg total) by mouth daily    hydroCHLOROthiazide 25 mg tablet; Take 2 tablets (50 mg total) by mouth daily    losartan (COZAAR) 50 mg tablet; Take 1 tablet (50 mg total) by mouth daily    CAD (coronary artery disease)    Orders:    aspirin 81 mg chewable tablet; Chew 1 tablet (81 mg total) daily    atorvastatin (LIPITOR) 40 mg tablet; Take 1 tablet (40 mg total) by mouth daily with dinner BLISTER PACK PLEASE    Alcohol use disorder    Orders:    folic acid (FOLVITE) 1 mg tablet; Take 1 tablet (1 mg total) by mouth daily    thiamine 100 MG tablet; Take 1 tablet (100 mg total) by mouth daily    Urinary frequency  Frequency is much better controlled on 25 mg mirabegron. Will continue     Orders:    Mirabegron ER (Myrbetriq) 25 MG TB24; Take 25 mg by mouth in the morning    Epigastric pain    Orders:    omeprazole (PriLOSEC) 40 MG capsule; Take 1 capsule (40 mg total) by mouth daily before breakfast    Junctional bradycardia  MANISH reviewed     Normal extended Holter monitoring with  predominant normal sinus rhythm with no clinically significant arrhythmias.  FU with cardiology as schedule in November 2024         Other hyperlipidemia  Lab Results   Component Value Date    LDLCALC 99 05/10/2024     Lab Results   Component Value Date    CHOLESTEROL 212 (H) 05/10/2024    CHOLESTEROL 174 01/30/2024    CHOLESTEROL 230 (H) 09/27/2021     Lab Results   Component Value Date    HDL 89 05/10/2024    HDL 58 01/30/2024    HDL 59 09/27/2021     Lab Results   Component Value Date    TRIG 118 05/10/2024    TRIG 58 01/30/2024    TRIG 90 09/27/2021     Lab Results   Component Value Date    NONHDLC 116 01/30/2024      Plan:   Continue atorvastatin 40 mg daily            Dizziness  Dizziness is much improved, no longer uses the meclizine.          Coronary artery disease involving native coronary artery of native heart without angina pectoris  See a/p for junctional bradycardia               Preventive health issues were discussed with patient, and age appropriate screening tests were ordered as noted in patient's After Visit Summary. Personalized health advice and appropriate referrals for health education or preventive services given if needed, as noted in patient's After Visit Summary.    History of Present Illness     HPI   Patient comes to the clinic today for medicare annual wellness visit. He has no subjective complaints today.   Patient Care Team:  Aaw Shepherd MD as PCP - General (Family Medicine)    Review of Systems   Constitutional:  Negative for chills and fever.   HENT:  Negative for congestion.    Respiratory:  Negative for cough, chest tightness, shortness of breath and wheezing.    Cardiovascular:  Negative for chest pain and palpitations.   Gastrointestinal:  Negative for abdominal pain, blood in stool, diarrhea, nausea and vomiting.   Genitourinary:  Negative for dysuria and frequency.   Musculoskeletal:  Negative for back pain.   Skin:  Negative for rash.   Neurological:  Negative for  dizziness and weakness.   Psychiatric/Behavioral:  Negative for agitation.    Medical History Reviewed by provider this encounter:  Meds       Annual Wellness Visit Questionnaire       Health Risk Assessment:   Patient rates overall health as good. Patient feels that their physical health rating is same. Patient is satisfied with their life. Eyesight was rated as same. Hearing was rated as same. Patient feels that their emotional and mental health rating is same. Patients states they are never, rarely angry. Patient states they are never, rarely unusually tired/fatigued. Pain experienced in the last 7 days has been none. Patient states that he has experienced no weight loss or gain in last 6 months.     Depression Screening:   PHQ-2 Score: 0      Fall Risk Screening:   In the past year, patient has experienced: no history of falling in past year      Home Safety:  Patient does not have trouble with stairs inside or outside of their home. Patient has working smoke alarms and has working carbon monoxide detector. Home safety hazards include: none.     Nutrition:   Current diet is Regular and No Added Salt. Low sodium as well.    Medications:   Patient is not currently taking any over-the-counter supplements. Patient is able to manage medications.     Activities of Daily Living (ADLs)/Instrumental Activities of Daily Living (IADLs):   Walk and transfer into and out of bed and chair?: Yes  Dress and groom yourself?: Yes    Bathe or shower yourself?: Yes    Feed yourself? Yes  Do your laundry/housekeeping?: Yes  Manage your money, pay your bills and track your expenses?: Yes  Make your own meals?: Yes    Do your own shopping?: Yes    Previous Hospitalizations:   Any hospitalizations or ED visits within the last 12 months?: No      Advance Care Planning:   Living will: No      PREVENTIVE SCREENINGS      Cardiovascular Screening:    General: Screening Not Indicated and History Lipid Disorder      Diabetes Screening:      "General: Screening Not Indicated and History Diabetes      Colorectal Cancer Screening:     General: Screening Current      Prostate Cancer Screening:    General: Screening Current      Lung Cancer Screening:     General: Screening Not Indicated      Hepatitis C Screening:    General: Screening Current    Screening, Brief Intervention, and Referral to Treatment (SBIRT)    Screening  Typical number of drinks in a day: 0  Typical number of drinks in a week: 0  Interpretation: Low risk drinking behavior.    Single Item Drug Screening:  How often have you used an illegal drug (including marijuana) or a prescription medication for non-medical reasons in the past year? never    Single Item Drug Screen Score: 0  Interpretation: Negative screen for possible drug use disorder    Social Determinants of Health     Financial Resource Strain: Low Risk  (9/16/2024)    Overall Financial Resource Strain (CARDIA)     Difficulty of Paying Living Expenses: Not hard at all   Food Insecurity: No Food Insecurity (9/16/2024)    Hunger Vital Sign     Worried About Running Out of Food in the Last Year: Never true     Ran Out of Food in the Last Year: Never true   Transportation Needs: No Transportation Needs (9/16/2024)    PRAPARE - Transportation     Lack of Transportation (Medical): No     Lack of Transportation (Non-Medical): No   Housing Stability: Low Risk  (9/16/2024)    Housing Stability Vital Sign     Unable to Pay for Housing in the Last Year: No     Number of Times Moved in the Last Year: 0     Homeless in the Last Year: No   Utilities: Not At Risk (9/16/2024)    [facility] Utilities     Threatened with loss of utilities: No     No results found.    Objective     /62 (BP Location: Right arm, Patient Position: Sitting, Cuff Size: Standard)   Pulse 100   Temp (!) 97.3 °F (36.3 °C) (Temporal)   Resp 16   Ht 5' 1\" (1.549 m)   Wt 54.2 kg (119 lb 6.4 oz)   SpO2 97%   BMI 22.56 kg/m²           Physical Exam  Constitutional:       " General: He is not in acute distress.     Appearance: Normal appearance. He is normal weight. He is not ill-appearing or toxic-appearing.   HENT:      Head: Normocephalic and atraumatic.      Nose: Nose normal.      Mouth/Throat:      Mouth: Mucous membranes are moist.   Eyes:      General: No scleral icterus.     Extraocular Movements: Extraocular movements intact.      Conjunctiva/sclera: Conjunctivae normal.   Cardiovascular:      Rate and Rhythm: Normal rate and regular rhythm.      Pulses: Normal pulses.      Heart sounds: Normal heart sounds.   Pulmonary:      Effort: Pulmonary effort is normal. No respiratory distress.      Breath sounds: Normal breath sounds. No wheezing or rales.   Abdominal:      General: Abdomen is flat. Bowel sounds are normal.      Palpations: Abdomen is soft.   Neurological:      Mental Status: He is alert.

## 2024-09-16 NOTE — ASSESSMENT & PLAN NOTE
Frequency is much better controlled on 25 mg mirabegron. Will continue     Orders:    Mirabegron ER (Myrbetriq) 25 MG TB24; Take 25 mg by mouth in the morning

## 2024-09-16 NOTE — ASSESSMENT & PLAN NOTE
Lab Results   Component Value Date    LDLCALC 99 05/10/2024     Lab Results   Component Value Date    CHOLESTEROL 212 (H) 05/10/2024    CHOLESTEROL 174 01/30/2024    CHOLESTEROL 230 (H) 09/27/2021     Lab Results   Component Value Date    HDL 89 05/10/2024    HDL 58 01/30/2024    HDL 59 09/27/2021     Lab Results   Component Value Date    TRIG 118 05/10/2024    TRIG 58 01/30/2024    TRIG 90 09/27/2021     Lab Results   Component Value Date    NONHDLC 116 01/30/2024      Plan:   Continue atorvastatin 40 mg daily

## 2024-09-17 ENCOUNTER — TELEPHONE (OUTPATIENT)
Dept: FAMILY MEDICINE CLINIC | Facility: CLINIC | Age: 59
End: 2024-09-17

## 2024-09-17 NOTE — ASSESSMENT & PLAN NOTE
Orders:    omeprazole (PriLOSEC) 40 MG capsule; Take 1 capsule (40 mg total) by mouth daily before breakfast

## 2024-09-17 NOTE — ASSESSMENT & PLAN NOTE
Orders:    folic acid (FOLVITE) 1 mg tablet; Take 1 tablet (1 mg total) by mouth daily    thiamine 100 MG tablet; Take 1 tablet (100 mg total) by mouth daily

## 2024-09-17 NOTE — TELEPHONE ENCOUNTER
[phone] FAX CONFIRMATION RECEIVED.

## 2024-09-19 ENCOUNTER — HOSPITAL ENCOUNTER (EMERGENCY)
Facility: HOSPITAL | Age: 59
Discharge: HOME | End: 2024-09-19
Attending: EMERGENCY MEDICINE | Admitting: EMERGENCY MEDICINE
Payer: MEDICARE

## 2024-09-19 ENCOUNTER — APPOINTMENT (EMERGENCY)
Dept: CT IMAGING | Facility: HOSPITAL | Age: 59
End: 2024-09-19
Payer: MEDICARE

## 2024-09-19 VITALS
HEART RATE: 81 BPM | BODY MASS INDEX: 23.33 KG/M2 | WEIGHT: 123.46 LBS | SYSTOLIC BLOOD PRESSURE: 113 MMHG | TEMPERATURE: 98 F | DIASTOLIC BLOOD PRESSURE: 58 MMHG | OXYGEN SATURATION: 96 % | RESPIRATION RATE: 18 BRPM

## 2024-09-19 DIAGNOSIS — K59.00 CONSTIPATION: ICD-10-CM

## 2024-09-19 DIAGNOSIS — R10.9 ABDOMINAL PAIN: Primary | ICD-10-CM

## 2024-09-19 LAB
ALBUMIN SERPL BCG-MCNC: 4.3 G/DL (ref 3.5–5)
ALP SERPL-CCNC: 86 U/L (ref 34–104)
ALT SERPL W P-5'-P-CCNC: 17 U/L (ref 7–52)
ANION GAP SERPL CALCULATED.3IONS-SCNC: 8 MMOL/L (ref 4–13)
APTT PPP: 28 SECONDS (ref 23–34)
AST SERPL W P-5'-P-CCNC: 25 U/L (ref 13–39)
BACTERIA UR QL AUTO: ABNORMAL /HPF
BASOPHILS # BLD AUTO: 0.02 THOUSANDS/ΜL (ref 0–0.1)
BASOPHILS NFR BLD AUTO: 0 % (ref 0–1)
BILIRUB DIRECT SERPL-MCNC: 0.1 MG/DL (ref 0–0.2)
BILIRUB SERPL-MCNC: 0.96 MG/DL (ref 0.2–1)
BILIRUB UR QL STRIP: NEGATIVE
BUN SERPL-MCNC: 9 MG/DL (ref 5–25)
CALCIUM SERPL-MCNC: 9.5 MG/DL (ref 8.4–10.2)
CHLORIDE SERPL-SCNC: 96 MMOL/L (ref 96–108)
CLARITY UR: CLEAR
CO2 SERPL-SCNC: 29 MMOL/L (ref 21–32)
COLOR UR: COLORLESS
CREAT SERPL-MCNC: 0.58 MG/DL (ref 0.6–1.3)
EOSINOPHIL # BLD AUTO: 0.01 THOUSAND/ΜL (ref 0–0.61)
EOSINOPHIL NFR BLD AUTO: 0 % (ref 0–6)
ERYTHROCYTE [DISTWIDTH] IN BLOOD BY AUTOMATED COUNT: 13.2 % (ref 11.6–15.1)
GFR SERPL CREATININE-BSD FRML MDRD: 111 ML/MIN/1.73SQ M
GLUCOSE SERPL-MCNC: 103 MG/DL (ref 65–140)
GLUCOSE UR STRIP-MCNC: NEGATIVE MG/DL
HCT VFR BLD AUTO: 46.1 % (ref 36.5–49.3)
HGB BLD-MCNC: 16.4 G/DL (ref 12–17)
HGB UR QL STRIP.AUTO: ABNORMAL
IMM GRANULOCYTES # BLD AUTO: 0.02 THOUSAND/UL (ref 0–0.2)
IMM GRANULOCYTES NFR BLD AUTO: 0 % (ref 0–2)
INR PPP: 1.12 (ref 0.85–1.19)
KETONES UR STRIP-MCNC: ABNORMAL MG/DL
LEUKOCYTE ESTERASE UR QL STRIP: ABNORMAL
LIPASE SERPL-CCNC: 19 U/L (ref 11–82)
LYMPHOCYTES # BLD AUTO: 0.99 THOUSANDS/ΜL (ref 0.6–4.47)
LYMPHOCYTES NFR BLD AUTO: 18 % (ref 14–44)
MCH RBC QN AUTO: 33 PG (ref 26.8–34.3)
MCHC RBC AUTO-ENTMCNC: 35.6 G/DL (ref 31.4–37.4)
MCV RBC AUTO: 93 FL (ref 82–98)
MONOCYTES # BLD AUTO: 0.71 THOUSAND/ΜL (ref 0.17–1.22)
MONOCYTES NFR BLD AUTO: 13 % (ref 4–12)
NEUTROPHILS # BLD AUTO: 3.77 THOUSANDS/ΜL (ref 1.85–7.62)
NEUTS SEG NFR BLD AUTO: 69 % (ref 43–75)
NITRITE UR QL STRIP: NEGATIVE
NON-SQ EPI CELLS URNS QL MICRO: ABNORMAL /HPF
NRBC BLD AUTO-RTO: 0 /100 WBCS
PH UR STRIP.AUTO: 7 [PH]
PLATELET # BLD AUTO: 158 THOUSANDS/UL (ref 149–390)
PMV BLD AUTO: 9.5 FL (ref 8.9–12.7)
POTASSIUM SERPL-SCNC: 3.4 MMOL/L (ref 3.5–5.3)
PROT SERPL-MCNC: 7.5 G/DL (ref 6.4–8.4)
PROT UR STRIP-MCNC: NEGATIVE MG/DL
PROTHROMBIN TIME: 14.6 SECONDS (ref 12.3–15)
RBC # BLD AUTO: 4.97 MILLION/UL (ref 3.88–5.62)
RBC #/AREA URNS AUTO: ABNORMAL /HPF
SODIUM SERPL-SCNC: 133 MMOL/L (ref 135–147)
SP GR UR STRIP.AUTO: 1.02 (ref 1–1.03)
UROBILINOGEN UR STRIP-ACNC: <2 MG/DL
WBC # BLD AUTO: 5.52 THOUSAND/UL (ref 4.31–10.16)
WBC #/AREA URNS AUTO: ABNORMAL /HPF

## 2024-09-19 PROCEDURE — 85025 COMPLETE CBC W/AUTO DIFF WBC: CPT | Performed by: PHYSICIAN ASSISTANT

## 2024-09-19 PROCEDURE — 96375 TX/PRO/DX INJ NEW DRUG ADDON: CPT

## 2024-09-19 PROCEDURE — 99284 EMERGENCY DEPT VISIT MOD MDM: CPT

## 2024-09-19 PROCEDURE — 80076 HEPATIC FUNCTION PANEL: CPT | Performed by: PHYSICIAN ASSISTANT

## 2024-09-19 PROCEDURE — 85730 THROMBOPLASTIN TIME PARTIAL: CPT | Performed by: PHYSICIAN ASSISTANT

## 2024-09-19 PROCEDURE — 85610 PROTHROMBIN TIME: CPT | Performed by: PHYSICIAN ASSISTANT

## 2024-09-19 PROCEDURE — 74177 CT ABD & PELVIS W/CONTRAST: CPT

## 2024-09-19 PROCEDURE — 80048 BASIC METABOLIC PNL TOTAL CA: CPT | Performed by: PHYSICIAN ASSISTANT

## 2024-09-19 PROCEDURE — 81001 URINALYSIS AUTO W/SCOPE: CPT | Performed by: PHYSICIAN ASSISTANT

## 2024-09-19 PROCEDURE — 36415 COLL VENOUS BLD VENIPUNCTURE: CPT | Performed by: PHYSICIAN ASSISTANT

## 2024-09-19 PROCEDURE — 83690 ASSAY OF LIPASE: CPT | Performed by: PHYSICIAN ASSISTANT

## 2024-09-19 PROCEDURE — 96374 THER/PROPH/DIAG INJ IV PUSH: CPT

## 2024-09-19 RX ORDER — ACETAMINOPHEN 325 MG/1
975 TABLET ORAL ONCE AS NEEDED
Status: COMPLETED | OUTPATIENT
Start: 2024-09-19 | End: 2024-09-19

## 2024-09-19 RX ORDER — KETOROLAC TROMETHAMINE 30 MG/ML
15 INJECTION, SOLUTION INTRAMUSCULAR; INTRAVENOUS ONCE
Status: COMPLETED | OUTPATIENT
Start: 2024-09-19 | End: 2024-09-19

## 2024-09-19 RX ORDER — POLYETHYLENE GLYCOL 3350 17 G/17G
POWDER, FOR SOLUTION ORAL
Qty: 20 EACH | Refills: 0 | Status: SHIPPED | OUTPATIENT
Start: 2024-09-19

## 2024-09-19 RX ORDER — PANTOPRAZOLE SODIUM 40 MG/10ML
40 INJECTION, POWDER, LYOPHILIZED, FOR SOLUTION INTRAVENOUS ONCE
Status: COMPLETED | OUTPATIENT
Start: 2024-09-19 | End: 2024-09-19

## 2024-09-19 RX ORDER — ONDANSETRON 2 MG/ML
4 INJECTION INTRAMUSCULAR; INTRAVENOUS ONCE
Status: COMPLETED | OUTPATIENT
Start: 2024-09-19 | End: 2024-09-19

## 2024-09-19 RX ORDER — MAGNESIUM HYDROXIDE/ALUMINUM HYDROXICE/SIMETHICONE 120; 1200; 1200 MG/30ML; MG/30ML; MG/30ML
30 SUSPENSION ORAL ONCE
Status: COMPLETED | OUTPATIENT
Start: 2024-09-19 | End: 2024-09-19

## 2024-09-19 RX ORDER — POTASSIUM CHLORIDE 1500 MG/1
20 TABLET, EXTENDED RELEASE ORAL ONCE
Status: COMPLETED | OUTPATIENT
Start: 2024-09-19 | End: 2024-09-19

## 2024-09-19 RX ORDER — LIDOCAINE HYDROCHLORIDE 20 MG/ML
15 SOLUTION OROPHARYNGEAL ONCE
Status: COMPLETED | OUTPATIENT
Start: 2024-09-19 | End: 2024-09-19

## 2024-09-19 RX ADMIN — ALUMINUM HYDROXIDE, MAGNESIUM HYDROXIDE, AND DIMETHICONE 30 ML: 200; 20; 200 SUSPENSION ORAL at 10:38

## 2024-09-19 RX ADMIN — ONDANSETRON 4 MG: 2 INJECTION INTRAMUSCULAR; INTRAVENOUS at 08:43

## 2024-09-19 RX ADMIN — PANTOPRAZOLE SODIUM 40 MG: 40 INJECTION, POWDER, FOR SOLUTION INTRAVENOUS at 08:34

## 2024-09-19 RX ADMIN — LIDOCAINE HYDROCHLORIDE 15 ML: 20 SOLUTION ORAL at 10:38

## 2024-09-19 RX ADMIN — ACETAMINOPHEN 975 MG: 325 TABLET ORAL at 11:14

## 2024-09-19 RX ADMIN — KETOROLAC TROMETHAMINE 15 MG: 30 INJECTION, SOLUTION INTRAMUSCULAR; INTRAVENOUS at 08:33

## 2024-09-19 RX ADMIN — POTASSIUM CHLORIDE 20 MEQ: 1500 TABLET, EXTENDED RELEASE ORAL at 10:42

## 2024-09-19 RX ADMIN — SODIUM CHLORIDE 500 ML: 0.9 INJECTION, SOLUTION INTRAVENOUS at 08:31

## 2024-09-19 RX ADMIN — IOHEXOL 85 ML: 350 INJECTION, SOLUTION INTRAVENOUS at 09:08

## 2024-09-19 NOTE — ED NOTES
This RN attempted to call caregiver, but there was no answer. Will attempt to try again later to get more information on patient.       Sybil Gomez RN  09/19/24 0810

## 2024-09-19 NOTE — ED PROVIDER NOTES
=  1. Abdominal pain    2. Constipation      ED Disposition       ED Disposition   Discharge    Condition   Stable    Date/Time   Thu Sep 19, 2024 10:40 AM    Comment   Mal Encarnacion discharge to home/self care.                   Assessment & Plan       Medical Decision Making  Will get ct and labs to eval for source of pain     Amount and/or Complexity of Data Reviewed  Independent Historian: parent and caregiver     Details: Mohter and caregiver help with pt hx as he is def   External Data Reviewed: labs, radiology and notes.  Labs: ordered. Decision-making details documented in ED Course.  Radiology: ordered and independent interpretation performed.    Risk  OTC drugs.  Prescription drug management.  Decision regarding hospitalization.                  ED Course as of 09/19/24 1539   Thu Sep 19, 2024   0907 Oxygen was 90-92 - will start 2 L  Continues with sig wheezing - will give additional neb and steriods    1032 Leukocytes, UA(!): Moderate  Ct suggests UTI - will treat - has leuks awaiting imicro   1037 Mother and caregiver now here - agrees no morphine discussed ct results and labs - awaiting urine micro   Will give maalox and viscous lido and monitor    1101 Bacteria, UA: Occasional  Not suggestive of UTI   1105 Mother now reports no stool x 4 days - ctg - no bowel obstruction - will start miralax    1112 Ct reviewed by myself - sig bowel gas - likely contribution to pt symptoms discussed with pt and family        Medications   sodium chloride 0.9 % bolus 500 mL (0 mL Intravenous Stopped 9/19/24 0900)   ketorolac (TORADOL) injection 15 mg (15 mg Intravenous Given 9/19/24 0833)   pantoprazole (PROTONIX) injection 40 mg (40 mg Intravenous Given 9/19/24 0834)   ondansetron (ZOFRAN) injection 4 mg (4 mg Intravenous Given 9/19/24 0843)   iohexol (OMNIPAQUE) 350 MG/ML injection (MULTI-DOSE) 85 mL (85 mL Intravenous Given 9/19/24 0908)   aluminum-magnesium hydroxide-simethicone (MAALOX) oral suspension 30 mL (30  mL Oral Given 9/19/24 1038)   Lidocaine Viscous HCl (XYLOCAINE) 2 % mucosal solution 15 mL (15 mL Swish & Swallow Given 9/19/24 1038)   potassium chloride (Klor-Con M20) CR tablet 20 mEq (20 mEq Oral Given 9/19/24 1042)   acetaminophen (TYLENOL) tablet 975 mg (975 mg Oral Given 9/19/24 1114)       History of Present Illness       Patient is a  deaf and mute patient comes in signaling right upper quadrant pain unable to get any additional history nursing staff will attempt to communicate with family.  He hands paperwork with hepatology contact information on it. Has ahd same pain in past - but feels its worse.             Review of Systems        Objective     ED Triage Vitals   Temperature Pulse Blood Pressure Respirations SpO2 Patient Position - Orthostatic VS   09/19/24 0816 09/19/24 0816 09/19/24 0816 09/19/24 0816 09/19/24 0816 09/19/24 0816   98 °F (36.7 °C) 92 157/73 18 97 % Lying      Temp Source Heart Rate Source BP Location FiO2 (%) Pain Score    09/19/24 0816 09/19/24 0816 09/19/24 0816 -- 09/19/24 0823    Oral Monitor Right arm  (S) 4        Physical Exam    Labs Reviewed   CBC AND DIFFERENTIAL - Abnormal       Result Value    WBC 5.52      RBC 4.97      Hemoglobin 16.4      Hematocrit 46.1      MCV 93      MCH 33.0      MCHC 35.6      RDW 13.2      MPV 9.5      Platelets 158      nRBC 0      Segmented % 69      Immature Grans % 0      Lymphocytes % 18      Monocytes % 13 (*)     Eosinophils Relative 0      Basophils Relative 0      Absolute Neutrophils 3.77      Absolute Immature Grans 0.02      Absolute Lymphocytes 0.99      Absolute Monocytes 0.71      Eosinophils Absolute 0.01      Basophils Absolute 0.02     URINALYSIS WITH REFLEX TO SCOPE - Abnormal    Color, UA Colorless      Clarity, UA Clear      Specific Gravity, UA 1.023      pH, UA 7.0      Leukocytes, UA Moderate (*)     Nitrite, UA Negative      Protein, UA Negative      Glucose, UA Negative      Ketones, UA Trace (*)      Urobilinogen, UA <2.0      Bilirubin, UA Negative      Occult Blood, UA Moderate (*)    BASIC METABOLIC PANEL - Abnormal    Sodium 133 (*)     Potassium 3.4 (*)     Chloride 96      CO2 29      ANION GAP 8      BUN 9      Creatinine 0.58 (*)     Glucose 103      Calcium 9.5      eGFR 111      Narrative:     National Kidney Disease Foundation guidelines for Chronic Kidney Disease (CKD):     Stage 1 with normal or high GFR (GFR > 90 mL/min/1.73 square meters)    Stage 2 Mild CKD (GFR = 60-89 mL/min/1.73 square meters)    Stage 3A Moderate CKD (GFR = 45-59 mL/min/1.73 square meters)    Stage 3B Moderate CKD (GFR = 30-44 mL/min/1.73 square meters)    Stage 4 Severe CKD (GFR = 15-29 mL/min/1.73 square meters)    Stage 5 End Stage CKD (GFR <15 mL/min/1.73 square meters)  Note: GFR calculation is accurate only with a steady state creatinine   URINE MICROSCOPIC - Abnormal    RBC, UA 4-10 (*)     WBC, UA 2-4 (*)     Epithelial Cells Occasional      Bacteria, UA Occasional     PROTIME-INR - Normal    Protime 14.6      INR 1.12      Narrative:     INR Therapeutic Range    Indication                                             INR Range      Atrial Fibrillation                                               2.0-3.0  Hypercoagulable State                                    2.0.2.3  Left Ventricular Asist Device                            2.0-3.0  Mechanical Heart Valve                                  -    Aortic(with afib, MI, embolism, HF, LA enlargement,    and/or coagulopathy)                                     2.0-3.0 (2.5-3.5)     Mitral                                                             2.5-3.5  Prosthetic/Bioprosthetic Heart Valve               2.0-3.0  Venous thromboembolism (VTE: VT, PE        2.0-3.0   APTT - Normal    PTT 28     LIPASE - Normal    Lipase 19     HEPATIC FUNCTION PANEL - Normal    Total Bilirubin 0.96      Bilirubin, Direct 0.10      Alkaline Phosphatase 86      AST 25      ALT 17      Total  Protein 7.5      Albumin 4.3       CT abdomen pelvis with contrast   Final Interpretation by Harry Kelly MD (09/19 1001)      No evidence of acute abdominopelvic process.      2 mm right renal nonobstructing calculus.      Stable appearance of the inferior pelvic fat posterior to the bladder and adjacent to the prostate gland and seminal vesicles. Normal PSA noted on 7/24/2024. Etiology uncertain. This may be sequela of prior prostatitis or nonspecific fatty desmoplastic    reaction.      Stable enlarged prostate gland.      Nonspecific thickening of the posterior inferior bladder wall. Suggest correlation with UA and urine cytology. May need nonemergent direct inspection to clear this part of the bladder.      Additional chronic findings and negatives as above.      The study was marked in EPIC for immediate notification.               Workstation performed: [id number]             Procedures       Bijal Cleveland PA-C  09/19/24 6922

## 2024-09-19 NOTE — DISCHARGE INSTRUCTIONS
Take MIralax with a lot of water until you have a bowel movement. Then take daily as needed. Follow up with GI and the family doctor. Return to the ED for worsening symptoms.

## 2024-09-19 NOTE — ED NOTES
Pt made aware urine sample is needed. Informed to press the red button if in need of anything.     Sybil Gomez RN  09/19/24 0819

## 2024-09-20 ENCOUNTER — OFFICE VISIT (OUTPATIENT)
Age: 59
End: 2024-09-20
Payer: MEDICARE

## 2024-09-20 VITALS
DIASTOLIC BLOOD PRESSURE: 64 MMHG | HEIGHT: 61 IN | SYSTOLIC BLOOD PRESSURE: 126 MMHG | WEIGHT: 117.2 LBS | BODY MASS INDEX: 22.13 KG/M2 | TEMPERATURE: 97.1 F

## 2024-09-20 DIAGNOSIS — R10.13 EPIGASTRIC PAIN: ICD-10-CM

## 2024-09-20 DIAGNOSIS — K74.69 OTHER CIRRHOSIS OF LIVER (HCC): Primary | ICD-10-CM

## 2024-09-20 DIAGNOSIS — Z11.59 ENCOUNTER FOR SCREENING FOR OTHER VIRAL DISEASES: ICD-10-CM

## 2024-09-20 DIAGNOSIS — D69.6 THROMBOCYTOPENIA (HCC): ICD-10-CM

## 2024-09-20 PROCEDURE — 99214 OFFICE O/P EST MOD 30 MIN: CPT | Performed by: STUDENT IN AN ORGANIZED HEALTH CARE EDUCATION/TRAINING PROGRAM

## 2024-09-20 PROCEDURE — G2211 COMPLEX E/M VISIT ADD ON: HCPCS | Performed by: STUDENT IN AN ORGANIZED HEALTH CARE EDUCATION/TRAINING PROGRAM

## 2024-09-20 NOTE — PROGRESS NOTES
[facility] - Outpatient Consultation  Mal Encarnacion 59 y.o. male MRN: [id number]  Encounter: [id number]    PCP:  Awa Shepherd MD, [phone]  Referring Provider:  No ref. provider found,     Patient: Mal Encarnacion, 1965  Reason for Referral: cirrhosis     ASSESSMENT/PLAN:  59 y.o. male with history of HTN and congential deafness who presents for initial evaluation for cirrhosis. History was obtained using an .     He was previously seen in GI clinic for RUQ pain and was noted to have cirrhotic liver morphology on CT.  He has had abnormal liver chemistries dating back to 2021 with mild elevation in serum ALT to 50 but recently had normal liver tests.  His liver synthetic function is normal but he has had an unexplained thrombocytopenia, although recent labs showed normal platelets. 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    Unclear etiology of his liver disease in the absence of metabolic risk factors and excessive EtOH consumption but will complete a serologic work-up to exclude other causes of chronic liver disease. I am not convinced that he has cirrhosis and personally reviewed his images which show some surface nodularity but otherwise normal appearing liver without radiographic evidence of portal hypertension. Would recommend repeating his US elastography in 6 months to ensure stability. Discussed the possibility of liver biopsy for a definitive diagnosis if his platelets remain low but will hold off for now.     Regarding his chronic abdominal complaints, will order EGD with gastric biopsies to exclude HP given that his symptoms are progressing.      He will return to clinic in 6 months or sooner if needed. Thank you for the opportunity to consult in his care.    - HAV IgG, HBsAg, HBcAB, HBsAb  - A1AT levels and phenotype  - Iron studies  - Complete abdominal ultrasound and US elastography in 6 months  - EGD      Grazyna Landa MD  Division of Gastroenterology and Hepatology  [facility]    ============================================================================  CC/HPI: 59 y.o. male with history of HTN and congential deafness who presents for initial evaluation for cirrhosis. History was obtained using an .     He was previously seen in GI clinic for RUQ pain and was noted to have cirrhotic liver morphology on CT.  He has had abnormal liver chemistries dating back to 2021 with mild elevation in serum ALT to 50 but recently had normal liver tests.  His liver synthetic function is normal but he has had an unexplained thrombocytopenia, although recent labs showed normal platelets.     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    He reports chronic RUQ and epigastric pain which has progressively worsened over the past month. Symptoms are not always reliably triggered by food intake. He denies NSAID use as well as excessive EtOH consumption. He also denies melena and hematochezia.     ROS: Complete review of systems otherwise negative.     PAST MEDICAL/SURGICAL HISTORY:  Past Medical History:   Diagnosis Date    Deaf     Heart murmur     Hypertension         Past Surgical History:   Procedure Laterality Date    CARDIAC CATHETERIZATION N/A 3/16/2024    Procedure: Cardiac pci;  Surgeon: Mal Nixon MD;  Location: [facility];  Service: Cardiology       FAMILY/SOCIAL HISTORY:  Family History   Problem Relation Age of Onset    Hypertension Mother     Diabetes Mother     Heart disease Mother         pacemaker    Arthritis Mother         chronic       Social History     Tobacco Use    Smoking status: Never     Passive exposure: Never    Smokeless tobacco: Never   Vaping Use    Vaping status: Never Used   Substance Use Topics    Alcohol use: Yes     Comment: 1 beer occasionally    Drug use: Never       MEDICATIONS:  Current  "Outpatient Medications on File Prior to Visit   Medication Sig Dispense Refill    amLODIPine (NORVASC) 10 mg tablet Take 1 tablet (10 mg total) by mouth daily 60 tablet 2    aspirin 81 mg chewable tablet Chew 1 tablet (81 mg total) daily 60 tablet 2    atorvastatin (LIPITOR) 40 mg tablet Take 1 tablet (40 mg total) by mouth daily with dinner BLISTER PACK PLEASE 60 tablet 2    Blood Pressure KIT Use 2 (two) times a day 1 kit 0    folic acid (FOLVITE) 1 mg tablet Take 1 tablet (1 mg total) by mouth daily 60 tablet 2    hydroCHLOROthiazide 25 mg tablet Take 2 tablets (50 mg total) by mouth daily 60 tablet 2    losartan (COZAAR) 50 mg tablet Take 1 tablet (50 mg total) by mouth daily 60 tablet 2    meclizine (ANTIVERT) 25 mg tablet Take 1 tablet (25 mg total) by mouth every 8 (eight) hours Use for the next 3-4 days for dizziness 30 tablet 0    Mirabegron ER (Myrbetriq) 25 MG TB24 Take 25 mg by mouth in the morning 90 tablet 3    omeprazole (PriLOSEC) 40 MG capsule Take 1 capsule (40 mg total) by mouth daily before breakfast 30 capsule 5    polyethylene glycol (GLYCOLAX) 17 GM/SCOOP powder Take 17 g by mouth daily Take as directed as per written office instructions 238 g 0    polyethylene glycol (MIRALAX) 17 g packet Take until stool , then daily as needed 20 each 0    thiamine 100 MG tablet Take 1 tablet (100 mg total) by mouth daily 60 tablet 2    [DISCONTINUED] carvedilol (COREG) 3.125 mg tablet Take 1 tablet (3.125 mg total) by mouth 2 (two) times a day with meals 60 tablet 1    [DISCONTINUED] chlorthalidone 25 mg tablet Take 0.5 tablets (12.5 mg total) by mouth daily In the morning 30 tablet 0     No current facility-administered medications on file prior to visit.     No Known Allergies    PHYSICAL EXAM:  /64 (BP Location: Right arm, Patient Position: Sitting, Cuff Size: Adult)   Temp (!) 97.1 °F (36.2 °C) (Tympanic)   Ht 5' 1\" (1.549 m)   Wt 53.2 kg (117 lb 3.2 oz)   BMI 22.14 kg/m²   GENERAL: NAD, " AAO  HEENT: anicteric, OP clear, MMM  ABDOMEN: S/ND/NT, normoactive BS, no hepatomegaly, spleen not palpable  EXTREMITIES: no edema  SKIN: no rashes, no palmar erythema, no spider angiomata   NEURO: normal gait, no tremor, no asterixis     LABS/RADIOLOGY/ENDOSCOPY:  Lab Results   Component Value Date    WBC 5.52 09/19/2024    HGB 16.4 09/19/2024    HCT 46.1 09/19/2024     09/19/2024    GLUF 93 07/24/2024    BUN 9 09/19/2024    CREATININE 0.58 (L) 09/19/2024    K 3.4 (L) 09/19/2024    CL 96 09/19/2024    CO2 29 09/19/2024    BILIDIR 0.10 09/19/2024    ALKPHOS 86 09/19/2024    ALT 17 09/19/2024    AST 25 09/19/2024    CALCIUM 9.5 09/19/2024    EGFR 111 09/19/2024    TRIG 118 05/10/2024    HDL 89 05/10/2024    INR 1.12 09/19/2024    PTT 28 09/19/2024     US elastography (6/2024)    Shear Wave Elastography sampling was performed to evaluate stiffness changes within the liver associated with fibrosis.  E1 6.97 kPa  E2 6.38 kPa  E3 6.98 kPa  E4 5.12 kPa  E5 6.66 kPa  E6 6.92 kPa  E7 6.36 kPa  E8 6.4 kPa  E9 4.89 kPa  E10 6.43 kPa     E median: 6.42 kPa.  The corresponding Metavir score is F0-F1 (absent or mild fibrosis), range 0-8.26 kPa. < 1.66 m/s.  IQR/median: 7.6% (IQR of less than 30% is considered a satisfactory data set).     COY (7/17/2024)  Two sessile polyps measuring from 4 mm up to 5 mm in the cecum; performed cold snare with complete en bloc removal and retrieved specimen  One 7 mm sessile polyp in the hepatic flexure; performed cold snare with complete en bloc removal and retrieved specimen  Three sessile polyps measuring from 4 mm up to 10 mm in the transverse colon; performed cold snare with complete en bloc removal and retrieved specimen  One 10 mm sessile polyp in the rectum; performed cold snare with complete en bloc removal and retrieved specimen  Severe diverticula causing moderate luminal narrowing (traversable) in the sigmoid colon; no bleeding was identified. There was narrowing in the  sigmoid colon which was difficult to traverse  Internal hemorrhoids    A. Transverse colon polyp (polypectomy):  - Fragments of tubular adenoma  - No high grade dysplasia      B. Hepatic flexure polyp (polypectomy):  - Fragments of tubular adenoma  - No high grade dysplasia      C. Cecum polyp (polypectomy):  - Fragments of tubular adenoma  - No high grade dysplasia      D. Rectal polyp (polypectomy):  - Colonic mucosa with prominent prolapse change  - Negative for dysplasia      Comment:  - Dr. Teixeira was notified of the amendment on 8/2/24 at 10:52 am via [employer].      MELD 3.0: 10 at 9/19/2024  8:28 AM  MELD-Na: 7 at 9/19/2024  8:28 AM  Calculated from:  Serum Creatinine: 0.58 mg/dL (Using min of 1 mg/dL) at 9/19/2024  8:28 AM  Serum Sodium: 133 mmol/L at 9/19/2024  8:28 AM  Total Bilirubin: 0.96 mg/dL (Using min of 1 mg/dL) at 9/19/2024  8:28 AM  Serum Albumin: 4.3 g/dL (Using max of 3.5 g/dL) at 9/19/2024  8:28 AM  INR(ratio): 1.12 at 9/19/2024  8:28 AM  Age at listing (hypothetical): 59 years  Sex: Male at 9/19/2024  8:28 AM

## 2024-09-20 NOTE — H&P (VIEW-ONLY)
[facility] - Outpatient Consultation  Mal Encarnacion 59 y.o. male MRN: [id number]  Encounter: [id number]    PCP:  Awa Shepherd MD, [phone]  Referring Provider:  No ref. provider found,     Patient: Mal Encarnacion, 1965  Reason for Referral: cirrhosis     ASSESSMENT/PLAN:  59 y.o. male with history of HTN and congential deafness who presents for initial evaluation for cirrhosis. History was obtained using an .     He was previously seen in GI clinic for RUQ pain and was noted to have cirrhotic liver morphology on CT.  He has had abnormal liver chemistries dating back to 2021 with mild elevation in serum ALT to 50 but recently had normal liver tests.  His liver synthetic function is normal but he has had an unexplained thrombocytopenia, although recent labs showed normal platelets. 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    Unclear etiology of his liver disease in the absence of metabolic risk factors and excessive EtOH consumption but will complete a serologic work-up to exclude other causes of chronic liver disease. I am not convinced that he has cirrhosis and personally reviewed his images which show some surface nodularity but otherwise normal appearing liver without radiographic evidence of portal hypertension. Would recommend repeating his US elastography in 6 months to ensure stability. Discussed the possibility of liver biopsy for a definitive diagnosis if his platelets remain low but will hold off for now.     Regarding his chronic abdominal complaints, will order EGD with gastric biopsies to exclude HP given that his symptoms are progressing.      He will return to clinic in 6 months or sooner if needed. Thank you for the opportunity to consult in his care.    - HAV IgG, HBsAg, HBcAB, HBsAb  - A1AT levels and phenotype  - Iron studies  - Complete abdominal ultrasound and US elastography in 6 months  - EGD      Grazyna Landa MD  Division of Gastroenterology and Hepatology  [facility]    ============================================================================  CC/HPI: 59 y.o. male with history of HTN and congential deafness who presents for initial evaluation for cirrhosis. History was obtained using an .     He was previously seen in GI clinic for RUQ pain and was noted to have cirrhotic liver morphology on CT.  He has had abnormal liver chemistries dating back to 2021 with mild elevation in serum ALT to 50 but recently had normal liver tests.  His liver synthetic function is normal but he has had an unexplained thrombocytopenia, although recent labs showed normal platelets.     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    He reports chronic RUQ and epigastric pain which has progressively worsened over the past month. Symptoms are not always reliably triggered by food intake. He denies NSAID use as well as excessive EtOH consumption. He also denies melena and hematochezia.     ROS: Complete review of systems otherwise negative.     PAST MEDICAL/SURGICAL HISTORY:  Past Medical History:   Diagnosis Date    Deaf     Heart murmur     Hypertension         Past Surgical History:   Procedure Laterality Date    CARDIAC CATHETERIZATION N/A 3/16/2024    Procedure: Cardiac pci;  Surgeon: Mal Nixon MD;  Location: [facility];  Service: Cardiology       FAMILY/SOCIAL HISTORY:  Family History   Problem Relation Age of Onset    Hypertension Mother     Diabetes Mother     Heart disease Mother         pacemaker    Arthritis Mother         chronic       Social History     Tobacco Use    Smoking status: Never     Passive exposure: Never    Smokeless tobacco: Never   Vaping Use    Vaping status: Never Used   Substance Use Topics    Alcohol use: Yes     Comment: 1 beer occasionally    Drug use: Never       MEDICATIONS:  Current  "Outpatient Medications on File Prior to Visit   Medication Sig Dispense Refill    amLODIPine (NORVASC) 10 mg tablet Take 1 tablet (10 mg total) by mouth daily 60 tablet 2    aspirin 81 mg chewable tablet Chew 1 tablet (81 mg total) daily 60 tablet 2    atorvastatin (LIPITOR) 40 mg tablet Take 1 tablet (40 mg total) by mouth daily with dinner BLISTER PACK PLEASE 60 tablet 2    Blood Pressure KIT Use 2 (two) times a day 1 kit 0    folic acid (FOLVITE) 1 mg tablet Take 1 tablet (1 mg total) by mouth daily 60 tablet 2    hydroCHLOROthiazide 25 mg tablet Take 2 tablets (50 mg total) by mouth daily 60 tablet 2    losartan (COZAAR) 50 mg tablet Take 1 tablet (50 mg total) by mouth daily 60 tablet 2    meclizine (ANTIVERT) 25 mg tablet Take 1 tablet (25 mg total) by mouth every 8 (eight) hours Use for the next 3-4 days for dizziness 30 tablet 0    Mirabegron ER (Myrbetriq) 25 MG TB24 Take 25 mg by mouth in the morning 90 tablet 3    omeprazole (PriLOSEC) 40 MG capsule Take 1 capsule (40 mg total) by mouth daily before breakfast 30 capsule 5    polyethylene glycol (GLYCOLAX) 17 GM/SCOOP powder Take 17 g by mouth daily Take as directed as per written office instructions 238 g 0    polyethylene glycol (MIRALAX) 17 g packet Take until stool , then daily as needed 20 each 0    thiamine 100 MG tablet Take 1 tablet (100 mg total) by mouth daily 60 tablet 2    [DISCONTINUED] carvedilol (COREG) 3.125 mg tablet Take 1 tablet (3.125 mg total) by mouth 2 (two) times a day with meals 60 tablet 1    [DISCONTINUED] chlorthalidone 25 mg tablet Take 0.5 tablets (12.5 mg total) by mouth daily In the morning 30 tablet 0     No current facility-administered medications on file prior to visit.     No Known Allergies    PHYSICAL EXAM:  /64 (BP Location: Right arm, Patient Position: Sitting, Cuff Size: Adult)   Temp (!) 97.1 °F (36.2 °C) (Tympanic)   Ht 5' 1\" (1.549 m)   Wt 53.2 kg (117 lb 3.2 oz)   BMI 22.14 kg/m²   GENERAL: NAD, " AAO  HEENT: anicteric, OP clear, MMM  ABDOMEN: S/ND/NT, normoactive BS, no hepatomegaly, spleen not palpable  EXTREMITIES: no edema  SKIN: no rashes, no palmar erythema, no spider angiomata   NEURO: normal gait, no tremor, no asterixis     LABS/RADIOLOGY/ENDOSCOPY:  Lab Results   Component Value Date    WBC 5.52 09/19/2024    HGB 16.4 09/19/2024    HCT 46.1 09/19/2024     09/19/2024    GLUF 93 07/24/2024    BUN 9 09/19/2024    CREATININE 0.58 (L) 09/19/2024    K 3.4 (L) 09/19/2024    CL 96 09/19/2024    CO2 29 09/19/2024    BILIDIR 0.10 09/19/2024    ALKPHOS 86 09/19/2024    ALT 17 09/19/2024    AST 25 09/19/2024    CALCIUM 9.5 09/19/2024    EGFR 111 09/19/2024    TRIG 118 05/10/2024    HDL 89 05/10/2024    INR 1.12 09/19/2024    PTT 28 09/19/2024     US elastography (6/2024)    Shear Wave Elastography sampling was performed to evaluate stiffness changes within the liver associated with fibrosis.  E1 6.97 kPa  E2 6.38 kPa  E3 6.98 kPa  E4 5.12 kPa  E5 6.66 kPa  E6 6.92 kPa  E7 6.36 kPa  E8 6.4 kPa  E9 4.89 kPa  E10 6.43 kPa     E median: 6.42 kPa.  The corresponding Metavir score is F0-F1 (absent or mild fibrosis), range 0-8.26 kPa. < 1.66 m/s.  IQR/median: 7.6% (IQR of less than 30% is considered a satisfactory data set).     COY (7/17/2024)  Two sessile polyps measuring from 4 mm up to 5 mm in the cecum; performed cold snare with complete en bloc removal and retrieved specimen  One 7 mm sessile polyp in the hepatic flexure; performed cold snare with complete en bloc removal and retrieved specimen  Three sessile polyps measuring from 4 mm up to 10 mm in the transverse colon; performed cold snare with complete en bloc removal and retrieved specimen  One 10 mm sessile polyp in the rectum; performed cold snare with complete en bloc removal and retrieved specimen  Severe diverticula causing moderate luminal narrowing (traversable) in the sigmoid colon; no bleeding was identified. There was narrowing in the  sigmoid colon which was difficult to traverse  Internal hemorrhoids    A. Transverse colon polyp (polypectomy):  - Fragments of tubular adenoma  - No high grade dysplasia      B. Hepatic flexure polyp (polypectomy):  - Fragments of tubular adenoma  - No high grade dysplasia      C. Cecum polyp (polypectomy):  - Fragments of tubular adenoma  - No high grade dysplasia      D. Rectal polyp (polypectomy):  - Colonic mucosa with prominent prolapse change  - Negative for dysplasia      Comment:  - Dr. Teixeira was notified of the amendment on 8/2/24 at 10:52 am via [employer].      MELD 3.0: 10 at 9/19/2024  8:28 AM  MELD-Na: 7 at 9/19/2024  8:28 AM  Calculated from:  Serum Creatinine: 0.58 mg/dL (Using min of 1 mg/dL) at 9/19/2024  8:28 AM  Serum Sodium: 133 mmol/L at 9/19/2024  8:28 AM  Total Bilirubin: 0.96 mg/dL (Using min of 1 mg/dL) at 9/19/2024  8:28 AM  Serum Albumin: 4.3 g/dL (Using max of 3.5 g/dL) at 9/19/2024  8:28 AM  INR(ratio): 1.12 at 9/19/2024  8:28 AM  Age at listing (hypothetical): 59 years  Sex: Male at 9/19/2024  8:28 AM

## 2024-09-20 NOTE — PATIENT INSTRUCTIONS
Scheduled date of EGD (as of today): 10/7/2024   Physician performing EGD: Dr. Victor Manuel Abad  Location of EGD: [facility]  Desired bowel prep reviewed with patient: EGD proc prep instructions given to pt at 9/20 ov  Instructions reviewed with patient by: Tasha  Clearances:  N/A    US Elastography and US upper right quadrant scheduled on 3/20/2025 at [facility]  6M F/U scheduled on 3/28/25 with Dr. RONNY Landa @ [address] [place] [address].

## 2024-10-01 ENCOUNTER — TELEPHONE (OUTPATIENT)
Age: 59
End: 2024-10-01

## 2024-10-01 NOTE — TELEPHONE ENCOUNTER
Spoke directly with Pt's caregiver, Devang Quinones, via phone call on 9/30/24 @[phone] with assistance from .  Informed Ms. Quinones that Pt is scheduled for an EGD procedure with Dr. Victor Manuel Abad on 10/7/24 at [facility].  EGD procedure instructions in Chinese mailed to Pt's mailing address in Pt's chart per Ms. Quinones's request.  Office phone number given to Ms. Quinones should Pt have any questions and/or needs to reschedule procedure.  Pt does not have [employer]/Email accounts.

## 2024-10-05 RX ORDER — SODIUM CHLORIDE, SODIUM LACTATE, POTASSIUM CHLORIDE, CALCIUM CHLORIDE 600; 310; 30; 20 MG/100ML; MG/100ML; MG/100ML; MG/100ML
50 INJECTION, SOLUTION INTRAVENOUS CONTINUOUS
Status: CANCELLED | OUTPATIENT
Start: 2024-10-05

## 2024-10-07 ENCOUNTER — ANESTHESIA (OUTPATIENT)
Dept: GASTROENTEROLOGY | Facility: HOSPITAL | Age: 59
End: 2024-10-07
Payer: MEDICARE

## 2024-10-07 ENCOUNTER — HOSPITAL ENCOUNTER (OUTPATIENT)
Dept: GASTROENTEROLOGY | Facility: HOSPITAL | Age: 59
Setting detail: OUTPATIENT SURGERY
Discharge: HOME | End: 2024-10-07
Attending: STUDENT IN AN ORGANIZED HEALTH CARE EDUCATION/TRAINING PROGRAM
Payer: MEDICARE

## 2024-10-07 ENCOUNTER — ANESTHESIA EVENT (OUTPATIENT)
Dept: ANESTHESIOLOGY | Facility: HOSPITAL | Age: 59
End: 2024-10-07

## 2024-10-07 ENCOUNTER — ANESTHESIA EVENT (OUTPATIENT)
Dept: GASTROENTEROLOGY | Facility: HOSPITAL | Age: 59
End: 2024-10-07
Payer: MEDICARE

## 2024-10-07 ENCOUNTER — ANESTHESIA (OUTPATIENT)
Dept: ANESTHESIOLOGY | Facility: HOSPITAL | Age: 59
End: 2024-10-07

## 2024-10-07 VITALS
DIASTOLIC BLOOD PRESSURE: 71 MMHG | RESPIRATION RATE: 16 BRPM | HEART RATE: 70 BPM | SYSTOLIC BLOOD PRESSURE: 142 MMHG | OXYGEN SATURATION: 100 % | TEMPERATURE: 97.6 F

## 2024-10-07 DIAGNOSIS — R10.13 EPIGASTRIC PAIN: ICD-10-CM

## 2024-10-07 PROCEDURE — 43239 EGD BIOPSY SINGLE/MULTIPLE: CPT | Performed by: INTERNAL MEDICINE

## 2024-10-07 PROCEDURE — 88305 TISSUE EXAM BY PATHOLOGIST: CPT | Performed by: PATHOLOGY

## 2024-10-07 RX ORDER — PROPOFOL 10 MG/ML
INJECTION, EMULSION INTRAVENOUS AS NEEDED
Status: DISCONTINUED | OUTPATIENT
Start: 2024-10-07 | End: 2024-10-07

## 2024-10-07 RX ORDER — SODIUM CHLORIDE, SODIUM LACTATE, POTASSIUM CHLORIDE, CALCIUM CHLORIDE 600; 310; 30; 20 MG/100ML; MG/100ML; MG/100ML; MG/100ML
50 INJECTION, SOLUTION INTRAVENOUS CONTINUOUS
Status: DISCONTINUED | OUTPATIENT
Start: 2024-10-07 | End: 2024-10-11 | Stop reason: HOSPADM

## 2024-10-07 RX ORDER — LIDOCAINE HYDROCHLORIDE 20 MG/ML
INJECTION, SOLUTION EPIDURAL; INFILTRATION; INTRACAUDAL; PERINEURAL AS NEEDED
Status: DISCONTINUED | OUTPATIENT
Start: 2024-10-07 | End: 2024-10-07

## 2024-10-07 RX ADMIN — PROPOFOL 50 MG: 10 INJECTION, EMULSION INTRAVENOUS at 13:38

## 2024-10-07 RX ADMIN — LIDOCAINE HYDROCHLORIDE 50 MG: 20 INJECTION, SOLUTION EPIDURAL; INFILTRATION; INTRACAUDAL at 13:34

## 2024-10-07 RX ADMIN — SODIUM CHLORIDE, SODIUM LACTATE, POTASSIUM CHLORIDE, AND CALCIUM CHLORIDE: .6; .31; .03; .02 INJECTION, SOLUTION INTRAVENOUS at 13:19

## 2024-10-07 RX ADMIN — PROPOFOL 100 MG: 10 INJECTION, EMULSION INTRAVENOUS at 13:34

## 2024-10-07 NOTE — INTERVAL H&P NOTE
H&P reviewed. After examining the patient I find no changes in the patients condition since the H&P had been written.    Vitals:    10/07/24 1323   BP: 156/76   Pulse: 79   Resp: 16   Temp: 97.8 °F (36.6 °C)   SpO2: 98%

## 2024-10-07 NOTE — ANESTHESIA PREPROCEDURE EVALUATION
Procedure:  PRE-OP ONLY    Relevant Problems   CARDIO   (+) Coronary artery disease involving native coronary artery of native heart without angina pectoris   (+) Junctional bradycardia   (+) Other hyperlipidemia   (+) Primary hypertension      HEMATOLOGY   (+) Thrombocytopenia (HCC)      Behavioral Health   (+) Alcohol use disorder      Care Coordination   (+) Transportation insecurity      Other Pediatrics   (+) Congenital deafness      Cardiovascular/Peripheral Vascular   (+) Left ventricular hypertrophy      Other   (+) Diastolic dysfunction without heart failure             Anesthesia Plan  ASA Score- 3     Anesthesia Type- IV sedation with anesthesia with ASA Monitors.         Additional Monitors:     Airway Plan:            Plan Factors-    Chart reviewed. EKG reviewed.  Existing labs reviewed. Patient summary reviewed.                  Induction-     Postoperative Plan-     Perioperative Resuscitation Plan - Level 1 - Full Code.       Informed Consent- Anesthetic plan and risks discussed with patient.  I personally reviewed this patient with the CRNA. Discussed and agreed on the Anesthesia Plan with the CRNA..      Lab Results   Component Value Date    HGBA1C 5.4 05/10/2024       Lab Results   Component Value Date    K 3.4 (L) 09/19/2024    CL 96 09/19/2024    CO2 29 09/19/2024    BUN 9 09/19/2024    CREATININE 0.58 (L) 09/19/2024    GLUF 93 07/24/2024    CALCIUM 9.5 09/19/2024    CORRECTEDCA 9.4 06/24/2021    AST 25 09/19/2024    ALT 17 09/19/2024    ALKPHOS 86 09/19/2024    EGFR 111 09/19/2024       Lab Results   Component Value Date    WBC 5.52 09/19/2024    HGB 16.4 09/19/2024    HCT 46.1 09/19/2024    MCV 93 09/19/2024     09/19/2024     Echo 2024 may        Left Ventricle: Left ventricular cavity size is normal. Wall thickness is moderately increased. There is moderate concentric hypertrophy. The left ventricular ejection fraction is 65%. Systolic function is normal. Wall motion is normal.  Diastolic function is mildly abnormal, consistent with grade I (abnormal) relaxation.    Atrial Septum: No patent foramen ovale detected, confirmed at rest using agitated saline contrast, confirmed by provocation with cough, using agitated saline contrast and with valsalva, using agitated saline contrast.    Aortic Valve: There is mild regurgitation.

## 2024-10-07 NOTE — ANESTHESIA PREPROCEDURE EVALUATION
Procedure:  EGD    Relevant Problems   CARDIO   (+) Coronary artery disease involving native coronary artery of native heart without angina pectoris   (+) Junctional bradycardia   (+) Other hyperlipidemia   (+) Primary hypertension      HEMATOLOGY   (+) Thrombocytopenia (HCC)        Physical Exam    Airway    Mallampati score: II  TM Distance: >3 FB  Neck ROM: full     Dental   Comment: Very poor dentation denies loose teeth      Cardiovascular  Cardiovascular exam normal    Pulmonary  Pulmonary exam normal     Other Findings        Anesthesia Plan  ASA Score- 3     Anesthesia Type- IV sedation with anesthesia with ASA Monitors.         Additional Monitors:     Airway Plan:     Comment: Used sign language interpretor.       Plan Factors-Exercise tolerance (METS): <4 METS.    Chart reviewed. EKG reviewed.  Existing labs reviewed. Patient summary reviewed.    Patient is not a current smoker. Patient not instructed to abstain from smoking on day of procedure. Patient did not smoke on day of surgery.            Induction-     Postoperative Plan-     Perioperative Resuscitation Plan - Level 1 - Full Code.       Informed Consent- Anesthetic plan and risks discussed with patient.  I personally reviewed this patient with the CRNA. Discussed and agreed on the Anesthesia Plan with the CRNA..      Lab Results   Component Value Date    HGBA1C 5.4 05/10/2024       Lab Results   Component Value Date    K 3.4 (L) 09/19/2024    CL 96 09/19/2024    CO2 29 09/19/2024    BUN 9 09/19/2024    CREATININE 0.58 (L) 09/19/2024    GLUF 93 07/24/2024    CALCIUM 9.5 09/19/2024    CORRECTEDCA 9.4 06/24/2021    AST 25 09/19/2024    ALT 17 09/19/2024    ALKPHOS 86 09/19/2024    EGFR 111 09/19/2024       Lab Results   Component Value Date    WBC 5.52 09/19/2024    HGB 16.4 09/19/2024    HCT 46.1 09/19/2024    MCV 93 09/19/2024     09/19/2024     Echo 2024 may        Left Ventricle: Left ventricular cavity size is normal. Wall thickness is  moderately increased. There is moderate concentric hypertrophy. The left ventricular ejection fraction is 65%. Systolic function is normal. Wall motion is normal. Diastolic function is mildly abnormal, consistent with grade I (abnormal) relaxation.    Atrial Septum: No patent foramen ovale detected, confirmed at rest using agitated saline contrast, confirmed by provocation with cough, using agitated saline contrast and with valsalva, using agitated saline contrast.    Aortic Valve: There is mild regurgitation.

## 2024-10-08 NOTE — ANESTHESIA POSTPROCEDURE EVALUATION
Post-Op Assessment Note    CV Status:  Stable    Pain management: adequate       Mental Status:  Alert and awake   Hydration Status:  Euvolemic   PONV Controlled:  Controlled   Airway Patency:  Patent     Post Op Vitals Reviewed: Yes    No anethesia notable event occurred.    Staff: Anesthesiologist           Last Filed PACU Vitals:  Vitals Value Taken Time   Temp 97.6 °F (36.4 °C) 10/07/24 1406   Pulse 70 10/07/24 1406   /71 10/07/24 1406   Resp 16 10/07/24 1406   SpO2 100 % 10/07/24 1406       Modified Danny:  Activity: 2 (10/7/2024  2:06 PM)  Respiration: 2 (10/7/2024  2:06 PM)  Circulation: 2 (10/7/2024  2:06 PM)  Consciousness: 2 (10/7/2024  2:06 PM)  Oxygen Saturation: 2 (10/7/2024  2:06 PM)  Modified Danny Score: 10 (10/7/2024  2:06 PM)

## 2024-10-17 PROCEDURE — 88305 TISSUE EXAM BY PATHOLOGIST: CPT | Performed by: PATHOLOGY

## 2024-10-18 NOTE — RESULT ENCOUNTER NOTE
Dr. Shepherd,    Mr. Encarnacion's gastric an duodenal biopsies did not show any worrisome findings beyond mild gastritis.  No H Pylori noted.    Clin team:  please call patient to inform him of the results.    Thank you.    Victor Manuel Abad MD  Hepatology/Gastroenterology

## 2024-10-29 PROBLEM — Z12.11 ENCOUNTER FOR SCREENING COLONOSCOPY: Status: RESOLVED | Noted: 2024-03-18 | Resolved: 2024-10-29

## 2024-10-29 PROBLEM — Z23 ENCOUNTER FOR IMMUNIZATION: Status: RESOLVED | Noted: 2021-08-20 | Resolved: 2024-10-29

## 2024-10-29 PROBLEM — Z09 HOSPITAL DISCHARGE FOLLOW-UP: Status: RESOLVED | Noted: 2024-03-18 | Resolved: 2024-10-29

## 2024-10-29 NOTE — ASSESSMENT & PLAN NOTE
Blood Pressure: 110/60   Controlled for age   Current medications:  losartan 50 mg daily, HCTZ 25 mg daily, amlodipine 10 mg tablet daily   Asymptomatic, no dizziness or lightheadedness     Plan:   Will continue blister packs    Encouraged the caregivers to contact pharmacy to have medications sent directly to home so they dont miss any medications.   Continue current regimen  Patient instructed to decrease consumption of fried/process/ fast foods and increase whole food intake   Goal of 3-5 servings of vegetables per day   Minimize salt to < 2g per day   Portion control   FU in 3 months for BP

## 2024-10-30 ENCOUNTER — OFFICE VISIT (OUTPATIENT)
Dept: FAMILY MEDICINE CLINIC | Facility: CLINIC | Age: 59
End: 2024-10-30

## 2024-10-30 VITALS
BODY MASS INDEX: 23.7 KG/M2 | HEART RATE: 89 BPM | DIASTOLIC BLOOD PRESSURE: 60 MMHG | WEIGHT: 125.5 LBS | RESPIRATION RATE: 16 BRPM | OXYGEN SATURATION: 95 % | TEMPERATURE: 97.3 F | HEIGHT: 61 IN | SYSTOLIC BLOOD PRESSURE: 110 MMHG

## 2024-10-30 DIAGNOSIS — I10 PRIMARY HYPERTENSION: Primary | ICD-10-CM

## 2024-10-30 DIAGNOSIS — I25.10 CORONARY ARTERY DISEASE INVOLVING NATIVE CORONARY ARTERY OF NATIVE HEART WITHOUT ANGINA PECTORIS: ICD-10-CM

## 2024-10-30 DIAGNOSIS — E78.49 OTHER HYPERLIPIDEMIA: ICD-10-CM

## 2024-10-30 DIAGNOSIS — I51.89 DIASTOLIC DYSFUNCTION WITHOUT HEART FAILURE: ICD-10-CM

## 2024-10-30 DIAGNOSIS — R35.0 URINARY FREQUENCY: ICD-10-CM

## 2024-10-30 DIAGNOSIS — R10.13 EPIGASTRIC PAIN: ICD-10-CM

## 2024-10-30 PROBLEM — R42 DIZZINESS: Status: RESOLVED | Noted: 2023-09-25 | Resolved: 2024-10-30

## 2024-10-30 PROCEDURE — G2211 COMPLEX E/M VISIT ADD ON: HCPCS | Performed by: FAMILY MEDICINE

## 2024-10-30 PROCEDURE — 3074F SYST BP LT 130 MM HG: CPT | Performed by: FAMILY MEDICINE

## 2024-10-30 PROCEDURE — 99213 OFFICE O/P EST LOW 20 MIN: CPT | Performed by: FAMILY MEDICINE

## 2024-10-30 PROCEDURE — 3078F DIAST BP <80 MM HG: CPT | Performed by: FAMILY MEDICINE

## 2024-10-30 NOTE — PROGRESS NOTES
Ambulatory Visit  Name: Mal Encarnacion      : 1965      MRN: [id number]  Encounter Provider: Awa Shepherd MD  Encounter Date: 10/30/2024   Encounter department: [facility]    Assessment & Plan  Primary hypertension  Blood Pressure: 110/60   Controlled for age   Current medications:  losartan 50 mg daily, HCTZ 25 mg daily, amlodipine 10 mg tablet daily   Asymptomatic, no dizziness or lightheadedness     Plan:   Will continue blister packs    Encouraged the caregivers to contact pharmacy to have medications sent directly to home so they dont miss any medications.   Continue current regimen  Patient instructed to decrease consumption of fried/process/ fast foods and increase whole food intake   Goal of 3-5 servings of vegetables per day   Minimize salt to < 2g per day   Portion control   FU in 3 months for BP              Other hyperlipidemia  Lab Results   Component Value Date    LDLCALC 99 05/10/2024     Lab Results   Component Value Date    CHOLESTEROL 212 (H) 05/10/2024    CHOLESTEROL 174 2024    CHOLESTEROL 230 (H) 2021     Lab Results   Component Value Date    HDL 89 05/10/2024    HDL 58 2024    HDL 59 2021     Lab Results   Component Value Date    TRIG 118 05/10/2024    TRIG 58 2024    TRIG 90 2021     Lab Results   Component Value Date    NONHDLC 116 2024      Plan:   Continue atorvastatin 40 mg daily              Urinary frequency  Once he has improved since starting mirabegron mg daily  Follow-up with urology    Plan:  Follow-up with urology as scheduled continue current medication         Epigastric pain  Only controlled with omeprazole 40 mg  Plan:  Continue current medication regimen         Diastolic dysfunction without heart failure  Has follow-up with cardiology     Results for orders placed during the hospital encounter of 24    Plan:  Follow-up with cardiology as scheduled             Coronary artery  "disease involving native coronary artery of native heart without angina pectoris  Continue aspirin 81 mg            History of Present Illness     HPI  Mal Encarnacion 59 y.o. with past medical history significant for  HTN, congenital deafness, GERD, diastolic dysfunction wo heart failure is accompanied by caregiver. Yakut translations services were used, and ASL video translation services were attempted.  He currently does not have any complaints and is very appreciative of the new bubble back system and is happy his BP is under control. Caretaker is concerned about transportation and picking up his medication regularly between the two of the caretakers.       Review of Systems   Constitutional:  Negative for chills and fever.   HENT:  Negative for congestion.    Respiratory:  Negative for cough, shortness of breath and wheezing.    Cardiovascular:  Negative for chest pain and palpitations.   Gastrointestinal:  Negative for abdominal distention, blood in stool, constipation, nausea and vomiting.   Genitourinary:  Negative for difficulty urinating, dysuria and hematuria.   Skin:  Negative for rash.   Neurological:  Negative for dizziness, tremors, seizures and weakness.   Psychiatric/Behavioral:  Negative for agitation.            Objective     /60 (BP Location: Right arm, Patient Position: Sitting, Cuff Size: Standard)   Pulse 89   Temp (!) 97.3 °F (36.3 °C) (Temporal)   Resp 16   Ht 5' 1\" (1.549 m)   Wt 56.9 kg (125 lb 8 oz)   SpO2 95%   BMI 23.71 kg/m²     Physical Exam  Constitutional:       General: He is not in acute distress.     Appearance: Normal appearance. He is normal weight. He is not ill-appearing or toxic-appearing.   HENT:      Head: Normocephalic and atraumatic.      Right Ear: External ear normal.      Left Ear: External ear normal.      Nose: Nose normal.      Mouth/Throat:      Mouth: Mucous membranes are moist.   Eyes:      General: No scleral icterus.     Conjunctiva/sclera: " Conjunctivae normal.   Cardiovascular:      Rate and Rhythm: Normal rate and regular rhythm.      Pulses: Normal pulses.      Heart sounds: Normal heart sounds.   Pulmonary:      Effort: Pulmonary effort is normal.      Breath sounds: Normal breath sounds.   Abdominal:      General: Abdomen is flat.      Tenderness: There is no abdominal tenderness.   Skin:     General: Skin is warm.      Capillary Refill: Capillary refill takes less than 2 seconds.   Neurological:      General: No focal deficit present.      Mental Status: He is alert and oriented to person, place, and time.   Psychiatric:         Mood and Affect: Mood normal.

## 2024-10-31 ENCOUNTER — OFFICE VISIT (OUTPATIENT)
Dept: UROLOGY | Facility: MEDICAL CENTER | Age: 59
End: 2024-10-31
Payer: MEDICARE

## 2024-10-31 VITALS
HEART RATE: 70 BPM | SYSTOLIC BLOOD PRESSURE: 130 MMHG | HEIGHT: 61 IN | OXYGEN SATURATION: 97 % | DIASTOLIC BLOOD PRESSURE: 64 MMHG | WEIGHT: 125 LBS | BODY MASS INDEX: 23.6 KG/M2

## 2024-10-31 DIAGNOSIS — N20.0 KIDNEY STONE: ICD-10-CM

## 2024-10-31 DIAGNOSIS — R35.0 URINARY FREQUENCY: ICD-10-CM

## 2024-10-31 DIAGNOSIS — R31.29 MICROSCOPIC HEMATURIA: Primary | ICD-10-CM

## 2024-10-31 PROCEDURE — 88112 CYTOPATH CELL ENHANCE TECH: CPT | Performed by: PATHOLOGY

## 2024-10-31 PROCEDURE — 99214 OFFICE O/P EST MOD 30 MIN: CPT | Performed by: UROLOGY

## 2024-10-31 NOTE — ASSESSMENT & PLAN NOTE
Punctate 2 mm right renal non-obstructing stone on CT  No prior history of stones  Currently asymptomatic      General dietary recommendations for kidney stone prevention:  Fluid intake: 100 oz or 3 L per day, enough to make 2.5 L urine daily.  This is the most important modifiable lifestyle factor to prevent recurrent stones.  Avoid beverages high in fructose, which are associated with increased urinary excretion of uric acid, calcium and oxalate  Low salt diet (less than 2,300 mg/d or 100 mEq).  Less than 10% of daily salt intake comes form adding salt to food.  Majority of salt comes from eating processed foods.    Low nondairy animal protein diet (less then 8-10 oz per day).  High animal protein diet increases dietary acid load that leads to increased bone resorption, increased uric acid excretion, acidosis, and decreased urinary citrate due to increased resorption by the kidneys.    Increase fruits and vegetables (5 servings per day), which are high in citrate, particularly citrus fruits.  Lemon and lime juice diluted in water, diet 7 Up and Sunkist Orange, and low calorie orange juice can improve citrate levels.  Citrate chelates calcium and forms a fairly soluble complex in urine.    Low oxalate diet  traditionally recommended (and keep Vitamin C intake < 1000 mg/day, as Vit C is metabolized to oxalate).  However, more recent data suggests increasing calcium intake with meals is preferable to a low oxalate diet.  The most common reason for hyperoxaluria is low calcium intake and first-line treatment may be 300 mg of calcium intake with meals and large snacks to decrease oxalate absorption from the gut  Normal dietary intake of calcium (1,000-1,200 mg per day).  Dietary restriction of calcium is not recommended as it leads to increase GI absorption of oxalate and thereby paradoxically increases the risk of calcium oxalate stones; it can also lead to bone demineralization with increased resorption of calcium  leading to hypercalciuria as well as osteopenia and osteoporosis.  Goal for urinary calcium is < 200 mg/day in women and < 250 mg/day in men.    Recent studies suggest that tea and coffee may actually protect against stone formation given their diuretic effect.  Tea itself is somewhat controversial given its oxalate content.   Increased magnesium intake is protective against formation of calcium oxalate stones.  Magnesium binds to oxalate in urine and is more soluble than calcium oxalate.  Dietary sources of magnesium include avocado, leafy greens, nuts, pumpkin seeds and dark chocolate.

## 2024-10-31 NOTE — ASSESSMENT & PLAN NOTE
Once he has improved since starting mirabegron mg daily  Follow-up with urology    Plan:  Follow-up with urology as scheduled continue current medication

## 2024-10-31 NOTE — ASSESSMENT & PLAN NOTE
4-10 RBC/hpf on UA (9/2024)  CTAP:  nonspecific posterior bladder wall thickening, 2 mm punctate right renal stone  - cytology  - schedule for cystoscopy

## 2024-10-31 NOTE — ASSESSMENT & PLAN NOTE
Urinary frequency improved with mirabegron 25 mg   Satisfied with current urinary symptoms  - continue mirabegron 25 mg daily

## 2024-10-31 NOTE — ASSESSMENT & PLAN NOTE
Has follow-up with cardiology 11/01    Results for orders placed during the hospital encounter of 03/16/24    Plan:  Follow-up with cardiology as scheduled

## 2024-10-31 NOTE — PATIENT INSTRUCTIONS
General dietary recommendations for kidney stone prevention:  Fluid intake: 100 oz or 3 L per day, enough to make 2.5 L urine daily.  This is the most important modifiable lifestyle factor to prevent recurrent stones.  Avoid beverages high in fructose, which are associated with increased urinary excretion of uric acid, calcium and oxalate  Low salt diet (less than 2,300 mg/d or 100 mEq).  Less than 10% of daily salt intake comes form adding salt to food.  Majority of salt comes from eating processed foods.    Low nondairy animal protein diet (less then 8-10 oz per day).  High animal protein diet increases dietary acid load that leads to increased bone resorption, increased uric acid excretion, acidosis, and decreased urinary citrate due to increased resorption by the kidneys.    Increase fruits and vegetables (5 servings per day), which are high in citrate, particularly citrus fruits.  Lemon and lime juice diluted in water, diet 7 Up and Sunkist Orange, and low calorie orange juice can improve citrate levels.  Citrate chelates calcium and forms a fairly soluble complex in urine.    Low oxalate diet  traditionally recommended (and keep Vitamin C intake < 1000 mg/day, as Vit C is metabolized to oxalate).  However, more recent data suggests increasing calcium intake with meals is preferable to a low oxalate diet.  The most common reason for hyperoxaluria is low calcium intake and first-line treatment may be 300 mg of calcium intake with meals and large snacks to decrease oxalate absorption from the gut  Normal dietary intake of calcium (1,000-1,200 mg per day).  Dietary restriction of calcium is not recommended as it leads to increase GI absorption of oxalate and thereby paradoxically increases the risk of calcium oxalate stones; it can also lead to bone demineralization with increased resorption of calcium leading to hypercalciuria as well as osteopenia and osteoporosis.  Goal for urinary calcium is < 200 mg/day in  women and < 250 mg/day in men.    Recent studies suggest that tea and coffee may actually protect against stone formation given their diuretic effect.  Tea itself is somewhat controversial given its oxalate content.   Increased magnesium intake is protective against formation of calcium oxalate stones.  Magnesium binds to oxalate in urine and is more soluble than calcium oxalate.  Dietary sources of magnesium include avocado, leafy greens, nuts, pumpkin seeds and dark chocolate.

## 2024-11-01 ENCOUNTER — OFFICE VISIT (OUTPATIENT)
Dept: CARDIOLOGY CLINIC | Facility: CLINIC | Age: 59
End: 2024-11-01
Payer: MEDICARE

## 2024-11-01 VITALS
DIASTOLIC BLOOD PRESSURE: 50 MMHG | HEART RATE: 88 BPM | BODY MASS INDEX: 23.43 KG/M2 | WEIGHT: 124 LBS | SYSTOLIC BLOOD PRESSURE: 100 MMHG

## 2024-11-01 DIAGNOSIS — R00.1 JUNCTIONAL BRADYCARDIA: ICD-10-CM

## 2024-11-01 DIAGNOSIS — I25.10 CORONARY ARTERY DISEASE INVOLVING NATIVE CORONARY ARTERY OF NATIVE HEART WITHOUT ANGINA PECTORIS: ICD-10-CM

## 2024-11-01 DIAGNOSIS — I10 PRIMARY HYPERTENSION: Primary | ICD-10-CM

## 2024-11-01 DIAGNOSIS — I11.9 HYPERTENSIVE HEART DISEASE WITHOUT HEART FAILURE: ICD-10-CM

## 2024-11-01 PROCEDURE — 99214 OFFICE O/P EST MOD 30 MIN: CPT | Performed by: INTERNAL MEDICINE

## 2024-11-01 PROCEDURE — G2211 COMPLEX E/M VISIT ADD ON: HCPCS | Performed by: INTERNAL MEDICINE

## 2024-11-01 RX ORDER — HYDROCHLOROTHIAZIDE 25 MG/1
12.5 TABLET ORAL DAILY
Qty: 45 TABLET | Refills: 3 | Status: SHIPPED | OUTPATIENT
Start: 2024-11-01

## 2024-11-01 RX ORDER — POTASSIUM CHLORIDE 750 MG/1
10 TABLET, EXTENDED RELEASE ORAL DAILY
Qty: 90 TABLET | Refills: 3 | Status: SHIPPED | OUTPATIENT
Start: 2024-11-01

## 2024-11-01 NOTE — ASSESSMENT & PLAN NOTE
Blood pressure well-controlled.  No heart failure symptoms recently.  -- Advising to continue current medications with changes as outlined.

## 2024-11-01 NOTE — PROGRESS NOTES
CARDIOLOGY ASSOCI[facility]  [facility]  Primary Office: [address] [place] [id number]  Phone: [phone]; Fax: [phone]  *-*-*-*-*-*-*-*-*-*-*-*-*-*-*-*-*-*-*-*-*-*-*-*-*-*-*-*-*-*-*-*-*-*-*-*-*-*-*-*-*-*-*-*-*-*-*-*-*-*-*-*-*-*  ENCOUNTER DATE: 11/01/24 12:41 PM  PATIENT NAME: Mal Encarnacion   1965    [id number]  AGE:59 y.o.      SEX: male  ENCOUNTER PROVIDER: Smooth Alicea MD, [facility], [facility]  PRIMARY CARE PHYSICIAN: Awa Shepherd MD    DIAGNOSES:  1.  Hypertensive heart disease with mild concentric left ventricular hypertrophy, without heart failure  2.  Obstructive coronary artery disease   3. Grade 1 diastolic dysfunction  4. Non MI troponin elevation  5. Dyslipidemia  6.  History of junctional bradycardia.  7. Deaf and Mute    Zio XT extended Holter monitor report:  -- Patient was monitored for 11 days and 18 hours between 7/22/2024 and 8/3/2024.  Indication for monitoring was detection of bradycardia.  -- Patient had a min HR of 43 bpm, max HR of 173 bpm, and avg HR of 74 bpm.  -- Predominant underlying rhythm was Sinus Rhythm.  Atrioventricular and interventricular conduction was normal.  -- There was no occurrence of ventricular tachycardia or atrial fibrillation or supraventricular tachycardia or clinically significant pause events or Mobitz type II or higher degree AV block.  No junctional rhythm was detected.  -- Isolated SVEs were rare (<1.0%), and no SVE Couplets or SVE Triplets were present.  -- Isolated VEs were occasional (1.7%, 90082), and no VE Couplets or VE Triplets were present. Ventricular Bigeminy and Trigeminy were present.  -- There were 7 triggered events and 0 diary entries.  Triggered events correlated with sinus rhythm and rare premature ventricular contraction.    Zio XT extended Holter monitor report:  -- Patient was monitored for 11 days and 18 hours between 7/20/2024 and 8/3/2024.  Indication for monitoring for symptomatic bradycardia.  --  Patient had a min HR of 43 bpm, max HR of 173 bpm, and avg HR of 74 bpm.  -- Predominant underlying rhythm was Sinus Rhythm.  Atrioventricular and interventricular conduction was normal.  -- There was no occurrence of ventricular tachycardia or atrial fibrillation or clinically significant pause events or Mobitz type II or higher degree AV block.  -- Isolated SVEs were rare (<1.0%), and no SVE Couplets or SVE Triplets were present.  -- Isolated VEs were occasional (1.7%, 60532), and no VE Couplets or VE Triplets were present. Ventricular Bigeminy and Trigeminy were present.  -- There were 7 triggered events and 0 diary entries.  Triggered events correlated with artifact.     ECHOCARDIOGRAM  September 28, 2021:  1. Mild concentric left ventricular hypertrophy, normal left ventricular systolic function, grade 1 diastolic dysfunction. EF around 56%.  2. Normal right ventricular size and systolic function.  3. Normal left and right atrial size.  4. Trace aortic valve regurgitation.  5. Trace mitral, tricuspid and pulmonic valve regurgitation.  6. No obvious pulmonary hypertension.  7. No pericardial effusion.    CARDIAC CATHETERIZATION 3/16/2024:  Left main: LAD: Angiographically normal.  LAD: Mild diffuse disease with calcification.  50% proximal LAD stenosis moderately calcified.  40% diagonal stenosis.  No disease reported in LCx or RCA.    ECHOCARDIOGRAM 3/17/2024:  Moderately increased left ventricular wall thickness, moderate concentric LVH, low normal left ventricular systolic function with EF 50 to 5%.  Grade 1 diastolic dysfunction.  Normal RV size and function.  Normal left and right atrial Size.  Normal aortic valve, mild aortic valve regurgitation.  Normal mitral valve, trace mitral valve regurgitation.  No tricuspid valve regurgitation, trace pulmonic valve regurgitation.  No obvious pulmonary hypertension.  No pericardial effusion.       CURRENT ECG     No results found for this visit on 11/01/24.        CARDIOLOGY ASSESSMENT & PLAN      Diagnosis ICD-10-CM Associated Orders   1. Primary hypertension  I10 hydroCHLOROthiazide 25 mg tablet     potassium chloride (Klor-Con M10) 10 mEq tablet      2. Junctional bradycardia  R00.1       3. Hypertensive heart disease without heart failure  I11.9       4. Coronary artery disease involving native coronary artery of native heart without angina pectoris  I25.10            1. Primary hypertension  Assessment & Plan:  Home blood pressure readings are normal.  Office blood pressure reading is low.  Patient reports overall feeling well with no recent symptoms.  He is on multiple antihypertensive medications.    -- I am advising him to lower the dose of hydrochlorothiazide to half a pill daily instead of 1 full pill.  He should be on HCTZ 12.5 mg daily.  Will continue all other medications.  Orders:  -     hydroCHLOROthiazide 25 mg tablet; Take 0.5 tablets (12.5 mg total) by mouth daily  -     potassium chloride (Klor-Con M10) 10 mEq tablet; Take 1 tablet (10 mEq total) by mouth daily  2. Junctional bradycardia  Assessment & Plan:  No significant dysrhythmia was identified during recent extended Holter monitoring.  No significant bradycardia was noted.  -- Advising to continue current medications.  3. Hypertensive heart disease without heart failure  Assessment & Plan:  Blood pressure well-controlled.  No heart failure symptoms recently.  -- Advising to continue current medications with changes as outlined.  4. Coronary artery disease involving native coronary artery of native heart without angina pectoris  Assessment & Plan:  No recent anginal-like symptoms.  Blood pressure is well-controlled.  Is on moderate intensity statin therapy.  Blood work showed potassium being on the lower side.  -- Lowering the dose of HCTZ to 12.5 mg daily.  Adding potassium chloride 10 mg daily.  -- Advising to continue other medications.        INTERVAL HISTORY, HISTORY OF PRESENT ILLNESS &  COMPREHENSIVE VISIT INFORMATION     Mal Encarnacion is here for follow-up regarding his cardiac comorbidities which include: History of chest pains, hypertensive heart disease without heart failure, nonobstructive coronary artery disease, dyslipidemia and other comorbidities.  He was last seen by me in May 2024.  As he was having some palpitations and he was not able to communicate we had requested a 1 month event monitoring.    He is here for follow-up today accompanied with his home health caregiver.  As he is deaf today's encounter is performed with help of Pulsar  ( I ID [id number]).    Patient reports that since last visit he has had no new Reports diagnosis or hospitalizations or illnesses.  He denies any unusual chest pain shortness of breath or dizziness or lightheadedness or palpitations.  That overall he is feeling much better.  Has been monitoring his blood pressures at home and blood pressures have been normal.  Denies orthopnea PND or worsening pedal edema.  Has had no recent falls.  Denies any strokelike symptoms.              Functional capacity status: Moderate   (Excellent- >10 METs; Good: (7-10 METs); Moderate (4-7 METs); Poor (<= 4 METs)    Any chronic stressors: None   (feeling of poor health, financial problems, and social isolation etc).    Tobacco or alcohol dependence: Denies any smoking or alcohol use.  Okay    Current cardiac medications: Aspirin 81 mg daily atorvastatin 40 mg daily amlodipine 10 mg daily HCTZ 25 mg daily losartan 50 mg daily meclizine    Last recent comprehensive blood work available:   Blood work 9/20/2024 showed sodium 133 potassium 3.4 chloride 96 bicarb 29 BUN 9 creatinine 0.58   Normal LFTs  Normal CBC  Lipid profile 5/10/2024  Total cholesterol 212 triglyceride 118 HDL 89 calculated LDL  TSH 1.229 on 3/16/2024  Hemoglobin A1c 5.4    REVIEW OF SYSTEMS   Positive for: As noted above in HPI  Negative for: All remaining as reviewed  below and in HPI.    SYSTEM SYMPTOMS REVIEWED:  General--weight change, fever, night sweats  Respiratory--cough, wheezing, shortness of breath, sputum production  Cardiovascular--chest pain, syncope, dyspnea on exertion, edema, decline in exercise tolerance, claudication   Gastrointestinal--persistent vomiting, diarrhea, abdominal distention, blood in stool   Muscular or skeletal--joint pain or swelling   Neurologic--headaches, syncope, abnormal movement  Hematologic--history of easy bruising and bleeding   Endocrine--thyroid enlargement, heat or cold intolerance, polyuria   Psychiatric--anxiety, depression     *-*-*-*-*-*-*-*-*-*-*-*-*-*-*-*-*-*-*-*-*-*-*-*-*-*-*-*-*-*-*-*-*-*-*-*-*-*-*-*-*-*-*-*-*-*-*-*-*-*-*-*-*-*-  VITAL SIGNS     CURRENT VITAL SIGNS:   Vitals:    11/01/24 1201   BP: 100/50   BP Location: Right arm   Patient Position: Sitting   Cuff Size: Adult   Pulse: 88   Weight: 56.2 kg (124 lb)       BMI: Body mass index is 23.43 kg/m².    WEIGHTS:   Wt Readings from Last 25 Encounters:   11/01/24 56.2 kg (124 lb)   10/31/24 56.7 kg (125 lb)   10/30/24 56.9 kg (125 lb 8 oz)   09/20/24 53.2 kg (117 lb 3.2 oz)   09/19/24 56 kg (123 lb 7.3 oz)   09/16/24 54.2 kg (119 lb 6.4 oz)   08/14/24 53.5 kg (118 lb)   07/18/24 53.5 kg (118 lb)   07/17/24 53.5 kg (118 lb)   07/11/24 53.8 kg (118 lb 9.6 oz)   07/01/24 54 kg (119 lb)   06/25/24 55.3 kg (122 lb)   06/12/24 56.9 kg (125 lb 6.4 oz)   06/03/24 57.6 kg (127 lb)   05/28/24 58.2 kg (128 lb 6.4 oz)   05/10/24 59 kg (130 lb)   03/22/24 59 kg (130 lb)   03/18/24 59 kg (130 lb)   03/17/24 59.9 kg (132 lb)   03/16/24 60.3 kg (132 lb 15 oz)   02/10/24 59 kg (130 lb 1.1 oz)   02/08/24 60.1 kg (132 lb 6.4 oz)   01/30/24 60.9 kg (134 lb 4 oz)   12/04/23 61.6 kg (135 lb 12.9 oz)   11/21/23 61.1 kg (134 lb 9.6 oz)        *-*-*-*-*-*-*-*-*-*-*-*-*-*-*-*-*-*-*-*-*-*-*-*-*-*-*-*-*-*-*-*-*-*-*-*-*-*-*-*-*-*-*-*-*-*-*-*-*-*-*-*-*-*-  PHYSICAL EXAM     General Appearance:     Alert, cooperative, no distress, appears stated age, lean build   Head, Eyes, ENT:  Some protein calorie malnutrition, moist mucous mebranes.   Neck:   Supple, no carotid bruit. No JVD, no obvious lymphadenoapthy   Back:     Symmetric, kyphosis   Lungs:     Respirations unlabored. Clear to auscultation bilaterally,    Chest wall:    No tenderness or deformity   Heart:    Regular rate and rhythm, S1 and S2 normal, no murmur, rub  or gallop.   Abdomen:     Soft, non-tender.   Extremities:   Extremities warm, no cyanosis or edema    Skin: No venostatic changes in lower extremities.   Normal skin color, texture, and turgor. No rashes or lesions   Neuro: Pt is alert and oriented time 3 with no gross motor deficits.   Psych/ behavioral: Mood is normal. Behavior is normal.     *-*-*-*-*-*-*-*-*-*-*-*-*-*-*-*-*-*-*-*-*-*-*-*-*-*-*-*-*-*-*-*-*-*-*-*-*-*-*-*-*-*-*-*-*-*-*-*-*-*-*-*-*-*-  CURRENT MEDICATIONS LIST     Current Outpatient Medications:     amLODIPine (NORVASC) 10 mg tablet, Take 1 tablet (10 mg total) by mouth daily, Disp: 60 tablet, Rfl: 2    aspirin 81 mg chewable tablet, Chew 1 tablet (81 mg total) daily, Disp: 60 tablet, Rfl: 2    atorvastatin (LIPITOR) 40 mg tablet, Take 1 tablet (40 mg total) by mouth daily with dinner BLISTER PACK PLEASE, Disp: 60 tablet, Rfl: 2    Blood Pressure KIT, Use 2 (two) times a day, Disp: 1 kit, Rfl: 0    folic acid (FOLVITE) 1 mg tablet, Take 1 tablet (1 mg total) by mouth daily, Disp: 60 tablet, Rfl: 2    hydroCHLOROthiazide 25 mg tablet, Take 0.5 tablets (12.5 mg total) by mouth daily, Disp: 45 tablet, Rfl: 3    losartan (COZAAR) 50 mg tablet, Take 1 tablet (50 mg total) by mouth daily, Disp: 60 tablet, Rfl: 2    meclizine (ANTIVERT) 25 mg tablet, Take 1 tablet (25 mg total) by mouth every 8 (eight) hours Use for the next 3-4 days for dizziness, Disp: 30 tablet, Rfl: 0    Mirabegron ER (Myrbetriq) 25 MG TB24, Take 25 mg by mouth in the morning, Disp: 90 tablet, Rfl: 3     omeprazole (PriLOSEC) 40 MG capsule, Take 1 capsule (40 mg total) by mouth daily before breakfast, Disp: 30 capsule, Rfl: 5    polyethylene glycol (GLYCOLAX) 17 GM/SCOOP powder, Take 17 g by mouth daily Take as directed as per written office instructions, Disp: 238 g, Rfl: 0    polyethylene glycol (MIRALAX) 17 g packet, Take until stool , then daily as needed, Disp: 20 each, Rfl: 0    potassium chloride (Klor-Con M10) 10 mEq tablet, Take 1 tablet (10 mEq total) by mouth daily, Disp: 90 tablet, Rfl: 3    thiamine 100 MG tablet, Take 1 tablet (100 mg total) by mouth daily, Disp: 60 tablet, Rfl: 2       ALLERGIES   No Known Allergies    *-*-*-*-*-*-*-*-*-*-*-*-*-*-*-*-*-*-*-*-*-*-*-*-*-*-*-*-*-*-*-*-*-*-*-*-*-*-*-*-*-*-*-*-*-*-*-*-*-*-  SIGNATURES:   @SIG@   Smooth Alicea MD; [facility]    *-*-*-*-*-*-*-*-*-*-*-*-*-*-*-*-*-*-*-*-*-*-*-*-*-*-*-*-*-*-*-*-*-*-*-*-*-*-*-*-*-*-*-*-*-*-*-*-*-*-*-*-*-*-  PAST MEDICAL HISTORY IN:  Past Medical History:   Diagnosis Date    Deaf     Heart murmur     Hypertension     PAST SURGICAL HISTORY:  Past Surgical History:   Procedure Laterality Date    CARDIAC CATHETERIZATION N/A 3/16/2024    Procedure: Cardiac pci;  Surgeon: Mal Nixon MD;  Location: [facility];  Service: Cardiology         FAMILY HISTORY:  Family History   Problem Relation Age of Onset    Hypertension Mother     Diabetes Mother     Heart disease Mother         pacemaker    Arthritis Mother         chronic    SOCIAL HISTORY:  Social History     Tobacco Use   Smoking Status Never    Passive exposure: Never   Smokeless Tobacco Never      Social History     Substance and Sexual Activity   Alcohol Use Yes    Comment: 1 beer occasionally     Social History     Substance and Sexual Activity   Drug Use Never    @[facility]@       *-*-*-*-*-*-*-*-*-*-*-*-*-*-*-*-*-*-*-*-*-*-*-*-*-*-*-*-*-*-*-*-*-*-*-*-*-*-*-*-*-*-*-*-*-*-*-*-*-*-*-*-*-*

## 2024-11-01 NOTE — PATIENT INSTRUCTIONS
CARDIOLOGY ASSESSMENT & PLAN      Diagnosis ICD-10-CM Associated Orders   1. Primary hypertension  I10 hydroCHLOROthiazide 25 mg tablet     potassium chloride (Klor-Con M10) 10 mEq tablet      2. Junctional bradycardia  R00.1       3. Hypertensive heart disease without heart failure  I11.9       4. Coronary artery disease involving native coronary artery of native heart without angina pectoris  I25.10            1. Primary hypertension  Assessment & Plan:  Home blood pressure readings are normal.  Office blood pressure reading is low.  Patient reports overall feeling well with no recent symptoms.  He is on multiple antihypertensive medications.    -- I am advising him to lower the dose of hydrochlorothiazide to half a pill daily instead of 1 full pill.  He should be on HCTZ 12.5 mg daily.  Will continue all other medications.  Orders:  -     hydroCHLOROthiazide 25 mg tablet; Take 0.5 tablets (12.5 mg total) by mouth daily  -     potassium chloride (Klor-Con M10) 10 mEq tablet; Take 1 tablet (10 mEq total) by mouth daily  2. Junctional bradycardia  Assessment & Plan:  No significant dysrhythmia was identified during recent extended Holter monitoring.  No significant bradycardia was noted.  -- Advising to continue current medications.  3. Hypertensive heart disease without heart failure  Assessment & Plan:  Blood pressure well-controlled.  No heart failure symptoms recently.  -- Advising to continue current medications with changes as outlined.  4. Coronary artery disease involving native coronary artery of native heart without angina pectoris  Assessment & Plan:  No recent anginal-like symptoms.  Blood pressure is well-controlled.  Is on moderate intensity statin therapy.  Blood work showed potassium being on the lower side.  -- Lowering the dose of HCTZ to 12.5 mg daily.  Adding potassium chloride 10 mg daily.  -- Advising to continue other medications.

## 2024-11-01 NOTE — ASSESSMENT & PLAN NOTE
Home blood pressure readings are normal.  Office blood pressure reading is low.  Patient reports overall feeling well with no recent symptoms.  He is on multiple antihypertensive medications.    -- I am advising him to lower the dose of hydrochlorothiazide to half a pill daily instead of 1 full pill.  He should be on HCTZ 12.5 mg daily.  Will continue all other medications.

## 2024-11-01 NOTE — ASSESSMENT & PLAN NOTE
No recent anginal-like symptoms.  Blood pressure is well-controlled.  Is on moderate intensity statin therapy.  Blood work showed potassium being on the lower side.  -- Lowering the dose of HCTZ to 12.5 mg daily.  Adding potassium chloride 10 mg daily.  -- Advising to continue other medications.

## 2024-11-01 NOTE — ASSESSMENT & PLAN NOTE
No significant dysrhythmia was identified during recent extended Holter monitoring.  No significant bradycardia was noted.  -- Advising to continue current medications.

## 2024-11-05 PROCEDURE — 88112 CYTOPATH CELL ENHANCE TECH: CPT | Performed by: PATHOLOGY

## 2024-11-06 ENCOUNTER — OFFICE VISIT (OUTPATIENT)
Dept: GASTROENTEROLOGY | Facility: MEDICAL CENTER | Age: 59
End: 2024-11-06
Payer: MEDICARE

## 2024-11-06 VITALS
BODY MASS INDEX: 23.45 KG/M2 | OXYGEN SATURATION: 97 % | HEIGHT: 61 IN | DIASTOLIC BLOOD PRESSURE: 64 MMHG | WEIGHT: 124.2 LBS | SYSTOLIC BLOOD PRESSURE: 114 MMHG | TEMPERATURE: 98.4 F | HEART RATE: 82 BPM

## 2024-11-06 DIAGNOSIS — R10.11 RUQ PAIN: Primary | ICD-10-CM

## 2024-11-06 DIAGNOSIS — K59.09 OTHER CONSTIPATION: ICD-10-CM

## 2024-11-06 DIAGNOSIS — R93.2 ABNORMAL CT OF LIVER: ICD-10-CM

## 2024-11-06 DIAGNOSIS — Z12.11 COLON CANCER SCREENING: ICD-10-CM

## 2024-11-06 PROCEDURE — 99214 OFFICE O/P EST MOD 30 MIN: CPT | Performed by: PHYSICIAN ASSISTANT

## 2024-11-06 NOTE — PROGRESS NOTES
Ambulatory Visit  Name: Mal Encarnacion      : 1965      MRN: [id number]  Encounter Provider: Juliana Dial PA-C  Encounter Date: 2024   Encounter department: [facility] [place]    Assessment & Plan  RUQ pain  Chronic. Sometimes after eating and sometimes already there upon awakening. S/p EGD for evaluation revealing a small hiatal hernia, grade B esophagitis, and some inflammation in the duodenal bulb. Stomach and duodenal biopsies without concern. The patient was started on omeprazole 40mg once daily with resolution. Will continue this for now and eventually titrate to the lowest effective dose.       Colon cancer screening  Colonoscopy 2024 with 7 polyps removed some of which were tubular adenomas with a 3 year recall. Will repeat 2027 or sooner if clinically indicated         Abnormal CT of liver  CT scan revealed cirrhotic morphology. His liver enzymes are within normal limits as is his INR and albumin however his PLT count is low at 142. U/S elastography revealed F0-F1 disease. He was seen by hepatology who is not convinces he has true cirrhosis. They recommend a serologic liver workup, abdominal U/S, and repeat U/S elastography in 6 months        Follow up as schedule with hepatology    History of Present Illness     Mal Encarnacion is a 59 y.o. male who presents for follow-up evaluation of right upper quadrant pain.  This has been chronic for the patient and prompted endoscopy revealing a small hiatal hernia, grade B esophagitis in the lower third of the esophagus, erythematous mucosa in the duodenal bulb otherwise normal.  Stomach and duodenal biopsies were obtained and without concern.  He was started on omeprazole 40 mg once daily and symptoms have completely resolved on this.  No other GI symptoms or complaints at his visit today.    ASL interpretor utilized       Review of Systems   Constitutional:  Negative for activity change, appetite change,  chills, fatigue, fever and unexpected weight change.   Gastrointestinal:  Negative for abdominal distention, abdominal pain, anal bleeding, blood in stool, constipation, diarrhea, nausea, rectal pain and vomiting.   Musculoskeletal:  Negative for back pain and gait problem.   Psychiatric/Behavioral:  Negative for confusion.        Medical History Reviewed by provider this encounter:  Meds       Current Outpatient Medications on File Prior to Visit   Medication Sig Dispense Refill    amLODIPine (NORVASC) 10 mg tablet Take 1 tablet (10 mg total) by mouth daily 60 tablet 2    aspirin 81 mg chewable tablet Chew 1 tablet (81 mg total) daily 60 tablet 2    atorvastatin (LIPITOR) 40 mg tablet Take 1 tablet (40 mg total) by mouth daily with dinner BLISTER PACK PLEASE 60 tablet 2    Blood Pressure KIT Use 2 (two) times a day 1 kit 0    folic acid (FOLVITE) 1 mg tablet Take 1 tablet (1 mg total) by mouth daily 60 tablet 2    hydroCHLOROthiazide 25 mg tablet Take 0.5 tablets (12.5 mg total) by mouth daily 45 tablet 3    losartan (COZAAR) 50 mg tablet Take 1 tablet (50 mg total) by mouth daily 60 tablet 2    Mirabegron ER (Myrbetriq) 25 MG TB24 Take 25 mg by mouth in the morning 90 tablet 3    omeprazole (PriLOSEC) 40 MG capsule Take 1 capsule (40 mg total) by mouth daily before breakfast 30 capsule 5    thiamine 100 MG tablet Take 1 tablet (100 mg total) by mouth daily 60 tablet 2    meclizine (ANTIVERT) 25 mg tablet Take 1 tablet (25 mg total) by mouth every 8 (eight) hours Use for the next 3-4 days for dizziness (Patient not taking: Reported on 11/6/2024) 30 tablet 0    polyethylene glycol (GLYCOLAX) 17 GM/SCOOP powder Take 17 g by mouth daily Take as directed as per written office instructions (Patient not taking: Reported on 11/6/2024) 238 g 0    polyethylene glycol (MIRALAX) 17 g packet Take until stool , then daily as needed (Patient not taking: Reported on 11/6/2024) 20 each 0    potassium chloride (Klor-Con M10) 10  "mEq tablet Take 1 tablet (10 mEq total) by mouth daily (Patient not taking: Reported on 11/6/2024) 90 tablet 3    [DISCONTINUED] carvedilol (COREG) 3.125 mg tablet Take 1 tablet (3.125 mg total) by mouth 2 (two) times a day with meals 60 tablet 1     No current facility-administered medications on file prior to visit.      Social History     Tobacco Use    Smoking status: Never     Passive exposure: Never    Smokeless tobacco: Never   Vaping Use    Vaping status: Never Used   Substance and Sexual Activity    Alcohol use: Yes     Comment: 1 beer occasionally    Drug use: Never    Sexual activity: Not on file         Objective     /64 (BP Location: Left arm, Patient Position: Sitting, Cuff Size: Standard)   Pulse 82   Temp 98.4 °F (36.9 °C) (Tympanic)   Ht 5' 1\" (1.549 m)   Wt 56.3 kg (124 lb 3.2 oz)   SpO2 97%   BMI 23.47 kg/m²       Physical Exam  Constitutional:       General: He is not in acute distress.     Appearance: Normal appearance. He is normal weight. He is not ill-appearing, toxic-appearing or diaphoretic.   HENT:      Nose: No congestion or rhinorrhea.   Eyes:      General:         Right eye: No discharge.         Left eye: No discharge.   Pulmonary:      Effort: Pulmonary effort is normal.   Musculoskeletal:         General: Normal range of motion.      Cervical back: Normal range of motion.   Skin:     Coloration: Skin is not jaundiced.   Neurological:      Mental Status: He is alert.   Psychiatric:         Mood and Affect: Mood normal.         Behavior: Behavior normal.         Thought Content: Thought content normal.         Judgment: Judgment normal.         "

## 2024-11-12 ENCOUNTER — PROCEDURE VISIT (OUTPATIENT)
Dept: UROLOGY | Facility: MEDICAL CENTER | Age: 59
End: 2024-11-12
Payer: MEDICARE

## 2024-11-12 VITALS
HEIGHT: 61 IN | OXYGEN SATURATION: 97 % | HEART RATE: 83 BPM | DIASTOLIC BLOOD PRESSURE: 56 MMHG | WEIGHT: 126 LBS | SYSTOLIC BLOOD PRESSURE: 110 MMHG | BODY MASS INDEX: 23.79 KG/M2

## 2024-11-12 DIAGNOSIS — R31.29 MICROSCOPIC HEMATURIA: Primary | ICD-10-CM

## 2024-11-12 DIAGNOSIS — D49.4 BLADDER TUMOR: ICD-10-CM

## 2024-11-12 LAB
SL AMB  POCT GLUCOSE, UA: ABNORMAL
SL AMB LEUKOCYTE ESTERASE,UA: ABNORMAL
SL AMB POCT BILIRUBIN,UA: ABNORMAL
SL AMB POCT BLOOD,UA: ABNORMAL
SL AMB POCT CLARITY,UA: CLEAR
SL AMB POCT COLOR,UA: YELLOW
SL AMB POCT KETONES,UA: ABNORMAL
SL AMB POCT NITRITE,UA: ABNORMAL
SL AMB POCT PH,UA: 5.5
SL AMB POCT SPECIFIC GRAVITY,UA: 1.02
SL AMB POCT URINE PROTEIN: ABNORMAL
SL AMB POCT UROBILINOGEN: 0.2

## 2024-11-12 PROCEDURE — 52000 CYSTOURETHROSCOPY: CPT | Performed by: UROLOGY

## 2024-11-12 PROCEDURE — 81003 URINALYSIS AUTO W/O SCOPE: CPT | Performed by: UROLOGY

## 2024-11-12 NOTE — PROGRESS NOTES
"   Cystoscopy     Date/Time  11/12/2024 3:00 PM     Performed by  Luís Ghosh MD   Authorized by  Luís Ghosh MD     Universal Protocol:  procedure performed by consultantConsent: Verbal consent obtained. Written consent obtained.  Risks and benefits: risks, benefits and alternatives were discussed  Consent given by: patient  Time out: Immediately prior to procedure a \"time out\" was called to verify the correct patient, procedure, equipment, support staff and site/side marked as required.  Timeout called at: 11/12/2024 3:25 PM.  Patient understanding: patient states understanding of the procedure being performed  Patient consent: the patient's understanding of the procedure matches consent given  Procedure consent: procedure consent matches procedure scheduled  Relevant documents: relevant documents present and verified  Test results: test results available and properly labeled  Site marked: the operative site was not marked  Radiology Images displayed and confirmed. If images not available, report reviewed: imaging studies available  Required items: required blood products, implants, devices, and special equipment available  Patient identity confirmed: verbally with patient      Procedure Details:  Procedure type: cystoscopy    Patient tolerance: Patient tolerated the procedure well with no immediate complications    Additional Procedure Details: Office Cystoscopy Procedure Note    Indication:    Hematuria    Informed consent   The risks, benefits, complications, treatment options, and expected outcomes were discussed with the patient. The patient concurred with the proposed plan and provided informed consent.    Anesthesia  Lidocaine jelly 2%    Antibiotic prophylaxis   None    Procedure  The patient was placed in the supineposition, was prepped and draped in the usual manner using sterile technique, and 2% lidocaine jelly instilled into the urethra.  A 17 F flexible cystoscope was then inserted into " the urethra and the urethra and bladder carefully examined.  The following findings were noted:    Findings:  Urethra:  Normal  Prostate:  moderate lateral lobe hypertrophy, no lesions  Bladder:  Normal mucosa, no trabeculation, no diverticulum, no stones, papillary inflammatory appearing lesion along trigone of bladder and bladder neck with hypervascularity   Ureteral orifices:  Orthotopic with clear efflux of urine  Other findings:  None, retroflexed view confirms    Specimens: None                 Complications:    None; patient tolerated the procedure well           Disposition: To home            Condition: Stable    Plan: Schedule for TURBT in hospital

## 2024-11-14 ENCOUNTER — TELEPHONE (OUTPATIENT)
Age: 59
End: 2024-11-14

## 2024-11-14 NOTE — TELEPHONE ENCOUNTER
Case placed 2 days ago for a TURBT. Called caregiver back, got VM. LMOM.     HOLDING 12/12 at [facility] with Dr. Ghosh   -will need Urine culture, CBC, BMP

## 2024-11-14 NOTE — TELEPHONE ENCOUNTER
Pt caregiver Devang, calling to schedule surgery with Dr Ghosh. Pt is requesting a call back to schedule.    Call back- [phone]

## 2024-11-25 DIAGNOSIS — I10 PRIMARY HYPERTENSION: ICD-10-CM

## 2024-11-25 RX ORDER — HYDROCHLOROTHIAZIDE 25 MG/1
12.5 TABLET ORAL DAILY
Qty: 45 TABLET | Refills: 1 | Status: SHIPPED | OUTPATIENT
Start: 2024-11-25

## 2024-11-25 NOTE — TELEPHONE ENCOUNTER
Medication dose was changed at visit 11/1.      Medication: hydrochlorothiazide    Dose/Frequency: 25 mg/take 0.5 tablets daily     Quantity: 45    Pharmacy: [facility] pharmacy    Office:   [] PCP/Provider -   [x] Speciality/Provider - Dr. Alicea    Does the patient have enough for 3 days?   [] Yes   [x] No - Send as HP to POD

## 2024-11-25 NOTE — TELEPHONE ENCOUNTER
Patient called requesting refill for Hydrochlorothiazide. Patient made aware medication was refilled on 11/25/24 for 45 with 1 refills to [facility] pharmacy. Patient instructed to contact the pharmacy to obtain refills of medication. Patient verbalized understanding.

## 2024-11-25 NOTE — TELEPHONE ENCOUNTER
Medication: Hydrochlorothiazide    Dose/Frequency: 25mg/ take 50mg once daily - - per pharmacy    Quantity: 60 / 3 refills    Pharmacy: [facility], [place]    Office:   [] PCP/Provider -   [x] Speciality/Provider -     Does the patient have enough for 3 days?   [] Yes   [x] No - Send as HP to POD

## 2024-12-02 ENCOUNTER — PREP FOR PROCEDURE (OUTPATIENT)
Dept: UROLOGY | Facility: MEDICAL CENTER | Age: 59
End: 2024-12-02

## 2024-12-02 DIAGNOSIS — D49.4 BLADDER TUMOR: ICD-10-CM

## 2024-12-02 DIAGNOSIS — Z01.812 PRE-OPERATIVE LABORATORY EXAMINATION: ICD-10-CM

## 2024-12-02 DIAGNOSIS — R39.89 SUSPECTED UTI: Primary | ICD-10-CM

## 2024-12-03 ENCOUNTER — APPOINTMENT (OUTPATIENT)
Dept: LAB | Facility: HOSPITAL | Age: 59
End: 2024-12-03
Payer: MEDICARE

## 2024-12-03 DIAGNOSIS — Z01.812 PRE-OPERATIVE LABORATORY EXAMINATION: ICD-10-CM

## 2024-12-03 DIAGNOSIS — D49.4 BLADDER TUMOR: ICD-10-CM

## 2024-12-03 DIAGNOSIS — R39.89 SUSPECTED UTI: ICD-10-CM

## 2024-12-03 LAB
ANION GAP SERPL CALCULATED.3IONS-SCNC: 7 MMOL/L (ref 4–13)
BASOPHILS # BLD AUTO: 0.03 THOUSANDS/ΜL (ref 0–0.1)
BASOPHILS NFR BLD AUTO: 1 % (ref 0–1)
BUN SERPL-MCNC: 13 MG/DL (ref 5–25)
CALCIUM SERPL-MCNC: 10.2 MG/DL (ref 8.4–10.2)
CHLORIDE SERPL-SCNC: 101 MMOL/L (ref 96–108)
CO2 SERPL-SCNC: 32 MMOL/L (ref 21–32)
CREAT SERPL-MCNC: 0.65 MG/DL (ref 0.6–1.3)
EOSINOPHIL # BLD AUTO: 0.02 THOUSAND/ΜL (ref 0–0.61)
EOSINOPHIL NFR BLD AUTO: 0 % (ref 0–6)
ERYTHROCYTE [DISTWIDTH] IN BLOOD BY AUTOMATED COUNT: 12.7 % (ref 11.6–15.1)
GFR SERPL CREATININE-BSD FRML MDRD: 106 ML/MIN/1.73SQ M
GLUCOSE P FAST SERPL-MCNC: 119 MG/DL (ref 65–99)
HCT VFR BLD AUTO: 43.2 % (ref 36.5–49.3)
HGB BLD-MCNC: 14.9 G/DL (ref 12–17)
IMM GRANULOCYTES # BLD AUTO: 0.04 THOUSAND/UL (ref 0–0.2)
IMM GRANULOCYTES NFR BLD AUTO: 1 % (ref 0–2)
LYMPHOCYTES # BLD AUTO: 1.42 THOUSANDS/ΜL (ref 0.6–4.47)
LYMPHOCYTES NFR BLD AUTO: 22 % (ref 14–44)
MCH RBC QN AUTO: 33.1 PG (ref 26.8–34.3)
MCHC RBC AUTO-ENTMCNC: 34.5 G/DL (ref 31.4–37.4)
MCV RBC AUTO: 96 FL (ref 82–98)
MONOCYTES # BLD AUTO: 0.44 THOUSAND/ΜL (ref 0.17–1.22)
MONOCYTES NFR BLD AUTO: 7 % (ref 4–12)
NEUTROPHILS # BLD AUTO: 4.45 THOUSANDS/ΜL (ref 1.85–7.62)
NEUTS SEG NFR BLD AUTO: 69 % (ref 43–75)
NRBC BLD AUTO-RTO: 0 /100 WBCS
PLATELET # BLD AUTO: 164 THOUSANDS/UL (ref 149–390)
PMV BLD AUTO: 10.8 FL (ref 8.9–12.7)
POTASSIUM SERPL-SCNC: 4.1 MMOL/L (ref 3.5–5.3)
RBC # BLD AUTO: 4.5 MILLION/UL (ref 3.88–5.62)
SODIUM SERPL-SCNC: 140 MMOL/L (ref 135–147)
WBC # BLD AUTO: 6.4 THOUSAND/UL (ref 4.31–10.16)

## 2024-12-03 PROCEDURE — 87086 URINE CULTURE/COLONY COUNT: CPT

## 2024-12-03 PROCEDURE — 36415 COLL VENOUS BLD VENIPUNCTURE: CPT

## 2024-12-03 PROCEDURE — 80048 BASIC METABOLIC PNL TOTAL CA: CPT

## 2024-12-03 PROCEDURE — 85025 COMPLETE CBC W/AUTO DIFF WBC: CPT

## 2024-12-04 LAB — BACTERIA UR CULT: NORMAL

## 2024-12-10 NOTE — PRE-PROCEDURE INSTRUCTIONS
Pre-Surgery Instructions:   Medication Instructions    amLODIPine (NORVASC) 10 mg tablet Take day of surgery.    aspirin 81 mg chewable tablet Take day of surgery.    atorvastatin (LIPITOR) 40 mg tablet Take night before surgery    folic acid (FOLVITE) 1 mg tablet Hold day of surgery.    hydroCHLOROthiazide 25 mg tablet Hold day of surgery.    losartan (COZAAR) 50 mg tablet Hold day of surgery.    Mirabegron ER (Myrbetriq) 25 MG TB24 Hold day of surgery.    omeprazole (PriLOSEC) 40 MG capsule Take day of surgery.    potassium chloride (Klor-Con M10) 10 mEq tablet Hold day of surgery.    thiamine 100 MG tablet Hold day of surgery.   Medication instructions for day surgery reviewed. Please use only a sip of water to take your instructed medications. Avoid all over the counter vitamins, supplements and NSAIDS for one week prior to surgery per anesthesia guidelines. Tylenol is ok to take as needed.     You will receive a call one business day prior to surgery with an arrival time and hospital directions. If your surgery is scheduled on a Monday, the hospital will be calling you on the Friday prior to your surgery. If you have not heard from anyone by 8pm, please call the hospital supervisor through the hospital  at [phone]. ([place] [phone] or [place] [phone]).    Do not eat or drink anything after midnight the night before your surgery, including candy, mints, lifesavers, or chewing gum. Do not drink alcohol 24hrs before your surgery. Try not to smoke at least 24hrs before your surgery.       Follow the pre surgery showering instructions as listed in the “My Surgical Experience Booklet” or otherwise provided by your surgeon's office. Do not use a blade to shave the surgical area 1 week before surgery. It is okay to use a clean electric clippers up to 24 hours before surgery. Do not apply any lotions, creams, including makeup, cologne, deodorant, or perfumes after showering on the day of your  surgery. Do not use dry shampoo, hair spray, hair gel, or any type of hair products.     No contact lenses, eye make-up, or artificial eyelashes. Remove nail polish, including gel polish, and any artificial, gel, or acrylic nails if possible. Remove all jewelry including rings and body piercing jewelry.     Wear causal clothing that is easy to take on and off. Consider your type of surgery.    Keep any valuables, jewelry, piercings at home. Please bring any specially ordered equipment (sling, braces) if indicated.    Arrange for a responsible person to drive you to and from the hospital on the day of your surgery. Please confirm the visitor policy for the day of your procedure when you receive your phone call with an arrival time.     Call the surgeon's office with any new illnesses, exposures, or additional questions prior to surgery.    Please reference your “My Surgical Experience Booklet” for additional information to prepare for your upcoming surgery.[employer] called to arrange sign language services spoke to Shari VILLANUEVA cost center # given   Database  completed with  to caregiver

## 2024-12-11 ENCOUNTER — OFFICE VISIT (OUTPATIENT)
Dept: FAMILY MEDICINE CLINIC | Facility: CLINIC | Age: 59
End: 2024-12-11

## 2024-12-11 ENCOUNTER — APPOINTMENT (OUTPATIENT)
Dept: LAB | Facility: HOSPITAL | Age: 59
End: 2024-12-11
Payer: MEDICARE

## 2024-12-11 VITALS
SYSTOLIC BLOOD PRESSURE: 110 MMHG | HEIGHT: 61 IN | BODY MASS INDEX: 24.92 KG/M2 | HEART RATE: 78 BPM | TEMPERATURE: 98 F | DIASTOLIC BLOOD PRESSURE: 52 MMHG | WEIGHT: 132 LBS | OXYGEN SATURATION: 97 % | RESPIRATION RATE: 16 BRPM

## 2024-12-11 DIAGNOSIS — Z11.59 ENCOUNTER FOR SCREENING FOR OTHER VIRAL DISEASES: ICD-10-CM

## 2024-12-11 DIAGNOSIS — Z23 ENCOUNTER FOR IMMUNIZATION: ICD-10-CM

## 2024-12-11 DIAGNOSIS — K74.69 OTHER CIRRHOSIS OF LIVER (HCC): ICD-10-CM

## 2024-12-11 DIAGNOSIS — Z02.89 ENCOUNTER FOR COMPLETION OF FORM WITH PATIENT: Primary | ICD-10-CM

## 2024-12-11 LAB
FERRITIN SERPL-MCNC: 406 NG/ML (ref 24–336)
HAV AB SER QL IA: NORMAL
HBV CORE AB SER QL: NORMAL
HBV SURFACE AB SER-ACNC: <3 MIU/ML
HBV SURFACE AG SER QL: NORMAL
IRON SATN MFR SERPL: 49 % (ref 15–50)
IRON SERPL-MCNC: 153 UG/DL (ref 50–212)
TIBC SERPL-MCNC: 314 UG/DL (ref 250–450)
UIBC SERPL-MCNC: 161 UG/DL (ref 155–355)

## 2024-12-11 PROCEDURE — 86708 HEPATITIS A ANTIBODY: CPT

## 2024-12-11 PROCEDURE — 82728 ASSAY OF FERRITIN: CPT

## 2024-12-11 PROCEDURE — 82104 ALPHA-1-ANTITRYPSIN PHENO: CPT

## 2024-12-11 PROCEDURE — 82103 ALPHA-1-ANTITRYPSIN TOTAL: CPT

## 2024-12-11 PROCEDURE — 86704 HEP B CORE ANTIBODY TOTAL: CPT

## 2024-12-11 PROCEDURE — 90673 RIV3 VACCINE NO PRESERV IM: CPT

## 2024-12-11 PROCEDURE — 87340 HEPATITIS B SURFACE AG IA: CPT

## 2024-12-11 PROCEDURE — 86706 HEP B SURFACE ANTIBODY: CPT

## 2024-12-11 PROCEDURE — 36415 COLL VENOUS BLD VENIPUNCTURE: CPT

## 2024-12-11 PROCEDURE — 83540 ASSAY OF IRON: CPT

## 2024-12-11 PROCEDURE — 83550 IRON BINDING TEST: CPT

## 2024-12-11 PROCEDURE — 3078F DIAST BP <80 MM HG: CPT

## 2024-12-11 PROCEDURE — 90471 IMMUNIZATION ADMIN: CPT

## 2024-12-11 PROCEDURE — 3074F SYST BP LT 130 MM HG: CPT

## 2024-12-11 PROCEDURE — 99214 OFFICE O/P EST MOD 30 MIN: CPT

## 2024-12-11 NOTE — PROGRESS NOTES
"Name: Mal Encarnacion      : 1965      MRN: [id number]  Encounter Provider: PRISCILLA Cooper  Encounter Date: 2024   Encounter department: [facility]  :  Assessment & Plan  Encounter for immunization    Orders:    influenza vaccine, recombinant, PF, 0.5 mL IM (Flublok)    Encounter for completion of form with patient  Letter created              History of Present Illness     Mal Encarnacion is a 59 y.o. male  has a past medical history of Deaf, Heart murmur, and Hypertension.  has a past surgical history that includes Cardiac catheterization (N/A, 3/16/2024).      Here today with her granddaughter. They refuse professional interpretation services. They are here because he needs a letter stating that he needs 24/7 home care after his bladder tumor removal surgery and that he needs an apartment on the first floor due to his cardiac conditions.       Review of Systems    Objective   /52 (BP Location: Left arm, Patient Position: Sitting, Cuff Size: Standard)   Pulse 78   Temp 98 °F (36.7 °C) (Temporal)   Resp 16   Ht 5' 1\" (1.549 m)   Wt 59.9 kg (132 lb)   SpO2 97%   BMI 24.94 kg/m²      Physical Exam  Constitutional:       Appearance: Normal appearance. He is normal weight.   HENT:      Head: Normocephalic and atraumatic.      Right Ear: External ear normal.      Left Ear: External ear normal.      Nose: Nose normal.   Musculoskeletal:      Cervical back: Normal range of motion.   Neurological:      General: No focal deficit present.      Mental Status: He is alert and oriented to person, place, and time. Mental status is at baseline.   Psychiatric:         Mood and Affect: Mood normal.         Behavior: Behavior normal.         Thought Content: Thought content normal.         Judgment: Judgment normal.         "

## 2024-12-11 NOTE — LETTER
December 11, 2024     Patient: Mal Encarnacion  YOB: 1965  Date of Visit: 12/11/2024      To Whom it May Concern:    Mal Encarnacion is under my professional care. Mal was seen in my office on 12/11/2024. Mal needs an apartment on the first floor due to his  cardiac conditions. He is unable to go up and down stairs.     If you have any questions or concerns, please don't hesitate to call.         Sincerely,          PRISCILLA Cooper        CC: No Recipients

## 2024-12-11 NOTE — LETTER
December 11, 2024     Patient: Mal Encarnacion  YOB: 1965  Date of Visit: 12/11/2024      To Whom it May Concern:    Mal Encarnacion is under my professional care. Mal was seen in my office on 12/11/2024. Mal will require home health services for 24 hours a day for 7 days a week. For the immediate post-op period after TRANSURETHRAL RESECTION OF BLADDER TUMOR (TURBT) (Bladder).    If you have any questions or concerns, please don't hesitate to call.         Sincerely,          PRISCILLA Cooper        CC: No Recipients

## 2024-12-13 ENCOUNTER — ANESTHESIA EVENT (OUTPATIENT)
Dept: PERIOP | Facility: HOSPITAL | Age: 59
End: 2024-12-13
Payer: MEDICARE

## 2024-12-17 ENCOUNTER — ANESTHESIA (OUTPATIENT)
Dept: PERIOP | Facility: HOSPITAL | Age: 59
End: 2024-12-17
Payer: MEDICARE

## 2024-12-17 ENCOUNTER — TELEPHONE (OUTPATIENT)
Dept: UROLOGY | Facility: MEDICAL CENTER | Age: 59
End: 2024-12-17

## 2024-12-17 ENCOUNTER — HOSPITAL ENCOUNTER (OUTPATIENT)
Facility: HOSPITAL | Age: 59
Setting detail: OUTPATIENT SURGERY
Discharge: HOME | End: 2024-12-17
Attending: UROLOGY | Admitting: UROLOGY
Payer: MEDICARE

## 2024-12-17 VITALS
OXYGEN SATURATION: 95 % | HEART RATE: 70 BPM | SYSTOLIC BLOOD PRESSURE: 140 MMHG | TEMPERATURE: 97.7 F | DIASTOLIC BLOOD PRESSURE: 67 MMHG | RESPIRATION RATE: 16 BRPM | HEIGHT: 61 IN | WEIGHT: 130.95 LBS | BODY MASS INDEX: 24.72 KG/M2

## 2024-12-17 DIAGNOSIS — D49.4 BLADDER TUMOR: ICD-10-CM

## 2024-12-17 PROCEDURE — 88307 TISSUE EXAM BY PATHOLOGIST: CPT | Performed by: STUDENT IN AN ORGANIZED HEALTH CARE EDUCATION/TRAINING PROGRAM

## 2024-12-17 PROCEDURE — 52234 CYSTOSCOPY AND TREATMENT: CPT | Performed by: UROLOGY

## 2024-12-17 PROCEDURE — NC001 PR NO CHARGE: Performed by: UROLOGY

## 2024-12-17 RX ORDER — PROPOFOL 10 MG/ML
INJECTION, EMULSION INTRAVENOUS AS NEEDED
Status: DISCONTINUED | OUTPATIENT
Start: 2024-12-17 | End: 2024-12-17

## 2024-12-17 RX ORDER — ONDANSETRON 2 MG/ML
INJECTION INTRAMUSCULAR; INTRAVENOUS AS NEEDED
Status: DISCONTINUED | OUTPATIENT
Start: 2024-12-17 | End: 2024-12-17

## 2024-12-17 RX ORDER — PHENAZOPYRIDINE HYDROCHLORIDE 100 MG/1
100 TABLET, FILM COATED ORAL
Status: CANCELLED | OUTPATIENT
Start: 2024-12-17

## 2024-12-17 RX ORDER — PHENAZOPYRIDINE HYDROCHLORIDE 200 MG/1
200 TABLET, FILM COATED ORAL
Qty: 10 TABLET | Refills: 0 | Status: SHIPPED | OUTPATIENT
Start: 2024-12-17

## 2024-12-17 RX ORDER — SODIUM CHLORIDE, SODIUM LACTATE, POTASSIUM CHLORIDE, CALCIUM CHLORIDE 600; 310; 30; 20 MG/100ML; MG/100ML; MG/100ML; MG/100ML
125 INJECTION, SOLUTION INTRAVENOUS CONTINUOUS
Status: DISCONTINUED | OUTPATIENT
Start: 2024-12-17 | End: 2024-12-17 | Stop reason: HOSPADM

## 2024-12-17 RX ORDER — FENTANYL CITRATE 50 UG/ML
INJECTION, SOLUTION INTRAMUSCULAR; INTRAVENOUS AS NEEDED
Status: DISCONTINUED | OUTPATIENT
Start: 2024-12-17 | End: 2024-12-17

## 2024-12-17 RX ORDER — FENTANYL CITRATE/PF 50 MCG/ML
25 SYRINGE (ML) INJECTION
Status: DISCONTINUED | OUTPATIENT
Start: 2024-12-17 | End: 2024-12-17 | Stop reason: HOSPADM

## 2024-12-17 RX ORDER — MEPERIDINE HYDROCHLORIDE 25 MG/ML
12.5 INJECTION INTRAMUSCULAR; INTRAVENOUS; SUBCUTANEOUS
Status: DISCONTINUED | OUTPATIENT
Start: 2024-12-17 | End: 2024-12-17 | Stop reason: HOSPADM

## 2024-12-17 RX ORDER — ONDANSETRON 2 MG/ML
4 INJECTION INTRAMUSCULAR; INTRAVENOUS ONCE AS NEEDED
Status: DISCONTINUED | OUTPATIENT
Start: 2024-12-17 | End: 2024-12-17 | Stop reason: HOSPADM

## 2024-12-17 RX ORDER — DEXAMETHASONE SODIUM PHOSPHATE 10 MG/ML
INJECTION, SOLUTION INTRAMUSCULAR; INTRAVENOUS AS NEEDED
Status: DISCONTINUED | OUTPATIENT
Start: 2024-12-17 | End: 2024-12-17

## 2024-12-17 RX ORDER — TAMSULOSIN HYDROCHLORIDE 0.4 MG/1
0.4 CAPSULE ORAL
Qty: 7 CAPSULE | Refills: 0 | Status: SHIPPED | OUTPATIENT
Start: 2024-12-17

## 2024-12-17 RX ORDER — CEFAZOLIN SODIUM 2 G/50ML
2000 SOLUTION INTRAVENOUS ONCE
Status: COMPLETED | OUTPATIENT
Start: 2024-12-17 | End: 2024-12-17

## 2024-12-17 RX ORDER — HYDROCODONE BITARTRATE AND ACETAMINOPHEN 5; 325 MG/1; MG/1
1 TABLET ORAL EVERY 6 HOURS PRN
Refills: 0 | Status: CANCELLED | OUTPATIENT
Start: 2024-12-17

## 2024-12-17 RX ORDER — LIDOCAINE HYDROCHLORIDE 20 MG/ML
INJECTION, SOLUTION EPIDURAL; INFILTRATION; INTRACAUDAL; PERINEURAL AS NEEDED
Status: DISCONTINUED | OUTPATIENT
Start: 2024-12-17 | End: 2024-12-17

## 2024-12-17 RX ORDER — HYDROCODONE BITARTRATE AND ACETAMINOPHEN 5; 325 MG/1; MG/1
1 TABLET ORAL EVERY 6 HOURS PRN
Qty: 5 TABLET | Refills: 0 | Status: SHIPPED | OUTPATIENT
Start: 2024-12-17

## 2024-12-17 RX ORDER — IBUPROFEN 600 MG/1
600 TABLET, FILM COATED ORAL EVERY 6 HOURS PRN
Qty: 30 TABLET | Refills: 0 | Status: SHIPPED | OUTPATIENT
Start: 2024-12-17

## 2024-12-17 RX ORDER — MAGNESIUM HYDROXIDE 1200 MG/15ML
LIQUID ORAL AS NEEDED
Status: DISCONTINUED | OUTPATIENT
Start: 2024-12-17 | End: 2024-12-17 | Stop reason: HOSPADM

## 2024-12-17 RX ORDER — ROCURONIUM BROMIDE 10 MG/ML
INJECTION, SOLUTION INTRAVENOUS AS NEEDED
Status: DISCONTINUED | OUTPATIENT
Start: 2024-12-17 | End: 2024-12-17

## 2024-12-17 RX ADMIN — FENTANYL CITRATE 50 MCG: 50 INJECTION INTRAMUSCULAR; INTRAVENOUS at 10:11

## 2024-12-17 RX ADMIN — SODIUM CHLORIDE, SODIUM LACTATE, POTASSIUM CHLORIDE, AND CALCIUM CHLORIDE 125 ML/HR: .6; .31; .03; .02 INJECTION, SOLUTION INTRAVENOUS at 08:53

## 2024-12-17 RX ADMIN — SUGAMMADEX 100 MG: 100 INJECTION, SOLUTION INTRAVENOUS at 10:48

## 2024-12-17 RX ADMIN — SUGAMMADEX 200 MG: 100 INJECTION, SOLUTION INTRAVENOUS at 10:41

## 2024-12-17 RX ADMIN — CEFAZOLIN SODIUM 2000 MG: 2 SOLUTION INTRAVENOUS at 10:17

## 2024-12-17 RX ADMIN — LIDOCAINE HYDROCHLORIDE 100 MG: 20 INJECTION, SOLUTION EPIDURAL; INFILTRATION; INTRACAUDAL at 10:11

## 2024-12-17 RX ADMIN — PROPOFOL 200 MG: 10 INJECTION, EMULSION INTRAVENOUS at 10:11

## 2024-12-17 RX ADMIN — ONDANSETRON 4 MG: 2 INJECTION INTRAMUSCULAR; INTRAVENOUS at 10:26

## 2024-12-17 RX ADMIN — PROPOFOL 50 MG: 10 INJECTION, EMULSION INTRAVENOUS at 10:44

## 2024-12-17 RX ADMIN — ROCURONIUM 40 MG: 50 INJECTION, SOLUTION INTRAVENOUS at 10:12

## 2024-12-17 RX ADMIN — DEXAMETHASONE SODIUM PHOSPHATE 6 MG: 10 INJECTION INTRAMUSCULAR; INTRAVENOUS at 10:12

## 2024-12-17 NOTE — ANESTHESIA PREPROCEDURE EVALUATION
Procedure:  TURBT (Bladder)    Relevant Problems   CARDIO   (+) Coronary artery disease involving native coronary artery of native heart without angina pectoris   (+) Junctional bradycardia   (+) Other hyperlipidemia   (+) Primary hypertension      /RENAL   (+) Kidney stone      HEMATOLOGY   (+) Thrombocytopenia (HCC) (Plt 164)      Behavioral Health   (+) Alcohol use disorder      Other Pediatrics   (+) Congenital deafness      Cardiovascular/Peripheral Vascular   (+) Hypertensive heart disease without heart failure   (+) Left ventricular hypertrophy   2024 Echo:    Left Ventricle: Left ventricular cavity size is normal. Wall thickness is moderately increased. There is moderate concentric hypertrophy. The left ventricular ejection fraction is 65%. Systolic function is normal. Wall motion is normal. Diastolic function is mildly abnormal, consistent with grade I (abnormal) relaxation.    Atrial Septum: No patent foramen ovale detected, confirmed at rest using agitated saline contrast, confirmed by provocation with cough, using agitated saline contrast and with valsalva, using agitated saline contrast.    Aortic Valve: There is mild regurgitation.     Physical Exam    Airway    Mallampati score: II  TM Distance: <3 FB       Dental        Cardiovascular  Rhythm: regular, Rate: normal    Pulmonary   Breath sounds clear to auscultation    Other Findings        Anesthesia Plan  ASA Score- 2     Anesthesia Type- general with ASA Monitors.         Additional Monitors:     Airway Plan:     Comment: ASL  at bedside.       Plan Factors-Exercise tolerance (METS): >4 METS.    Chart reviewed.   Existing labs reviewed.     Patient is not a current smoker.      Obstructive sleep apnea risk education given perioperatively.        Induction- intravenous.    Postoperative Plan- Plan for postoperative opioid use.         Informed Consent- Anesthetic plan and risks discussed with patient.

## 2024-12-17 NOTE — DISCHARGE INSTR - AVS FIRST PAGE
Mal, everything went well today.  The inflammatory tissue was removed and sent to pathology.  Drink plenty of fluids to flush out any blood.  Intermittent blood in the urine is normal for several weeks.

## 2024-12-17 NOTE — ANESTHESIA POSTPROCEDURE EVALUATION
Post-Op Assessment Note    CV Status:  Stable  Pain Score: 0    Pain management: adequate       Mental Status:  Awake and sleepy   Hydration Status:  Euvolemic   PONV Controlled:  Controlled   Airway Patency:  Patent  Airway: intubated  Two or more mitigation strategies used for obstructive sleep apnea   Post Op Vitals Reviewed: Yes    No anethesia notable event occurred.    Staff: Anesthesiologist, CRNA           Last Filed PACU Vitals:  Vitals Value Taken Time   Temp 97.5    Pulse 74    /60    Resp 13    SpO2 100        Modified Danny:  Activity: 2 (12/17/2024 11:47 AM)  Respiration: 2 (12/17/2024 11:47 AM)  Circulation: 2 (12/17/2024 11:47 AM)  Consciousness: 2 (12/17/2024 11:47 AM)  Oxygen Saturation: 2 (12/17/2024 11:47 AM)  Modified Danny Score: 10 (12/17/2024 11:47 AM)

## 2024-12-17 NOTE — ANESTHESIA POSTPROCEDURE EVALUATION
Post-Op Assessment Note    CV Status:  Stable    Pain management: adequate       Mental Status:  Awake and sleepy   Hydration Status:  Euvolemic   PONV Controlled:  Controlled   Airway Patency:  Patent  Airway: intubated  Two or more mitigation strategies used for obstructive sleep apnea   Post Op Vitals Reviewed: Yes    No anethesia notable event occurred.            Last Filed PACU Vitals:  Vitals Value Taken Time   Temp 97.5    Pulse 74    /60    Resp 13    SpO2 100        Modified Danny:  No data recorded

## 2024-12-17 NOTE — TELEPHONE ENCOUNTER
Patient is scheduled for path review with Dr. Ghosh on 1/6/25. Will confirm at time of post op call.

## 2024-12-17 NOTE — H&P
H&P - Urology   Name: Mal Encarnacion 59 y.o. male I MRN: [id number]  Unit/Bed#: OR POOL I Date of Admission: 12/17/2024   Date of Service: 12/17/2024 I Hospital Day: 0     Assessment & Plan   This is a 59 y.o. year old male here for TURBT, and he is stable and optimized for his procedure.    History of Present Illness    Mal Encarnacion is a 59 y.o. year old male who presents for TURBT for papillary, hypervascular, inflammatory appearing lesion along trigone/bladder neck    REVIEW OF SYSTEMS: Per the HPI, and otherwise unremarkable.    Historical Information   Past Medical History:   Diagnosis Date    Deaf     Heart murmur     Hypertension      Past Surgical History:   Procedure Laterality Date    CARDIAC CATHETERIZATION N/A 3/16/2024    Procedure: Cardiac pci;  Surgeon: Mal Nixon MD;  Location: [facility];  Service: Cardiology     Social History     Tobacco Use    Smoking status: Never     Passive exposure: Never    Smokeless tobacco: Never   Vaping Use    Vaping status: Never Used   Substance and Sexual Activity    Alcohol use: Yes     Comment: 1 beer occasionally    Drug use: Never    Sexual activity: Not on file     E-Cigarette/Vaping    E-Cigarette Use Never User      E-Cigarette/Vaping Substances    Nicotine No     THC No     CBD No     Flavoring No     Other No     Unknown No      Family History   Problem Relation Age of Onset    Hypertension Mother     Diabetes Mother     Heart disease Mother         pacemaker    Arthritis Mother         chronic       Meds/Allergies   No current facility-administered medications for this encounter.  No Known Allergies    Objective :       Physical Exam  Vitals and nursing note reviewed.   Constitutional:       General: He is not in acute distress.     Appearance: He is well-developed.   HENT:      Head: Normocephalic and atraumatic.   Eyes:      Conjunctiva/sclera: Conjunctivae normal.   Cardiovascular:      Rate and Rhythm: Normal rate and regular rhythm.       Heart sounds: No murmur heard.  Pulmonary:      Effort: Pulmonary effort is normal. No respiratory distress.      Breath sounds: Normal breath sounds.   Abdominal:      Palpations: Abdomen is soft.      Tenderness: There is no abdominal tenderness.   Musculoskeletal:         General: No swelling.      Cervical back: Neck supple.   Skin:     General: Skin is warm and dry.      Capillary Refill: Capillary refill takes less than 2 seconds.   Neurological:      Mental Status: He is alert.   Psychiatric:         Mood and Affect: Mood normal.       Gen: NAD  Head: NCAT  CV: RRR  CHEST: Clear  ABD: soft, NT/ND  EXT: no edema

## 2024-12-17 NOTE — INTERIM OP NOTE
TURBT  Postoperative Note  PATIENT NAME: Mal Encarnacion  : 1965  MRN: [id number]  [facility] OR ROOM 06    Surgery Date: 2024    Preop Diagnosis:  Bladder tumor [D49.4]    Post-Op Diagnosis Codes:     * Bladder tumor [D49.4]    Procedure(s) (LRB):  TURBT (N/A)    Surgeons and Role:     * Luís Ghosh MD - Primary    Specimens:  ID Type Source Tests Collected by Time Destination   1 : Trigone Tissue Urinary Bladder TISSUE EXAM Luís Ghosh MD 2024 1030        Estimated Blood Loss:   Minimal    Anesthesia Type:   General     Findings:   Inflammatory lesion along bladder neck and trigone with prominent hypervascularity of blood vessels along surface    Complications:   None    PROCEDURE SUMMARY:    The patient was brought to the operating room and anesthesia obtained.  The patient was then placed in the lithotomy position and prepped and draped using standard sterile technique.  All pressure points were carefully padded.  A surgical time-out occurred, antibiotics were administered, and thromboembolism prophylaxis was given.  A 27 F saline resectoscope was inserted into the urethra after dilation of the urethral meatus to 28 F using standard urethral sounds. The urethra and bladder were carefully inspected.     Cystoscopy findings:    Urethra:  Normal  Prostate:  Normal  Bladder:  Lesion identified, characteristics below  Ureteral orifices: Normal    Urachus:   Normal    Lesion characteristics:  -Appearance:    Nodular  -Number of foci:   1  -Aggregate tumor diameter:  1-2 cm  -Largest individual tumor:  1-2 cm  -Location:    Trigone/bladder neck    Bladder tumor resection:    Using a 26-Dutch continuous flow Olympus resectoscope, all abnormal lesions noted above were resected and sent for pathology.  Resection craters and surrounding urothelium was electrofulgurated and hemostasis was achieved.  Each section was submitted for pathology as documented above.  The bladder was then observed multiple  cycles of filling and emptying hemostasis was deemed to be excellent prior to terminating the procedure.    A 20-Vincentian Guerrero was placed and will be removed in the PACU if urine is clear.          DISPOSITION:   PACU - hemodynamically stable.      SIGNATURE: Luís Ghosh MD   DATE: December 17, 2024   TIME: 10:42 AM

## 2024-12-17 NOTE — OP NOTE
OPERATIVE REPORT  PATIENT NAME: Mal Encarnacion    :  1965  MRN: [id number]  Pt Location: [facility] OR ROOM 06    SURGERY DATE: 2024    Surgeons and Role:     * Luís Ghosh MD - Primary    Preop Diagnosis:  Bladder tumor [D49.4]    Post-Op Diagnosis Codes:     * Bladder tumor [D49.4]    Procedure(s):  TURBT    Specimen(s):  ID Type Source Tests Collected by Time Destination   1 : Trigone Tissue Urinary Bladder TISSUE EXAM Luís Ghosh MD 2024 1030        Estimated Blood Loss:   Minimal    Drains:  Urethral Catheter Latex;Double-lumen 20 Fr. (Active)   Number of days: 0       Anesthesia Type:   General    Operative Indications:  Bladder tumor [D49.4]    Operative Findings:  Inflammatory nodular and papillary lesion along trigone and bladder neck with hypervascularity and prominent surface level blood vessels      Complications:   None    Procedure and Technique:    PROCEDURE SUMMARY:    The patient was brought to the operating room and anesthesia obtained.  The patient was then placed in the lithotomy position and prepped and draped using standard sterile technique.  All pressure points were carefully padded.  A surgical time-out occurred, antibiotics were administered, and thromboembolism prophylaxis was given.  A 27 F saline resectoscope was inserted into the urethra after dilation of the urethral meatus to 28 F using standard urethral sounds. The urethra and bladder were carefully inspected.     Cystoscopy findings:    Urethra:  Normal  Prostate:  Normal  Bladder:  Lesion identified, characteristics below  Ureteral orifices: Normal    Urachus:   Normal    Lesion characteristics:  -Appearance:    Nodular  -Number of foci:   1  -Aggregate tumor diameter:  1-2 cm  -Largest individual tumor:  1-2 cm  -Location:    Trigone/bladder neck    Bladder tumor resection:2    Using a 26-Divehi continuous flow Olympus resectoscope, all abnormal lesions noted above were resected and sent for pathology.   Resection craters and surrounding urothelium was electrofulgurated and hemostasis was achieved.  Each section was submitted for pathology as documented above.  The bladder was then observed multiple cycles of filling and emptying hemostasis was deemed to be excellent prior to terminating the procedure.    A 20-Spanish Guerrero was placed at the end of the case and will be removed in the PACU if urine is clear.      I was present for the entire procedure.    Patient Disposition:  PACU         SIGNATURE: Luís Ghosh MD  DATE: December 17, 2024  TIME: 10:51 AM

## 2024-12-18 NOTE — TELEPHONE ENCOUNTER
Post Op Note    Mal Encarnacion is a 59 y.o. male s/p TURBT (Bladder)  performed 12/17/2024.  Mal Encarnacion is a patient of Dr. Dr. Ghosh and is seen at the [place] office.      Voicemail left for patient/caregiver to please contact the office to follow up and see how patient is doing s/p procedure and to confirm appointment with Dr. Ghosh on 1/6/25.

## 2024-12-20 ENCOUNTER — RESULTS FOLLOW-UP (OUTPATIENT)
Age: 59
End: 2024-12-20

## 2024-12-20 DIAGNOSIS — R79.89 ELEVATED FERRITIN: ICD-10-CM

## 2024-12-20 DIAGNOSIS — E83.10 DISORDER OF IRON METABOLISM: Primary | ICD-10-CM

## 2024-12-20 NOTE — TELEPHONE ENCOUNTER
Called and spoke with patients mother Janessa with  [phone].    Post Op Note    Mal Encarnacion is a 59 y.o. male s/p TURBT (Bladder)  performed 12/17/2024.  Mal Encarnacion is a patient of Dr. Dr. Ghosh and is seen at the [place] office.     How would you rate your pain on a scale from 1 to 10, 10 being the worst pain ever? 0    Have you had a fever? No    Have your bowel movements been regular? Yes    Do you have any difficulty urinating? No    Do you have any other questions or concerns that I can address at this time?     Patients mother states patient is doing very well at this time and offers no complaints. Post op appointment with Dr. Ghosh on 1/6/25 @ 10:45 am was confirmed.

## 2024-12-21 DIAGNOSIS — I10 PRIMARY HYPERTENSION: ICD-10-CM

## 2024-12-21 DIAGNOSIS — I25.10 CAD (CORONARY ARTERY DISEASE): ICD-10-CM

## 2024-12-22 DIAGNOSIS — I10 PRIMARY HYPERTENSION: ICD-10-CM

## 2024-12-23 LAB
A1AT PHENOTYP SERPL IFE: NORMAL
A1AT SERPL-MCNC: 151 MG/DL (ref 101–187)

## 2024-12-23 RX ORDER — ATORVASTATIN CALCIUM 40 MG/1
40 TABLET, FILM COATED ORAL
Qty: 60 TABLET | Refills: 2 | Status: SHIPPED | OUTPATIENT
Start: 2024-12-23 | End: 2025-06-21

## 2024-12-23 RX ORDER — AMLODIPINE BESYLATE 10 MG/1
10 TABLET ORAL DAILY
Qty: 60 TABLET | Refills: 2 | Status: SHIPPED | OUTPATIENT
Start: 2024-12-23 | End: 2025-06-21

## 2024-12-23 RX ORDER — LOSARTAN POTASSIUM 50 MG/1
50 TABLET ORAL DAILY
Qty: 60 TABLET | Refills: 2 | Status: SHIPPED | OUTPATIENT
Start: 2024-12-23 | End: 2025-06-21

## 2024-12-24 PROCEDURE — 88307 TISSUE EXAM BY PATHOLOGIST: CPT | Performed by: STUDENT IN AN ORGANIZED HEALTH CARE EDUCATION/TRAINING PROGRAM

## 2024-12-29 DIAGNOSIS — F10.90 ALCOHOL USE DISORDER: ICD-10-CM

## 2024-12-31 RX ORDER — FOLIC ACID 1 MG/1
1 TABLET ORAL DAILY
Qty: 60 TABLET | Refills: 2 | Status: SHIPPED | OUTPATIENT
Start: 2024-12-31 | End: 2025-06-29

## 2025-01-02 ENCOUNTER — APPOINTMENT (OUTPATIENT)
Dept: LAB | Facility: HOSPITAL | Age: 60
End: 2025-01-02
Attending: STUDENT IN AN ORGANIZED HEALTH CARE EDUCATION/TRAINING PROGRAM
Payer: MEDICARE

## 2025-01-02 DIAGNOSIS — R79.89 ELEVATED FERRITIN: ICD-10-CM

## 2025-01-02 DIAGNOSIS — E83.10 DISORDER OF IRON METABOLISM: ICD-10-CM

## 2025-01-02 PROCEDURE — 36415 COLL VENOUS BLD VENIPUNCTURE: CPT

## 2025-01-03 ENCOUNTER — TRANSCRIBE ORDERS (OUTPATIENT)
Age: 60
End: 2025-01-03

## 2025-01-06 ENCOUNTER — OFFICE VISIT (OUTPATIENT)
Dept: UROLOGY | Facility: MEDICAL CENTER | Age: 60
End: 2025-01-06
Payer: MEDICARE

## 2025-01-06 ENCOUNTER — RESULTS FOLLOW-UP (OUTPATIENT)
Age: 60
End: 2025-01-06

## 2025-01-06 VITALS
BODY MASS INDEX: 24.74 KG/M2 | HEIGHT: 61 IN | HEART RATE: 80 BPM | DIASTOLIC BLOOD PRESSURE: 60 MMHG | SYSTOLIC BLOOD PRESSURE: 104 MMHG | OXYGEN SATURATION: 97 %

## 2025-01-06 DIAGNOSIS — R35.0 URINARY FREQUENCY: ICD-10-CM

## 2025-01-06 DIAGNOSIS — N30.80 CYSTITIS GLANDULARIS: ICD-10-CM

## 2025-01-06 DIAGNOSIS — R31.29 MICROSCOPIC HEMATURIA: ICD-10-CM

## 2025-01-06 DIAGNOSIS — N20.0 KIDNEY STONE: ICD-10-CM

## 2025-01-06 DIAGNOSIS — D49.4 BLADDER TUMOR: Primary | ICD-10-CM

## 2025-01-06 LAB
HFE GENE MUT ANL BLD/T: NORMAL
Lab: NORMAL
POST-VOID RESIDUAL VOLUME, ML POC: 26 ML

## 2025-01-06 PROCEDURE — 99213 OFFICE O/P EST LOW 20 MIN: CPT | Performed by: UROLOGY

## 2025-01-06 PROCEDURE — 51798 US URINE CAPACITY MEASURE: CPT | Performed by: UROLOGY

## 2025-01-06 RX ORDER — MIRABEGRON 25 MG/1
25 TABLET, FILM COATED, EXTENDED RELEASE ORAL DAILY
Qty: 90 TABLET | Refills: 3 | Status: SHIPPED | OUTPATIENT
Start: 2025-01-06

## 2025-01-06 NOTE — ASSESSMENT & PLAN NOTE
Improved with Myrbetriq 25 mg   - continue Myrbetriq 25 mg daily    Orders:    POCT Measure PVR    Mirabegron ER (Myrbetriq) 25 MG TB24; Take 25 mg by mouth in the morning

## 2025-01-06 NOTE — PROGRESS NOTES
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    Orders:    POCT Measure PVR    Urinary frequency  Improved with Myrbetriq 25 mg   - continue Myrbetriq 25 mg daily    Orders:    POCT Measure PVR    Mirabegron ER (Myrbetriq) 25 MG TB24; Take 25 mg by mouth in the morning    Kidney stone  Punctate 2 mm right renal non-obstructing stone on CT (2024)  No prior history of stones  Currently asymptomatic       Cystitis glandularis  Dicussed that it is not clear if cystitis glandularis is premalignant, but most studies suggest that it is not  AUA recommends periodic q1-2 year cystoscopy   - schedule for cystoscopy in 1 year           History of Present Illness   Mal Encarnacion is a 59 y.o. male who presents for follow up s/p TURBT    Reports intermittent hematuria that has resolved  No bothersome symptoms at this time    Reports decreased frequency of urination  Continues to take Myrbetriq 25 mg    Final pathology: cystitis cystica and glandularis     Sign language interpretor #[id number]    Review of Systems   All other systems reviewed and are negative.    Past Medical History   Past Medical History:   Diagnosis Date    Deaf     Heart murmur     Hypertension      Past Surgical History:   Procedure Laterality Date    CARDIAC CATHETERIZATION N/A 3/16/2024    Procedure: Cardiac pci;  Surgeon: Mal Nixon MD;  Location: [facility];  Service: Cardiology    AR CYSTO W/REMOVAL OF LESIONS SMALL N/A 2024    Procedure: TURBT;  Surgeon: Luís Ghosh MD;  Location: [facility];  Service: Urology     Family History   Problem Relation Age of Onset    Hypertension Mother      Diabetes Mother     Heart disease Mother         pacemaker    Arthritis Mother         chronic      reports that he has never smoked. He has never been exposed to tobacco smoke. He has never used smokeless tobacco. He reports current alcohol use. He reports that he does not use drugs.  Current Outpatient Medications on File Prior to Visit   Medication Sig Dispense Refill    amLODIPine (NORVASC) 10 mg tablet TAKE 1 TABLET (10 MG TOTAL) BY MOUTH DAILY 60 tablet 2    aspirin 81 mg chewable tablet Chew 1 tablet (81 mg total) daily 60 tablet 2    atorvastatin (LIPITOR) 40 mg tablet TAKE 1 TABLET (40 MG TOTAL) BY MOUTH DAILY WITH DINNER BLISTER PACK PLEASE 60 tablet 2    Blood Pressure KIT Use 2 (two) times a day 1 kit 0    folic acid (FOLVITE) 1 mg tablet TAKE 1 TABLET (1 MG TOTAL) BY MOUTH DAILY 60 tablet 2    hydroCHLOROthiazide 25 mg tablet Take 0.5 tablets (12.5 mg total) by mouth daily 45 tablet 1    HYDROcodone-acetaminophen (Norco) 5-325 mg per tablet Take 1 tablet by mouth every 6 (six) hours as needed for pain Max Daily Amount: 4 tablets 5 tablet 0    ibuprofen (MOTRIN) 600 mg tablet Take 1 tablet (600 mg total) by mouth every 6 (six) hours as needed for mild pain 30 tablet 0    losartan (COZAAR) 50 mg tablet TAKE 1 TABLET (50 MG TOTAL) BY MOUTH DAILY 60 tablet 2    omeprazole (PriLOSEC) 40 MG capsule Take 1 capsule (40 mg total) by mouth daily before breakfast 30 capsule 5    phenazopyridine (PYRIDIUM) 200 mg tablet Take 1 tablet (200 mg total) by mouth 3 (three) times a day with meals 10 tablet 0    potassium chloride (Klor-Con M10) 10 mEq tablet Take 1 tablet (10 mEq total) by mouth daily 90 tablet 3    tamsulosin (FLOMAX) 0.4 mg Take 1 capsule (0.4 mg total) by mouth daily with dinner 7 capsule 0    thiamine 100 MG tablet Take 1 tablet (100 mg total) by mouth daily 60 tablet 2    meclizine (ANTIVERT) 25 mg tablet Take 1 tablet (25 mg total) by mouth every 8 (eight) hours Use for the next 3-4 days for  dizziness (Patient not taking: Reported on 11/6/2024) 30 tablet 0    polyethylene glycol (GLYCOLAX) 17 GM/SCOOP powder Take 17 g by mouth daily Take as directed as per written office instructions (Patient not taking: Reported on 11/6/2024) 238 g 0    polyethylene glycol (MIRALAX) 17 g packet Take until stool , then daily as needed (Patient not taking: Reported on 11/6/2024) 20 each 0    [DISCONTINUED] carvedilol (COREG) 3.125 mg tablet Take 1 tablet (3.125 mg total) by mouth 2 (two) times a day with meals 60 tablet 1    [DISCONTINUED] Mirabegron ER (Myrbetriq) 25 MG TB24 Take 25 mg by mouth in the morning (Patient not taking: Reported on 1/6/2025) 90 tablet 3     No current facility-administered medications on file prior to visit.   No Known Allergies      Objective   There were no vitals taken for this visit.    Physical Exam  Vitals and nursing note reviewed.   Constitutional:       General: He is not in acute distress.     Appearance: He is well-developed.   HENT:      Head: Normocephalic and atraumatic.   Eyes:      Conjunctiva/sclera: Conjunctivae normal.   Cardiovascular:      Rate and Rhythm: Normal rate and regular rhythm.      Heart sounds: No murmur heard.  Pulmonary:      Effort: Pulmonary effort is normal. No respiratory distress.      Breath sounds: Normal breath sounds.   Abdominal:      Palpations: Abdomen is soft.      Tenderness: There is no abdominal tenderness.   Musculoskeletal:         General: No swelling.      Cervical back: Neck supple.   Skin:     General: Skin is warm and dry.      Capillary Refill: Capillary refill takes less than 2 seconds.   Neurological:      Mental Status: He is alert.   Psychiatric:         Mood and Affect: Mood normal.          Results  Lab Results   Component Value Date    PSA 1.649 07/24/2024     Lab Results   Component Value Date    CALCIUM 10.2 12/03/2024    K 4.1 12/03/2024    CO2 32 12/03/2024     12/03/2024    BUN 13 12/03/2024    CREATININE 0.65  12/03/2024     Lab Results   Component Value Date    WBC 6.40 12/03/2024    HGB 14.9 12/03/2024    HCT 43.2 12/03/2024    MCV 96 12/03/2024     12/03/2024       Office Urine Dip  No results found for this or any previous visit (from the past hour).]

## 2025-01-06 NOTE — ASSESSMENT & PLAN NOTE
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

## 2025-01-06 NOTE — ASSESSMENT & PLAN NOTE
Punctate 2 mm right renal non-obstructing stone on CT (9/2024)  No prior history of stones  Currently asymptomatic

## 2025-01-07 ENCOUNTER — TELEPHONE (OUTPATIENT)
Age: 60
End: 2025-01-07

## 2025-01-07 NOTE — TELEPHONE ENCOUNTER
Via translation line  -left message on caretakers ([facility]) voice mail. Testing shows you are a carrier of Hemochromatosis but you do not have the disease. Please contact our office with any questions. Thank you

## 2025-01-14 DIAGNOSIS — I25.10 CAD (CORONARY ARTERY DISEASE): ICD-10-CM

## 2025-01-15 RX ORDER — ASPIRIN 81 MG/1
81 TABLET, CHEWABLE ORAL DAILY
Qty: 60 TABLET | Refills: 2 | Status: SHIPPED | OUTPATIENT
Start: 2025-01-15 | End: 2025-07-14

## 2025-01-16 ENCOUNTER — RA CDI HCC (OUTPATIENT)
Dept: OTHER | Facility: HOSPITAL | Age: 60
End: 2025-01-16

## 2025-01-16 NOTE — PROGRESS NOTES
HCC coding opportunities       Chart reviewed, no opportunity found: CHART REVIEWED, NO OPPORTUNITY FOUND        Patients Insurance     Medicare Insurance: [employer]        This is a reminder to assess all HCC (risk adjustment) codes for the year 2025 as patients TERESA scores reset to zero with the New year.

## 2025-01-28 PROBLEM — Z92.29 HEPATITIS A AND HEPATITIS B VACCINE ADMINISTERED: Status: ACTIVE | Noted: 2025-01-28

## 2025-01-28 PROBLEM — R93.2 ABNORMAL CT OF LIVER: Status: ACTIVE | Noted: 2025-01-28

## 2025-01-28 PROBLEM — I51.89 DIASTOLIC DYSFUNCTION WITHOUT HEART FAILURE: Status: RESOLVED | Noted: 2021-06-24 | Resolved: 2025-01-28

## 2025-01-28 NOTE — ASSESSMENT & PLAN NOTE
09/2024- Mildly nodular hepatic contour suggestive of cirrhosis.   Liver enzymes normal and INR and albumin normal  F0 to F1 on elastography    Plan:  Follow-up with hepatology  Repeat ultrasound elastography scheduled for/20/2025  Repeat RUQ ultrasound scheduled for 3/2025  Carrier of hematochromatosis but not having active disease

## 2025-01-28 NOTE — ASSESSMENT & PLAN NOTE
BP Readings from Last 3 Encounters:   01/29/25 126/66   01/06/25 104/60   12/17/24 140/67     Current regiment: HCTZ 12.5 ( decreased from 25 at last cardiology visit) losartan 50 mg, and amlodipine 10 mg daily     Plan:   Will continue blister packs    Encouraged the caregivers to contact pharmacy to have medications sent directly to home so they dont miss any medications.   Continue current regimen  Patient instructed to decrease consumption of fried/process/ fast foods and increase whole food intake   Goal of 3-5 servings of vegetables per day   Minimize salt to < 2g per day   Portion control   FU in 3 months for BP

## 2025-01-28 NOTE — ASSESSMENT & PLAN NOTE
Lab Results   Component Value Date    LDLCALC 99 05/10/2024     Lab Results   Component Value Date    CHOLESTEROL 212 (H) 05/10/2024    CHOLESTEROL 174 01/30/2024    CHOLESTEROL 230 (H) 09/27/2021     Lab Results   Component Value Date    HDL 89 05/10/2024    HDL 58 01/30/2024    HDL 59 09/27/2021     Lab Results   Component Value Date    TRIG 118 05/10/2024    TRIG 58 01/30/2024    TRIG 90 09/27/2021     Lab Results   Component Value Date    NONHDLC 116 01/30/2024      At goal <100 LDL  Plan:   Continue atorvastatin 40 mg daily

## 2025-01-28 NOTE — ASSESSMENT & PLAN NOTE
Seen by gastroenterolgy  Per workup not immune to HAV, and HBV     Plan:   Administer vaccines today  hep B virus vaccine at 0, 1, and 6 months to completed the series   Hep A virus at 0 and 6 months.         Orders:    omeprazole (PriLOSEC) 20 mg delayed release capsule; Take 1 capsule (20 mg total) by mouth daily before breakfast    HEPATITIS A VACCINE ADULT IM    HEPATITIS B VACCINE ADULT IM

## 2025-01-28 NOTE — ASSESSMENT & PLAN NOTE
Follows with urology   Improved symptoms   Continue mirabegron  25 mg daily    Plan:   Continue medication regiment

## 2025-01-28 NOTE — ASSESSMENT & PLAN NOTE
Follows with urology   s/p TURBT on 12/17/2024.  Pathology shows cystitis glandularis, per urology not clear if premalignant    Plan:  Follow-up with urology for cystoscopy in 1 year

## 2025-01-28 NOTE — ASSESSMENT & PLAN NOTE
Follows with gastroenterology   Continue omeprazole 40 mg daily, if stable pain may decrease to 20 mg daily     Orders:    omeprazole (PriLOSEC) 20 mg delayed release capsule; Take 1 capsule (20 mg total) by mouth daily before breakfast

## 2025-01-29 ENCOUNTER — APPOINTMENT (OUTPATIENT)
Dept: LAB | Facility: CLINIC | Age: 60
End: 2025-01-29
Payer: MEDICARE

## 2025-01-29 ENCOUNTER — OFFICE VISIT (OUTPATIENT)
Dept: FAMILY MEDICINE CLINIC | Facility: CLINIC | Age: 60
End: 2025-01-29

## 2025-01-29 VITALS
TEMPERATURE: 98.3 F | OXYGEN SATURATION: 97 % | SYSTOLIC BLOOD PRESSURE: 126 MMHG | BODY MASS INDEX: 25.9 KG/M2 | WEIGHT: 137.2 LBS | RESPIRATION RATE: 18 BRPM | DIASTOLIC BLOOD PRESSURE: 66 MMHG | HEART RATE: 88 BPM | HEIGHT: 61 IN

## 2025-01-29 DIAGNOSIS — I11.9 HYPERTENSIVE HEART DISEASE WITHOUT HEART FAILURE: ICD-10-CM

## 2025-01-29 DIAGNOSIS — E87.6 HYPOKALEMIA: ICD-10-CM

## 2025-01-29 DIAGNOSIS — R35.0 URINARY FREQUENCY: ICD-10-CM

## 2025-01-29 DIAGNOSIS — D49.4 BLADDER TUMOR: ICD-10-CM

## 2025-01-29 DIAGNOSIS — Z23 ENCOUNTER FOR IMMUNIZATION: ICD-10-CM

## 2025-01-29 DIAGNOSIS — R93.2 ABNORMAL CT OF LIVER: ICD-10-CM

## 2025-01-29 DIAGNOSIS — R10.13 EPIGASTRIC PAIN: ICD-10-CM

## 2025-01-29 DIAGNOSIS — Z92.29 HEPATITIS A AND HEPATITIS B VACCINE ADMINISTERED: Primary | ICD-10-CM

## 2025-01-29 DIAGNOSIS — I51.89 DIASTOLIC DYSFUNCTION WITHOUT HEART FAILURE: ICD-10-CM

## 2025-01-29 DIAGNOSIS — E78.49 OTHER HYPERLIPIDEMIA: ICD-10-CM

## 2025-01-29 DIAGNOSIS — I10 PRIMARY HYPERTENSION: ICD-10-CM

## 2025-01-29 LAB
ANION GAP SERPL CALCULATED.3IONS-SCNC: 12 MMOL/L (ref 4–13)
BUN SERPL-MCNC: 17 MG/DL (ref 5–25)
CALCIUM SERPL-MCNC: 9.8 MG/DL (ref 8.4–10.2)
CHLORIDE SERPL-SCNC: 98 MMOL/L (ref 96–108)
CO2 SERPL-SCNC: 29 MMOL/L (ref 21–32)
CREAT SERPL-MCNC: 0.71 MG/DL (ref 0.6–1.3)
GFR SERPL CREATININE-BSD FRML MDRD: 102 ML/MIN/1.73SQ M
GLUCOSE P FAST SERPL-MCNC: 93 MG/DL (ref 65–99)
POTASSIUM SERPL-SCNC: 4 MMOL/L (ref 3.5–5.3)
SODIUM SERPL-SCNC: 139 MMOL/L (ref 135–147)

## 2025-01-29 PROCEDURE — 99213 OFFICE O/P EST LOW 20 MIN: CPT | Performed by: FAMILY MEDICINE

## 2025-01-29 PROCEDURE — 36415 COLL VENOUS BLD VENIPUNCTURE: CPT

## 2025-01-29 PROCEDURE — 3078F DIAST BP <80 MM HG: CPT | Performed by: FAMILY MEDICINE

## 2025-01-29 PROCEDURE — 90471 IMMUNIZATION ADMIN: CPT | Performed by: FAMILY MEDICINE

## 2025-01-29 PROCEDURE — 3074F SYST BP LT 130 MM HG: CPT | Performed by: FAMILY MEDICINE

## 2025-01-29 PROCEDURE — G0010 ADMIN HEPATITIS B VACCINE: HCPCS | Performed by: FAMILY MEDICINE

## 2025-01-29 PROCEDURE — 90632 HEPA VACCINE ADULT IM: CPT | Performed by: FAMILY MEDICINE

## 2025-01-29 PROCEDURE — 80048 BASIC METABOLIC PNL TOTAL CA: CPT

## 2025-01-29 PROCEDURE — G2211 COMPLEX E/M VISIT ADD ON: HCPCS | Performed by: FAMILY MEDICINE

## 2025-01-29 PROCEDURE — 90746 HEPB VACCINE 3 DOSE ADULT IM: CPT | Performed by: FAMILY MEDICINE

## 2025-01-29 NOTE — PROGRESS NOTES
Name: Mal Encarnacion      : 1965      MRN: [id number]  Encounter Provider: Awa Shepherd MD  Encounter Date: 2025   Encounter department: [facility] [place]  :  Assessment & Plan  Hepatitis A and hepatitis B vaccine administered  Seen by gastroenterolgy  Per workup not immune to HAV, and HBV     Plan:   Administer vaccines today  hep B virus vaccine at 0, 1, and 6 months to completed the series   Hep A virus at 0 and 6 months.         Orders:    omeprazole (PriLOSEC) 20 mg delayed release capsule; Take 1 capsule (20 mg total) by mouth daily before breakfast    HEPATITIS A VACCINE ADULT IM    HEPATITIS B VACCINE ADULT IM    Primary hypertension  BP Readings from Last 3 Encounters:   25 126/66   25 104/60   24 140/67     Current regiment: HCTZ 12.5 ( decreased from 25 at last cardiology visit) losartan 50 mg, and amlodipine 10 mg daily     Plan:   Will continue blister packs    Encouraged the caregivers to contact pharmacy to have medications sent directly to home so they dont miss any medications.   Continue current regimen  Patient instructed to decrease consumption of fried/process/ fast foods and increase whole food intake   Goal of 3-5 servings of vegetables per day   Minimize salt to < 2g per day   Portion control   FU in 3 months for BP          Bladder tumor  Follows with urology   s/p TURBT on 2024.  Pathology shows cystitis glandularis, per urology not clear if premalignant    Plan:  Follow-up with urology for cystoscopy in 1 year         Other hyperlipidemia  Lab Results   Component Value Date    LDLCALC 99 05/10/2024     Lab Results   Component Value Date    CHOLESTEROL 212 (H) 05/10/2024    CHOLESTEROL 174 2024    CHOLESTEROL 230 (H) 2021     Lab Results   Component Value Date    HDL 89 05/10/2024    HDL 58 2024    HDL 59 2021     Lab Results   Component Value Date    TRIG 118 05/10/2024    TRIG 58 2024     TRIG 90 09/27/2021     Lab Results   Component Value Date    NONHDLC 116 01/30/2024      At goal <100 LDL  Plan:   Continue atorvastatin 40 mg daily                Epigastric pain  Follows with gastroenterology   Continue omeprazole 40 mg daily, if stable pain may decrease to 20 mg daily     Orders:    omeprazole (PriLOSEC) 20 mg delayed release capsule; Take 1 capsule (20 mg total) by mouth daily before breakfast    Urinary frequency  Follows with urology   Improved symptoms   Continue mirabegron  25 mg daily    Plan:   Continue medication regiment         Diastolic dysfunction without heart failure      Follow-up on 5/1/2025 with cardiology          Hypertensive heart disease without heart failure  Continue current blood pressure regiment  Asymptomatic           Abnormal CT of liver  09/2024- Mildly nodular hepatic contour suggestive of cirrhosis.   Liver enzymes normal and INR and albumin normal  F0 to F1 on elastography    Plan:  Follow-up with hepatology  Repeat ultrasound elastography scheduled for/20/2025  Repeat RUQ ultrasound scheduled for 3/2025  Carrier of hematochromatosis but not having active disease           Hypokalemia  Not taking prescribed potassium pills 10 mg daily fro cardiology     Plan: recheck  BMP   Orders:    Basic metabolic panel; Future    Encounter for immunization  Fu in 1 month for hep B second dose   Orders:    HEPATITIS A VACCINE ADULT IM    HEPATITIS B VACCINE ADULT IM        BMI Counseling: Body mass index is 25.92 kg/m². The BMI is above normal. Nutrition recommendations include decreasing portion sizes. Exercise recommendations include moderate physical activity 150 minutes/week. Rationale for BMI follow-up plan is due to patient being overweight or obese.       History of Present Illness   HPI    Patient presents to the clinic for fu on BP. Patient is asymptomatic and has no acute complaints at this time.   Review of Systems   Constitutional:  Negative for activity change,  "chills and fever.   HENT:  Negative for congestion.    Respiratory:  Negative for cough, chest tightness, shortness of breath and wheezing.    Cardiovascular:  Negative for chest pain, palpitations and leg swelling.   Gastrointestinal:  Negative for abdominal pain, constipation, diarrhea, nausea and vomiting.   Endocrine: Negative for polyuria.   Genitourinary:  Negative for difficulty urinating, dysuria, hematuria and urgency.   Musculoskeletal:  Negative for back pain.   Skin:  Negative for pallor.   Neurological:  Negative for dizziness and weakness.   Psychiatric/Behavioral:  Negative for agitation.        Objective   /66 (BP Location: Right arm, Patient Position: Sitting, Cuff Size: Standard)   Pulse 88   Temp 98.3 °F (36.8 °C) (Temporal)   Resp 18   Ht 5' 1\" (1.549 m)   Wt 62.2 kg (137 lb 3.2 oz)   SpO2 97%   BMI 25.92 kg/m²      Physical Exam  Constitutional:       General: He is not in acute distress.     Appearance: Normal appearance. He is normal weight. He is not ill-appearing or toxic-appearing.   HENT:      Head: Normocephalic and atraumatic.      Right Ear: External ear normal.      Left Ear: External ear normal.      Nose: Nose normal.      Mouth/Throat:      Mouth: Mucous membranes are moist.   Eyes:      General: No scleral icterus.     Conjunctiva/sclera: Conjunctivae normal.   Cardiovascular:      Rate and Rhythm: Normal rate and regular rhythm.      Pulses: Normal pulses.      Heart sounds: Normal heart sounds.   Pulmonary:      Effort: Pulmonary effort is normal.      Breath sounds: Normal breath sounds.   Abdominal:      General: Abdomen is flat.      Tenderness: There is no abdominal tenderness.   Skin:     General: Skin is warm.      Capillary Refill: Capillary refill takes less than 2 seconds.   Neurological:      General: No focal deficit present.      Mental Status: He is alert and oriented to person, place, and time.   Psychiatric:         Mood and Affect: Mood normal. "

## 2025-01-30 ENCOUNTER — RESULTS FOLLOW-UP (OUTPATIENT)
Dept: FAMILY MEDICINE CLINIC | Facility: CLINIC | Age: 60
End: 2025-01-30

## 2025-01-30 NOTE — RESULT ENCOUNTER NOTE
Hello,     Please let this patient know that his lab from yesterday that evaluated his kidneys and electrolytes including his potassium were normal.     Thank you!     
show

## 2025-02-11 DIAGNOSIS — D49.4 BLADDER TUMOR: ICD-10-CM

## 2025-02-11 DIAGNOSIS — R10.13 EPIGASTRIC PAIN: Primary | ICD-10-CM

## 2025-02-11 RX ORDER — OMEPRAZOLE 40 MG/1
CAPSULE, DELAYED RELEASE ORAL
Qty: 30 CAPSULE | Refills: 0 | Status: SHIPPED | OUTPATIENT
Start: 2025-02-11

## 2025-02-12 RX ORDER — TAMSULOSIN HYDROCHLORIDE 0.4 MG/1
0.4 CAPSULE ORAL
Qty: 7 CAPSULE | Refills: 0 | Status: SHIPPED | OUTPATIENT
Start: 2025-02-12 | End: 2025-02-18

## 2025-02-12 RX ORDER — IBUPROFEN 600 MG/1
600 TABLET, FILM COATED ORAL EVERY 6 HOURS PRN
Qty: 30 TABLET | Refills: 0 | Status: SHIPPED | OUTPATIENT
Start: 2025-02-12 | End: 2025-02-18

## 2025-02-17 DIAGNOSIS — F10.90 ALCOHOL USE DISORDER: ICD-10-CM

## 2025-02-18 DIAGNOSIS — D49.4 BLADDER TUMOR: ICD-10-CM

## 2025-02-18 RX ORDER — IBUPROFEN 600 MG/1
600 TABLET, FILM COATED ORAL EVERY 6 HOURS PRN
Qty: 30 TABLET | Refills: 0 | Status: SHIPPED | OUTPATIENT
Start: 2025-02-18

## 2025-02-18 RX ORDER — TAMSULOSIN HYDROCHLORIDE 0.4 MG/1
0.4 CAPSULE ORAL
Qty: 7 CAPSULE | Refills: 0 | Status: SHIPPED | OUTPATIENT
Start: 2025-02-18

## 2025-02-19 RX ORDER — LANOLIN ALCOHOL/MO/W.PET/CERES
100 CREAM (GRAM) TOPICAL DAILY
Qty: 60 TABLET | Refills: 2 | Status: SHIPPED | OUTPATIENT
Start: 2025-02-19 | End: 2025-08-18

## 2025-02-28 ENCOUNTER — CLINICAL SUPPORT (OUTPATIENT)
Dept: FAMILY MEDICINE CLINIC | Facility: CLINIC | Age: 60
End: 2025-02-28

## 2025-02-28 DIAGNOSIS — Z23 ENCOUNTER FOR IMMUNIZATION: Primary | ICD-10-CM

## 2025-02-28 PROCEDURE — 90746 HEPB VACCINE 3 DOSE ADULT IM: CPT

## 2025-02-28 PROCEDURE — G0010 ADMIN HEPATITIS B VACCINE: HCPCS

## 2025-03-16 DIAGNOSIS — F10.90 ALCOHOL USE DISORDER: ICD-10-CM

## 2025-03-18 ENCOUNTER — TELEPHONE (OUTPATIENT)
Age: 60
End: 2025-03-18

## 2025-03-18 DIAGNOSIS — D49.4 BLADDER TUMOR: ICD-10-CM

## 2025-03-18 RX ORDER — IBUPROFEN 600 MG/1
600 TABLET, FILM COATED ORAL EVERY 6 HOURS PRN
Qty: 30 TABLET | Refills: 0 | Status: SHIPPED | OUTPATIENT
Start: 2025-03-18 | End: 2025-03-25

## 2025-03-18 RX ORDER — TAMSULOSIN HYDROCHLORIDE 0.4 MG/1
0.4 CAPSULE ORAL
Qty: 7 CAPSULE | Refills: 0 | Status: SHIPPED | OUTPATIENT
Start: 2025-03-18 | End: 2025-03-25

## 2025-03-18 RX ORDER — LANOLIN ALCOHOL/MO/W.PET/CERES
100 CREAM (GRAM) TOPICAL DAILY
Qty: 60 TABLET | Refills: 2 | Status: SHIPPED | OUTPATIENT
Start: 2025-03-18 | End: 2025-09-14

## 2025-03-18 NOTE — TELEPHONE ENCOUNTER
Pt under care of:  Dr Ghohs   Office Location: [place]    Last Seen (include Follow Up recommendations of last visit- see Office Visit - Instructions): 1/6/25 and recommended to  Return for cystoscopy in 1 year.     Pt calling due to: Pt caregiver calling to schedule 1 yr f/u. Advised that provider's schedule is not open that far out yet. Pt caregiver understood and will call back in a few months to schedule.

## 2025-03-20 ENCOUNTER — HOSPITAL ENCOUNTER (OUTPATIENT)
Dept: ULTRASOUND IMAGING | Facility: HOSPITAL | Age: 60
Discharge: HOME | End: 2025-03-20
Attending: STUDENT IN AN ORGANIZED HEALTH CARE EDUCATION/TRAINING PROGRAM
Payer: MEDICARE

## 2025-03-20 DIAGNOSIS — K74.69 OTHER CIRRHOSIS OF LIVER (HCC): ICD-10-CM

## 2025-03-20 PROCEDURE — 76981 USE PARENCHYMA: CPT

## 2025-03-20 PROCEDURE — 76705 ECHO EXAM OF ABDOMEN: CPT

## 2025-03-25 DIAGNOSIS — D49.4 BLADDER TUMOR: ICD-10-CM

## 2025-03-25 RX ORDER — TAMSULOSIN HYDROCHLORIDE 0.4 MG/1
0.4 CAPSULE ORAL
Qty: 7 CAPSULE | Refills: 0 | Status: SHIPPED | OUTPATIENT
Start: 2025-03-25

## 2025-03-25 RX ORDER — IBUPROFEN 600 MG/1
600 TABLET, FILM COATED ORAL EVERY 6 HOURS PRN
Qty: 30 TABLET | Refills: 0 | Status: SHIPPED | OUTPATIENT
Start: 2025-03-25

## 2025-03-27 ENCOUNTER — TELEPHONE (OUTPATIENT)
Age: 60
End: 2025-03-27

## 2025-03-27 NOTE — TELEPHONE ENCOUNTER
LVM for patient's Caregiver and Mother regarding change in time for appt tomorrow with Dr. Landa. Patient appt moved to 10:30.

## 2025-03-27 NOTE — PROGRESS NOTES
Name: Mal Encarnacion      : 1965      MRN: [id number]  Encounter Provider: Grazyna Landa MD  Encounter Date: 3/28/2025   Encounter department: [facility] [address]  :  Assessment & Plan  NAFLD (nonalcoholic fatty liver disease)  60M with congenital deafness, HTN, HLD, and overweight body habitus who presents for follow up for fatty liver disease. He is feeling well from liver and GI standpoint and has no acute concerns.     His last elastography showed F2 fibrosis. FIB-4 was indeterminate. LFTs normal when last checked. Thrombocytopenia improved. Will check ELF and if elevated and consistent with fibrosis, will start on Rezdiffra.     We discussed the MOA, side effects, monitoring of LFT [id number], and repeat elastography following 12 months of treatment. We discussed that he should continue working on lifestyle modifications including weight loss, healthy dietary choices, and increasing physical activity as able. He filled out paperwork for the medication at today's visit and can be started pending ELF results.   Orders:    Enhanced Liver Fibrosis (ELF) Score; Future    Thrombocytopenia (HCC)  Improved on last labs, continue to monitor intermittently        RTC 6 months     History of Present Illness   HPI  Mal Encarnacion is a 60 y.o. male with HTN, HLD, and congenital deafness who presents for follow up.     He was last seen in hep clinic 2024 by Dr. Landa for concerns of cirrhosis based on CT showing cirrhotic liver morphology. Elastography showed F0-F1 but repeat showing F2 fibrosis and S1 steatosis. Serologic workup was negative for competing causes. Mild elevation in ferritin to 400s. 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 Normal LFT in 2024. Elevated but indeterminate FIB-4 2.22.     He reports feeling better. Needed increase in diuretics? Had gas. No abdominal pain. Bms a little soft, otherwise 'perfect.'  No edema, abdominal bloating, jaundice.      History obtained from: patient using [employer] interpretor     Review of Systems  Medical History Reviewed by provider this encounter:     .     Objective   There were no vitals taken for this visit.     Physical Exam  Vitals reviewed.   Eyes:      Conjunctiva/sclera: Conjunctivae normal.   Pulmonary:      Effort: Pulmonary effort is normal. No respiratory distress.   Abdominal:      General: There is no distension.      Palpations: Abdomen is soft.   Skin:     General: Skin is warm and dry.   Neurological:      Mental Status: He is alert.           Fibrosis-4 (FIB-4) Score is: 2.22    Indication for testing Absence of advanced fibrosis Presence of advanced fibrosis Indeterminate result   NAFLD <1.30 >2.67 1.30-2.67   Hepatitis C <1.45 >3.25 1.45-3.25   Hepatitis B <1.00 >2.65 1.00-2.65     FIB-4 Score Component Values:  Component Value Date   Age: 60 y.o.     AST: 25 U/L 9/19/2024   Platelet: 164 Thousands/uL 12/3/2024   ALT: 17 U/L 9/19/2024

## 2025-03-28 ENCOUNTER — OFFICE VISIT (OUTPATIENT)
Age: 60
End: 2025-03-28

## 2025-03-28 VITALS
WEIGHT: 138.4 LBS | DIASTOLIC BLOOD PRESSURE: 60 MMHG | HEART RATE: 72 BPM | HEIGHT: 61 IN | SYSTOLIC BLOOD PRESSURE: 122 MMHG | BODY MASS INDEX: 26.13 KG/M2

## 2025-03-28 DIAGNOSIS — K74.02 ADVANCED HEPATIC FIBROSIS: ICD-10-CM

## 2025-03-28 DIAGNOSIS — D69.6 THROMBOCYTOPENIA (HCC): ICD-10-CM

## 2025-03-28 DIAGNOSIS — K76.0 METABOLIC DYSFUNCTION-ASSOCIATED STEATOTIC LIVER DISEASE (MASLD): ICD-10-CM

## 2025-03-28 DIAGNOSIS — K76.0 NAFLD (NONALCOHOLIC FATTY LIVER DISEASE): Primary | ICD-10-CM

## 2025-03-31 RX ORDER — RESMETIROM 80 MG/1
80 TABLET, COATED ORAL DAILY
Qty: 90 TABLET | Refills: 3 | Status: SHIPPED | OUTPATIENT
Start: 2025-03-31 | End: 2025-04-03 | Stop reason: SDUPTHER

## 2025-04-01 ENCOUNTER — TELEPHONE (OUTPATIENT)
Age: 60
End: 2025-04-01

## 2025-04-02 NOTE — TELEPHONE ENCOUNTER
PA for Rezdiffra 80 mg    SUBMITTED to [facility]    via    [x]CMM-KEY: [id number]     Waiting for next steps/questions to complete prior auth.

## 2025-04-03 RX ORDER — RESMETIROM 80 MG/1
80 TABLET, COATED ORAL DAILY
Qty: 90 TABLET | Refills: 3 | Status: SHIPPED | OUTPATIENT
Start: 2025-04-03 | End: 2026-04-03

## 2025-04-03 NOTE — TELEPHONE ENCOUNTER
PA for Rezdiffra 80 mg    SUBMITTED to [employer]    via    [x]CMM-KEY: (Key: [id number]  PA Case ID #: [id number]      All office notes, labs and other pertaining documents and studies sent. Clinical questions answered. Awaiting determination from insurance company.     Turnaround time for your insurance to make a decision on your Prior Authorization can take 7-21 business days.

## 2025-04-03 NOTE — TELEPHONE ENCOUNTER
TALON Encarnacion APPROVED     Date(s) approved 4/3/2026    This medication is a possible high dollar medication. The patient has not been contacted regarding the decision as the office clinical team will handle advising the patient and if/any patient assistance that may have been started.  Thank you.      Approval letter scanned into [employer] Yes

## 2025-04-08 DIAGNOSIS — D49.4 BLADDER TUMOR: ICD-10-CM

## 2025-04-08 RX ORDER — IBUPROFEN 600 MG/1
600 TABLET, FILM COATED ORAL EVERY 6 HOURS PRN
Qty: 30 TABLET | Refills: 0 | Status: SHIPPED | OUTPATIENT
Start: 2025-04-08 | End: 2025-04-11

## 2025-04-11 DIAGNOSIS — D49.4 BLADDER TUMOR: ICD-10-CM

## 2025-04-11 RX ORDER — IBUPROFEN 600 MG/1
600 TABLET, FILM COATED ORAL EVERY 6 HOURS PRN
Qty: 30 TABLET | Refills: 0 | Status: SHIPPED | OUTPATIENT
Start: 2025-04-11 | End: 2025-04-15

## 2025-04-14 DIAGNOSIS — I10 PRIMARY HYPERTENSION: ICD-10-CM

## 2025-04-15 DIAGNOSIS — D49.4 BLADDER TUMOR: ICD-10-CM

## 2025-04-15 RX ORDER — HYDROCHLOROTHIAZIDE 12.5 MG/1
12.5 TABLET ORAL DAILY
Qty: 90 TABLET | Refills: 1 | Status: SHIPPED | OUTPATIENT
Start: 2025-04-15

## 2025-04-15 RX ORDER — IBUPROFEN 600 MG/1
600 TABLET, FILM COATED ORAL EVERY 6 HOURS PRN
Qty: 30 TABLET | Refills: 0 | Status: SHIPPED | OUTPATIENT
Start: 2025-04-15

## 2025-04-15 RX ORDER — TAMSULOSIN HYDROCHLORIDE 0.4 MG/1
0.4 CAPSULE ORAL
Qty: 7 CAPSULE | Refills: 0 | Status: SHIPPED | OUTPATIENT
Start: 2025-04-15

## 2025-04-16 ENCOUNTER — TRANSCRIBE ORDERS (OUTPATIENT)
Age: 60
End: 2025-04-16

## 2025-04-28 DIAGNOSIS — D49.4 BLADDER TUMOR: ICD-10-CM

## 2025-04-29 RX ORDER — IBUPROFEN 600 MG/1
600 TABLET, FILM COATED ORAL EVERY 6 HOURS PRN
Qty: 30 TABLET | Refills: 0 | Status: SHIPPED | OUTPATIENT
Start: 2025-04-29

## 2025-04-30 PROBLEM — K76.0 NAFLD (NONALCOHOLIC FATTY LIVER DISEASE): Status: ACTIVE | Noted: 2025-04-30

## 2025-04-30 NOTE — ASSESSMENT & PLAN NOTE
Seen by gastroenterology on 3/28 2025 and was due to start Rezdiffra d/t significant fibrosis with monitoring of LFTS q 3 months and repeat elastography in 1 yr.     Patient unable to get rezzdiffra. Provided letter from insurance that the medication was authorized     Plan:   Instructed patient to reach out to pharmacy concerning dispensing medication, as it seems they were trying to reach him, however were unable to.   Contact GI in regards to medication.

## 2025-04-30 NOTE — ASSESSMENT & PLAN NOTE
BP Readings from Last 3 Encounters:   05/01/25 138/70   03/28/25 122/60   01/29/25 126/66     Blood pressure controlled though borderline   Current regiment: HCTZ 12.5 ( decreased from 25 at last cardiology visit) losartan 50 mg, and amlodipine 10 mg daily     Plan:   Take BP log once daily, if bp >150/90 for 3 readings please come to the office to discuss and possible medication adjustment   Will continue blister packs    Continue current regimen  Patient instructed to decrease consumption of fried/process/ fast foods and increase whole food intake   Goal of 3-5 servings of vegetables per day   Minimize salt to < 2g per day   Portion control   FU in 3 months for BP

## 2025-05-01 ENCOUNTER — OFFICE VISIT (OUTPATIENT)
Dept: FAMILY MEDICINE CLINIC | Facility: CLINIC | Age: 60
End: 2025-05-01

## 2025-05-01 VITALS
BODY MASS INDEX: 25.68 KG/M2 | HEIGHT: 61 IN | HEART RATE: 85 BPM | RESPIRATION RATE: 16 BRPM | DIASTOLIC BLOOD PRESSURE: 70 MMHG | WEIGHT: 136 LBS | OXYGEN SATURATION: 98 % | TEMPERATURE: 98.5 F | SYSTOLIC BLOOD PRESSURE: 138 MMHG

## 2025-05-01 DIAGNOSIS — K76.0 NAFLD (NONALCOHOLIC FATTY LIVER DISEASE): ICD-10-CM

## 2025-05-01 DIAGNOSIS — I10 PRIMARY HYPERTENSION: Primary | ICD-10-CM

## 2025-05-01 DIAGNOSIS — E78.49 OTHER HYPERLIPIDEMIA: ICD-10-CM

## 2025-05-01 DIAGNOSIS — D49.4 BLADDER TUMOR: ICD-10-CM

## 2025-05-01 NOTE — PATIENT INSTRUCTIONS
Take blood pressure daily and bring log to next visit   If you experience any BP  >150/90 for 3 consecutive days please call the office to make a sooner appointment   If you experience chest pain, SOB, blurry vision  with high blood pressure go to ED.

## 2025-05-01 NOTE — PROGRESS NOTES
Name: Mal Encarnacion      : 1965      MRN: [id number]  Encounter Provider: Awa Shepherd MD  Encounter Date: 2025   Encounter department: [facility]  :  Assessment & Plan  Primary hypertension  BP Readings from Last 3 Encounters:   25 138/70   25 122/60   25 126/66     Blood pressure controlled though borderline   Current regiment: HCTZ 12.5 ( decreased from 25 at last cardiology visit) losartan 50 mg, and amlodipine 10 mg daily     Plan:   Take BP log once daily, if bp >150/90 for 3 readings please come to the office to discuss and possible medication adjustment   Will continue blister packs    Continue current regimen  Patient instructed to decrease consumption of fried/process/ fast foods and increase whole food intake   Goal of 3-5 servings of vegetables per day   Minimize salt to < 2g per day   Portion control   FU in 3 months for BP            NAFLD (nonalcoholic fatty liver disease)  Seen by gastroenterology on 3/28 2025 and was due to start Rezdiffra d/t significant fibrosis with monitoring of LFTS q 3 months and repeat elastography in 1 yr.     Patient unable to get rezzdiffra. Provided letter from insurance that the medication was authorized     Plan:   Instructed patient to reach out to pharmacy concerning dispensing medication, as it seems they were trying to reach him, however were unable to.   Contact GI in regards to medication.              Bladder tumor  Follows with urology   s/p TURBT on 2024.  Pathology shows cystitis glandularis, per urology not clear if premalignant    Plan:  Follow-up with urology for cystoscopy in 1 year           Other hyperlipidemia  Lab Results   Component Value Date    LDLCALC 99 05/10/2024     Lab Results   Component Value Date    CHOLESTEROL 212 (H) 05/10/2024    CHOLESTEROL 174 2024    CHOLESTEROL 230 (H) 2021     Lab Results   Component Value Date    HDL 89 05/10/2024    HDL 58  "01/30/2024    HDL 59 09/27/2021     Lab Results   Component Value Date    TRIG 118 05/10/2024    TRIG 58 01/30/2024    TRIG 90 09/27/2021     Lab Results   Component Value Date    NONHDLC 116 01/30/2024      At goal <100 LDL  Plan:   Continue atorvastatin 40 mg daily                         History of Present Illness     Mal Encarnacion 60 y.o. with past medical history significant for HTN, CAD, NAFLD, congenital deafness, , bladder tumor, comes to the clinic for FU on HTN. Patient has no subjective complaints today and feels very well.     Hypertension  Pertinent negatives include no chest pain, headaches, palpitations or shortness of breath.     Review of Systems   Constitutional:  Negative for appetite change, chills and fever.   Respiratory:  Negative for shortness of breath.    Cardiovascular:  Negative for chest pain and palpitations.   Gastrointestinal:  Negative for abdominal pain, constipation, diarrhea, nausea and vomiting.   Genitourinary:  Negative for dysuria and hematuria.   Neurological:  Negative for dizziness, numbness and headaches.   Psychiatric/Behavioral:  Negative for agitation and decreased concentration.        Objective   /70   Pulse 85   Temp 98.5 °F (36.9 °C) (Temporal)   Resp 16   Ht 5' 1\" (1.549 m)   Wt 61.7 kg (136 lb)   SpO2 98%   BMI 25.70 kg/m²      Physical Exam  Constitutional:       General: He is not in acute distress.     Appearance: Normal appearance. He is normal weight. He is not ill-appearing or toxic-appearing.   HENT:      Head: Normocephalic and atraumatic.      Right Ear: External ear normal.      Left Ear: External ear normal.      Nose: Nose normal.      Mouth/Throat:      Mouth: Mucous membranes are moist.   Eyes:      General: No scleral icterus.     Conjunctiva/sclera: Conjunctivae normal.   Cardiovascular:      Rate and Rhythm: Normal rate and regular rhythm.      Pulses: Normal pulses.      Heart sounds: Normal heart sounds.   Pulmonary:      Effort: " Pulmonary effort is normal.      Breath sounds: Normal breath sounds.   Abdominal:      General: Abdomen is flat.      Tenderness: There is no abdominal tenderness.   Skin:     General: Skin is warm.      Capillary Refill: Capillary refill takes less than 2 seconds.   Neurological:      General: No focal deficit present.      Mental Status: He is alert and oriented to person, place, and time.   Psychiatric:         Mood and Affect: Mood normal.

## 2025-05-06 DIAGNOSIS — I10 PRIMARY HYPERTENSION: ICD-10-CM

## 2025-05-06 DIAGNOSIS — D49.4 BLADDER TUMOR: ICD-10-CM

## 2025-05-06 RX ORDER — LOSARTAN POTASSIUM 50 MG/1
50 TABLET ORAL DAILY
Qty: 60 TABLET | Refills: 2 | Status: SHIPPED | OUTPATIENT
Start: 2025-05-06

## 2025-05-07 RX ORDER — IBUPROFEN 600 MG/1
600 TABLET, FILM COATED ORAL EVERY 6 HOURS PRN
Qty: 30 TABLET | Refills: 0 | OUTPATIENT
Start: 2025-05-07

## 2025-05-15 DIAGNOSIS — K74.02 ADVANCED HEPATIC FIBROSIS: ICD-10-CM

## 2025-05-15 DIAGNOSIS — K76.0 METABOLIC DYSFUNCTION-ASSOCIATED STEATOTIC LIVER DISEASE (MASLD): ICD-10-CM

## 2025-05-16 RX ORDER — RESMETIROM 80 MG/1
80 TABLET, COATED ORAL DAILY
Qty: 90 TABLET | Refills: 3 | Status: SHIPPED | OUTPATIENT
Start: 2025-05-16

## 2025-05-28 DIAGNOSIS — Z92.29 HEPATITIS A AND HEPATITIS B VACCINE ADMINISTERED: ICD-10-CM

## 2025-05-28 DIAGNOSIS — R10.13 EPIGASTRIC PAIN: ICD-10-CM

## 2025-05-28 DIAGNOSIS — D49.4 BLADDER TUMOR: ICD-10-CM

## 2025-05-29 RX ORDER — OMEPRAZOLE 20 MG/1
20 CAPSULE, DELAYED RELEASE ORAL
Qty: 30 CAPSULE | Refills: 5 | Status: SHIPPED | OUTPATIENT
Start: 2025-05-29 | End: 2025-11-25

## 2025-05-29 RX ORDER — IBUPROFEN 600 MG/1
600 TABLET, FILM COATED ORAL EVERY 6 HOURS PRN
Qty: 30 TABLET | Refills: 0 | Status: SHIPPED | OUTPATIENT
Start: 2025-05-29 | End: 2025-06-05

## 2025-06-05 DIAGNOSIS — D49.4 BLADDER TUMOR: ICD-10-CM

## 2025-06-05 RX ORDER — IBUPROFEN 600 MG/1
600 TABLET, FILM COATED ORAL EVERY 6 HOURS PRN
Qty: 30 TABLET | Refills: 5 | Status: SHIPPED | OUTPATIENT
Start: 2025-06-05

## 2025-06-11 ENCOUNTER — OFFICE VISIT (OUTPATIENT)
Dept: CARDIOLOGY CLINIC | Facility: CLINIC | Age: 60
End: 2025-06-11
Payer: MEDICARE

## 2025-06-11 VITALS
BODY MASS INDEX: 25.49 KG/M2 | HEART RATE: 80 BPM | DIASTOLIC BLOOD PRESSURE: 56 MMHG | SYSTOLIC BLOOD PRESSURE: 100 MMHG | HEIGHT: 61 IN | WEIGHT: 135 LBS

## 2025-06-11 DIAGNOSIS — I11.9 HYPERTENSIVE HEART DISEASE WITHOUT HEART FAILURE: ICD-10-CM

## 2025-06-11 DIAGNOSIS — I10 PRIMARY HYPERTENSION: ICD-10-CM

## 2025-06-11 DIAGNOSIS — I25.10 CORONARY ARTERY DISEASE INVOLVING NATIVE CORONARY ARTERY OF NATIVE HEART WITHOUT ANGINA PECTORIS: Primary | ICD-10-CM

## 2025-06-11 DIAGNOSIS — I51.7 LEFT VENTRICULAR HYPERTROPHY: ICD-10-CM

## 2025-06-11 PROCEDURE — 99214 OFFICE O/P EST MOD 30 MIN: CPT | Performed by: INTERNAL MEDICINE

## 2025-06-11 PROCEDURE — G2211 COMPLEX E/M VISIT ADD ON: HCPCS | Performed by: INTERNAL MEDICINE

## 2025-06-11 NOTE — PROGRESS NOTES
Name: Mal Encarnacion      : 1965      MRN: [id number]  Encounter Provider: Smooth Alicea MD  Encounter Date: 2025   Encounter department: [facility] CARDIOLOGY [place]      DIAGNOSES:  1.  Hypertensive heart disease with mild concentric left ventricular hypertrophy, without heart failure  2.  Non-obstructive coronary artery disease (50% proximal LAD stenosis and 40% D1 stenosis 3/16/2024)  3. Grade 1 diastolic dysfunction  4. Non MI troponin elevation  5. Dyslipidemia  6.  History of junctional bradycardia.  7. Deaf and Mute    Zio XT extended Holter monitor report:  -- Patient was monitored for 11 days and 18 hours between 2024 and 8/3/2024.  Indication for monitoring was detection of bradycardia.  -- Patient had a min HR of 43 bpm, max HR of 173 bpm, and avg HR of 74 bpm.  -- Predominant underlying rhythm was Sinus Rhythm.  Atrioventricular and interventricular conduction was normal.  -- There was no occurrence of ventricular tachycardia or atrial fibrillation or supraventricular tachycardia or clinically significant pause events or Mobitz type II or higher degree AV block.  No junctional rhythm was detected.  -- Isolated SVEs were rare (<1.0%), and no SVE Couplets or SVE Triplets were present.  -- Isolated VEs were occasional (1.7%, [id number]), and no VE Couplets or VE Triplets were present. Ventricular Bigeminy and Trigeminy were present.  -- There were 7 triggered events and 0 diary entries.  Triggered events correlated with sinus rhythm and rare premature ventricular contraction.    Zio XT extended Holter monitor report:  -- Patient was monitored for 11 days and 18 hours between 2024 and 8/3/2024.  Indication for monitoring for symptomatic bradycardia.  -- Patient had a min HR of 43 bpm, max HR of 173 bpm, and avg HR of 74 bpm.  -- Predominant underlying rhythm was Sinus Rhythm.  Atrioventricular and interventricular conduction was normal.  -- There was no occurrence of ventricular  tachycardia or atrial fibrillation or clinically significant pause events or Mobitz type II or higher degree AV block.  -- Isolated SVEs were rare (<1.0%), and no SVE Couplets or SVE Triplets were present.  -- Isolated VEs were occasional (1.7%, [id number]), and no VE Couplets or VE Triplets were present. Ventricular Bigeminy and Trigeminy were present.  -- There were 7 triggered events and 0 diary entries.  Triggered events correlated with artifact.     ECHOCARDIOGRAM  September 28, 2021:  1. Mild concentric left ventricular hypertrophy, normal left ventricular systolic function, grade 1 diastolic dysfunction. EF around 56%.  2. Normal right ventricular size and systolic function.  3. Normal left and right atrial size.  4. Trace aortic valve regurgitation.  5. Trace mitral, tricuspid and pulmonic valve regurgitation.  6. No obvious pulmonary hypertension.  7. No pericardial effusion.    CARDIAC CATHETERIZATION 3/16/2024:  Left main: LAD: Angiographically normal.  LAD: Mild diffuse disease with calcification.  50% proximal LAD stenosis moderately calcified.  40% diagonal stenosis.  No disease reported in LCx or RCA.    ECHOCARDIOGRAM 3/17/2024:  Moderately increased left ventricular wall thickness, moderate concentric LVH, low normal left ventricular systolic function with EF 50 to 5%.  Grade 1 diastolic dysfunction.  Normal RV size and function.  Normal left and right atrial Size.  Normal aortic valve, mild aortic valve regurgitation.  Normal mitral valve, trace mitral valve regurgitation.  No tricuspid valve regurgitation, trace pulmonic valve regurgitation.  No obvious pulmonary hypertension.  No pericardial effusion.    Assessment & Plan  Non-obstructive coronary artery disease (50% proximal LAD stenosis and 40% D1 stenosis 3/16/2024)  Has been overall doing well with no recent symptoms suggestive of angina.  Blood pressure is well-controlled and on the lower side.  Lipids are borderline elevated.  Is on moderate  intensity statin therapy.    Will continue current medications.  Should have follow-up lipid profile.  If lipids remain high then ezetimibe 10 mg daily should be added to the regimen.       Primary hypertension  Blood pressure is well-controlled and on the lower side.  For now we will continue current medications.  If he experiences dizziness or lightheadedness amlodipine can be reduced to 5 mg daily.       Left ventricular hypertrophy  Moderate left ventricular hypertrophy by echocardiogram in 2024.  No symptoms of heart failure.  Will consider repeating echocardiogram in 2026.       Hypertensive heart disease without heart failure  Evaluation and plan as outlined above.           History of Present Illness   HPI  Mal Encarnacion is a 60 y.o. male who presents  for follow-up regarding his cardiac comorbidities which include: History of chest pains, hypertensive heart disease without heart failure, nonobstructive coronary artery disease, dyslipidemia and other comorbidities.     History obtained from: patient As patient primarily speaks Georgian,  encounter is performed with use of [employer]  telephone translation service (  [id number] )    Patient is here today accompanied with his caregiving home health aide.  Speaking to the  patient reports that since last visit he has had no new diagnoses or hospitalizations or significant illnesses.  From symptom perspective he has been overall feeling well.  He denies any unusual chest pain or shortness of breath or lightheadedness or palpitations.  Denies any orthopnea or PND or worsening pedal edema.    Functional capacity status: Moderate   (Excellent- >10 METs; Good: (7-10 METs); Moderate (4-7 METs); Poor (<= 4 METs)     Any chronic stressors: None   (feeling of poor health, financial problems, and social isolation etc).     Tobacco or alcohol dependence: Denies any smoking or alcohol use.  Okay     Current cardiac medications: Aspirin 81 mg daily  "atorvastatin 40 mg daily amlodipine 10 mg daily HCTZ 25 mg daily losartan 50 mg daily meclizine    Last blood work is from 1/29/2025 and shows sodium 139 potassium 4.0 chloride 98 bicarb 29 BUN 17 creatinine 0.70   Lipid profile 5/10/2024 shows   HDL 89 Calc LDL 99  CBC 12/3/2024 hemoglobin 14.9 hematocrit 43.2 platelet count 164  TSH 1.229 on 3/16/2024  Hemoglobin A1c 5.4 on  5/10/2024    Review of Systems   Constitutional: Negative.    HENT: Negative.     Eyes: Negative.    Respiratory: Negative.  Negative for shortness of breath.    Cardiovascular: Negative.  Negative for chest pain, palpitations and leg swelling.   Gastrointestinal: Negative.    Musculoskeletal: Negative.    Neurological:  Negative for dizziness, syncope and light-headedness.   Psychiatric/Behavioral: Negative.     All other systems reviewed and are negative.         Objective   /56   Pulse 80   Ht 5' 1\" (1.549 m)   Wt 61.2 kg (135 lb)   BMI 25.51 kg/m²      Physical Exam  Vitals reviewed.   Constitutional:       Appearance: He is normal weight.      Comments: Hearing aids present   HENT:      Head: Normocephalic.      Comments: Hearing aids  Neck:      Vascular: No carotid bruit.     Cardiovascular:      Rate and Rhythm: Normal rate and regular rhythm.   Pulmonary:      Effort: Pulmonary effort is normal.      Breath sounds: Normal breath sounds.   Abdominal:      Palpations: Abdomen is soft.     Musculoskeletal:      Right lower leg: No edema.      Left lower leg: No edema.     Skin:     General: Skin is warm and dry.     Neurological:      Mental Status: He is alert. Mental status is at baseline.     Psychiatric:         Behavior: Behavior normal.           "

## 2025-06-13 NOTE — ASSESSMENT & PLAN NOTE
Moderate left ventricular hypertrophy by echocardiogram in 2024.  No symptoms of heart failure.  Will consider repeating echocardiogram in 2026.

## 2025-06-13 NOTE — ASSESSMENT & PLAN NOTE
Has been overall doing well with no recent symptoms suggestive of angina.  Blood pressure is well-controlled and on the lower side.  Lipids are borderline elevated.  Is on moderate intensity statin therapy.    Will continue current medications.  Should have follow-up lipid profile.  If lipids remain high then ezetimibe 10 mg daily should be added to the regimen.

## 2025-06-13 NOTE — ASSESSMENT & PLAN NOTE
Blood pressure is well-controlled and on the lower side.  For now we will continue current medications.  If he experiences dizziness or lightheadedness amlodipine can be reduced to 5 mg daily.

## 2025-06-13 NOTE — PATIENT INSTRUCTIONS
Assessment & Plan  Non-obstructive coronary artery disease (50% proximal LAD stenosis and 40% D1 stenosis 3/16/2024)  Has been overall doing well with no recent symptoms suggestive of angina.  Blood pressure is well-controlled and on the lower side.  Lipids are borderline elevated.  Is on moderate intensity statin therapy.    Will continue current medications.  Should have follow-up lipid profile.  If lipids remain high then ezetimibe 10 mg daily should be added to the regimen.       Primary hypertension  Blood pressure is well-controlled and on the lower side.  For now we will continue current medications.  If he experiences dizziness or lightheadedness amlodipine can be reduced to 5 mg daily.       Left ventricular hypertrophy  Moderate left ventricular hypertrophy by echocardiogram in 2024.  No symptoms of heart failure.  Will consider repeating echocardiogram in 2026.       Hypertensive heart disease without heart failure  Evaluation and plan as outlined above.

## 2025-06-16 DIAGNOSIS — I25.10 CAD (CORONARY ARTERY DISEASE): ICD-10-CM

## 2025-06-16 RX ORDER — ASPIRIN 81 MG/1
81 TABLET, CHEWABLE ORAL DAILY
Qty: 60 TABLET | Refills: 2 | Status: SHIPPED | OUTPATIENT
Start: 2025-06-16 | End: 2025-12-13

## 2025-06-29 DIAGNOSIS — I10 PRIMARY HYPERTENSION: ICD-10-CM

## 2025-06-30 DIAGNOSIS — D49.4 BLADDER TUMOR: ICD-10-CM

## 2025-06-30 RX ORDER — HYDROCHLOROTHIAZIDE 12.5 MG/1
12.5 TABLET ORAL DAILY
Qty: 90 TABLET | Refills: 1 | OUTPATIENT
Start: 2025-06-30

## 2025-07-01 RX ORDER — IBUPROFEN 600 MG/1
600 TABLET, FILM COATED ORAL EVERY 6 HOURS PRN
Qty: 30 TABLET | Refills: 5 | Status: SHIPPED | OUTPATIENT
Start: 2025-07-01

## 2025-08-04 DIAGNOSIS — I25.10 CAD (CORONARY ARTERY DISEASE): ICD-10-CM

## 2025-08-04 DIAGNOSIS — F10.90 ALCOHOL USE DISORDER: ICD-10-CM

## 2025-08-04 DIAGNOSIS — I10 PRIMARY HYPERTENSION: ICD-10-CM

## 2025-08-04 RX ORDER — ATORVASTATIN CALCIUM 40 MG/1
40 TABLET, FILM COATED ORAL
Qty: 60 TABLET | Refills: 2 | Status: SHIPPED | OUTPATIENT
Start: 2025-08-04 | End: 2026-01-31

## 2025-08-04 RX ORDER — FOLIC ACID 1 MG/1
1000 TABLET ORAL DAILY
Qty: 60 TABLET | Refills: 2 | Status: SHIPPED | OUTPATIENT
Start: 2025-08-04

## 2025-08-04 RX ORDER — AMLODIPINE BESYLATE 10 MG/1
10 TABLET ORAL DAILY
Qty: 60 TABLET | Refills: 2 | Status: SHIPPED | OUTPATIENT
Start: 2025-08-04

## 2025-08-07 DIAGNOSIS — I25.10 CAD (CORONARY ARTERY DISEASE): ICD-10-CM

## 2025-08-07 RX ORDER — ASPIRIN 81 MG/1
81 TABLET, CHEWABLE ORAL DAILY
Qty: 60 TABLET | Refills: 2 | OUTPATIENT
Start: 2025-08-07 | End: 2026-02-03

## 2025-08-07 RX ORDER — ASPIRIN 81 MG/1
81 TABLET, CHEWABLE ORAL DAILY
Qty: 60 TABLET | Refills: 2 | Status: SHIPPED | OUTPATIENT
Start: 2025-08-07 | End: 2026-02-03

## (undated) DEVICE — PREMIUM DRY TRAY LF: Brand: MEDLINE INDUSTRIES, INC.

## (undated) DEVICE — GLIDESHEATH BASIC HYDROPHILIC COATED INTRODUCER SHEATH: Brand: GLIDESHEATH

## (undated) DEVICE — EXIDINE 4 PCT

## (undated) DEVICE — CATH GUIDE LAUNCHER 6FR EBU 3.5

## (undated) DEVICE — DGW .035 FC J3MM 260CM TEF: Brand: EMERALD

## (undated) DEVICE — INVIEW CLEAR LEGGINGS: Brand: CONVERTORS

## (undated) DEVICE — CATH DIAG 5FR IMPULSE 110CM 6S PIG 145 ANG

## (undated) DEVICE — TUBING SUCTION 5MM X 12 FT

## (undated) DEVICE — GUIDEWIRE WHOLEY .035 145 CM FLOP STR TIP

## (undated) DEVICE — TR BAND RADIAL ARTERY COMPRESSION DEVICE: Brand: TR BAND

## (undated) DEVICE — 4-PORT MANIFOLD: Brand: NEPTUNE 2

## (undated) DEVICE — LUBRICANT JELLY SURGILUBE TUBE 2OZ FLIP TOP

## (undated) DEVICE — GLIDESHEATH SLENDER STAINLESS STEEL KIT: Brand: GLIDESHEATH SLENDER

## (undated) DEVICE — SCD SEQUENTIAL COMPRESSION COMFORT SLEEVE MEDIUM KNEE LENGTH: Brand: KENDALL SCD

## (undated) DEVICE — PACK TUR

## (undated) DEVICE — DECANTER: Brand: UNBRANDED

## (undated) DEVICE — BASIC SINGLE BASIN-LF: Brand: MEDLINE INDUSTRIES, INC.

## (undated) DEVICE — HF-RESECTION ELECTRODE PLASMALOOP LOOP, MEDIUM, 24 FR., 12°-30°, ESG TURIS: Brand: OLYMPUS

## (undated) DEVICE — UROLOGIC DRAIN BAG: Brand: UNBRANDED